# Patient Record
Sex: FEMALE | Race: WHITE | NOT HISPANIC OR LATINO | Employment: FULL TIME | URBAN - METROPOLITAN AREA
[De-identification: names, ages, dates, MRNs, and addresses within clinical notes are randomized per-mention and may not be internally consistent; named-entity substitution may affect disease eponyms.]

---

## 2017-05-25 ENCOUNTER — TELEPHONE (OUTPATIENT)
Dept: FAMILY MEDICINE | Facility: CLINIC | Age: 57
End: 2017-05-25

## 2017-05-25 ENCOUNTER — OFFICE VISIT (OUTPATIENT)
Dept: FAMILY MEDICINE | Facility: CLINIC | Age: 57
End: 2017-05-25
Payer: COMMERCIAL

## 2017-05-25 VITALS
WEIGHT: 145 LBS | OXYGEN SATURATION: 96 % | DIASTOLIC BLOOD PRESSURE: 82 MMHG | HEART RATE: 102 BPM | BODY MASS INDEX: 25.69 KG/M2 | SYSTOLIC BLOOD PRESSURE: 122 MMHG | TEMPERATURE: 98.5 F | HEIGHT: 63 IN

## 2017-05-25 DIAGNOSIS — L72.3 INFECTED SEBACEOUS CYST: Primary | ICD-10-CM

## 2017-05-25 DIAGNOSIS — L08.9 INFECTED SEBACEOUS CYST: Primary | ICD-10-CM

## 2017-05-25 PROCEDURE — 99213 OFFICE O/P EST LOW 20 MIN: CPT | Performed by: PHYSICIAN ASSISTANT

## 2017-05-25 RX ORDER — CEPHALEXIN 500 MG/1
500 CAPSULE ORAL 4 TIMES DAILY
Qty: 40 CAPSULE | Refills: 0 | Status: SHIPPED | OUTPATIENT
Start: 2017-05-25 | End: 2017-06-04

## 2017-05-25 NOTE — PATIENT INSTRUCTIONS
Sebaceous Cyst, Infected (Antibiotic Treatment)  A sebaceous cyst occurs when the opening of an oil gland in the skin becomes blocked. The gland swells with skin oil and dead skin cells. As it gets bigger, it looks like a small, painless lump under the skin. Sometimes the cyst can get infected. When the infection is limited, it can be treated with antibiotics alone. If the infection does not clear up with antibiotic treatment, the cyst will need to be drained by making a small incision using local anesthesia.  A sebaceous cyst is a term that most commonly refers to 2 types of cysts:  epidermoid  (from skin), and  pilar  (from hair follicles). Here is some information about these cysts:    A cyst is a sac filled with material that is often cheesy, fatty, oily, or fibrous material. The material in them can be thick (like cottage cheese) or liquid.    Sebaceous cysts form slowly under the skin, and can be found on most parts of the body. They are most commonly found in hairier areas like the scalp, face, upper back, and genitals.    You can usually move the cyst slightly if you try.    The cysts can be smaller than a pea or as large as a couple of inches.    The cysts are usually not painful, unless they become inflamed or infected.    The area around the cyst may smell bad. If the cyst breaks open, the material inside it often smells bad as well.  Treatment  While these cysts often go away without any treatment, they can get infected. If they drain by themselves, they may return. Resist the temptation to squeeze, pop, stick a needle in it or cut it open. This often leads to an infection and scarring. If the cyst gets severely inflamed or infected, you should seek medical treatment.  When the infection is early, and the size and location are limited, it can sometimes be treated with antibiotics alone. If the infection does not clear up with antibiotics and warm compresses, the cyst will need to be drained by making a  small incision using local anesthesia.  Home care  The following guidelines will help you care for your wound at home:    Again, resist the temptation to squeeze, pop, stick a needle in it, or cut it open; this often leads to a worsening infection and scarring.    Take the antibiotic as directed until it is all used up.    Soak the involved area in hot water or apply hot packs (small towel soaked in hot water) for 20 minutes at a time 3 to 4 times a day.    Apply antibiotic cream or ointment 3 times a day.    You may use acetaminophen or ibuprofen to control pain, unless another medicine was given. If you have chronic liver or kidney disease or ever had a stomach ulcer or GI bleeding, talk with your doctor before using these medicines.  Preventing future infection  Once this infection has healed, observe the following to reduce the risk of future infections:    Keep the area of the cyst clean by bathing or showering daily.    Avoid tight fitting clothing in the area of the cyst.  Follow-up care  Follow up with your doctor as advised by our staff. If a gauze packing was inserted in your wound, it should be removed in 1-2 days. Check your wound every day for the signs listed below.  When to seek medical care  Get prompt medical attention if any of the following occur:    Pus coming from the cyst    Increasing redness around the wound    Increasing local pain or swelling    Fever of 100.4 F (38 C) or higher, or as directed by your health care provider    3297-9484 [employer]. [address] [place] [address], [place] [address]. All rights reserved. This information is not intended as a substitute for professional medical care. Always follow your healthcare professional's instructions.

## 2017-05-25 NOTE — MR AVS SNAPSHOT
After Visit Summary   5/25/2017    Dulce Ma    MRN: [id number]           Patient Information     Date Of Birth          1960        Visit Information        Provider Department      5/25/2017 3:20 PM Tiana Krishna PA-C [facility]        Today's Diagnoses     Infected sebaceous cyst    -  1      Care Instructions      Sebaceous Cyst, Infected (Antibiotic Treatment)  A sebaceous cyst occurs when the opening of an oil gland in the skin becomes blocked. The gland swells with skin oil and dead skin cells. As it gets bigger, it looks like a small, painless lump under the skin. Sometimes the cyst can get infected. When the infection is limited, it can be treated with antibiotics alone. If the infection does not clear up with antibiotic treatment, the cyst will need to be drained by making a small incision using local anesthesia.  A sebaceous cyst is a term that most commonly refers to 2 types of cysts:  epidermoid  (from skin), and  pilar  (from hair follicles). Here is some information about these cysts:    A cyst is a sac filled with material that is often cheesy, fatty, oily, or fibrous material. The material in them can be thick (like cottage cheese) or liquid.    Sebaceous cysts form slowly under the skin, and can be found on most parts of the body. They are most commonly found in hairier areas like the scalp, face, upper back, and genitals.    You can usually move the cyst slightly if you try.    The cysts can be smaller than a pea or as large as a couple of inches.    The cysts are usually not painful, unless they become inflamed or infected.    The area around the cyst may smell bad. If the cyst breaks open, the material inside it often smells bad as well.  Treatment  While these cysts often go away without any treatment, they can get infected. If they drain by themselves, they may return. Resist the temptation to squeeze, pop, stick a needle in it or cut it open.  This often leads to an infection and scarring. If the cyst gets severely inflamed or infected, you should seek medical treatment.  When the infection is early, and the size and location are limited, it can sometimes be treated with antibiotics alone. If the infection does not clear up with antibiotics and warm compresses, the cyst will need to be drained by making a small incision using local anesthesia.  Home care  The following guidelines will help you care for your wound at home:    Again, resist the temptation to squeeze, pop, stick a needle in it, or cut it open; this often leads to a worsening infection and scarring.    Take the antibiotic as directed until it is all used up.    Soak the involved area in hot water or apply hot packs (small towel soaked in hot water) for 20 minutes at a time 3 to 4 times a day.    Apply antibiotic cream or ointment 3 times a day.    You may use acetaminophen or ibuprofen to control pain, unless another medicine was given. If you have chronic liver or kidney disease or ever had a stomach ulcer or GI bleeding, talk with your doctor before using these medicines.  Preventing future infection  Once this infection has healed, observe the following to reduce the risk of future infections:    Keep the area of the cyst clean by bathing or showering daily.    Avoid tight fitting clothing in the area of the cyst.  Follow-up care  Follow up with your doctor as advised by our staff. If a gauze packing was inserted in your wound, it should be removed in 1-2 days. Check your wound every day for the signs listed below.  When to seek medical care  Get prompt medical attention if any of the following occur:    Pus coming from the cyst    Increasing redness around the wound    Increasing local pain or swelling    Fever of 100.4 F (38 C) or higher, or as directed by your health care provider    2597-2036 [employer]. [address] [place] [address], [place] [address]. All rights reserved.  "This information is not intended as a substitute for professional medical care. Always follow your healthcare professional's instructions.                Follow-ups after your visit        Who to contact     If you have questions or need follow up information about today's clinic visit or your schedule please contact [facility] directly at [phone].  Normal or non-critical lab and imaging results will be communicated to you by MyChart, letter or phone within 4 business days after the clinic has received the results. If you do not hear from us within 7 days, please contact the clinic through [employer] or phone. If you have a critical or abnormal lab result, we will notify you by phone as soon as possible.  Submit refill requests through [email or web address] or call your pharmacy and they will forward the refill request to us. Please allow 3 business days for your refill to be completed.          Additional Information About Your Visit        MyChart Information     [email or web address] gives you secure access to your electronic health record. If you see a primary care provider, you can also send messages to your care team and make appointments. If you have questions, please call your primary care clinic.  If you do not have a primary care provider, please call [phone] and they will assist you.        Care EveryWhere ID     This is your Care EveryWhere ID. This could be used by other organizations to access your [place] medical records  [id number]        Your Vitals Were     Pulse Temperature Height Last Period Pulse Oximetry BMI (Body Mass Index)    102 98.5  F (36.9  C) (Tympanic) 5' 3\" (1.6 m) 10/31/2011 96% 25.69 kg/m2       Blood Pressure from Last 3 Encounters:   05/25/17 122/82   07/22/16 128/78   07/20/15 124/74    Weight from Last 3 Encounters:   05/25/17 145 lb (65.8 kg)   07/22/16 144 lb 6 oz (65.5 kg)   07/20/15 143 lb (64.9 kg)              Today, you had the following     No orders found for display    "      Today's Medication Changes          These changes are accurate as of: 5/25/17  3:45 PM.  If you have any questions, ask your nurse or doctor.               Start taking these medicines.        Dose/Directions    cephALEXin 500 MG capsule   Commonly known as:  KEFLEX   Used for:  Infected sebaceous cyst   Started by:  Tiana Krishna PA-C        Dose:  500 mg   Take 1 capsule (500 mg) by mouth 4 times daily for 10 days   Quantity:  40 capsule   Refills:  0            Where to get your medicines      These medications were sent to [facility] - [address] [address]     Phone:  [phone]     cephALEXin 500 MG capsule                Primary Care Provider Office Phone # Fax #    Alida Lamb [phone]       [address] [address]        Thank you!     Thank you for choosing [facility]  for your care. Our goal is always to provide you with excellent care. Hearing back from our patients is one way we can continue to improve our services. Please take a few minutes to complete the written survey that you may receive in the mail after your visit with us. Thank you!             Your Updated Medication List - Protect others around you: Learn how to safely use, store and throw away your medicines at www.disposemymeds.org.          This list is accurate as of: 5/25/17  3:45 PM.  Always use your most recent med list.                   Brand Name Dispense Instructions for use    aspirin 81 MG tablet     100 tablet    Take 1 tablet by mouth daily.       cephALEXin 500 MG capsule    KEFLEX    40 capsule    Take 1 capsule (500 mg) by mouth 4 times daily for 10 days       clobetasol 0.05 % cream    TEMOVATE         furosemide 20 MG tablet    LASIX    20 tablet    Take 0.5 tablets (10 mg) by mouth daily As needed for leg swelling        hydrocortisone 2.5 % cream     60 g    Apply to vulvar tissue and just inside vaginal opening 1-2x/day       latanoprost 0.005 % ophthalmic solution    XALATAN    1 Bottle    At Bedtime. For recent Dx of glaucoma       * lisinopril 5 MG tablet    PRINIVIL/ZESTRIL    90 tablet    Take 1 tablet (5 mg) by mouth daily       * lisinopril 5 MG tablet    PRINIVIL/ZESTRIL    90 tablet    TAKE 1 TABLET DAILY (DUE FOR YEARLY PHYSICAL FOR FUTURE REFILLS)       Multi-vitamin Tabs tablet     100 tablet    Take 1 tablet by mouth daily.       timolol 0.5 % ophthalmic solution    TIMOPTIC    1 Bottle    Place 1 drop into both eyes daily In the a.m. for Dx of glaucoma       * Notice:  This list has 2 medication(s) that are the same as other medications prescribed for you. Read the directions carefully, and ask your doctor or other care provider to review them with you.

## 2017-05-25 NOTE — TELEPHONE ENCOUNTER
Patient is concerned about a bug bite.  She does not remember getting stung.   It is not red, not itchy, not hot.    She noticed it Monday and it has gotten bigger and bigger every day.   On lower back by spine.  Not painful. Very hard, not squishy. She can't tell if it is movable.  Hard for her to view it.   She is outside a lot so she thinks it is a bug bite.  She has a tick earlier this week also.   ? If lump, cyst or bug bite.    She has not tried warm packs.   She is concerned and would like to be seen.      appt today with Tiana at 3:20  Bailey Hazel RN- Triage [employer]

## 2017-05-25 NOTE — PROGRESS NOTES
SUBJECTIVE:                                                    Dulce Ma is a 57 year old female who presents to clinic today for the following health issues:    Qianp  Dulce presents to clinic today with chief complaint of lump on low back. Onset of symptoms became noticeable ~4 days ago. Describes large hard lump on right low back that has been increasing in size since onset. Denies pain, warmth, redness, or drainage. Per patient she thinks lump is from a bug bite. Lump initially began as the size of a pea and has increased to the size of a quarter. Patient reports PMH significant for skin CA so any skin abnormalities always cause her some concern.    Problem list and histories reviewed & adjusted, as indicated.  Additional history: as documented    Patient Active Problem List   Diagnosis     Malignant melanoma of skin of trunk - Dr. Myles Lake - [facility]- rechecks w/ him 1x/year     Encounter for routine gynecological examination - [facility] - Dr. Isabela Perez      Essential hypertension with goal blood pressure less than 140/90     CARDIOVASCULAR SCREENING; LDL GOAL LESS THAN 130     Family history of heart failure- mother, MGM, maternal uncle     Chronic constipation- trying to do daily fiber therapy     Glaucoma- sees Dr. Harvey Brown - [facility] [place], [place]     Dysplastic nevi     Skin cancer, basal cell     Past Surgical History:   Procedure Laterality Date     C NONSPECIFIC PROCEDURE  3/02    malignant melanoma removed from abdomen with negative nodes-Dr. Acosta -surgeon       Social History   Substance Use Topics     Smoking status: Never Smoker     Smokeless tobacco: Never Used     Alcohol use Yes      Comment: occ.     Family History   Problem Relation Age of Onset     Hypertension Mother      HEART DISEASE Mother      bypass     Hypertension Father      Hypertension Paternal Grandmother      Eye Disorder Maternal Grandmother      glacoma     Eye Disorder  Maternal Grandfather      mendez         Current Outpatient Prescriptions   Medication Sig Dispense Refill     cephALEXin (KEFLEX) 500 MG capsule Take 1 capsule (500 mg) by mouth 4 times daily for 10 days 40 capsule 0     lisinopril (PRINIVIL,ZESTRIL) 5 MG tablet Take 1 tablet (5 mg) by mouth daily 90 tablet 3     furosemide (LASIX) 20 MG tablet Take 0.5 tablets (10 mg) by mouth daily As needed for leg swelling 20 tablet 1     clobetasol (TEMOVATE) 0.05 % cream        timolol (TIMOPTIC) 0.5 % ophthalmic solution Place 1 drop into both eyes daily In the a.m. for Dx of glaucoma 1 Bottle      hydrocortisone 2.5 % cream Apply to vulvar tissue and just inside vaginal opening 1-2x/day 60 g 3     multivitamin, therapeutic with minerals (MULTI-VITAMIN) TABS Take 1 tablet by mouth daily. 100 tablet 3     latanoprost (XALATAN) 0.005 % ophthalmic solution At Bedtime. For recent Dx of glaucoma 1 Bottle 12     aspirin 81 MG tablet Take 1 tablet by mouth daily. 100 tablet 3     [DISCONTINUED] lisinopril (PRINIVIL,ZESTRIL) 5 MG tablet TAKE 1 TABLET DAILY (DUE FOR YEARLY PHYSICAL FOR FUTURE REFILLS) 90 tablet 3     Allergies   Allergen Reactions     Amoxicillin      hives     Benzoyl Peroxide      swelling     Ibuprofen      over long duration dev. hives     Penicillins      hives       Reviewed and updated as needed this visit by clinical staff  Tobacco  Allergies  Meds  Problems  Med Hx  Surg Hx  Fam Hx  Soc Hx        Reviewed and updated as needed this visit by Provider  Tobacco  Allergies  Meds  Problems  Med Hx  Surg Hx  Fam Hx  Soc Hx          ROS:  Constitutional, HEENT, cardiovascular, pulmonary, GI, , musculoskeletal, neuro, skin, endocrine and psych systems are negative, except as otherwise noted.    This document serves as a record of the services and decisions personally performed and made by Tiana Krishna PA-C. It was created on her behalf by Nusrat Lisa, a trained medical scribe. The creation of this  "document is based the provider's statements to the medical scribe.  Nusrat Lisa, May 25, 2017 3:43 PM    OBJECTIVE:                                                    /82  Pulse 102  Temp 98.5  F (36.9  C) (Tympanic)  Ht 5' 3\" (1.6 m)  Wt 145 lb (65.8 kg)  LMP 10/31/2011  SpO2 96%  BMI 25.69 kg/m2  Body mass index is 25.69 kg/(m^2).     GENERAL: healthy, alert and no distress  MS: well circumscribes area of tension at base of right low back measuring 2.5 cm in diameter and active drainage with compression - drained a small amount with compression today - foul smelling discharge.  NEURO: Normal strength and tone, mentation intact and speech normal  PSYCH: mentation appears normal, affect normal/bright    Diagnostic Test Results:  none      ASSESSMENT/PLAN:                                                    Dulce was seen today for insect bites.    Diagnoses and all orders for this visit:    Infected sebaceous cyst  Advised patient to do warm compresses BID for at least the next 5 days. Begin taking cephalexin QID for 10 days. If symptoms not improving RTC for I&D   -     cephALEXin (KEFLEX) 500 MG capsule; Take 1 capsule (500 mg) by mouth 4 times daily for 10 days     The information in this document, created by the medical scribe for me, accurately reflects the services I personally performed and the decisions made by me. I have reviewed and approved this document for accuracy prior to leaving the patient care area .  Tiana Krishna PA-C May 25, 2017 3:42 PM    Tiana Krishna PA-C  [facility]  "

## 2017-05-25 NOTE — TELEPHONE ENCOUNTER
Reason for Call:  Other - call back    Detailed comments: Patient calling stating she was bit by a bug a couple of days ago.  It seems to be getting larger and there is a lump on her back.  She would like to talk with the nurse regarding whether she should be seen or not.    Phone Number Patient can be reached at: Cell number on file:    Telephone Information:   Mobile [phone]       Best Time: any    Can we leave a detailed message on this number? YES    Call taken on 5/25/2017 at 8:23 AM by Li Morris

## 2017-05-25 NOTE — NURSING NOTE
"Chief Complaint   Patient presents with     Insect Bites     right lower back bug bite ~4 days        Initial /82  Pulse 102  Temp 98.5  F (36.9  C) (Tympanic)  Ht 5' 3\" (1.6 m)  Wt 145 lb (65.8 kg)  LMP 10/31/2011  SpO2 96%  BMI 25.69 kg/m2 Estimated body mass index is 25.69 kg/(m^2) as calculated from the following:    Height as of this encounter: 5' 3\" (1.6 m).    Weight as of this encounter: 145 lb (65.8 kg).  Medication Reconciliation: complete   Debbie Scherer, LEONILA      "

## 2017-06-03 ENCOUNTER — HEALTH MAINTENANCE LETTER (OUTPATIENT)
Age: 57
End: 2017-06-03

## 2017-08-03 ENCOUNTER — RADIANT APPOINTMENT (OUTPATIENT)
Dept: MAMMOGRAPHY | Facility: CLINIC | Age: 57
End: 2017-08-03
Payer: COMMERCIAL

## 2017-08-03 DIAGNOSIS — Z12.31 VISIT FOR SCREENING MAMMOGRAM: ICD-10-CM

## 2017-08-03 PROCEDURE — G0202 SCR MAMMO BI INCL CAD: HCPCS | Mod: TC

## 2017-09-15 ENCOUNTER — OFFICE VISIT (OUTPATIENT)
Dept: FAMILY MEDICINE | Facility: CLINIC | Age: 57
End: 2017-09-15
Payer: COMMERCIAL

## 2017-09-15 VITALS
OXYGEN SATURATION: 100 % | HEART RATE: 66 BPM | HEIGHT: 63 IN | BODY MASS INDEX: 25.55 KG/M2 | TEMPERATURE: 98.2 F | SYSTOLIC BLOOD PRESSURE: 138 MMHG | DIASTOLIC BLOOD PRESSURE: 90 MMHG | WEIGHT: 144.2 LBS

## 2017-09-15 DIAGNOSIS — I10 ESSENTIAL HYPERTENSION WITH GOAL BLOOD PRESSURE LESS THAN 140/90: ICD-10-CM

## 2017-09-15 DIAGNOSIS — Z00.01 ENCOUNTER FOR ROUTINE ADULT MEDICAL EXAM WITH ABNORMAL FINDINGS: Primary | ICD-10-CM

## 2017-09-15 DIAGNOSIS — Z23 NEED FOR PROPHYLACTIC VACCINATION AND INOCULATION AGAINST INFLUENZA: ICD-10-CM

## 2017-09-15 DIAGNOSIS — Z13.6 CARDIOVASCULAR SCREENING; LDL GOAL LESS THAN 130: ICD-10-CM

## 2017-09-15 DIAGNOSIS — Z12.11 SCREEN FOR COLON CANCER: ICD-10-CM

## 2017-09-15 DIAGNOSIS — R60.0 BILATERAL LEG EDEMA: ICD-10-CM

## 2017-09-15 DIAGNOSIS — Z23 NEED FOR SHINGLES VACCINE: ICD-10-CM

## 2017-09-15 DIAGNOSIS — N90.4 LICHEN SCLEROSUS ET ATROPHICUS OF THE VULVA: ICD-10-CM

## 2017-09-15 PROCEDURE — 99396 PREV VISIT EST AGE 40-64: CPT | Performed by: FAMILY MEDICINE

## 2017-09-15 PROCEDURE — 82043 UR ALBUMIN QUANTITATIVE: CPT | Performed by: FAMILY MEDICINE

## 2017-09-15 RX ORDER — LISINOPRIL 5 MG/1
5 TABLET ORAL DAILY
Qty: 90 TABLET | Refills: 3 | Status: SHIPPED | OUTPATIENT
Start: 2017-09-15 | End: 2018-11-21

## 2017-09-15 RX ORDER — CLOBETASOL PROPIONATE 0.5 MG/G
CREAM TOPICAL
Qty: 45 G | Refills: 1 | Status: SHIPPED | OUTPATIENT
Start: 2017-09-15 | End: 2021-11-22

## 2017-09-15 RX ORDER — FUROSEMIDE 20 MG
10 TABLET ORAL DAILY
Qty: 20 TABLET | Refills: 1 | Status: SHIPPED | OUTPATIENT
Start: 2017-09-15 | End: 2019-01-11

## 2017-09-15 ASSESSMENT — ANXIETY QUESTIONNAIRES
5. BEING SO RESTLESS THAT IT IS HARD TO SIT STILL: NOT AT ALL
GAD7 TOTAL SCORE: 1
2. NOT BEING ABLE TO STOP OR CONTROL WORRYING: NOT AT ALL
7. FEELING AFRAID AS IF SOMETHING AWFUL MIGHT HAPPEN: NOT AT ALL
3. WORRYING TOO MUCH ABOUT DIFFERENT THINGS: NOT AT ALL
1. FEELING NERVOUS, ANXIOUS, OR ON EDGE: NOT AT ALL
IF YOU CHECKED OFF ANY PROBLEMS ON THIS QUESTIONNAIRE, HOW DIFFICULT HAVE THESE PROBLEMS MADE IT FOR YOU TO DO YOUR WORK, TAKE CARE OF THINGS AT HOME, OR GET ALONG WITH OTHER PEOPLE: NOT DIFFICULT AT ALL
6. BECOMING EASILY ANNOYED OR IRRITABLE: NOT AT ALL

## 2017-09-15 ASSESSMENT — PATIENT HEALTH QUESTIONNAIRE - PHQ9
SUM OF ALL RESPONSES TO PHQ QUESTIONS 1-9: 0
5. POOR APPETITE OR OVEREATING: SEVERAL DAYS

## 2017-09-15 NOTE — PROGRESS NOTES
SUBJECTIVE:   CC: Dulce Ma is an 57 year old woman who presents for preventive health visit.     Healthy Habits:    Do you get at least three servings of calcium containing foods daily (dairy, green leafy vegetables, etc.)? yes    Amount of exercise or daily activities, outside of work: horse chores    Problems taking medications regularly No    Medication side effects: No    Have you had an eye exam in the past two years? yes    Do you see a dentist twice per year? No - once per year    Do you have sleep apnea, excessive snoring or daytime drowsiness?no      Hypertension Follow-up:       Outpatient blood pressures are not being checked.    Low Salt Diet: no added salt    BP Readings from Last 5 Encounters:   09/15/17 138/90   05/25/17 122/82   07/22/16 128/78   07/20/15 124/74   07/11/14 120/86       Today's PHQ-2 Score:   PHQ-2 ( 1999 [employer]) 9/15/2017 7/22/2016   Q1: Little interest or pleasure in doing things 0 0   Q2: Feeling down, depressed or hopeless 0 0   PHQ-2 Score 0 0       Abuse: Current or Past(Physical, Sexual or Emotional)- No  Do you feel safe in your environment - Yes      Social History   Substance Use Topics     Smoking status: Never Smoker     Smokeless tobacco: Never Used     Alcohol use Yes      Comment: occ.     The patient does not drink >3 drinks per day nor >7 drinks per week.    Reviewed orders with patient.  Reviewed health maintenance and updated orders accordingly - Yes  BP Readings from Last 3 Encounters:   09/15/17 138/90   05/25/17 122/82   07/22/16 128/78    Wt Readings from Last 3 Encounters:   09/15/17 144 lb 3.2 oz (65.4 kg)   05/25/17 145 lb (65.8 kg)   07/22/16 144 lb 6 oz (65.5 kg)               Patient Active Problem List   Diagnosis     Malignant melanoma of skin of trunk - Dr. Myles Lake - [facility]- rechecks w/ him 1x/year     Encounter for routine gynecological examination - [facility] - Dr. Isabela Perez      Essential hypertension with  goal blood pressure less than 140/90     CARDIOVASCULAR SCREENING; LDL GOAL LESS THAN 130     Family history of heart failure- mother, MGM, maternal uncle     Chronic constipation- trying to do daily fiber therapy     Glaucoma- sees Dr. Harvey Brown - [facility] - [place], [place]     Dysplastic nevi     Skin cancer, basal cell     Lichen sclerosus et atrophicus of the vulva     Past Surgical History:   Procedure Laterality Date     C NONSPECIFIC PROCEDURE  3/02    malignant melanoma removed from abdomen with negative nodes-Dr. Acosta -surgeon       Social History   Substance Use Topics     Smoking status: Never Smoker     Smokeless tobacco: Never Used     Alcohol use Yes      Comment: occ.     Family History   Problem Relation Age of Onset     Hypertension Mother      HEART DISEASE Mother      bypass     Neuropathy Mother      Hypertension Father      Hypertension Paternal Grandmother      Eye Disorder Maternal Grandmother      glacoma     Eye Disorder Maternal Grandfather      glacoma         Current Outpatient Prescriptions   Medication Sig Dispense Refill     lisinopril (PRINIVIL/ZESTRIL) 5 MG tablet Take 1 tablet (5 mg) by mouth daily 90 tablet 3     furosemide (LASIX) 20 MG tablet Take 0.5 tablets (10 mg) by mouth daily As needed for leg swelling 20 tablet 1     zoster vaccine live, PF, (ZOSTAVAX) injection Inject 0.65 mLs Subcutaneous once for 1 dose 0.65 mL 0     clobetasol (TEMOVATE) 0.05 % cream Apply very scant amount to inner labia daily for up to 14 days at a time -ok to use every 2 months or so 45 g 1     timolol (TIMOPTIC) 0.5 % ophthalmic solution Place 1 drop into both eyes daily In the a.m. for Dx of glaucoma 1 Bottle      multivitamin, therapeutic with minerals (MULTI-VITAMIN) TABS Take 1 tablet by mouth daily. 100 tablet 3     latanoprost (XALATAN) 0.005 % ophthalmic solution At Bedtime. For recent Dx of glaucoma 1 Bottle 12     [DISCONTINUED] lisinopril (PRINIVIL,ZESTRIL) 5 MG tablet Take 1  tablet (5 mg) by mouth daily 90 tablet 3     [DISCONTINUED] furosemide (LASIX) 20 MG tablet Take 0.5 tablets (10 mg) by mouth daily As needed for leg swelling 20 tablet 1     Allergies   Allergen Reactions     Amoxicillin      hives     Benzoyl Peroxide      swelling     Ibuprofen      over long duration dev. hives     Penicillins      hives     Recent Labs   Lab Test  07/22/16   0907  07/20/15   0945  05/17/13   1048   10/26/10   1010   LDL  145*  133*  122   < >  116   HDL  61  60  63   < >  64   TRIG  158*  152*  107   < >  125   ALT  16  21  17   --   19   CR  0.73  0.83  0.79   < >  0.86   GFRESTIMATED  83  72  76   < >  70   GFRESTBLACK  >90   GFR Calc    87  >90   < >  85   POTASSIUM  4.6  5.0  4.7   < >  4.4   TSH   --    --   0.96   --   1.30    < > = values in this interval not displayed.      Mammogram: Patient over age 50, mutual decision to screen reflected in health maintenance.    Ma Screening Digital Bilateral    Result Date: 8/4/2017  Narrative: SCREENING MAMMOGRAM, BILATERAL, DIGITAL w/CAD - 8/3/2017 12:56 PM BREAST SYMPTOMS: No current breast complaints. COMPARISON:  07/11/2011, 2/11/2010, 4/27/2007 . BREAST DENSITY: Heterogeneously dense. COMMENTS: No findings of suspicion for malignancy.       History of abnormal Pap smear: NO - age 30- 65 PAP every 3 years recommended    No results found for: PAP      Reviewed and updated as needed this visit by clinical staff  Tobacco  Allergies  Meds  Med Hx  Surg Hx  Fam Hx  Soc Hx        Reviewed and updated as needed this visit by Provider  Fam Hx          Wt Readings from Last 5 Encounters:   09/15/17 144 lb 3.2 oz (65.4 kg)   05/25/17 145 lb (65.8 kg)   07/22/16 144 lb 6 oz (65.5 kg)   07/20/15 143 lb (64.9 kg)   07/11/14 138 lb (62.6 kg)       ROS:  C: NEGATIVE for fever, chills, change in weight  I: NEGATIVE for worrisome rashes, moles or lesions  E: NEGATIVE for vision changes or irritation  ENT: NEGATIVE for ear, mouth and  "throat problems  R: NEGATIVE for significant cough or SOB  B: NEGATIVE for masses, tenderness or discharge  CV: NEGATIVE for chest pain, palpitations or peripheral edema  GI: NEGATIVE for nausea, abdominal pain, heartburn, or change in bowel habits  : NEGATIVE for unusual urinary or vaginal symptoms. No vaginal bleeding.  M: NEGATIVE for significant arthralgias or myalgia  N: NEGATIVE for weakness, dizziness or paresthesias  P: NEGATIVE for changes in mood or affect     OBJECTIVE:   /90 (BP Location: Left arm, Patient Position: Chair, Cuff Size: Adult Large)  Pulse 66  Temp 98.2  F (36.8  C) (Oral)  Ht 5' 2.75\" (1.594 m)  Wt 144 lb 3.2 oz (65.4 kg)  LMP 10/31/2011  SpO2 100%  BMI 25.75 kg/m2  EXAM:  GENERAL APPEARANCE: healthy, alert and no distress  EYES: Eyes grossly normal to inspection, PERRL and conjunctivae and sclerae normal  HENT: ear canals and TM's normal, nose and mouth without ulcers or lesions, oropharynx clear and oral mucous membranes moist  NECK: no adenopathy, no asymmetry, masses, or scars and thyroid normal to palpation  RESP: lungs clear to auscultation - no rales, rhonchi or wheezes  BREAST: normal without masses, tenderness or nipple discharge and no palpable axillary masses or adenopathy  CV: regular rate and rhythm, normal S1 S2, no S3 or S4, no murmur, click or rub, no peripheral edema and peripheral pulses strong  ABDOMEN: soft, nontender, no hepatosplenomegaly, no masses and bowel sounds normal   (female): normal female external genitalia with exception of very mild lichen sclerosis et atrophicus in the inferior inner labia near the introitus , normal urethral meatus, vaginal mucosal atrophy noted, normal cervix and  adnexae, and uterus without masses on bimanual exam, no abnormal discharge  MS: no musculoskeletal defects are noted and gait is age appropriate without ataxia, no pedal edema noted today.   SKIN: no suspicious lesions or rashes  NEURO: Normal strength and " "tone, sensory exam grossly normal, mentation intact and speech normal  PSYCH: mentation appears normal and affect normal/bright    ASSESSMENT/PLAN:       ICD-10-CM    1. Encounter for routine adult medical exam with abnormal findings Z00.01    2. Essential hypertension with goal blood pressure less than 140/90- fairly good control today - encouraged regular walking program  I10 Albumin Random Urine Quantitative with Creat Ratio     Comprehensive metabolic panel     CBC with platelets     lisinopril (PRINIVIL/ZESTRIL) 5 MG tablet   3. Screen for colon cancer Z12.11 GASTROENTEROLOGY ADULT REF PROCEDURE ONLY     Fecal colorectal cancer screen FIT - Future (S+30)   4. Need for prophylactic vaccination and inoculation against influenza Z23    5. CARDIOVASCULAR SCREENING; LDL GOAL LESS THAN 130 Z13.6 TSH with free T4 reflex     Lipid panel reflex to direct LDL   6. Bilateral leg edema R60.0 furosemide (LASIX) 20 MG tablet   7. Need for shingles vaccine Z23 zoster vaccine live, PF, (ZOSTAVAX) injection   8. Lichen sclerosus et atrophicus of the vulva N90.4 clobetasol (TEMOVATE) 0.05 % cream       COUNSELING:   Reviewed preventive health counseling, as reflected in patient instructions    Recheck your blood pressure in our pharmacy in 1 month or sooner if needed.  Have pharmacy send me their note.       reports that she has never smoked. She has never used smokeless tobacco.    Estimated body mass index is 25.75 kg/(m^2) as calculated from the following:    Height as of this encounter: 5' 2.75\" (1.594 m).    Weight as of this encounter: 144 lb 3.2 oz (65.4 kg).   Weight management plan: see next     Start walking for exercise - If walking outside,   - rain or shine - walk a block, then come home, next day walk   2 blocks , then come home ; and then add a block further from home daily - work up to 30-45minutes daily or 3-4x/week - or can work  up to  3-4 miles briskly daily.       If walking on treadmill or at  the mall, " "start with 5 minutes first day, then 6 minutes next day , then 7 minutes next day, then 8 minutes next day, etc.   Gradually work up to the above goals.  No stopping.         Establish an exercise regimen. Activity goal: 45 minutes 5 days a week. New exercise routine: cardiovascular workout on exercise equipment, walking and weightlifting. Diet regimen was discussed and plan is self-directed dieting: reduce calories, reduce portions, reduce processed  carbs, increase fruits/vegetables and avoid sweets and supervised diet program. Avoid artificial sweeteners and \"diet\" drinks and sodas.       Pt getting flu shot through work.   Counseling Resources:  ATP IV Guidelines  Pooled Cohorts Equation Calculator  Breast Cancer Risk Calculator  FRAX Risk Assessment  ICSI Preventive Guidelines  Dietary Guidelines for Americans, 2010  USDA's MyPlate  ASA Prophylaxis  Lung CA Screening    Alida Labm MD  [facility]  "

## 2017-09-15 NOTE — NURSING NOTE
"Chief Complaint   Patient presents with     Physical       Initial /90 (BP Location: Left arm, Patient Position: Chair, Cuff Size: Adult Large)  Pulse 66  Temp 98.2  F (36.8  C) (Oral)  Ht 5' 2.75\" (1.594 m)  Wt 144 lb 3.2 oz (65.4 kg)  LMP 10/31/2011  SpO2 100%  BMI 25.75 kg/m2 Estimated body mass index is 25.75 kg/(m^2) as calculated from the following:    Height as of this encounter: 5' 2.75\" (1.594 m).    Weight as of this encounter: 144 lb 3.2 oz (65.4 kg).  Medication Reconciliation: complete   Lisandra Mercer CMA  "

## 2017-09-15 NOTE — PATIENT INSTRUCTIONS
"Start walking for exercise - If walking outside,   - rain or shine - walk a block, then come home, next day walk   2 blocks , then come home ; and then add a block further from home daily - work up to 30-45minutes daily or 3-4x/week - or can work  up to  3-4 miles briskly daily.       If walking on treadmill or at  the mall, start with 5 minutes first day, then 6 minutes next day , then 7 minutes next day, then 8 minutes next day, etc.   Gradually work up to the above goals.  No stopping.         Establish an exercise regimen. Activity goal: 45 minutes 5 days a week. New exercise routine: cardiovascular workout on exercise equipment, walking and weightlifting. Diet regimen was discussed and plan is self-directed dieting: reduce calories, reduce portions, reduce processed  carbs, increase fruits/vegetables and avoid sweets and supervised diet program. Avoid artificial sweeteners and \"diet\" drinks and sodas.       check your blood pressure in our pharmacy in 1 month or sooner if needed.  Have pharmacy send me their note.     Preventive Health Recommendations  Female Ages 50 - 64    Yearly exam: See your health care provider every year in order to  o Review health changes.   o Discuss preventive care.    o Review your medicines if your doctor has prescribed any.      Get a Pap test every three years (unless you have an abnormal result and your provider advises testing more often).    If you get Pap tests with HPV test, you only need to test every 5 years, unless you have an abnormal result.     You do not need a Pap test if your uterus was removed (hysterectomy) and you have not had cancer.    You should be tested each year for STDs (sexually transmitted diseases) if you're at risk.     Have a mammogram every 1 to 2 years.    Have a colonoscopy at age 50, or have a yearly FIT test (stool test). These exams screen for colon cancer.      Have a cholesterol test every 5 years, or more often if advised.    Have a diabetes " test (fasting glucose) every three years. If you are at risk for diabetes, you should have this test more often.     If you are at risk for osteoporosis (brittle bone disease), think about having a bone density scan (DEXA).    Shots: Get a flu shot each year. Get a tetanus shot every 10 years.    Nutrition:     Eat at least 5 servings of fruits and vegetables each day.    Eat whole-grain bread, whole-wheat pasta and brown rice instead of white grains and rice.    Talk to your provider about Calcium and Vitamin D.     Lifestyle    Exercise at least 150 minutes a week (30 minutes a day, 5 days a week). This will help you control your weight and prevent disease.    Limit alcohol to one drink per day.    No smoking.     Wear sunscreen to prevent skin cancer.     See your dentist every six months for an exam and cleaning.    See your eye doctor every 1 to 2 years.               Thank you for choosing [facility]  for your Health Care. It was a pleasure seeing you at your visit today. Please contact us with any questions or concerns you may have.                   Alida Lamb MD                                  To reach your [facility] care team after hours call:   [phone]    Our clinic hours are:     Monday- 7:30 am - 7:00 pm                             Tuesday through Friday- 7:30 am - 5:00 pm                                        Saturday- 8:00 am - 12:00 pm                  Phone:  [phone]    Our pharmacy hours are:     Monday  8:00 am to 7:00 pm      Tuesday through Friday 8:00am to 6:00pm                        Saturday - 9:00 am to 1:00 pm      Sunday : Closed.              Phone:  [phone]      There is also information available at our web site:  [email or web address]    If your provider ordered any lab tests and you do not receive the results within 10 business days, please call the clinic.    If you need a medication refill please contact your pharmacy.   Please allow 2 business days for your refill to be completed.    Our clinic offers telephone visits and e visits.  Please ask one of your team members to explain more.      Use [facility] (secure email communication and access to your chart) to send your primary care provider a message or make an appointment. Ask someone on your Team how to sign up for [facility] [employer].

## 2017-09-15 NOTE — LETTER
[address] [address]  [phone]        November 20, 2017    Dulce Ma  [id number]  [place] [facility] [address]              Dear Dulce Ma    This is to remind you that your fasting lab work is due.    You may call our office at [phone] to schedule an appointment.    Please disregard this notice if you have already had your labs drawn or made an appointment.        Sincerely,        Alida Lamb M.D.

## 2017-09-15 NOTE — MR AVS SNAPSHOT
"              After Visit Summary   9/15/2017    Dulce Ma    MRN: [id number]           Patient Information     Date Of Birth          1960        Visit Information        Provider Department      9/15/2017 2:45 PM Alida Lamb MD [facility]        Today's Diagnoses     Encounter for routine adult medical exam with abnormal findings    -  1    Essential hypertension with goal blood pressure less than 140/90- fairly good control today - encouraged regular walking program         Screen for colon cancer        Need for prophylactic vaccination and inoculation against influenza        CARDIOVASCULAR SCREENING; LDL GOAL LESS THAN 130        Bilateral leg edema        Need for shingles vaccine        Lichen sclerosus et atrophicus of the vulva          Care Instructions    Start walking for exercise - If walking outside,   - rain or shine - walk a block, then come home, next day walk   2 blocks , then come home ; and then add a block further from home daily - work up to 30-45minutes daily or 3-4x/week - or can work  up to  3-4 miles briskly daily.       If walking on treadmill or at  the mall, start with 5 minutes first day, then 6 minutes next day , then 7 minutes next day, then 8 minutes next day, etc.   Gradually work up to the above goals.  No stopping.         Establish an exercise regimen. Activity goal: 45 minutes 5 days a week. New exercise routine: cardiovascular workout on exercise equipment, walking and weightlifting. Diet regimen was discussed and plan is self-directed dieting: reduce calories, reduce portions, reduce processed  carbs, increase fruits/vegetables and avoid sweets and supervised diet program. Avoid artificial sweeteners and \"diet\" drinks and sodas.       check your blood pressure in our pharmacy in 1 month or sooner if needed.  Have pharmacy send me their note.     Preventive Health Recommendations  Female Ages 50 - 64    Yearly exam: See your health care " provider every year in order to  o Review health changes.   o Discuss preventive care.    o Review your medicines if your doctor has prescribed any.      Get a Pap test every three years (unless you have an abnormal result and your provider advises testing more often).    If you get Pap tests with HPV test, you only need to test every 5 years, unless you have an abnormal result.     You do not need a Pap test if your uterus was removed (hysterectomy) and you have not had cancer.    You should be tested each year for STDs (sexually transmitted diseases) if you're at risk.     Have a mammogram every 1 to 2 years.    Have a colonoscopy at age 50, or have a yearly FIT test (stool test). These exams screen for colon cancer.      Have a cholesterol test every 5 years, or more often if advised.    Have a diabetes test (fasting glucose) every three years. If you are at risk for diabetes, you should have this test more often.     If you are at risk for osteoporosis (brittle bone disease), think about having a bone density scan (DEXA).    Shots: Get a flu shot each year. Get a tetanus shot every 10 years.    Nutrition:     Eat at least 5 servings of fruits and vegetables each day.    Eat whole-grain bread, whole-wheat pasta and brown rice instead of white grains and rice.    Talk to your provider about Calcium and Vitamin D.     Lifestyle    Exercise at least 150 minutes a week (30 minutes a day, 5 days a week). This will help you control your weight and prevent disease.    Limit alcohol to one drink per day.    No smoking.     Wear sunscreen to prevent skin cancer.     See your dentist every six months for an exam and cleaning.    See your eye doctor every 1 to 2 years.               Thank you for choosing [facility]  for your Health Care. It was a pleasure seeing you at your visit today. Please contact us with any questions or concerns you may have.                   Alida Lamb MD                                   To reach your [facility] care team after hours call:   [phone]    Our clinic hours are:     Monday- 7:30 am - 7:00 pm                             Tuesday through Friday- 7:30 am - 5:00 pm                                        Saturday- 8:00 am - 12:00 pm                  Phone:  [phone]    Our pharmacy hours are:     Monday  8:00 am to 7:00 pm      Tuesday through Friday 8:00am to 6:00pm                        Saturday - 9:00 am to 1:00 pm      Sunday : Closed.              Phone:  [phone]      There is also information available at our web site:  [email or web address]    If your provider ordered any lab tests and you do not receive the results within 10 business days, please call the clinic.    If you need a medication refill please contact your pharmacy.  Please allow 2 business days for your refill to be completed.    Our clinic offers telephone visits and e visits.  Please ask one of your team members to explain more.      Use Insightfulinc (secure email communication and access to your chart) to send your primary care provider a message or make an appointment. Ask someone on your Team how to sign up for Insightfulinc.                         Follow-ups after your visit        Additional Services     GASTROENTEROLOGY ADULT REF PROCEDURE ONLY       Last Lab Result: Creatinine (mg/dL)       Date                     Value                 07/22/2016               0.73             ----------  There is no height or weight on file to calculate BMI.     Needed:  No  Language:  English    Patient will be contacted to schedule procedure.     Please be aware that coverage of these services is subject to the terms and limitations of your health insurance plan.  Call member services at your health plan with any benefit or coverage questions.  Any procedures must be performed at a [facility] facility OR coordinated by your clinic's referral office.    Please bring the  following with you to your appointment:    (1) Any X-Rays, CTs or MRIs which have been performed.  Contact the facility where they were done to arrange for  prior to your scheduled appointment.    (2) List of current medications   (3) This referral request   (4) Any documents/labs given to you for this referral                  Future tests that were ordered for you today     Open Future Orders        Priority Expected Expires Ordered    Fecal colorectal cancer screen FIT - Future (S+30) Routine 10/6/2017 10/15/2017 9/15/2017    Comprehensive metabolic panel Routine 9/23/2017 11/15/2017 9/15/2017    CBC with platelets Routine 9/23/2017 11/15/2017 9/15/2017    TSH with free T4 reflex Routine 9/23/2017 11/15/2017 9/15/2017    Lipid panel reflex to direct LDL Routine 9/23/2017 11/15/2017 9/15/2017            Who to contact     If you have questions or need follow up information about today's clinic visit or your schedule please contact [facility] directly at [phone].  Normal or non-critical lab and imaging results will be communicated to you by My Ad Boxhart, letter or phone within 4 business days after the clinic has received the results. If you do not hear from us within 7 days, please contact the clinic through OpenEdt or phone. If you have a critical or abnormal lab result, we will notify you by phone as soon as possible.  Submit refill requests through NEMOPTIC or call your pharmacy and they will forward the refill request to us. Please allow 3 business days for your refill to be completed.          Additional Information About Your Visit        NEMOPTIC Information     NEMOPTIC gives you secure access to your electronic health record. If you see a primary care provider, you can also send messages to your care team and make appointments. If you have questions, please call your primary care clinic.  If you do not have a primary care provider, please call [phone] and they will assist you.    "     Care EveryWhere ID     This is your Care EveryWhere ID. This could be used by other organizations to access your [facility] medical records  [id number]        Your Vitals Were     Pulse Temperature Height Last Period Pulse Oximetry BMI (Body Mass Index)    66 98.2  F (36.8  C) (Oral) 5' 2.75\" (1.594 m) 10/31/2011 100% 25.75 kg/m2       Blood Pressure from Last 3 Encounters:   09/15/17 138/90   05/25/17 122/82   07/22/16 128/78    Weight from Last 3 Encounters:   09/15/17 144 lb 3.2 oz (65.4 kg)   05/25/17 145 lb (65.8 kg)   07/22/16 144 lb 6 oz (65.5 kg)              We Performed the Following     Albumin Random Urine Quantitative with Creat Ratio     GASTROENTEROLOGY ADULT REF PROCEDURE ONLY          Today's Medication Changes          These changes are accurate as of: 9/15/17  4:17 PM.  If you have any questions, ask your nurse or doctor.               Start taking these medicines.        Dose/Directions    zoster vaccine live (PF) injection   Commonly known as:  ZOSTAVAX   Used for:  Need for shingles vaccine   Started by:  Alida Lamb MD        Dose:  1 each   Inject 0.65 mLs Subcutaneous once for 1 dose   Quantity:  0.65 mL   Refills:  0         These medicines have changed or have updated prescriptions.        Dose/Directions    clobetasol 0.05 % cream   Commonly known as:  TEMOVATE   This may have changed:    - when to take this  - reasons to take this  - additional instructions   Used for:  Lichen sclerosus et atrophicus of the vulva   Changed by:  Alida Lamb MD        Apply very scant amount to inner labia daily for up to 14 days at a time -ok to use every 2 months or so   Quantity:  45 g   Refills:  1         Stop taking these medicines if you haven't already. Please contact your care team if you have questions.     aspirin 81 MG tablet   Stopped by:  Alida Lamb MD           hydrocortisone 2.5 % cream   Stopped by:  Alida Lamb MD                Where " to get your medicines      Some of these will need a paper prescription and others can be bought over the counter.  Ask your nurse if you have questions.     Bring a paper prescription for each of these medications     clobetasol 0.05 % cream    furosemide 20 MG tablet    lisinopril 5 MG tablet    zoster vaccine live (PF) injection                Primary Care Provider Office Phone # Fax #    Alida Lamb [phone]       [address] [place] [address]        Equal Access to Services     [facility] : Hadii aad ku hadasho Soomaali, waaxda luqadaha, qaybta kaalmada adeegyada, waxay matthewin haymelinan fabricio chavez . So [facility] [phone].    ATENCIÓN: Si habla español, tiene a mosher disposición servicios gratuitos de asistencia lingüística. [facility] [phone].    We comply with applicable federal civil rights laws and [place] laws. We do not discriminate on the basis of race, color, national origin, age, disability sex, sexual orientation or gender identity.            Thank you!     Thank you for choosing [facility]  for your care. Our goal is always to provide you with excellent care. Hearing back from our patients is one way we can continue to improve our services. Please take a few minutes to complete the written survey that you may receive in the mail after your visit with us. Thank you!             Your Updated Medication List - Protect others around you: Learn how to safely use, store and throw away your medicines at www.disposemymeds.org.          This list is accurate as of: 9/15/17  4:17 PM.  Always use your most recent med list.                   Brand Name Dispense Instructions for use Diagnosis    clobetasol 0.05 % cream    TEMOVATE    45 g    Apply very scant amount to inner labia daily for up to 14 days at a time -ok to use every 2 months or so    Lichen sclerosus et atrophicus of the vulva       furosemide 20 MG tablet    LASIX    20 tablet     Take 0.5 tablets (10 mg) by mouth daily As needed for leg swelling    Bilateral leg edema       latanoprost 0.005 % ophthalmic solution    XALATAN    1 Bottle    At Bedtime. For recent Dx of glaucoma        lisinopril 5 MG tablet    PRINIVIL/ZESTRIL    90 tablet    Take 1 tablet (5 mg) by mouth daily    Essential hypertension with goal blood pressure less than 140/90       Multi-vitamin Tabs tablet     100 tablet    Take 1 tablet by mouth daily.        timolol 0.5 % ophthalmic solution    TIMOPTIC    1 Bottle    Place 1 drop into both eyes daily In the a.m. for Dx of glaucoma        zoster vaccine live (PF) injection    ZOSTAVAX    0.65 mL    Inject 0.65 mLs Subcutaneous once for 1 dose    Need for shingles vaccine

## 2017-09-16 LAB
CREAT UR-MCNC: 138 MG/DL
MICROALBUMIN UR-MCNC: 8 MG/L
MICROALBUMIN/CREAT UR: 5.51 MG/G CR (ref 0–25)

## 2017-09-16 ASSESSMENT — ANXIETY QUESTIONNAIRES: GAD7 TOTAL SCORE: 1

## 2017-10-10 ENCOUNTER — MYC MEDICAL ADVICE (OUTPATIENT)
Dept: FAMILY MEDICINE | Facility: CLINIC | Age: 57
End: 2017-10-10

## 2017-12-26 ENCOUNTER — TELEPHONE (OUTPATIENT)
Dept: LAB | Facility: CLINIC | Age: 57
End: 2017-12-26

## 2018-06-22 ENCOUNTER — OFFICE VISIT (OUTPATIENT)
Dept: FAMILY MEDICINE | Facility: CLINIC | Age: 58
End: 2018-06-22
Payer: COMMERCIAL

## 2018-06-22 VITALS
OXYGEN SATURATION: 98 % | HEIGHT: 63 IN | BODY MASS INDEX: 24.52 KG/M2 | HEART RATE: 74 BPM | SYSTOLIC BLOOD PRESSURE: 110 MMHG | WEIGHT: 138.4 LBS | TEMPERATURE: 98.6 F | DIASTOLIC BLOOD PRESSURE: 78 MMHG

## 2018-06-22 DIAGNOSIS — L50.9 URTICARIA: Primary | ICD-10-CM

## 2018-06-22 DIAGNOSIS — Z12.11 SCREEN FOR COLON CANCER: ICD-10-CM

## 2018-06-22 DIAGNOSIS — C43.59 MALIGNANT MELANOMA OF SKIN OF TRUNK (H): ICD-10-CM

## 2018-06-22 PROCEDURE — 99213 OFFICE O/P EST LOW 20 MIN: CPT | Performed by: FAMILY MEDICINE

## 2018-06-22 RX ORDER — PREDNISONE 20 MG/1
TABLET ORAL
Qty: 14 TABLET | Refills: 0 | Status: SHIPPED | OUTPATIENT
Start: 2018-06-22 | End: 2019-01-11

## 2018-06-22 NOTE — MR AVS SNAPSHOT
After Visit Summary   6/22/2018    Dulce Ma    MRN: [id number]           Patient Information     Date Of Birth          1960        Visit Information        Provider Department      6/22/2018 10:00 AM Feroz Alvarez MD [facility] [place]        Today's Diagnoses     Urticaria    -  1    h/o Malignant melanoma of skin of trunk - Dr. [facility]les [place] [facility]- rechecks w/ him 1x/year          Care Instructions      Understanding Hives (Urticaria)  Urticaria (hives) are red, itchy, and swollen areas on the skin. They are most often an allergic reaction from eating a food or taking a medicine. Sometimes the cause may be unknown. A single hive can vary in size from a half inch to several inches. Hives can appear all over the body. Or they may appear on only one part of the body.  What causes hives?  Hives can be caused by food and beverages such as:    Tree nuts (almonds, walnuts, hazelnuts)    Peanuts    Eggs    Shellfish    Milk  Hives can also be caused by medicines such as:    Antibiotics, especially penicillin and sulfa-based medicines     Anticonvulsant or antiseizure medicines     Chemotherapy medicines   Other causes of hives include:    Infection or virus    Heat    Cold air or cold water    Exercise    Scratching or rubbing your skin, or wearing tight-fitting clothes that rub your skin    Being exposed to sunlight or light from a light bulb, in rare cases    Inhaled-chemicals in the environment from foods and drugs, insects, plants, or other sources  In some cases, hives may occur again and again with no specific cause.  If you have hives    Stay away from the food, drink, medicine, or other thing that may be causing the hives.    Ask your healthcare provider how to control itchy or irritated skin.    Talk with your healthcare provider right away if you think a medicine gave you hives.  Watch for anaphylaxis  If you have hives, watch for symptoms of a severe  reaction that can affect your entire body. This is called anaphylaxis. Symptoms can include swollen areas of the body, wheezing, trouble breathing or swallowing, and a hoarse voice. This reaction may happen right away. Or it may happen in an hour or more. In extreme cases, the airways from mouth to lungs may swell and make breathing difficult. This is a medical emergency. Use epinephrine medicine if you have it, and call 911 or go to the emergency room.     When to call your healthcare provider  Call your healthcare provider if:    Your hives feel uncomfortable    You have never had hives before    Your symptoms don't go away or come back    Your symptoms get worse or new symptoms develop such as:   ? Sneezing, coughing, runny or stuffy nose  ? Itching of the eyes, nose, or roof of the mouth  ? Itching, burning, stinging, or pain  ? Dry, flaky, cracking, or scaly skin  ? Red or purple spots  Call 911  Call 911 right away if you have:    Swelling in your lips, tongue, or throat, called angioedema    Drooling    Trouble breathing, talking, or swallowing    Cool, moist or pale (blue in color) skin    Fast and weak heartbeat    Wheezing or short of breath    Feeling lightheaded or confused    Diarrhea    Severe nausea or vomiting    Seizure    Feeling dizzy or weak, or a sudden drop in blood pressure   Date Last Reviewed: 4/1/2017 2000-2017 [employer]. [address] [place] [address]. All rights reserved. This information is not intended as a substitute for professional medical care. Always follow your healthcare professional's instructions.                Follow-ups after your visit        Follow-up notes from your care team     Return in about 1 week (around 6/29/2018), or if symptoms worsen or fail to improve.      Who to contact     If you have questions or need follow up information about today's clinic visit or your schedule please contact [facility] directly at  "[phone].  Normal or non-critical lab and imaging results will be communicated to you by InfoDifhart, letter or phone within 4 business days after the clinic has received the results. If you do not hear from us within 7 days, please contact the clinic through FANCRUt or phone. If you have a critical or abnormal lab result, we will notify you by phone as soon as possible.  Submit refill requests through Melon or call your pharmacy and they will forward the refill request to us. Please allow 3 business days for your refill to be completed.          Additional Information About Your Visit        InfoDifhart Information     Melon gives you secure access to your electronic health record. If you see a primary care provider, you can also send messages to your care team and make appointments. If you have questions, please call your primary care clinic.  If you do not have a primary care provider, please call [phone] and they will assist you.        Care EveryWhere ID     This is your Care EveryWhere ID. This could be used by other organizations to access your [facility] medical records  [id number]        Your Vitals Were     Pulse Temperature Height Last Period Pulse Oximetry Breastfeeding?    74 98.6  F (37  C) (Oral) 5' 3\" (1.6 m) 10/31/2011 98% No    BMI (Body Mass Index)                   24.52 kg/m2            Blood Pressure from Last 3 Encounters:   06/22/18 110/78   09/15/17 138/90   05/25/17 122/82    Weight from Last 3 Encounters:   06/22/18 138 lb 6.4 oz (62.8 kg)   09/15/17 144 lb 3.2 oz (65.4 kg)   05/25/17 145 lb (65.8 kg)              Today, you had the following     No orders found for display         Today's Medication Changes          These changes are accurate as of 6/22/18 10:57 AM.  If you have any questions, ask your nurse or doctor.               Start taking these medicines.        Dose/Directions    predniSONE 20 MG tablet   Commonly known as:  DELTASONE   Used for:  Urticaria   Started by:  " Feroz Alvarez MD        Take 2 tabs (40 mg) orally daily for 4 days, then Take 1 tab (20 mg) orally daily for 4 days, then Take 1/2 tab (10 mg) orally for 4 days   Quantity:  14 tablet   Refills:  0            Where to get your medicines      These medications were [place] [id number] [place] [address]  [address], [facility] [address]     Phone:  [phone]     predniSONE 20 MG tablet                Primary Care Provider Office Phone # Fax #    Alidatommy Lamb [phone]       [address] [facility] [address]        Equal Access to Services     [facility] : Hadii aad ku hadasho Soomaali, waaxda luqadaha, qaybta kaalmada adeegyada, waxdariela chavez . So [facility] [phone].    ATENCIÓN: Si habla español, tiene a mosher disposición servicios gratuitos de asistencia lingüística. [facility] [phone].    We comply with applicable federal civil rights laws and [place] laws. We do not discriminate on the basis of race, color, national origin, age, disability, sex, sexual orientation, or gender identity.            Thank you!     Thank you for choosing [facility]  for your care. Our goal is always to provide you with excellent care. Hearing back from our patients is one way we can continue to improve our services. Please take a few minutes to complete the written survey that you may receive in the mail after your visit with us. Thank you!             Your Updated Medication List - Protect others around you: Learn how to safely use, store and throw away your medicines at www.disposemymeds.org.          This list is accurate as of 6/22/18 10:57 AM.  Always use your most recent med list.                   Brand Name Dispense Instructions for use Diagnosis    clobetasol 0.05 % cream    TEMOVATE    45 g    Apply very scant amount to inner labia daily for up to 14 days at a time -ok to  use every 2 months or so    Lichen sclerosus et atrophicus of the vulva       furosemide 20 MG tablet    LASIX    20 tablet    Take 0.5 tablets (10 mg) by mouth daily As needed for leg swelling    Bilateral leg edema       latanoprost 0.005 % ophthalmic solution    XALATAN    1 Bottle    At Bedtime. For recent Dx of glaucoma        lisinopril 5 MG tablet    PRINIVIL/ZESTRIL    90 tablet    Take 1 tablet (5 mg) by mouth daily    Essential hypertension with goal blood pressure less than 140/90       Multi-vitamin Tabs tablet     100 tablet    Take 1 tablet by mouth daily.        predniSONE 20 MG tablet    DELTASONE    14 tablet    Take 2 tabs (40 mg) orally daily for 4 days, then Take 1 tab (20 mg) orally daily for 4 days, then Take 1/2 tab (10 mg) orally for 4 days    Urticaria       timolol 0.5 % ophthalmic solution    TIMOPTIC    1 Bottle    Place 1 drop into both eyes daily In the a.m. for Dx of glaucoma

## 2018-06-22 NOTE — PROGRESS NOTES
"  SUBJECTIVE:                                                    Dulce Ma is a 58 year old female who presents to clinic today for the following health issues:    Rash  Onset: 3 weeks    Description:   Location: all over upper body   Character: round, blotchy, red  Itching (Pruritis): YES    Progression of Symptoms:  Improving as of now    Accompanying Signs & Symptoms:  Fever: no   Body aches or joint pain: no   Sore throat symptoms: no   Recent cold symptoms: no   Pt states heat makes the rash worse     History:   Previous similar rash: no     Precipitating factors:   Exposure to similar rash: no   New exposures: None, no new products, no recent swimming in lakes   Recent travel: no     Alleviating factors:  OTC-Cortizone 10, gold bond, children's benadryl      Therapies Tried and outcome: Cortizone 10, gold bond, children's benadryl, showers     She notes getting a rash when taking ibuprofen.       Problem list and histories reviewed & adjusted, as indicated.  Additional history: as documented    ROS:  Constitutional, HEENT, cardiovascular, pulmonary, GI, , musculoskeletal, neuro, skin, endocrine and psych systems are negative, except as otherwise noted.    This document serves as a record of the services and decisions personally performed and made by Feroz Alvarez MD. It was created on his behalf by Khadijah Spicer, a trained medical scribe. The creation of this document is based on the provider's statements to the medical scribe.  Khadijah Spicer 10:42 AM 6/22/2018  OBJECTIVE:                                                    /78  Pulse 74  Temp 98.6  F (37  C) (Oral)  Ht 1.6 m (5' 3\")  Wt 62.8 kg (138 lb 6.4 oz)  LMP 10/31/2011  SpO2 98%  Breastfeeding? No  BMI 24.52 kg/m2 Body mass index is 24.52 kg/(m^2).   GENERAL: healthy, alert, well nourished, well hydrated, no distress  HENT: ear canals- normal; TMs- normal; Nose- normal; Mouth- no ulcers, no lesions  NECK: no tenderness, no " adenopathy, no asymmetry, no masses, no stiffness; thyroid- normal to palpation  RESP: lungs clear to auscultation - no rales, no rhonchi, no wheezes  CV: regular rates and rhythm, normal S1 S2, no S3 or S4 and no murmur, no click or rub -  ABDOMEN: soft, no tenderness, no  hepatosplenomegaly, no masses, normal bowel sounds  MS: extremities- no gross deformities noted, no edema  SKIN: 3-mm to 5-mm pink raised pink papules on arms, chest, abdomen and back, otherwise no suspicious lesions, no rashes    Diagnostic test results:  none      ASSESSMENT/PLAN:         Dulce was seen today for derm problem.    Diagnoses and all orders for this visit:    Urticaria - Begin prednisone taper. If symptoms persist or return, take zyrtec daily.  -     predniSONE (DELTASONE) 20 MG tablet; Take 2 tabs (40 mg) orally daily for 4 days, then Take 1 tab (20 mg) orally daily for 4 days, then Take 1/2 tab (10 mg) orally for 4 days    h/o Malignant melanoma of skin of trunk - Dr. Myles [place] - [facility]- rechecks w/ him 1x/year    Screen for colon cancer - Schedule colonoscopy.   -     GASTROENTEROLOGY ADULT REF PROCEDURE ONLY [facility] [phone]; [facility]        Risks, benefits and alternatives of treatments discussed. Plan agreed on.      Followup: Return in about 1 week (around 6/29/2018), or if symptoms worsen or fail to improve.    See patient instructions.     The information in this document, created by a scribe for me, accurately reflects the services I personally performed and the decisions made by me. I have reviewed and approved this document for accuracy.      Marco Alvarez MD   Pager: [phone]

## 2018-06-22 NOTE — PATIENT INSTRUCTIONS
Understanding Hives (Urticaria)  Urticaria (hives) are red, itchy, and swollen areas on the skin. They are most often an allergic reaction from eating a food or taking a medicine. Sometimes the cause may be unknown. A single hive can vary in size from a half inch to several inches. Hives can appear all over the body. Or they may appear on only one part of the body.  What causes hives?  Hives can be caused by food and beverages such as:    Tree nuts (almonds, walnuts, hazelnuts)    Peanuts    Eggs    Shellfish    Milk  Hives can also be caused by medicines such as:    Antibiotics, especially penicillin and sulfa-based medicines     Anticonvulsant or antiseizure medicines     Chemotherapy medicines   Other causes of hives include:    Infection or virus    Heat    Cold air or cold water    Exercise    Scratching or rubbing your skin, or wearing tight-fitting clothes that rub your skin    Being exposed to sunlight or light from a light bulb, in rare cases    Inhaled-chemicals in the environment from foods and drugs, insects, plants, or other sources  In some cases, hives may occur again and again with no specific cause.  If you have hives    Stay away from the food, drink, medicine, or other thing that may be causing the hives.    Ask your healthcare provider how to control itchy or irritated skin.    Talk with your healthcare provider right away if you think a medicine gave you hives.  Watch for anaphylaxis  If you have hives, watch for symptoms of a severe reaction that can affect your entire body. This is called anaphylaxis. Symptoms can include swollen areas of the body, wheezing, trouble breathing or swallowing, and a hoarse voice. This reaction may happen right away. Or it may happen in an hour or more. In extreme cases, the airways from mouth to lungs may swell and make breathing difficult. This is a medical emergency. Use epinephrine medicine if you have it, and call 911 or go to the emergency room.     When  to call your healthcare provider  Call your healthcare provider if:    Your hives feel uncomfortable    You have never had hives before    Your symptoms don't go away or come back    Your symptoms get worse or new symptoms develop such as:   ? Sneezing, coughing, runny or stuffy nose  ? Itching of the eyes, nose, or roof of the mouth  ? Itching, burning, stinging, or pain  ? Dry, flaky, cracking, or scaly skin  ? Red or purple spots  Call 911  Call 911 right away if you have:    Swelling in your lips, tongue, or throat, called angioedema    Drooling    Trouble breathing, talking, or swallowing    Cool, moist or pale (blue in color) skin    Fast and weak heartbeat    Wheezing or short of breath    Feeling lightheaded or confused    Diarrhea    Severe nausea or vomiting    Seizure    Feeling dizzy or weak, or a sudden drop in blood pressure   Date Last Reviewed: 4/1/2017 2000-2017 [employer]. [address] [place] [address], [place] [address]. All rights reserved. This information is not intended as a substitute for professional medical care. Always follow your healthcare professional's instructions.

## 2018-07-27 ENCOUNTER — HOSPITAL ENCOUNTER (OUTPATIENT)
Facility: CLINIC | Age: 58
Discharge: HOME | End: 2018-07-27
Attending: INTERNAL MEDICINE | Admitting: INTERNAL MEDICINE
Payer: COMMERCIAL

## 2018-07-27 VITALS
RESPIRATION RATE: 16 BRPM | HEIGHT: 63 IN | WEIGHT: 138 LBS | OXYGEN SATURATION: 94 % | DIASTOLIC BLOOD PRESSURE: 59 MMHG | BODY MASS INDEX: 24.45 KG/M2 | SYSTOLIC BLOOD PRESSURE: 117 MMHG

## 2018-07-27 LAB — COLONOSCOPY: NORMAL

## 2018-07-27 PROCEDURE — 45378 DIAGNOSTIC COLONOSCOPY: CPT | Performed by: INTERNAL MEDICINE

## 2018-07-27 PROCEDURE — G0121 COLON CA SCRN NOT HI RSK IND: HCPCS | Performed by: INTERNAL MEDICINE

## 2018-07-27 PROCEDURE — 25000128 H RX IP 250 OP 636: Performed by: INTERNAL MEDICINE

## 2018-07-27 PROCEDURE — G0500 MOD SEDAT ENDO SERVICE >5YRS: HCPCS | Performed by: INTERNAL MEDICINE

## 2018-07-27 RX ORDER — ONDANSETRON 2 MG/ML
4 INJECTION INTRAMUSCULAR; INTRAVENOUS EVERY 6 HOURS PRN
Status: DISCONTINUED | OUTPATIENT
Start: 2018-07-27 | End: 2018-07-27 | Stop reason: HOSPADM

## 2018-07-27 RX ORDER — ONDANSETRON 2 MG/ML
4 INJECTION INTRAMUSCULAR; INTRAVENOUS
Status: DISCONTINUED | OUTPATIENT
Start: 2018-07-27 | End: 2018-07-27 | Stop reason: HOSPADM

## 2018-07-27 RX ORDER — FLUMAZENIL 0.1 MG/ML
0.2 INJECTION, SOLUTION INTRAVENOUS
Status: DISCONTINUED | OUTPATIENT
Start: 2018-07-27 | End: 2018-07-27 | Stop reason: HOSPADM

## 2018-07-27 RX ORDER — FENTANYL CITRATE 50 UG/ML
INJECTION, SOLUTION INTRAMUSCULAR; INTRAVENOUS PRN
Status: DISCONTINUED | OUTPATIENT
Start: 2018-07-27 | End: 2018-07-27 | Stop reason: HOSPADM

## 2018-07-27 RX ORDER — LIDOCAINE 40 MG/G
CREAM TOPICAL
Status: DISCONTINUED | OUTPATIENT
Start: 2018-07-27 | End: 2018-07-27 | Stop reason: HOSPADM

## 2018-07-27 RX ORDER — NALOXONE HYDROCHLORIDE 0.4 MG/ML
.1-.4 INJECTION, SOLUTION INTRAMUSCULAR; INTRAVENOUS; SUBCUTANEOUS
Status: DISCONTINUED | OUTPATIENT
Start: 2018-07-27 | End: 2018-07-27 | Stop reason: HOSPADM

## 2018-07-27 RX ORDER — ONDANSETRON 4 MG/1
4 TABLET, ORALLY DISINTEGRATING ORAL EVERY 6 HOURS PRN
Status: DISCONTINUED | OUTPATIENT
Start: 2018-07-27 | End: 2018-07-27 | Stop reason: HOSPADM

## 2018-07-27 NOTE — IP AVS SNAPSHOT
MRN:[id number]                      After Visit Summary   7/27/2018    Dluce Ma    MRN: [id number]           Thank you!     Thank you for choosing [facility] for your care. Our goal is always to provide you with excellent care. Hearing back from our patients is one way we can continue to improve our services. Please take a few minutes to complete the written survey that you may receive in the mail after you visit. If you would like to speak to someone directly about your visit please contact Patient Relations at [phone]. Thank you!          Patient Information     Date Of Birth          1960        About your hospital stay     You were admitted on:  July 27, 2018 You last received care in the:  [facility]    You were discharged on:  July 27, 2018       Who to Call     For medical emergencies, please call 911.  For non-urgent questions about your medical care, please call your primary care provider or clinic, [phone]  For questions related to your surgery, please call your surgery clinic        Attending Provider     Provider Specialty    Ren Whitehead MD Gastroenterology       Primary Care Provider Office Phone # Fax #    Alida Lamb [phone]      Further instructions from your care team         Understanding Diverticulosis and Diverticulitis     Pouches or diverticula usually occur in the lower part of the colon called the sigmoid.      Diverticulitis occurs when the pouches become inflamed.     The colon (large intestine) is the last part of the digestive tract. It absorbs water from stool and changes it from a liquid to a solid. In certain cases, small pouches called diverticula can form in the colon wall. This condition is called diverticulosis. The pouches can become infected. If this happens, it becomes a more serious problem called diverticulitis. These problems can be painful. But they can be managed.    Managing Your Condition  Diet changes or taking medications are often tried first. These may be enough to bring relief. If the case is bad, surgery may be done. You and your doctor can discuss the plan that is best for you.  If You Have Diverticulosis  Diet changes are often enough to control symptoms. The main changes are adding fiber (roughage) and drinking more water. Fiber absorbs water as it travels through your colon. This helps your stool stay soft and move smoothly. Water helps this process. If needed, you may be told to take over-the-counter stool softeners. To help relieve pain, antispasmodic medications may be prescribed.  If You Have Diverticulitis  Treatment depends on how bad your symptoms are.  For mild symptoms: You may be put on a liquid diet for a short time. You may also be prescribed antibiotics. If these two steps relieve your symptoms, you may then be prescribed a high-fiber diet. If you still have symptoms, your doctor will discuss further treatment options with you.  For severe symptoms: You may need to be admitted to the hospital. There, you can be given IV antibiotics and fluids. Once symptoms are under control, the above treatments may be tried. If these don t control your condition, your doctor may discuss the option of having surgery with you.  [place] to Colon Health  Help keep your colon healthy with a diet that includes plenty of high-fiber fruits, vegetables, and whole grains. Drink plenty of liquids like water and juice. Your doctor may also recommend avoiding seeds and nuts.          [id number] [facility], [address] [place] [address]. All rights reserved. This information is not intended as a substitute for professional medical care. Always follow your healthcare professional's instructions.    Pending Results     No orders found from 7/25/2018 to 7/28/2018.            Admission Information     Date & Time Provider Department Dept. Phone    7/27/2018 [place]  "Ren VILLANUEVA MD [facility] Endoscopy [phone]      Your Vitals Were     Blood Pressure Respirations Height Weight Last Period Pulse Oximetry    109/67 12 1.6 m (5' 3\") 62.6 kg (138 lb) 10/31/2011 95%    BMI (Body Mass Index)                   24.45 kg/m2           MyChart Information     [facility] gives you secure access to your electronic health record. If you see a primary care provider, you can also send messages to your care team and make appointments. If you have questions, please call your primary care clinic.  If you do not have a primary care provider, please call [phone] and they will assist you.        Care EveryWhere ID     This is your Care EveryWhere ID. This could be used by other organizations to access your [place] medical records  [id number]        Equal Access to Services     KENDY BEE : Jenae Miles, dyana mack, erendira hay, yvrose martines. So [facility] [phone].    ATENCIÓN: Si habla español, tiene a mosher disposición servicios gratuitos de asistencia lingüística. Mike al [phone].    We comply with applicable federal civil rights laws and [place] laws. We do not discriminate on the basis of race, color, national origin, age, disability, sex, sexual orientation, or gender identity.               Review of your medicines      CONTINUE these medicines which have NOT CHANGED        Dose / Directions    clobetasol 0.05 % cream   Commonly known as:  TEMOVATE   Used for:  Lichen sclerosus et atrophicus of the vulva        Apply very scant amount to inner labia daily for up to 14 days at a time -ok to use every 2 months or so   Quantity:  45 g   Refills:  1       furosemide 20 MG tablet   Commonly known as:  LASIX   Used for:  Bilateral leg edema        Dose:  10 mg   Take 0.5 tablets (10 mg) by mouth daily As needed for leg swelling   Quantity:  20 tablet   Refills:  1       latanoprost 0.005 % ophthalmic solution "   Commonly known as:  XALATAN        At Bedtime. For recent Dx of glaucoma   Quantity:  1 Bottle   Refills:  12       lisinopril 5 MG tablet   Commonly known as:  PRINIVIL/ZESTRIL   Used for:  Essential hypertension with goal blood pressure less than 140/90        Dose:  5 mg   Take 1 tablet (5 mg) by mouth daily   Quantity:  90 tablet   Refills:  3       Multi-vitamin Tabs tablet        Dose:  1 tablet   Take 1 tablet by mouth daily.   Quantity:  100 tablet   Refills:  3       predniSONE 20 MG tablet   Commonly known as:  DELTASONE   Used for:  Urticaria        Take 2 tabs (40 mg) orally daily for 4 days, then Take 1 tab (20 mg) orally daily for 4 days, then Take 1/2 tab (10 mg) orally for 4 days   Quantity:  14 tablet   Refills:  0       timolol 0.5 % ophthalmic solution   Commonly known as:  TIMOPTIC        Dose:  1 drop   Place 1 drop into both eyes daily In the a.m. for Dx of glaucoma   Quantity:  1 Bottle   Refills:  0                Protect others around you: Learn how to safely use, store and throw away your medicines at www.disposemymeds.org.             Medication List: This is a list of all your medications and when to take them. Check marks below indicate your daily home schedule. Keep this list as a reference.      Medications           Morning Afternoon Evening Bedtime As Needed    clobetasol 0.05 % cream   Commonly known as:  TEMOVATE   Apply very scant amount to inner labia daily for up to 14 days at a time -ok to use every 2 months or so                                furosemide 20 MG tablet   Commonly known as:  LASIX   Take 0.5 tablets (10 mg) by mouth daily As needed for leg swelling                                latanoprost 0.005 % ophthalmic solution   Commonly known as:  XALATAN   At Bedtime. For recent Dx of glaucoma                                lisinopril 5 MG tablet   Commonly known as:  PRINIVIL/ZESTRIL   Take 1 tablet (5 mg) by mouth daily                                Multi-vitamin  Tabs tablet   Take 1 tablet by mouth daily.                                predniSONE 20 MG tablet   Commonly known as:  DELTASONE   Take 2 tabs (40 mg) orally daily for 4 days, then Take 1 tab (20 mg) orally daily for 4 days, then Take 1/2 tab (10 mg) orally for 4 days                                timolol 0.5 % ophthalmic solution   Commonly known as:  TIMOPTIC   Place 1 drop into both eyes daily In the a.m. for Dx of glaucoma

## 2018-07-27 NOTE — LETTER
July 6, 2018      Dulce Ma  [address] [place] [address]        Dear Dulce,         Thank you for choosing [facility]. You are scheduled for the following service.    Date:  07/27/2018 - Friday  Procedure: COLONOSCOPY   Doctor: Ren Whitehead           Arrival Time:  10:30 am  *Check in at Emergency/Endoscopy desk*  Procedure Time: 11:00 am     Location:   [facility]        Endoscopy Department, First Floor (Enter through ER Doors) *        [address] [place], [place] [address]      [phone] or [phone] () to reschedule     Colonoscopy is the most accurate test to detect colon polyps and colon cancer; and the only test where polyps can be removed. During this procedure, a doctor examines the lining of your large intestine and rectum through a flexible tube.     Transportation  Arrange for a ride for the day of your procedure with a responsible adult.  A taxi ride is not an option unless you are accompanied by a responsible adult. If you fail to arrange transportation with a responsible adult, your procedure will be cancelled and rescheduled    MIRALAX -GATORADE  DOUBLE PREP  Purchase the  following supplies at your local pharmacy:  - 2 (two) bisacodyl tablets: each tablet contains 5 mg.  (Dulcolax  laxative NOT Dulcolax  stool softener)   - 2 (two) 8.3 oz bottles of Polyethylene Glycol (PEG) 3350 Powder   (MiraLAX , Smooth LAX , ClearLAX  or equivalent)  - 128 oz Gatorade    Regular Gatorade , Gatorade G2 , Powerade , Powerade Zero  or Pedialyte  is acceptable. Red colored flavors are not allowed; all other colors (yellow, green, orange, purple and blue) are okay. It is also okay to buy four 2.12 oz packets of powdered Gatorade that can be mixed with water to a total volume of 128 oz of liquid.  - 1 (one) 10 oz bottle of Magnesium Citrate (Red colored flavors are not allowed)  It is also okay for you to use a 0.5 oz package of  powdered magnesium citrate (17 g) mixed with 10 oz of water.      PREPARATION FOR COLONOSCOPY    7 days before:    Discontinue fiber supplements and medications containing iron. This includes Metamucil  and Fibercon ; and multivitamins with iron.  3 days before:     Begin a low-fiber diet. A low-fiber diet helps make the cleanout more effective.     Examples of a low-fiber diet include (but are not limited to): white bread, white rice, pasta, crackers, fish, chicken, eggs, ground beef, creamy peanut butter, cooked/steamed/boiled vegetables, canned fruit, bananas, melons, milk, plain yogurt cheese, salad dressing and other condiments.     The following are not allowed on a low-fiber diet: seeds, nuts, popcorn, bran, whole wheat, corn, quinoa, raw fruits and vegetables, berries and dried fruit, beans and lentils.    For additional details on low-fiber diet, please refer to the table on the last page.  2 days before:    Stop eating solid foods in the morning.    Begin a clear liquid diet (liquids you can see through).     Examples of a clear liquid diet include: water, tea, clear broth or bouillon, Gatorade, Pedialyte or Powerade, carbonated and non-carbonated soft drinks (Sprite , 7-Up , ginger ale), strained fruit juices without pulp (apple, white grape, white cranberry), Jell-O  and popsicles.     The following are not allowed on a clear liquid diet: red liquids, alcoholic beverages,  dairy products (milk, creamer and yogurt), protein shakes, creamy broths, juice with pulp and chewing tobacco.    At 4 pm (and no later than 6pm): start drinking the Miralax-Gatorade preparation (8.3 oz of Miralax mixed with 64 oz of Gatorade in a large pitcher). Drink 1 (one) 8 oz glass every 15 minutes thereafter, until the mixture is gone.  1 day before:    Continue the clear liquid diet.    At noon: take 2 (two) bisacodyl tablets.     At 4 pm (and no later than 6pm): start drinking the Miralax-Gatorade preparation (8.3 oz of  Miralax mixed with 64 oz of Gatorade in a large pitcher). Drink 1 (one) 8 oz glass every 15 minutes thereafter, until the mixture is gone.    COLON CLEANSING TIPS: drink adequate amounts of fluids before and after your colon cleansing to prevent dehydration. Stay near a toilet because you will have diarrhea. Even if you are sitting on the toilet, continue to drink the cleansing solution every 15 minutes. If you feel nauseous or vomit, rinse your mouth with water, take a 15 to 30-minute-break and then continue drinking the solution. You will be uncomfortable until the stool has flushed from your colon (in about 2 to 4 hours). You may feel chilled.    Day of your procedure  You may take all of your morning medications including blood pressure medications, blood thinners (if you have not been instructed to stop these by our office), methadone, anti-seizure medications with sips of water 3 hours prior to your procedure or earlier. Do not take insulin or vitamins prior to your procedure. Continue the clear liquid diet.   4 hours prior: Drink 10 oz magnesium citrate. It may be easier to drink it with a straw.     STOP consuming all liquids after that.     Do not take anything by mouth during this time.     Allow extra time to travel to your procedure as you may need to stop and use a restroom along the way.  You are ready for the procedure, if you followed all instructions and your stool is no longer formed, but clear or yellow liquid. If you are unsure whether your colon is clean, please call our office at [phone] before you leave for your appointment.  Bring the following to your procedure:  - Insurance Card/Photo ID.   - List of current medications including over-the-counter medications and supplements.   - Your rescue inhaler if you currently use one to control asthma.     Canceling or rescheduling your appointment:  If you must cancel or reschedule your appointment, please call [phone] as soon as  possible.    COLONOSCOPY PRE-PROCEDURE CHECKLIST  If you have diabetes, ask your regular doctor for diet and medication restrictions.  If you take an anticoagulant or anti-platelet medication (such as Coumadin , Lovenox , Pradaxa , Xarelto , Eliquis , etc.), please call your primary doctor for advice on holding this medication.  If you take aspirin you may continue to do so.  If you are or may be pregnant, please discuss the risks and benefits of this procedure with your doctor.    What happens during a colonoscopy?    Plan to spend up to two hours, starting at registration time, at the endoscopy center the day of your procedure. The colonoscopy takes an average of 15 to 30 minutes. Recovery time is about 30 minutes.    Before the exam:    You will change into a gown.    Your medical history and medication list will be reviewed with you, unless that has been done over the phone prior to the procedure.     A nurse will insert an intravenous (IV) line into your hand or arm.    The doctor will meet with you and will give you a consent form to sign.    During the exam:     Medicine will be given through the IV line to help you relax.     Your heart rate and oxygen levels will be monitored. If your blood pressure is low, you may be given fluids through the IV line.     The doctor will insert a flexible hollow tube, called a colonoscope, into your rectum. The scope will be advanced slowly through the large intestine (colon).    You may have a feeling of fullness or pressure.     If an abnormal tissue or a polyp is found, the doctor may remove it through the endoscope for closer examination, or biopsy. Tissue removal is painless    After the exam:           Any tissue samples removed during the exam will be sent to a lab for evaluation. It may take 5-7 working days for you to be notified of the results.     A nurse will provide you with complete discharge instructions before you leave the endoscopy center. Be sure to ask  the nurse for specific instructions if you take blood thinners such as Aspirin, Coumadin or Plavix.     The doctor will prepare a full report for you and for the physician who referred you for the procedure.     Your doctor will talk with you about the initial results of your exam.      Medication given during the exam will prohibit you from driving for the rest of the day.     Following the exam, you may resume your normal diet. Your first meal should be light, no greasy foods. Avoid alcohol until the next day.     You may resume your regular activities the day after the procedure.     LOW-FIBER DIET  Foods RECOMMENDED Foods to AVOID   Breads, Cereal, Rice and Pasta:   White bread, rolls, biscuits, croissant and mani toast.   Waffles, Lithuanian toast and pancakes.   White rice, noodles, pasta, macaroni and peeled cooked potatoes.   Plain crackers and saltines.   Cooked cereals: farina, cream of rice.   Cold cereals: Puffed Rice , Rice Krispies , Corn Flakes  and Special K    Breads, Cereal, Rice and Pasta:   Breads or rolls with nuts, seeds or fruit.   Whole wheat, pumpernickel, rye breads and cornbread.   Potatoes with skin, brown or wild rice, and kasha (buckwheat).     Vegetables:   Tender cooked and canned vegetables without seeds: carrots, asparagus tips, green or wax beans, pumpkin, spinach, lima beans. Vegetables:   Raw or steamed vegetables.   Vegetables with seeds.   Sauerkraut.   Winter squash, peas, broccoli, Brussel sprouts, cabbage, onions, cauliflower, baked beans, peas and corn.   Fruits:   Strained fruit juice.   Canned fruit, except pineapple.   Ripe bananas and melon. Fruits:   Prunes and prune juice.   Raw fruits.   Dried fruits: figs, dates and raisins.   Milk/Dairy:   Milk: plain or flavored.   Yogurt, custard and ice cream.   Cheese and cottage cheese Milk/Dairy:     Meat and other proteins:   ground, well-cooked tender beef, lamb, ham, veal, pork, fish, poultry and organ meats.   Eggs.   Peanut  butter without nuts. Meat and other proteins:   Tough, fibrous meats with gristle.   Dry beans, peas and lentils.   Peanut butter with nuts.   Tofu.   Fats, Snack, Sweets, Condiments and Beverages:   Margarine, butter, oils, mayonnaise, sour cream and salad dressing, plain gravy.   Sugar, hard candy, clear jelly, honey and syrup.   Spices, cooked herbs, bouillon, broth and soups made with allowed vegetable, ketchup and mustard.   Coffee, tea and carbonated drinks.   Plain cakes, cookies and pretzels.   Gelatin, plain puddings, custard, ice cream, sherbet and popsicles. Fats, Snack, Sweets, Condiments and Beverages:   Nuts, seeds and coconut.   Jam, marmalade and preserves.   Pickles, olives, relish and horseradish.   All desserts containing nuts, seeds, dried fruit and coconut; or made from whole grains or bran.   Candy made with nuts or seeds.   Popcorn.       DIRECTIONS TO THE ENDOSCOPY DEPARTMENT     From the north ([facility])  Take [address] South, exit on [address] [address]. Get into the left hand jasmina, turn left (east), go one-half mile to [address] and turn left. Go north to the first stoplight, take a right on [address] and follow it to the Emergency entrance.  From the south ([facility])  Take 35N to the 35E split and exit on [address] . On [address] , turn left (west) to [address]. Turn right (north) on [address]. Go north to the first stoplight, take a right on [address] and follow it to the Emergency entrance.    From the east via 35E ([facility])  Take [address]E south to [address] [address] exit. Turn right on [address] . Go west to [address]. Turn right (north) on [address]. Go to the first stoplight, take a right and follow on [address] to the Emergency entrance.  From the east via [address] ([facility])  Take [address] to [address]. Turn left (south) on [address] to [address]. Turn left (east)  on [address] and follow it to the Emergency entrance.    From the west via [address] ([place])  Take [address] east to [address]. Turn right (south) on [address] to [address]. Turn left (east) on [address] and follow it to the Emergency entrance.

## 2018-07-27 NOTE — DISCHARGE INSTRUCTIONS
Understanding Diverticulosis and Diverticulitis     Pouches or diverticula usually occur in the lower part of the colon called the sigmoid.      Diverticulitis occurs when the pouches become inflamed.     The colon (large intestine) is the last part of the digestive tract. It absorbs water from stool and changes it from a liquid to a solid. In certain cases, small pouches called diverticula can form in the colon wall. This condition is called diverticulosis. The pouches can become infected. If this happens, it becomes a more serious problem called diverticulitis. These problems can be painful. But they can be managed.   Managing Your Condition  Diet changes or taking medications are often tried first. These may be enough to bring relief. If the case is bad, surgery may be done. You and your doctor can discuss the plan that is best for you.  If You Have Diverticulosis  Diet changes are often enough to control symptoms. The main changes are adding fiber (roughage) and drinking more water. Fiber absorbs water as it travels through your colon. This helps your stool stay soft and move smoothly. Water helps this process. If needed, you may be told to take over-the-counter stool softeners. To help relieve pain, antispasmodic medications may be prescribed.  If You Have Diverticulitis  Treatment depends on how bad your symptoms are.  For mild symptoms: You may be put on a liquid diet for a short time. You may also be prescribed antibiotics. If these two steps relieve your symptoms, you may then be prescribed a high-fiber diet. If you still have symptoms, your doctor will discuss further treatment options with you.  For severe symptoms: You may need to be admitted to the hospital. There, you can be given IV antibiotics and fluids. Once symptoms are under control, the above treatments may be tried. If these don t control your condition, your doctor may discuss the option of having surgery with you.  Olar to Colon  Health  Help keep your colon healthy with a diet that includes plenty of high-fiber fruits, vegetables, and whole grains. Drink plenty of liquids like water and juice. Your doctor may also recommend avoiding seeds and nuts.          [id number] Shirley Winter, [address] [place] [address], [place] [address]. All rights reserved. This information is not intended as a substitute for professional medical care. Always follow your healthcare professional's instructions.

## 2018-07-27 NOTE — H&P
Pre-Endoscopy History and Physical     Dulce Ma MRN# [id number]   YOB: 1960 Age: 58 year old     Date of Procedure: 7/27/2018  Primary care provider: Alida Lamb  Type of Endoscopy: Colonoscopy with possible biopsy, possible polypectomy  Reason for Procedure: screen  Type of Anesthesia Anticipated: Conscious Sedation    HPI:    Dulce is a 58 year old female who will be undergoing the above procedure.      A history and physical has been performed. The patient's medications and allergies have been reviewed. The risks and benefits of the procedure and the sedation options and risks were discussed with the patient.  All questions were answered and informed consent was obtained.      She denies a personal or family history of anesthesia complications or bleeding disorders.     Patient Active Problem List   Diagnosis     h/o Malignant melanoma of skin of trunk - Dr. Myles Lake - [facility]- rechecks w/ him 1x/year     Encounter for routine gynecological examination - [facility] - Dr. Isabela Perez      Essential hypertension with goal blood pressure less than 140/90     CARDIOVASCULAR SCREENING; LDL GOAL LESS THAN 130     Family history of heart failure- mother, MGM, maternal uncle     Chronic constipation- trying to do daily fiber therapy     Glaucoma- sees Dr. Harvey Brown - [facility] - [place], [place]     Dysplastic nevi     Skin cancer, basal cell     Lichen sclerosus et atrophicus of the vulva        Past Medical History:   Diagnosis Date     Dysplastic nevi      Glaucoma     Diagnosed in Spring 2010     Hypertension goal BP (blood pressure) < 140/90 at age 50     very strong family hx      Lumbago     stiffness in low back     Other malignant neoplasm of skin of trunk, except scrotum 2/02    Dr. Myles Lake, melanoma with negative sentinel node biopsy - no chemo-Dr. Selene Gonzales-derm     Perimenopausal      Routine gyne exam     [facility] -   Isabela Perez      Skin cancer, basal cell     multiple - 3 on nose, treated with interferon intralesionally x 1      Unspecified derangement, shoulder region     s/p horse-riding injury- no problem now     Unspecified musculoskeletal disorders and symptoms referable to neck     compressed vertebrae in neck- bothers pt rarely        Past Surgical History:   Procedure Laterality Date     APPENDECTOMY  2008    aprroximately 8-10 years agp     BREAST SURGERY      Breast biopsies     C NONSPECIFIC PROCEDURE  3/02    malignant melanoma removed from abdomen with negative nodes-Dr. Acosta -surgeon     ORTHOPEDIC SURGERY Right 2014    ORIF right ring finger       Social History   Substance Use Topics     Smoking status: Never Smoker     Smokeless tobacco: Never Used     Alcohol use Yes      Comment: occ.       Family History   Problem Relation Age of Onset     Hypertension Mother      HEART DISEASE Mother      bypass     Neuropathy Mother      Hypertension Father      Hypertension Paternal Grandmother      Eye Disorder Maternal Grandmother      glacoma     Eye Disorder Maternal Grandfather      glacoma     Colon Cancer No family hx of        Prior to Admission medications    Medication Sig Start Date End Date Taking? Authorizing Provider   predniSONE (DELTASONE) 20 MG tablet Take 2 tabs (40 mg) orally daily for 4 days, then Take 1 tab (20 mg) orally daily for 4 days, then Take 1/2 tab (10 mg) orally for 4 days 6/22/18  Yes Feroz Alvarez MD   clobetasol (TEMOVATE) 0.05 % cream Apply very scant amount to inner labia daily for up to 14 days at a time -ok to use every 2 months or so 9/15/17   Alida Lamb MD   furosemide (LASIX) 20 MG tablet Take 0.5 tablets (10 mg) by mouth daily As needed for leg swelling 9/15/17   Alida Lamb MD   latanoprost (XALATAN) 0.005 % ophthalmic solution At Bedtime. For recent Dx of glaucoma 10/26/10   Alida Lamb MD   lisinopril (PRINIVIL/ZESTRIL) 5 MG tablet  "Take 1 tablet (5 mg) by mouth daily 9/15/17   Alida Lamb MD   multivitamin, therapeutic with minerals (MULTI-VITAMIN) TABS Take 1 tablet by mouth daily. 5/17/13   Alida Lamb MD   timolol (TIMOPTIC) 0.5 % ophthalmic solution Place 1 drop into both eyes daily In the a.m. for Dx of glaucoma 7/11/14   Alida Lamb MD       Allergies   Allergen Reactions     Amoxicillin      hives     Benzoyl Peroxide      swelling     Ibuprofen      over long duration dev. hives     Penicillins      hives        REVIEW OF SYSTEMS:   5 point ROS negative except as noted above in HPI, including Gen., Resp., CV, GI &  system review.    PHYSICAL EXAM:   [facility] 10/31/2011 Estimated body mass index is 24.52 kg/(m^2) as calculated from the following:    Height as of 6/22/18: 1.6 m (5' 3\").    Weight as of 6/22/18: 62.8 kg (138 lb 6.4 oz).   GENERAL APPEARANCE: alert, and oriented  MENTAL STATUS: alert  AIRWAY EXAM: Mallampatti Class I (visualization of the soft palate, fauces, uvula, anterior and posterior pillars)  RESP: lungs clear to auscultation - no rales, rhonchi or wheezes  CV: regular rates and rhythm  DIAGNOSTICS:    Not indicated    IMPRESSION   ASA Class 1 - Healthy patient, no medical problems    PLAN:   Plan for Colonoscopy with possible biopsy, possible polypectomy. We discussed the risks, benefits and alternatives and the patient wished to proceed.    The above has been forwarded to the consulting provider.      Signed Electronically by: Ren Whitehead  July 27, 2018          "

## 2018-10-06 ENCOUNTER — HEALTH MAINTENANCE LETTER (OUTPATIENT)
Age: 58
End: 2018-10-06

## 2018-10-08 ENCOUNTER — TELEPHONE (OUTPATIENT)
Dept: FAMILY MEDICINE | Facility: CLINIC | Age: 58
End: 2018-10-08

## 2018-10-08 NOTE — TELEPHONE ENCOUNTER
Reason for Call:  Other appointment    Detailed comments: does patient qualify for mammo truck? She has had breast biopsy and benign lumps or does she need order for hospital imaging?    Phone Number Patient can be reached at: Cell number on file:    Telephone Information:   Mobile [phone]       Best Time: any    Can we leave a detailed message on this number? YES    Call taken on 10/8/2018 at 2:36 PM by Blanca Krishna

## 2018-10-12 NOTE — TELEPHONE ENCOUNTER
Left detailed vm. Will keep encounter open incase pt calls back.    Renee Bhandari RN  [facility]

## 2018-10-12 NOTE — TELEPHONE ENCOUNTER
If she wants 3D mammogram, which I'd recommend - needs to do at [facility].   If just wants standard mammo and no current problems - can do mammo truck.     Pt due for annual px. Please assist pt in making appt to be seen for this.

## 2018-10-20 ENCOUNTER — HEALTH MAINTENANCE LETTER (OUTPATIENT)
Age: 58
End: 2018-10-20

## 2018-11-21 DIAGNOSIS — I10 ESSENTIAL HYPERTENSION WITH GOAL BLOOD PRESSURE LESS THAN 140/90: ICD-10-CM

## 2018-11-21 RX ORDER — LISINOPRIL 5 MG/1
5 TABLET ORAL DAILY
Qty: 90 TABLET | Refills: 1 | Status: SHIPPED | OUTPATIENT
Start: 2018-11-21 | End: 2019-01-11

## 2018-11-21 NOTE — TELEPHONE ENCOUNTER
"Requested Prescriptions   Pending Prescriptions Disp Refills     lisinopril (PRINIVIL/ZESTRIL) 5 MG tablet [Pharmacy Med Name: LISINOPRIL TABS 5MG] 90 tablet 3        Last Written Prescription Date:  9.15.17  Last Fill Quantity: 90,  # refills: 3   Last Office Visit: 6/22/2018   Future Office Visit:    Next 5 appointments (look out 90 days)     Jan 11, 2019 10:00 AM CST   PHYSICAL with Alida Lamb MD   [facility]    [address] [place] [id number] [phone]                  Sig: TAKE 1 TABLET DAILY    ACE Inhibitors (Including Combos) Protocol Failed    11/21/2018  9:59 AM       Failed - Normal serum creatinine on file in past 12 months    Recent Labs   Lab Test  07/22/16   0907   CR  0.73            Failed - Normal serum potassium on file in past 12 months    Recent Labs   Lab Test  07/22/16   0907   POTASSIUM  4.6            Passed - Blood pressure under 140/90 in past 12 months    BP Readings from Last 3 Encounters:   07/27/18 117/59   06/22/18 110/78   09/15/17 138/90                Passed - Recent (12 mo) or future (30 days) visit within the authorizing provider's specialty    Patient had office visit in the last 12 months or has a visit in the next 30 days with authorizing provider or within the authorizing provider's specialty.  See \"Patient Info\" tab in inbasket, or \"Choose Columns\" in Meds & Orders section of the refill encounter.             Passed - Patient is age 18 or older       Passed - No active pregnancy on record       Passed - No positive pregnancy test in past 12 months          "

## 2018-11-21 NOTE — TELEPHONE ENCOUNTER
Routing refill request to provider for review/approval because:  Labs not current:  MARGOT Zhou BSN, RN  [facility] [facility]

## 2019-01-11 ENCOUNTER — OFFICE VISIT (OUTPATIENT)
Dept: FAMILY MEDICINE | Facility: CLINIC | Age: 59
End: 2019-01-11
Payer: COMMERCIAL

## 2019-01-11 ENCOUNTER — TELEPHONE (OUTPATIENT)
Dept: FAMILY MEDICINE | Facility: CLINIC | Age: 59
End: 2019-01-11

## 2019-01-11 VITALS
SYSTOLIC BLOOD PRESSURE: 142 MMHG | HEIGHT: 63 IN | HEART RATE: 65 BPM | WEIGHT: 144.2 LBS | TEMPERATURE: 98.9 F | DIASTOLIC BLOOD PRESSURE: 92 MMHG | OXYGEN SATURATION: 100 % | BODY MASS INDEX: 25.55 KG/M2

## 2019-01-11 DIAGNOSIS — T88.59XA: ICD-10-CM

## 2019-01-11 DIAGNOSIS — R60.0 BILATERAL LEG EDEMA: ICD-10-CM

## 2019-01-11 DIAGNOSIS — Z12.11 SCREEN FOR COLON CANCER: ICD-10-CM

## 2019-01-11 DIAGNOSIS — Z12.31 VISIT FOR SCREENING MAMMOGRAM: ICD-10-CM

## 2019-01-11 DIAGNOSIS — Z13.6 CARDIOVASCULAR SCREENING; LDL GOAL LESS THAN 130: ICD-10-CM

## 2019-01-11 DIAGNOSIS — Z00.01 ENCOUNTER FOR ROUTINE ADULT MEDICAL EXAM WITH ABNORMAL FINDINGS: Primary | ICD-10-CM

## 2019-01-11 DIAGNOSIS — Z11.4 SCREENING FOR HIV (HUMAN IMMUNODEFICIENCY VIRUS): ICD-10-CM

## 2019-01-11 DIAGNOSIS — N95.2 ATROPHIC VAGINITIS: ICD-10-CM

## 2019-01-11 DIAGNOSIS — C43.59 MALIGNANT MELANOMA OF SKIN OF TRUNK (H): ICD-10-CM

## 2019-01-11 DIAGNOSIS — Z12.4 SCREENING FOR MALIGNANT NEOPLASM OF CERVIX: ICD-10-CM

## 2019-01-11 DIAGNOSIS — I10 ESSENTIAL HYPERTENSION WITH GOAL BLOOD PRESSURE LESS THAN 140/90: ICD-10-CM

## 2019-01-11 DIAGNOSIS — Z23 NEED FOR SHINGLES VACCINE: ICD-10-CM

## 2019-01-11 LAB
ERYTHROCYTE [DISTWIDTH] IN BLOOD BY AUTOMATED COUNT: 12.6 % (ref 10–15)
HCT VFR BLD AUTO: 39.6 % (ref 35–47)
HGB BLD-MCNC: 12.7 G/DL (ref 11.7–15.7)
MCH RBC QN AUTO: 30.3 PG (ref 26.5–33)
MCHC RBC AUTO-ENTMCNC: 32.1 G/DL (ref 31.5–36.5)
MCV RBC AUTO: 95 FL (ref 78–100)
PLATELET # BLD AUTO: 251 10E9/L (ref 150–450)
RBC # BLD AUTO: 4.19 10E12/L (ref 3.8–5.2)
WBC # BLD AUTO: 6.1 10E9/L (ref 4–11)

## 2019-01-11 PROCEDURE — 84443 ASSAY THYROID STIM HORMONE: CPT | Performed by: FAMILY MEDICINE

## 2019-01-11 PROCEDURE — 80053 COMPREHEN METABOLIC PANEL: CPT | Performed by: FAMILY MEDICINE

## 2019-01-11 PROCEDURE — 85027 COMPLETE CBC AUTOMATED: CPT | Performed by: FAMILY MEDICINE

## 2019-01-11 PROCEDURE — 87624 HPV HI-RISK TYP POOLED RSLT: CPT | Performed by: FAMILY MEDICINE

## 2019-01-11 PROCEDURE — 82043 UR ALBUMIN QUANTITATIVE: CPT | Performed by: FAMILY MEDICINE

## 2019-01-11 PROCEDURE — 99213 OFFICE O/P EST LOW 20 MIN: CPT | Mod: 25 | Performed by: FAMILY MEDICINE

## 2019-01-11 PROCEDURE — G0145 SCR C/V CYTO,THINLAYER,RESCR: HCPCS | Performed by: FAMILY MEDICINE

## 2019-01-11 PROCEDURE — 99396 PREV VISIT EST AGE 40-64: CPT | Mod: 25 | Performed by: FAMILY MEDICINE

## 2019-01-11 PROCEDURE — 36415 COLL VENOUS BLD VENIPUNCTURE: CPT | Performed by: FAMILY MEDICINE

## 2019-01-11 PROCEDURE — 90471 IMMUNIZATION ADMIN: CPT | Performed by: FAMILY MEDICINE

## 2019-01-11 PROCEDURE — 90750 HZV VACC RECOMBINANT IM: CPT | Performed by: FAMILY MEDICINE

## 2019-01-11 PROCEDURE — 80061 LIPID PANEL: CPT | Performed by: FAMILY MEDICINE

## 2019-01-11 RX ORDER — LISINOPRIL 5 MG/1
10 TABLET ORAL DAILY
Qty: 180 TABLET | Refills: 1 | Status: SHIPPED | OUTPATIENT
Start: 2019-01-11 | End: 2019-03-21

## 2019-01-11 RX ORDER — FUROSEMIDE 20 MG
10 TABLET ORAL DAILY
Qty: 20 TABLET | Refills: 1 | Status: SHIPPED | OUTPATIENT
Start: 2019-01-11 | End: 2020-05-28

## 2019-01-11 ASSESSMENT — PATIENT HEALTH QUESTIONNAIRE - PHQ9
5. POOR APPETITE OR OVEREATING: NOT AT ALL
SUM OF ALL RESPONSES TO PHQ QUESTIONS 1-9: 0

## 2019-01-11 ASSESSMENT — ANXIETY QUESTIONNAIRES
1. FEELING NERVOUS, ANXIOUS, OR ON EDGE: NOT AT ALL
2. NOT BEING ABLE TO STOP OR CONTROL WORRYING: NOT AT ALL
6. BECOMING EASILY ANNOYED OR IRRITABLE: NOT AT ALL
3. WORRYING TOO MUCH ABOUT DIFFERENT THINGS: NOT AT ALL
7. FEELING AFRAID AS IF SOMETHING AWFUL MIGHT HAPPEN: NOT AT ALL
GAD7 TOTAL SCORE: 0
5. BEING SO RESTLESS THAT IT IS HARD TO SIT STILL: NOT AT ALL

## 2019-01-11 ASSESSMENT — MIFFLIN-ST. JEOR: SCORE: 1199.25

## 2019-01-11 NOTE — PROGRESS NOTES
SUBJECTIVE:   CC: Dulce Ma is an 58 year old woman who presents for preventive health visit.     Healthy Habits:    Do you get at least three servings of calcium containing foods daily (dairy, green leafy vegetables, etc.)? yes    Amount of exercise or daily activities, outside of work: barn chores and walking    Problems taking medications regularly No    Medication side effects: No    Have you had an eye exam in the past two years? yes    Do you see a dentist twice per year? Once per year    Do you have sleep apnea, excessive snoring or daytime drowsiness?no      Hypertension Follow-up:       Outpatient blood pressures are being checked at home.  Results are within normal limits.    Low Salt Diet: low salt    BP Readings from Last 5 Encounters:   01/11/19 (!) 142/92   07/27/18 117/59   06/22/18 110/78   09/15/17 138/90   05/25/17 122/82     Hasn't been taking furosemide in addition to lisinopril in the last month as she hasn't needed to for leg swelling.       Today's PHQ-2 Score:   PHQ-2 ( 1999 Pf[employer]) 6/22/2018 9/15/2017   Q1: Little interest or pleasure in doing things 0 0   Q2: Feeling down, depressed or hopeless 0 0   PHQ-2 Score 0 0       Abuse: Current or Past(Physical, Sexual or Emotional)- No  Do you feel safe in your environment? Yes      Social History     Tobacco Use     Smoking status: Never Smoker     Smokeless tobacco: Never Used   Substance Use Topics     Alcohol use: Yes     Comment: occ.     If you drink alcohol do you typically have >3 drinks per day or >7 drinks per week? No                     Reviewed orders with patient.  Reviewed health maintenance and updated orders accordingly - Yes  BP Readings from Last 3 Encounters:   01/11/19 166/90   07/27/18 117/59   06/22/18 110/78    Wt Readings from Last 3 Encounters:   01/11/19 65.4 kg (144 lb 3.2 oz)   07/27/18 62.6 kg (137 lb 16 oz)   06/22/18 62.8 kg (138 lb 6.4 oz)                  Patient Active Problem List   Diagnosis     h/o  Malignant melanoma of skin of trunk - Dr. Myles Lake - [facility]- rechecks w/ him 1x/year     [facility] Dr. Isabela Perez      Essential hypertension with goal blood pressure less than 140/90     CARDIOVASCULAR SCREENING; LDL GOAL LESS THAN 130     Family history of heart failure- mother, MGM, maternal uncle     Chronic constipation- trying to do daily fiber therapy     Glaucoma- sees Dr. Harvey Brown - [facility] - [place], [place]     Dysplastic nevi     Skin cancer, basal cell     Lichen sclerosus et atrophicus of the vulva     Past Surgical History:   Procedure Laterality Date     APPENDECTOMY  2008    aprroximately 8-10 years agp     BREAST SURGERY      Breast biopsies     C NONSPECIFIC PROCEDURE  3/02    malignant melanoma removed from abdomen with negative nodes-Dr. Acosta -surgeon     COLONOSCOPY N/A 7/27/2018    Procedure: COLONOSCOPY;  COLONOSCOPY ;  Surgeon: Ren Whitehead MD;  Location:  GI     ORTHOPEDIC SURGERY Right 2014    ORIF right ring finger       Social History     Tobacco Use     Smoking status: Never Smoker     Smokeless tobacco: Never Used   Substance Use Topics     Alcohol use: Yes     Comment: occ.     Family History   Problem Relation Age of Onset     Hypertension Mother      Heart Disease Mother         bypass     Neuropathy Mother      Hypertension Father      Hypertension Paternal Grandmother      Eye Disorder Maternal Grandmother         glacoma     Eye Disorder Maternal Grandfather         glacoma     Colon Cancer No family hx of          Current Outpatient Medications   Medication Sig Dispense Refill     clobetasol (TEMOVATE) 0.05 % cream Apply very scant amount to inner labia daily for up to 14 days at a time -ok to use every 2 months or so 45 g 1     furosemide (LASIX) 20 MG tablet Take 0.5 tablets (10 mg) by mouth daily As needed for leg swelling 20 tablet 1     latanoprost (XALATAN) 0.005 %  ophthalmic solution At Bedtime. For recent Dx of glaucoma 1 Bottle 12     lisinopril (PRINIVIL/ZESTRIL) 5 MG tablet Take 1 tablet (5 mg) by mouth daily 90 tablet 1     multivitamin, therapeutic with minerals (MULTI-VITAMIN) TABS Take 1 tablet by mouth daily. 100 tablet 3     timolol (TIMOPTIC) 0.5 % ophthalmic solution Place 1 drop into both eyes daily In the a.m. for Dx of glaucoma 1 Bottle      Allergies   Allergen Reactions     Amoxicillin      hives     Benzoyl Peroxide      swelling     Ibuprofen      over long duration dev. hives     Penicillins      hives     Recent Labs   Lab Test 07/22/16  0907 07/20/15  0945 05/17/13  1048  10/26/10  1010   * 133* 122   < > 116   HDL 61 60 63   < > 64   TRIG 158* 152* 107   < > 125   ALT 16 21 17  --  19   CR 0.73 0.83 0.79   < > 0.86   GFRESTIMATED 83 72 76   < > 70   GFRESTBLACK >90   GFR Calc   87 >90   < > 85   POTASSIUM 4.6 5.0 4.7   < > 4.4   TSH  --   --  0.96  --  1.30    < > = values in this interval not displayed.      Mammogram Screening: Patient over age 50, mutual decision to screen reflected in health maintenance.    Ma Screening Digital Bilateral    Result Date: 8/4/2017  Narrative: SCREENING MAMMOGRAM, BILATERAL, DIGITAL w/CAD - 8/3/2017 12:56 PM BREAST SYMPTOMS: No current breast complaints. COMPARISON:  07/11/2011, 2/11/2010, 4/27/2007 . BREAST DENSITY: Heterogeneously dense. COMMENTS: No findings of suspicion for malignancy.       History of abnormal Pap smear: NO - age 30- 65 PAP every 3 years recommended     Reviewed and updated as needed this visit by clinical staff  Tobacco  Allergies  Meds  Med Hx  Surg Hx  Fam Hx  Soc Hx        Reviewed and updated as needed this visit by Provider        Past Medical History:   Diagnosis Date     Dysplastic nevi      Glaucoma     Diagnosed in Spring 2010     Hypertension goal BP (blood pressure) < 140/90 at age 50     very strong family hx      Lumbago     stiffness in low back      Other malignant neoplasm of skin of trunk, except scrotum     Dr. Myles Lake, melanoma with negative sentinel node biopsy - no chemo-Dr. Selene Gonzales-derm     Perimenopausal      Routine gyne exam     [facility] ob/[facility] - Dr. Isabela Perez      Skin cancer, basal cell     multiple - 3 on nose, treated with interferon intralesionally x 1      Unspecified derangement, shoulder region     s/p horse-riding injury- no problem now     Unspecified musculoskeletal disorders and symptoms referable to neck     compressed vertebrae in neck- bothers pt rarely      Past Surgical History:   Procedure Laterality Date     APPENDECTOMY      aprroximately 8-10 years agp     BREAST SURGERY      Breast biopsies     C NONSPECIFIC PROCEDURE  3/02    malignant melanoma removed from abdomen with negative nodes-Dr. Acosta -surgeon     COLONOSCOPY N/A 2018    Procedure: COLONOSCOPY;  COLONOSCOPY ;  Surgeon: Ren Whitehead MD;  Location:  GI     ORTHOPEDIC SURGERY Right     ORIF right ring finger     Obstetric History       T0      L0     SAB0   TAB0   Ectopic0   Multiple0   Live Births0             ROS:  CONSTITUTIONAL: NEGATIVE for fever, chills, change in weight  INTEGUMENTARY/SKIN: NEGATIVE for worrisome rashes, moles or lesions  EYES: NEGATIVE for vision changes or irritation  ENT: NEGATIVE for ear, mouth and throat problems  RESP: NEGATIVE for significant cough or SOB  BREAST: NEGATIVE for masses, tenderness or discharge  CV: NEGATIVE for chest pain, palpitations or peripheral edema  GI: NEGATIVE for nausea, abdominal pain, heartburn, or change in bowel habits  : NEGATIVE for unusual urinary or vaginal symptoms. No vaginal bleeding.  MUSCULOSKELETAL: NEGATIVE for significant arthralgias or myalgia  NEURO: NEGATIVE for weakness, dizziness or paresthesias  ENDOCRINE: NEGATIVE for temperature intolerance, skin/hair changes  HEME/ALLERGY/IMMUNE: NEGATIVE for bleeding problems  PSYCHIATRIC: NEGATIVE  "for changes in mood or affect     OBJECTIVE:   BP (!) 142/92   Pulse 65   Temp 98.9  F (37.2  C) (Oral)   Ht 1.594 m (5' 2.75\")   Wt 65.4 kg (144 lb 3.2 oz)   LMP 10/31/2011   SpO2 100%   BMI 25.75 kg/m    EXAM:  GENERAL APPEARANCE: healthy, alert and no distress  EYES: Eyes grossly normal to inspection, PERRL and conjunctivae and sclerae normal  HENT: ear canals and TM's normal, nose and mouth without ulcers or lesions, oropharynx clear and oral mucous membranes moist  NECK: no adenopathy, no asymmetry, masses, or scars and thyroid normal to palpation  RESP: lungs clear to auscultation - no rales, rhonchi or wheezes  BREAST: normal without masses, tenderness or nipple discharge and no palpable axillary masses or adenopathy  CV: regular rate and rhythm, normal S1 S2, no S3 or S4, no murmur, click or rub, no peripheral edema and peripheral pulses strong  ABDOMEN: soft, nontender, no hepatosplenomegaly, no masses and bowel sounds normal   (female): normal female external genitalia, normal urethral meatus, signif. vaginal mucosal atrophy noted, normal cervix, adnexae, and uterus without masses or abnormal discharge  MS: no musculoskeletal defects are noted and gait is age appropriate without ataxia  SKIN: no suspicious lesions or rashes  NEURO: Normal strength and tone, sensory exam grossly normal, mentation intact and speech normal  PSYCH: mentation appears normal and affect normal/bright    See [facility] orders.     ASSESSMENT/PLAN:       ICD-10-CM    1. Encounter for routine adult medical exam with abnormal findings Z00.01    2. Essential hypertension with goal blood pressure less than 140/90 - not well controlled today - will increase lisinopril to 10mg daily  I10 Albumin Random Urine Quantitative with Creat Ratio     JUST IN CASE     lisinopril (PRINIVIL/ZESTRIL) 5 MG tablet     OFFICE/OUTPT VISIT,EST,LEVL IV   3. Atrophic vaginitis N95.2 OFFICE/OUTPT VISIT,EST,LEVL IV   4. Bilateral leg edema R60.0 " "furosemide (LASIX) 20 MG tablet     OFFICE/OUTPT VISIT,EST,LEVL IV   5. Malignant melanoma of skin of trunk (H) C43.59 OFFICE/OUTPT VISIT,EST,LEVL IV   6. CARDIOVASCULAR SCREENING; LDL GOAL LESS THAN 130 Z13.6 Lipid panel reflex to direct LDL Fasting     Comprehensive metabolic panel     CBC with platelets     TSH with free T4 reflex     JUST IN CASE   7. Screen for colon cancer Z12.11 Fecal colorectal cancer screen FIT - Future (S+30)   8. Anesthesia complication, initial encounter T41.45XA    9. Visit for screening mammogram Z12.31 MA Screen Bilateral w/Suraj     CANCELED: MA SCREENING DIGITAL BILAT - Future  (s+30)   10. Screening for malignant neoplasm of cervix Z12.4 Pap imaged thin layer screen with HPV - recommended age 30 - 65 years (select HPV order below)     HPV High Risk Types DNA Cervical   11. Screening for HIV (human immunodeficiency virus) Z11.4    12. Need for shingles vaccine Z23 ZOSTER VACCINE RECOMBINANT ADJUVANTED IM NJX     ADMIN 1st VACCINE   seeing dermatology 1-2x/year re: skin checks for follow up on her     Try to increase your lisinopril to 2-5mg tabs daily and Recheck your blood pressure in our pharmacy in 1 week or sooner if needed.  Have pharmacy send me their note.      Recheck in 2-6 months for nurse only visit for 2nd shingles vaccine. (Shingrix).     Offered pt vaginal estrogen cream to help with atrophic vaginitis . She declined today as she is not sexually active at this time and no plans to be anytime soon. She'll think about it and let us know. No discomfort re: vaginal dryness.     COUNSELING:   Reviewed preventive health counseling, as reflected in patient instructions    BP Readings from Last 1 Encounters:   01/11/19 (!) 142/92     Estimated body mass index is 25.75 kg/m  as calculated from the following:    Height as of this encounter: 1.594 m (5' 2.75\").    Weight as of this encounter: 65.4 kg (144 lb 3.2 oz).      Weight management plan: Discussed healthy diet and exercise " guidelines     reports that  has never smoked. she has never used smokeless tobacco.    Return in about 1 week (around 1/18/2019) for BP Recheck in our pharmacy and then again in 6 months with Dr. ROBERTSON .      Counseling Resources:  ATP IV Guidelines  Pooled Cohorts Equation Calculator  Breast Cancer Risk Calculator  FRAX Risk Assessment  ICSI Preventive Guidelines  Dietary Guidelines for Americans, 2010  USDA's MyPlate  ASA Prophylaxis  Lung CA Screening    Alida Lamb MD  [facility]

## 2019-01-11 NOTE — TELEPHONE ENCOUNTER
Form was signed and completed as much as able by Dr. Lamb.  Patient took original form with her and will complete the lab portion when her labs come in.  Sent copy of form to christo.    Lisandra Mercer CMA

## 2019-01-11 NOTE — PATIENT INSTRUCTIONS
Try to increase your lisinopril to 2-5mg tabs daily and Recheck your blood pressure in our pharmacy in 1 week or sooner if needed.  Have pharmacy send me their note.      Recheck in 2-6 months for nurse only visit for 2nd shingles vaccine. (Shingrix).       Preventive Health Recommendations  Female Ages 50 - 64    Yearly exam: See your health care provider every year in order to  o Review health changes.   o Discuss preventive care.    o Review your medicines if your doctor has prescribed any.      Get a Pap test every three years (unless you have an abnormal result and your provider advises testing more often).    If you get Pap tests with HPV test, you only need to test every 5 years, unless you have an abnormal result.     You do not need a Pap test if your uterus was removed (hysterectomy) and you have not had cancer.    You should be tested each year for STDs (sexually transmitted diseases) if you're at risk.     Have a mammogram every 1 to 2 years.    Have a colonoscopy at age 50, or have a yearly FIT test (stool test). These exams screen for colon cancer.      Have a cholesterol test every 5 years, or more often if advised.    Have a diabetes test (fasting glucose) every three years. If you are at risk for diabetes, you should have this test more often.     If you are at risk for osteoporosis (brittle bone disease), think about having a bone density scan (DEXA).    Shots: Get a flu shot each year. Get a tetanus shot every 10 years.    Nutrition:     Eat at least 5 servings of fruits and vegetables each day.    Eat whole-grain bread, whole-wheat pasta and brown rice instead of white grains and rice.    Get adequate Calcium and Vitamin D.     Lifestyle    Exercise at least 150 minutes a week (30 minutes a day, 5 days a week). This will help you control your weight and prevent disease.    Limit alcohol to one drink per day.    No smoking.     Wear sunscreen to prevent skin cancer.     See your dentist every  six months for an exam and cleaning.    See your eye doctor every 1 to 2 years.      Try to increase your lisinopril to 2-5mg tabs daily and Recheck your blood pressure in our pharmacy in 1 week or sooner if needed.  Have pharmacy send me their note.                 Thank you for choosing [facility]  for your Health Care. It was a pleasure seeing you at your visit today. Please contact us with any questions or concerns you may have.                   Alida Lamb MD                                  To reach your [facility] care team after hours call:   [phone]    Our clinic hours are:     Monday- 7:30 am - 7:00 pm                             Tuesday through Friday- 7:30 am - 5:00 pm                                        Saturday- 8:00 am - 12:00 pm                  Phone:  [phone]    Our pharmacy hours are:     Monday  8:00 am to 7:00 pm      Tuesday through Friday 8:00am to 6:00pm                        Saturday - 9:00 am to 1:00 pm      Sunday : Closed.              Phone:  [phone]      There is also information available at our web site:  [email or web address]    If your provider ordered any lab tests and you do not receive the results within 10 business days, please call the clinic.    If you need a medication refill please contact your pharmacy.  Please allow 2 business days for your refill to be completed.    Our clinic offers telephone visits and e visits.  Please ask one of your team members to explain more.      Use [employer] (secure email communication and access to your chart) to send your primary care provider a message or make an appointment. Ask someone on your Team how to sign up for [employer].

## 2019-01-11 NOTE — NURSING NOTE
Patient was given Shingrix vaccine today.  I asked her if she had checked with her insurance prior to getting Shingrix and she stated she did.  She stated that they said that it was only covered in the clinic.      Lisandra Mercer, LEONILA

## 2019-01-11 NOTE — TELEPHONE ENCOUNTER
Reason for Call:  Form, our goal is to have forms completed with 72 hours, however, some forms may require a visit or additional information.    Type of letter, form or note:  Biometric - Health Screening Form    Who is the form from?: [employer] (if other please explain)    Where did the form come from: Patient or family brought in       What clinic location was the form placed at?: [facility]    Where the form was placed: Given to physician    What number is listed as a contact on the form?: [phone] - Patient       Additional comments: Once completed with labs, needs to be faxed to [phone]. Also mail a copy to patient's [place] address [place] [address] [place] [address].  Send copy to scan.    Call taken on 1/11/2019 at 10:33 AM by Lisandra Mercer

## 2019-01-12 LAB
ALBUMIN SERPL-MCNC: 3.8 G/DL (ref 3.4–5)
ALP SERPL-CCNC: 68 U/L (ref 40–150)
ALT SERPL W P-5'-P-CCNC: 18 U/L (ref 0–50)
ANION GAP SERPL CALCULATED.3IONS-SCNC: 7 MMOL/L (ref 3–14)
AST SERPL W P-5'-P-CCNC: 13 U/L (ref 0–45)
BILIRUB SERPL-MCNC: 0.7 MG/DL (ref 0.2–1.3)
BUN SERPL-MCNC: 16 MG/DL (ref 7–30)
CALCIUM SERPL-MCNC: 8.8 MG/DL (ref 8.5–10.1)
CHLORIDE SERPL-SCNC: 108 MMOL/L (ref 94–109)
CHOLEST SERPL-MCNC: 249 MG/DL
CO2 SERPL-SCNC: 24 MMOL/L (ref 20–32)
CREAT SERPL-MCNC: 0.81 MG/DL (ref 0.52–1.04)
GFR SERPL CREATININE-BSD FRML MDRD: 79 ML/MIN/{1.73_M2}
GLUCOSE SERPL-MCNC: 78 MG/DL (ref 70–99)
HDLC SERPL-MCNC: 63 MG/DL
LDLC SERPL CALC-MCNC: 161 MG/DL
NONHDLC SERPL-MCNC: 186 MG/DL
POTASSIUM SERPL-SCNC: 4.2 MMOL/L (ref 3.4–5.3)
PROT SERPL-MCNC: 7 G/DL (ref 6.8–8.8)
SODIUM SERPL-SCNC: 139 MMOL/L (ref 133–144)
TRIGL SERPL-MCNC: 126 MG/DL
TSH SERPL DL<=0.005 MIU/L-ACNC: 1.46 MU/L (ref 0.4–4)

## 2019-01-12 ASSESSMENT — ANXIETY QUESTIONNAIRES: GAD7 TOTAL SCORE: 0

## 2019-01-13 LAB
CREAT UR-MCNC: 185 MG/DL
MICROALBUMIN UR-MCNC: 15 MG/L
MICROALBUMIN/CREAT UR: 7.95 MG/G CR (ref 0–25)

## 2019-01-16 LAB
COPATH REPORT: NORMAL
PAP: NORMAL

## 2019-01-17 LAB
FINAL DIAGNOSIS: NORMAL
HPV HR 12 DNA CVX QL NAA+PROBE: NEGATIVE
HPV16 DNA SPEC QL NAA+PROBE: NEGATIVE
HPV18 DNA SPEC QL NAA+PROBE: NEGATIVE
SPECIMEN DESCRIPTION: NORMAL
SPECIMEN SOURCE CVX/VAG CYTO: NORMAL

## 2019-02-01 DIAGNOSIS — Z13.6 CARDIOVASCULAR SCREENING; LDL GOAL LESS THAN 130: Primary | ICD-10-CM

## 2019-03-16 ENCOUNTER — ALLIED HEALTH/NURSE VISIT (OUTPATIENT)
Dept: FAMILY MEDICINE | Facility: CLINIC | Age: 59
End: 2019-03-16
Payer: COMMERCIAL

## 2019-03-16 VITALS — DIASTOLIC BLOOD PRESSURE: 80 MMHG | SYSTOLIC BLOOD PRESSURE: 142 MMHG

## 2019-03-16 DIAGNOSIS — I10 ESSENTIAL HYPERTENSION WITH GOAL BLOOD PRESSURE LESS THAN 140/90: Primary | ICD-10-CM

## 2019-03-16 PROCEDURE — 99207 ZZC NO CHARGE NURSE ONLY: CPT | Performed by: FAMILY MEDICINE

## 2019-03-16 NOTE — PROGRESS NOTES
Dulce Ma is enrolled/participating in the retail pharmacy Blood Pressure Goals Achievement Program (BPGAP).  Dulce Ma was evaluated at [facility] on March 16, 2019 at which time her blood pressure was:    BP Readings from Last 3 Encounters:   03/16/19 142/80   01/11/19 (!) 142/92   07/27/18 117/59     Reviewed lifestyle modifications for blood pressure control and reduction: including making healthy food choices, managing weight, getting regular exercise, smoking cessation, reducing alcohol consumption, monitoring blood pressure regularly.     Dulce Ma is not experiencing symptoms.    Follow-Up: BP is not at goal of < 140/90mmHg (patient 18+ years of age with or without diabetes), Recommended follow-up with PCP.  Routing to PCP for further review.    Recommendation to Provider: patient is very close to goal. I suggested she get rechecked by pharmacy in 1 month to see if still elevated. Or, if she has been consistently borderline she may benefit from a dose increase of her lisinopril to see if that brings her to goal.    This note completed by: Tonie Vines, PharmD  [facility]  PH: [phone]

## 2019-03-16 NOTE — Clinical Note
Routing message to PCP for review because BP checked at pharmacy is above goal. Recommended patient to follow-up with PCP.  PCP please close this encounter.

## 2019-03-18 ENCOUNTER — TELEPHONE (OUTPATIENT)
Dept: FAMILY MEDICINE | Facility: CLINIC | Age: 59
End: 2019-03-18

## 2019-03-18 ENCOUNTER — HOSPITAL ENCOUNTER (OUTPATIENT)
Dept: MAMMOGRAPHY | Facility: CLINIC | Age: 59
Discharge: HOME | End: 2019-03-18
Attending: FAMILY MEDICINE | Admitting: FAMILY MEDICINE
Payer: COMMERCIAL

## 2019-03-18 DIAGNOSIS — Z12.31 VISIT FOR SCREENING MAMMOGRAM: ICD-10-CM

## 2019-03-18 DIAGNOSIS — I10 ESSENTIAL HYPERTENSION WITH GOAL BLOOD PRESSURE LESS THAN 140/90: ICD-10-CM

## 2019-03-18 PROCEDURE — 77063 BREAST TOMOSYNTHESIS BI: CPT

## 2019-03-18 NOTE — TELEPHONE ENCOUNTER
Attempt #1.  Tianna Bhandari contacted Dulce on 03/18/19 and left a message. If patient calls back please contact RN team.  eRnee Bhandari RN  [facility]

## 2019-03-18 NOTE — TELEPHONE ENCOUNTER
"From pharmacy bp visit:     \"Dulce Ma is enrolled/participating in the retail pharmacy Blood Pressure Goals Achievement Program (BPGAP).  Dulce Ma was evaluated at [facility] on March 16, 2019 at which time her blood pressure was:         BP Readings from Last 3 Encounters:   03/16/19 142/80   01/11/19 (!) 142/92   07/27/18 117/59      Reviewed lifestyle modifications for blood pressure control and reduction: including making healthy food choices, managing weight, getting regular exercise, smoking cessation, reducing alcohol consumption, monitoring blood pressure regularly.      Dulce Ma is not experiencing symptoms.     Follow-Up: BP is not at goal of < 140/90mmHg (patient 18+ years of age with or without diabetes), Recommended follow-up with PCP.  Routing to PCP for further review.     Recommendation to Provider: patient is very close to goal. I suggested she get rechecked by pharmacy in 1 month to see if still elevated. Or, if she has been consistently borderline she may benefit from a dose increase of her lisinopril to see if that brings her to goal.     This note completed by: Tonie Vines, PharmD  [facility]  PH: [phone] \"    Current Outpatient Medications   Medication Sig Dispense Refill     clobetasol (TEMOVATE) 0.05 % cream Apply very scant amount to inner labia daily for up to 14 days at a time -ok to use every 2 months or so 45 g 1     furosemide (LASIX) 20 MG tablet Take 0.5 tablets (10 mg) by mouth daily As needed for leg swelling 20 tablet 1     latanoprost (XALATAN) 0.005 % ophthalmic solution At Bedtime. For recent Dx of glaucoma 1 Bottle 12     lisinopril (PRINIVIL/ZESTRIL) 5 MG tablet Take 2 tablets (10 mg) by mouth daily 180 tablet 1     multivitamin, therapeutic with minerals (MULTI-VITAMIN) TABS Take 1 tablet by mouth daily. 100 tablet 3     timolol (TIMOPTIC) 0.5 % ophthalmic solution Place 1 drop into both eyes daily In the a.m. for Dx of glaucoma " 1 Bottle      Recommend that pt increase her lisinopril from 10mg daily to 20mg daily. This is the usual increase in dose for pt's bp.    Please inform patient and Adjust med list , too, please.  Ok for 4-5mg tabs until they are done, then 1-20mg daily or 2-10mg daily. Please offer pt the options.   Then, Recheck your blood pressure in our pharmacy in 1 week or sooner if needed.  Have pharmacy send me their note.

## 2019-03-20 NOTE — TELEPHONE ENCOUNTER
Attempt #2  Called patient @ [phone] - Left a non-detailed message to call back and speak with any triage nurse.    Marine Mojica RN  [facility]

## 2019-03-21 RX ORDER — LISINOPRIL 20 MG/1
20 TABLET ORAL DAILY
Qty: 90 TABLET | Refills: 0 | Status: SHIPPED | OUTPATIENT
Start: 2019-03-21 | End: 2019-06-28

## 2019-03-21 NOTE — TELEPHONE ENCOUNTER
Called patient @ # below -     Advised of MD ORION message below - Patient stated an understanding and agreed with plan.  Rx sent to [facility]    Patient to call back and schedule nurse only visit for end of next week.       Marine Mojica RN  [facility]

## 2019-04-22 ENCOUNTER — ALLIED HEALTH/NURSE VISIT (OUTPATIENT)
Dept: NURSING | Facility: CLINIC | Age: 59
End: 2019-04-22
Payer: COMMERCIAL

## 2019-04-22 VITALS
OXYGEN SATURATION: 93 % | HEART RATE: 70 BPM | BODY MASS INDEX: 25.39 KG/M2 | SYSTOLIC BLOOD PRESSURE: 116 MMHG | WEIGHT: 142.2 LBS | DIASTOLIC BLOOD PRESSURE: 70 MMHG

## 2019-04-22 DIAGNOSIS — Z23 ENCOUNTER FOR IMMUNIZATION: ICD-10-CM

## 2019-04-22 DIAGNOSIS — I10 ESSENTIAL HYPERTENSION WITH GOAL BLOOD PRESSURE LESS THAN 140/90: Primary | ICD-10-CM

## 2019-04-22 PROCEDURE — 90750 HZV VACC RECOMBINANT IM: CPT

## 2019-04-22 PROCEDURE — 90471 IMMUNIZATION ADMIN: CPT

## 2019-04-22 NOTE — PROGRESS NOTES
Hypertension Follow-up      Outpatient blood pressures are not being checked.    Low Salt Diet: no added salt      Amount of exercise or physical activity: 2-3 days/week for an average of 30-45 minutes    Problems taking medications regularly: No    Medication side effects: none    Diet: regular (no restrictions)    Denies: CP SOB , headaches, blurred vision, nausea, vomiting        reviewed and advised diet and exercise     Patient stated an understanding and agreed with plan.    Catherine Varma RN, BSN  [facility]

## 2019-04-24 NOTE — PROGRESS NOTES
BP Readings from Last 3 Encounters:   04/22/19 116/70   03/16/19 142/80   01/11/19 (!) 142/92

## 2019-06-10 ENCOUNTER — MYC MEDICAL ADVICE (OUTPATIENT)
Dept: FAMILY MEDICINE | Facility: CLINIC | Age: 59
End: 2019-06-10

## 2019-06-10 NOTE — TELEPHONE ENCOUNTER
Patient needs OV for evaluation/physical exam.   Will likely need imaging/doppler      Tiana Krishna MS, PA-C

## 2019-06-10 NOTE — TELEPHONE ENCOUNTER
Forwarded to JV/provider pool.  Please review patient's Mychart message and advise.  Giselle Luis, RN, BS, PHN

## 2019-06-10 NOTE — TELEPHONE ENCOUNTER
Mychart note sent to patient.    Giselle Luis, BS, RN, N  [facility]) [phone]

## 2019-06-18 ENCOUNTER — APPOINTMENT (OUTPATIENT)
Age: 59
Setting detail: DERMATOLOGY
End: 2019-06-18

## 2019-06-18 VITALS — WEIGHT: 145 LBS | RESPIRATION RATE: 14 BRPM | HEIGHT: 63 IN

## 2019-06-18 DIAGNOSIS — L82.1 OTHER SEBORRHEIC KERATOSIS: ICD-10-CM

## 2019-06-18 DIAGNOSIS — L57.0 ACTINIC KERATOSIS: ICD-10-CM

## 2019-06-18 DIAGNOSIS — L72.0 EPIDERMAL CYST: ICD-10-CM

## 2019-06-18 DIAGNOSIS — L73.8 OTHER SPECIFIED FOLLICULAR DISORDERS: ICD-10-CM

## 2019-06-18 PROCEDURE — 17000 DESTRUCT PREMALG LESION: CPT

## 2019-06-18 PROCEDURE — OTHER COUNSELING: OTHER

## 2019-06-18 PROCEDURE — OTHER EDUCATIONAL RESOURCES PROVIDED: OTHER

## 2019-06-18 PROCEDURE — OTHER LIQUID NITROGEN: OTHER

## 2019-06-18 PROCEDURE — 99214 OFFICE O/P EST MOD 30 MIN: CPT | Mod: 25

## 2019-06-18 ASSESSMENT — LOCATION ZONE DERM
LOCATION ZONE: TRUNK
LOCATION ZONE: FACE
LOCATION ZONE: NECK
LOCATION ZONE: FACE

## 2019-06-18 ASSESSMENT — LOCATION DETAILED DESCRIPTION DERM
LOCATION DETAILED: LEFT INFERIOR ANTERIOR NECK
LOCATION DETAILED: RIGHT LATERAL UPPER BACK
LOCATION DETAILED: RIGHT CENTRAL MALAR CHEEK
LOCATION DETAILED: RIGHT CENTRAL MALAR CHEEK

## 2019-06-18 ASSESSMENT — LOCATION SIMPLE DESCRIPTION DERM
LOCATION SIMPLE: LEFT ANTERIOR NECK
LOCATION SIMPLE: RIGHT UPPER BACK
LOCATION SIMPLE: RIGHT CHEEK
LOCATION SIMPLE: RIGHT CHEEK

## 2019-06-18 ASSESSMENT — TOTAL NUMBER OF LESIONS: # OF LESIONS?: 1

## 2019-06-18 NOTE — PROCEDURE: LIQUID NITROGEN
Number Of Freeze-Thaw Cycles: 2 freeze-thaw cycles
Render Note In Bullet Format When Appropriate: Yes
Consent: The patient's consent was obtained including but not limited to risks of crusting, scabbing, blistering, scarring, darker or lighter pigmentary change, recurrence, incomplete removal and infection.
Post-Care Instructions: I reviewed with the patient in detail post-care instructions. Patient is to wear sunprotection, and avoid picking at any of the treated lesions. Pt may apply Aquaphor or Vaseline to crusted or scabbing areas.
Duration Of Freeze Thaw-Cycle (Seconds): 3
Detail Level: Simple

## 2019-06-28 DIAGNOSIS — I10 ESSENTIAL HYPERTENSION WITH GOAL BLOOD PRESSURE LESS THAN 140/90: ICD-10-CM

## 2019-06-28 RX ORDER — LISINOPRIL 20 MG/1
TABLET ORAL
Qty: 90 TABLET | Refills: 1 | Status: SHIPPED | OUTPATIENT
Start: 2019-06-28 | End: 2019-12-30

## 2019-06-28 NOTE — TELEPHONE ENCOUNTER
Prescription approved per [facility] Refill Protocol.    Giselle Luis, BS, RN, N  [facility]) [phone]

## 2019-06-28 NOTE — TELEPHONE ENCOUNTER
"Requested Prescriptions   Pending Prescriptions Disp Refills     lisinopril (PRINIVIL/ZESTRIL) 20 MG tablet [Pharmacy Med Name: LISINOPRIL TABS 20MG]        Last Written Prescription Date:  3.21.19  Last Fill Quantity: 90 tablet,  # refills: 0   Last office visit: 1/11/2019 with prescribing provider:  Alida Lamb MD             Future Office Visit:       90 tablet 0     Sig: TAKE 1 TABLET DAILY       ACE Inhibitors (Including Combos) Protocol Passed - 6/28/2019 10:20 AM        Passed - Blood pressure under 140/90 in past 12 months     BP Readings from Last 3 Encounters:   04/22/19 116/70   03/16/19 142/80   01/11/19 (!) 142/92                 Passed - Recent (12 mo) or future (30 days) visit within the authorizing provider's specialty     Patient had office visit in the last 12 months or has a visit in the next 30 days with authorizing provider or within the authorizing provider's specialty.  See \"Patient Info\" tab in inbasket, or \"Choose Columns\" in Meds & Orders section of the refill encounter.              Passed - Medication is active on med list        Passed - Patient is age 18 or older        Passed - No active pregnancy on record        Passed - Normal serum creatinine on file in past 12 months     Recent Labs   Lab Test 01/11/19  1035   CR 0.81             Passed - Normal serum potassium on file in past 12 months     Recent Labs   Lab Test 01/11/19  1035   POTASSIUM 4.2             Passed - No positive pregnancy test within past 12 months        "

## 2019-07-25 ENCOUNTER — TELEPHONE (OUTPATIENT)
Dept: FAMILY MEDICINE | Facility: CLINIC | Age: 59
End: 2019-07-25

## 2019-07-26 ENCOUNTER — OFFICE VISIT (OUTPATIENT)
Dept: FAMILY MEDICINE | Facility: CLINIC | Age: 59
End: 2019-07-26
Payer: COMMERCIAL

## 2019-07-26 VITALS
WEIGHT: 142 LBS | TEMPERATURE: 97.8 F | BODY MASS INDEX: 25.16 KG/M2 | OXYGEN SATURATION: 97 % | DIASTOLIC BLOOD PRESSURE: 82 MMHG | HEIGHT: 63 IN | SYSTOLIC BLOOD PRESSURE: 122 MMHG | HEART RATE: 71 BPM

## 2019-07-26 DIAGNOSIS — L72.3 SEBACEOUS CYST: Primary | ICD-10-CM

## 2019-07-26 PROCEDURE — 99213 OFFICE O/P EST LOW 20 MIN: CPT | Performed by: NURSE PRACTITIONER

## 2019-07-26 RX ORDER — CEPHALEXIN 500 MG/1
500 CAPSULE ORAL 2 TIMES DAILY
Qty: 14 CAPSULE | Refills: 0 | Status: SHIPPED | OUTPATIENT
Start: 2019-07-26 | End: 2019-08-02

## 2019-07-26 ASSESSMENT — MIFFLIN-ST. JEOR: SCORE: 1184.27

## 2019-07-26 NOTE — PROGRESS NOTES
Subjective     Dulce Ma is a 59 year old female who presents to clinic today for the following health issues:    HPI     Lump-  Has had it for a while- lump on lower back- got bit by a horse about a year ago and its in the same area where she has the lump now- not sure if its related, its irritating - her jeans rub on it from where its located, can press on it and it doesn't hurt. Had a hematoma in that area after horse bite.  Increase in size and down decrease back down.      Has kept it covered by a band aid.        Patient Active Problem List   Diagnosis     h/o Malignant melanoma of skin of trunk - Dr. Myles Lake - [facility]- rechecks w/ him 1x/year     Encounter for routine gynecological examination - [facility] - Dr. Isabela Perez      Essential hypertension with goal blood pressure less than 140/90     CARDIOVASCULAR SCREENING; LDL GOAL LESS THAN 130     Family history of heart failure- mother, MGM, maternal uncle     Chronic constipation- trying to do daily fiber therapy     Glaucoma- sees Dr. Harvey Brown - [facility] - [place], [place]     Dysplastic nevi     Skin cancer, basal cell     Lichen sclerosus et atrophicus of the vulva     Anesthesia complication, initial encounter-was completely out with Midazolam 2 mg IV, Fentanyl 100 micrograms IV that was given at time of colonoscopy 7/2018      Past Surgical History:   Procedure Laterality Date     APPENDECTOMY  2008    aprroximately 8-10 years agp     BREAST SURGERY      Breast biopsies     C NONSPECIFIC PROCEDURE  3/02    malignant melanoma removed from abdomen with negative nodes-Dr. Acosta -surgeon     COLONOSCOPY N/A 7/27/2018    Procedure: COLONOSCOPY;  COLONOSCOPY ;  Surgeon: Ren Whitehead MD;  Location:  GI     ORTHOPEDIC SURGERY Right 2014    ORIF right ring finger       Social History     Tobacco Use     Smoking status: Never Smoker     Smokeless tobacco: Never Used   Substance Use Topics     Alcohol use: Yes  "    Comment: occ.     Family History   Problem Relation Age of Onset     Hypertension Mother      Heart Disease Mother         bypass     Neuropathy Mother      Hypertension Father      Hypertension Paternal Grandmother      Eye Disorder Maternal Grandmother         glacoma     Eye Disorder Maternal Grandfather         glacoma     Colon Cancer No family hx of          Current Outpatient Medications   Medication Sig Dispense Refill     cephALEXin (KEFLEX) 500 MG capsule Take 1 capsule (500 mg) by mouth 2 times daily for 7 days 14 capsule 0     furosemide (LASIX) 20 MG tablet Take 0.5 tablets (10 mg) by mouth daily As needed for leg swelling 20 tablet 1     latanoprost (XALATAN) 0.005 % ophthalmic solution At Bedtime. For recent Dx of glaucoma 1 Bottle 12     lisinopril (PRINIVIL/ZESTRIL) 20 MG tablet TAKE 1 TABLET DAILY 90 tablet 1     multivitamin, therapeutic with minerals (MULTI-VITAMIN) TABS Take 1 tablet by mouth daily. 100 tablet 3     timolol (TIMOPTIC) 0.5 % ophthalmic solution Place 1 drop into both eyes daily In the a.m. for Dx of glaucoma 1 Bottle      clobetasol (TEMOVATE) 0.05 % cream Apply very scant amount to inner labia daily for up to 14 days at a time -ok to use every 2 months or so (Patient not taking: Reported on 7/26/2019) 45 g 1     Allergies   Allergen Reactions     Amoxicillin      hives     Benzoyl Peroxide      swelling     Ibuprofen      over long duration dev. hives     Penicillins      hives       Reviewed and updated as needed this visit by Provider         Review of Systems   ROS COMP: Constitutional, HEENT, cardiovascular, pulmonary, gi and gu systems are negative, except as otherwise noted.      Objective    /82 (BP Location: Right arm, Patient Position: Sitting, Cuff Size: Adult Regular)   Pulse 71   Temp 97.8  F (36.6  C) (Oral)   Ht 1.594 m (5' 2.75\")   Wt 64.4 kg (142 lb)   LMP 10/31/2011   SpO2 97%   BMI 25.36 kg/m    Body mass index is 25.36 kg/m .  Physical Exam "   GENERAL: healthy, alert and no distress  SKIN:  Right lumbar region with erythematous and indurated cyst like lesion, tender to palpation, no drainage or center point  PSYCH: mentation appears normal, affect normal/bright      Assessment & Plan     Dulce was seen today for mass.    Diagnoses and all orders for this visit:    Sebaceous cyst - infected  -     cephALEXin (KEFLEX) 500 MG capsule; Take 1 capsule (500 mg) by mouth 2 times daily for 7 days  -     Hot pack/warm compress 3 times daily for 10 minutes.   -     Follow-up with no improvement or worsening of symptoms.       Return in about 2 weeks (around 8/9/2019) for dermatology follow-up.    [facility]EMIA [facility]

## 2019-07-26 NOTE — PATIENT INSTRUCTIONS
Patient Education     Epidermoid Cyst (Sebaceous Cyst), Infected (Antibiotic Treatment)  You have an epidermoid cyst. This is a small, painless lump under your skin. An epidermoid cyst (often called a sebaceous cyst, epidermal cyst, or epidermal inclusion cyst) is a term most often used for 2 similar types of cysts:    Epidermoid cysts. These cysts form slowly under the skin. They can be found on most parts of the body. But they are most often found on areas with more hair such as the scalp, face, upper back, and genitals.    Pilar cysts. These are similar to epidermoid cysts. But they start from a different part of the hair follicle. They are more likely to be on the scalp.  Here are some general facts about these cysts:    A cyst is a sac filled with material that is often cheesy, fatty, oily, or stringy. The material inside can be thick. Or it can be a liquid.    You can usually move the cyst slightly if you try.    The cysts can be smaller than a pea or as large as a few inches.    The cysts are usually not painful, unless they become inflamed or infected.    The area around the cyst may smell bad. If the cyst breaks open, the material inside it often smells bad as well.  Your cyst became infected and your healthcare provider wanted to treat it with antibiotics. If the antibiotics don t clear up the infection, the cyst will need to be drained by making a small cut (incision). Local anesthesia will be used to numb the area.  Home care    Resist the temptation to squeeze or pop the cyst, stick a needle in it, or cut it open. This often leads to a worsening infection and scarring.    Take the antibiotic as directed until it is all used up.    Soak the affected area in hot water or apply a hot pack (a thin, clean towel soaked in hot water) for 20 minutes at a time. Do this 3 to 4 times a day.    Apply antibiotic cream or ointment 3 times a day.    You may use over-the-counter pain medicine to control pain, unless  another medicine was given. If you have chronic liver or kidney disease or ever had a stomach ulcer or GI bleeding, talk with your healthcare provider before using these medicines.  Prevention  Once this infection has healed, reduce the risk of future infections by:    Keeping the cyst area clean by bathing or showering daily    Avoiding tight-fitting clothing in the cyst area  Follow-up care  Follow up with your healthcare provider, or as advised. If a gauze packing was put in your wound, it should be removed in a few days as advised by your healthcare provider. Check your wound every day for the signs listed below.  When to seek medical advice  Call your healthcare provider right away if any of these occur:    Pus coming from the cyst    Increasing redness around the wound    Increasing local pain or swelling    Fever of 100.4 F (38 C) or higher, or as directed by your provider  Date Last Reviewed: 10/5/2016    5461-9787 [employer]. [address] [place] [address], [place] [address]. All rights reserved. This information is not intended as a substitute for professional medical care. Always follow your healthcare professional's instructions.

## 2020-03-15 ENCOUNTER — HEALTH MAINTENANCE LETTER (OUTPATIENT)
Age: 60
End: 2020-03-15

## 2020-03-31 DIAGNOSIS — I10 ESSENTIAL HYPERTENSION WITH GOAL BLOOD PRESSURE LESS THAN 140/90: ICD-10-CM

## 2020-03-31 RX ORDER — LISINOPRIL 20 MG/1
TABLET ORAL
Qty: 90 TABLET | Refills: 0 | Status: SHIPPED | OUTPATIENT
Start: 2020-03-31 | End: 2020-05-28

## 2020-03-31 NOTE — TELEPHONE ENCOUNTER
"Requested Prescriptions   Pending Prescriptions Disp Refills     lisinopril (ZESTRIL) 20 MG tablet [Pharmacy Med Name: LISINOPRIL TABS 20MG] 90 tablet 3     Sig: TAKE 1 TABLET DAILY       Last Written Prescription Date:  12/30/2019  Last Fill Quantity: 90,  # refills: 0   Last office visit: 7/26/2019 with prescribing provider:     Future Office Visit:   Next 5 appointments (look out 90 days)    May 28, 2020  8:00 AM CDT  PHYSICAL with Alida Lamb MD  [facility] [address] [place] [id number] [phone]             ACE Inhibitors (Including Combos) Protocol Failed - 3/31/2020  8:36 AM        Failed - Recent (12 mo) or future (30 days) visit within the authorizing provider's specialty     Patient has had an office visit with the authorizing provider or a provider within the authorizing providers department within the previous 12 mos or has a future within next 30 days. See \"Patient Info\" tab in inbasket, or \"Choose Columns\" in Meds & Orders section of the refill encounter.              Failed - Normal serum creatinine on file in past 12 months     Recent Labs   Lab Test 01/11/19  1035   CR 0.81       Ok to refill medication if creatinine is low          Failed - Normal serum potassium on file in past 12 months     Recent Labs   Lab Test 01/11/19  1035   POTASSIUM 4.2             Passed - Blood pressure under 140/90 in past 12 months     BP Readings from Last 3 Encounters:   07/26/19 122/82   04/22/19 116/70   03/16/19 142/80                 Passed - Medication is active on med list        Passed - Patient is age 18 or older        Passed - No active pregnancy on record        Passed - No positive pregnancy test within past 12 months             "

## 2020-05-27 NOTE — PATIENT INSTRUCTIONS
Preventive Health Recommendations  Female Ages 50 - 64    Yearly exam: See your health care provider every year in order to  o Review health changes.   o Discuss preventive care.    o Review your medicines if your doctor has prescribed any.      Get a Pap test every three years (unless you have an abnormal result and your provider advises testing more often).    If you get Pap tests with HPV test, you only need to test every 5 years, unless you have an abnormal result.     You do not need a Pap test if your uterus was removed (hysterectomy) and you have not had cancer.    You should be tested each year for STDs (sexually transmitted diseases) if you're at risk.     Have a mammogram every 1 to 2 years.    Have a colonoscopy at age 50, or have a yearly FIT test (stool test). These exams screen for colon cancer.      Have a cholesterol test every 5 years, or more often if advised.    Have a diabetes test (fasting glucose) every three years. If you are at risk for diabetes, you should have this test more often.     If you are at risk for osteoporosis (brittle bone disease), think about having a bone density scan (DEXA).    Shots: Get a flu shot each year. Get a tetanus shot every 10 years.    Nutrition:     Eat at least 5 servings of fruits and vegetables each day.    Eat whole-grain bread, whole-wheat pasta and brown rice instead of white grains and rice.    Get adequate Calcium and Vitamin D.     Lifestyle    Exercise at least 150 minutes a week (30 minutes a day, 5 days a week). This will help you control your weight and prevent disease.    Limit alcohol to one drink per day.    No smoking.     Wear sunscreen to prevent skin cancer.     See your dentist every six months for an exam and cleaning.    See your eye doctor every 1 to 2 years.    Preventive Health Recommendations  Female Ages 50 - 64    Yearly exam: See your health care provider every year in order to  o Review health changes.   o Discuss preventive  care.    o Review your medicines if your doctor has prescribed any.      Get a Pap test every three years (unless you have an abnormal result and your provider advises testing more often).    If you get Pap tests with HPV test, you only need to test every 5 years, unless you have an abnormal result.     You do not need a Pap test if your uterus was removed (hysterectomy) and you have not had cancer.    You should be tested each year for STDs (sexually transmitted diseases) if you're at risk.     Have a mammogram every 1 to 2 years.    Have a colonoscopy at age 50, or have a yearly FIT test (stool test). These exams screen for colon cancer.      Have a cholesterol test every 5 years, or more often if advised.    Have a diabetes test (fasting glucose) every three years. If you are at risk for diabetes, you should have this test more often.     If you are at risk for osteoporosis (brittle bone disease), think about having a bone density scan (DEXA).    Shots: Get a flu shot each year. Get a tetanus shot every 10 years.    Nutrition:     Eat at least 5 servings of fruits and vegetables each day.    Eat whole-grain bread, whole-wheat pasta and brown rice instead of white grains and rice.    Get adequate Calcium and Vitamin D.     Lifestyle    Exercise at least 150 minutes a week (30 minutes a day, 5 days a week). This will help you control your weight and prevent disease.    Limit alcohol to one drink per day.    No smoking.     Wear sunscreen to prevent skin cancer.     See your dentist every six months for an exam and cleaning.  See your eye doctor every 1 to 2 years.    Thank you so much or choosing [facility]  for your Health Care. It was a pleasure seeing you at your visit today! Please contact us with any questions or concerns you may have.                   Alida Lamb MD                              To reach your [facility] care team after hours  call:   [phone]    PLEASE NOTE OUR HOURS HAVE CHANGED secondary to COVID-19 coronavirus pandemic, as we are trying to minimize patient exposure to the virus,  which is now widespread in the state.  These hours may change with very little notice.  We apologize for any inconvenience.       Our current clinic hours are:    Monday- Friday  9:00am - 4:00pm                              Saturday and Sunday : Closed to in person visits      We have telephone and virtual visit times available between 7:40am - 6pm on Mondays.                                                      And 7:40am - 5pm Tuesdays through Fridays.                  Phone:  [phone]    Our pharmacy hours:   Monday  9:00 am to 6:00 pm      Tuesday through Friday 9:00am to 5:00pm                        Saturday - 9:00 am to 12 noon       Sunday : Closed.              Phone:  [phone]      There is also information available at our web site:  [email or web address]    If your provider ordered any lab tests and you do not receive the results within 10 business days, please call the clinic.    If you need a medication refill please contact your pharmacy.  Please allow 2 business days for your refill to be completed.    Our clinic offers telephone visits and e visits.  Please ask one of your team members to explain more.      Use Commun[employer] (secure email communication and access to your chart) to send your primary care provider a message or make an appointment. Ask someone on your Team how to sign up for [employer].

## 2020-05-27 NOTE — PROGRESS NOTES
"   SUBJECTIVE:   CC: Dulce Ma is an 60 year old woman who presents for preventive health visit.     HPI  {Add if <65 person on Medicare  - Required Questions (Optional):709650}  {Outside tests to abstract? :089304}    {additional problems to add (Optional):323907}    Today's PHQ-2 Score:   PHQ-2 ( 1999 Pfizer) 1/11/2019   Q1: Little interest or pleasure in doing things 0   Q2: Feeling down, depressed or hopeless 0   PHQ-2 Score 0       Abuse: Current or Past(Physical, Sexual or Emotional)- { :743348}  Do you feel safe in your environment? { :114676}        Social History     Tobacco Use     Smoking status: Never Smoker     Smokeless tobacco: Never Used   Substance Use Topics     Alcohol use: Yes     Comment: occ.     {Rooming Staff- Complete this question if Prescreen response is not shown below for today's visit. If you drink alcohol do you typically have >3 drinks per day or >7 drinks per week? (Optional):908410}    Alcohol Use 9/15/2017   Prescreen: >3 drinks/day or >7 drinks/week? The patient does not drink >3 drinks per day nor >7 drinks per week.   {add AUDIT responses (Optional) (A score of 7 for adult men is an indication of hazardous drinking; a score of 8 or more is an indication of an alcohol use disorder.  A score of 7 or more for adult women is an indication of hazardous drinking or an alchohol use disorder):296518}    Reviewed orders with patient.  Reviewed health maintenance and updated orders accordingly - { :181414::\"Yes\"}  {Chronicprobdata (optional):369333}    {Mammo Decision Support (Optional):986813}    Pertinent mammograms are reviewed under the imaging tab.  History of abnormal Pap smear: { :193835}  PAP / HPV Latest Ref Rng & Units 1/11/2019   PAP - NIL   HPV 16 DNA NEG:Negative Negative   HPV 18 DNA NEG:Negative Negative   OTHER HR HPV NEG:Negative Negative     Reviewed and updated as needed this visit by clinical staff         Reviewed and updated as needed this visit by Provider      " "  {HISTORY OPTIONS (Optional):840136}    Review of Systems  {FEMALE ROS (Optional):954933}     OBJECTIVE:   LMP 10/31/2011   Physical Exam  {Exam Choices (Optional):479399}    {Diagnostic Test Results (Optional):439197::\"Diagnostic Test Results:\",\"Labs reviewed in Epic\"}    ASSESSMENT/PLAN:   {Diag Picklist:004887}    COUNSELING:  {FEMALE COUNSELING MESSAGES:682984::\"Reviewed preventive health counseling, as reflected in patient instructions\"}    Estimated body mass index is 25.36 kg/m  as calculated from the following:    Height as of 7/26/19: 1.594 m (5' 2.75\").    Weight as of 7/26/19: 64.4 kg (142 lb).    {Weight Management Plan (ACO) Complete if BMI is abnormal-  Ages 18-64  BMI >24.9.  Age 65+ with BMI <23 or >30 (Optional):145374}     reports that she has never smoked. She has never used smokeless tobacco.  {Tobacco Cessation -- Complete if patient is a smoker (Optional):193556}    Counseling Resources:  ATP IV Guidelines  Pooled Cohorts Equation Calculator  Breast Cancer Risk Calculator  FRAX Risk Assessment  ICSI Preventive Guidelines  Dietary Guidelines for Americans, 2010  USDA's MyPlate  ASA Prophylaxis  Lung CA Screening    Alida Lamb MD  [facility]   SUBJECTIVE:   CC: Dulce Ma is an 60 year old woman who presents for preventive health visit.     Healthy Habits:    Do you get at least three servings of calcium containing foods daily (dairy, green leafy vegetables, etc.)? { :940714::\"yes\"}    Amount of exercise or daily activities, outside of work: { :099339}    Problems taking medications regularly { :113152::\"No\"}    Medication side effects: { :114101::\"No\"}    Have you had an eye exam in the past two years? { :394221}    Do you see a dentist twice per year? { :666942}    Do you have sleep apnea, excessive snoring or daytime drowsiness?{ :942529}  {Outside tests to abstract? :251829}    {additional problems to add (Optional):567152}    Today's PHQ-2 Score:   PHQ-2 " "( 1999 Pfizer) 1/11/2019 6/22/2018   Q1: Little interest or pleasure in doing things 0 0   Q2: Feeling down, depressed or hopeless 0 0   PHQ-2 Score 0 0     {PHQ-2 LOOK IN ASSESSMENTS (Optional) :468597}  Abuse: Current or Past(Physical, Sexual or Emotional)- {YES/NO/NA:814724}  Do you feel safe in your environment? {YES/NO/NA:595849}        Social History     Tobacco Use     Smoking status: Never Smoker     Smokeless tobacco: Never Used   Substance Use Topics     Alcohol use: Yes     Comment: occ.     If you drink alcohol do you typically have >3 drinks per day or >7 drinks per week? {ETOH :258206}                     Reviewed orders with patient.  Reviewed health maintenance and updated orders accordingly - {Yes/No:033003::\"Yes\"}  {Chronicprobdata (Optional):467904}    {Mammo Decision Support (Optional):229061}    Pertinent mammograms are reviewed under the imaging tab.  History of abnormal Pap smear: {PAP HX:308955}  PAP / HPV Latest Ref Rng & Units 1/11/2019   PAP - NIL   HPV 16 DNA NEG:Negative Negative   HPV 18 DNA NEG:Negative Negative   OTHER HR HPV NEG:Negative Negative     Reviewed and updated as needed this visit by clinical staff         Reviewed and updated as needed this visit by Provider        {HISTORY OPTIONS (Optional):392224}    ROS:  { :143782}    OBJECTIVE:   LMP 10/31/2011   EXAM:  {Exam Choices:372255}    {Diagnostic Test Results (Optional):301793::\"Diagnostic Test Results:\",\"Labs reviewed in Epic\"}    ASSESSMENT/PLAN:   {Diag Picklist:488982}    COUNSELING:   {FEMALE COUNSELING MESSAGES:182424::\"Reviewed preventive health counseling, as reflected in patient instructions\"}    Estimated body mass index is 25.36 kg/m  as calculated from the following:    Height as of 7/26/19: 1.594 m (5' 2.75\").    Weight as of 7/26/19: 64.4 kg (142 lb).    {Weight Management Plan (ACO) Complete if BMI is abnormal-  Ages 18-64  BMI >24.9.  Age 65+ with BMI <23 or >30 (Optional):156855}     reports that she has " never smoked. She has never used smokeless tobacco.  {Tobacco Cessation -- Complete if patient is a smoker (Optional):377566}    Counseling Resources:  ATP IV Guidelines  Pooled Cohorts Equation Calculator  Breast Cancer Risk Calculator  FRAX Risk Assessment  ICSI Preventive Guidelines  Dietary Guidelines for Americans, 2010  USDA's MyPlate  ASA Prophylaxis  Lung CA Screening    Alida Lamb MD  [facility]

## 2020-05-28 ENCOUNTER — OFFICE VISIT (OUTPATIENT)
Dept: FAMILY MEDICINE | Facility: CLINIC | Age: 60
End: 2020-05-28
Payer: COMMERCIAL

## 2020-05-28 VITALS
BODY MASS INDEX: 25.52 KG/M2 | WEIGHT: 144 LBS | SYSTOLIC BLOOD PRESSURE: 110 MMHG | DIASTOLIC BLOOD PRESSURE: 80 MMHG | HEIGHT: 63 IN | OXYGEN SATURATION: 98 % | TEMPERATURE: 98.6 F | HEART RATE: 78 BPM

## 2020-05-28 DIAGNOSIS — C43.59 MALIGNANT MELANOMA OF SKIN OF TRUNK (H): ICD-10-CM

## 2020-05-28 DIAGNOSIS — R60.0 BILATERAL LEG EDEMA: ICD-10-CM

## 2020-05-28 DIAGNOSIS — Z12.11 SCREEN FOR COLON CANCER: ICD-10-CM

## 2020-05-28 DIAGNOSIS — I10 ESSENTIAL HYPERTENSION WITH GOAL BLOOD PRESSURE LESS THAN 140/90: ICD-10-CM

## 2020-05-28 DIAGNOSIS — E55.9 VITAMIN D DEFICIENCY: ICD-10-CM

## 2020-05-28 DIAGNOSIS — Z00.01 ENCOUNTER FOR ROUTINE ADULT MEDICAL EXAM WITH ABNORMAL FINDINGS: Primary | ICD-10-CM

## 2020-05-28 DIAGNOSIS — N90.4 LICHEN SCLEROSUS ET ATROPHICUS OF THE VULVA: ICD-10-CM

## 2020-05-28 DIAGNOSIS — Z12.31 VISIT FOR SCREENING MAMMOGRAM: ICD-10-CM

## 2020-05-28 DIAGNOSIS — Z13.6 CARDIOVASCULAR SCREENING; LDL GOAL LESS THAN 130: ICD-10-CM

## 2020-05-28 LAB
BASOPHILS # BLD AUTO: 0 10E9/L (ref 0–0.2)
BASOPHILS NFR BLD AUTO: 0.3 %
DIFFERENTIAL METHOD BLD: NORMAL
EOSINOPHIL # BLD AUTO: 0.2 10E9/L (ref 0–0.7)
EOSINOPHIL NFR BLD AUTO: 2.7 %
ERYTHROCYTE [DISTWIDTH] IN BLOOD BY AUTOMATED COUNT: 13.2 % (ref 10–15)
HCT VFR BLD AUTO: 40.1 % (ref 35–47)
HGB BLD-MCNC: 12.8 G/DL (ref 11.7–15.7)
LYMPHOCYTES # BLD AUTO: 2.1 10E9/L (ref 0.8–5.3)
LYMPHOCYTES NFR BLD AUTO: 30.3 %
MCH RBC QN AUTO: 29.8 PG (ref 26.5–33)
MCHC RBC AUTO-ENTMCNC: 31.9 G/DL (ref 31.5–36.5)
MCV RBC AUTO: 93 FL (ref 78–100)
MONOCYTES # BLD AUTO: 0.4 10E9/L (ref 0–1.3)
MONOCYTES NFR BLD AUTO: 5.5 %
NEUTROPHILS # BLD AUTO: 4.2 10E9/L (ref 1.6–8.3)
NEUTROPHILS NFR BLD AUTO: 61.2 %
PLATELET # BLD AUTO: 275 10E9/L (ref 150–450)
RBC # BLD AUTO: 4.3 10E12/L (ref 3.8–5.2)
WBC # BLD AUTO: 6.9 10E9/L (ref 4–11)

## 2020-05-28 PROCEDURE — 80050 GENERAL HEALTH PANEL: CPT | Performed by: FAMILY MEDICINE

## 2020-05-28 PROCEDURE — 99396 PREV VISIT EST AGE 40-64: CPT | Mod: 25 | Performed by: FAMILY MEDICINE

## 2020-05-28 PROCEDURE — 90714 TD VACC NO PRESV 7 YRS+ IM: CPT | Performed by: FAMILY MEDICINE

## 2020-05-28 PROCEDURE — 82306 VITAMIN D 25 HYDROXY: CPT | Performed by: FAMILY MEDICINE

## 2020-05-28 PROCEDURE — 90471 IMMUNIZATION ADMIN: CPT | Performed by: FAMILY MEDICINE

## 2020-05-28 PROCEDURE — 82043 UR ALBUMIN QUANTITATIVE: CPT | Performed by: FAMILY MEDICINE

## 2020-05-28 PROCEDURE — 80061 LIPID PANEL: CPT | Performed by: FAMILY MEDICINE

## 2020-05-28 PROCEDURE — 36415 COLL VENOUS BLD VENIPUNCTURE: CPT | Performed by: FAMILY MEDICINE

## 2020-05-28 RX ORDER — FUROSEMIDE 20 MG
10 TABLET ORAL DAILY
Qty: 20 TABLET | Refills: 1 | Status: SHIPPED | OUTPATIENT
Start: 2020-05-28 | End: 2021-11-22

## 2020-05-28 RX ORDER — LISINOPRIL 20 MG/1
20 TABLET ORAL DAILY
Qty: 90 TABLET | Refills: 3 | Status: SHIPPED | OUTPATIENT
Start: 2020-05-28 | End: 2021-07-07

## 2020-05-28 ASSESSMENT — MIFFLIN-ST. JEOR: SCORE: 1192.31

## 2020-05-28 NOTE — PROGRESS NOTES
SUBJECTIVE:   CC: Dulce Ma is an 60 year old woman who presents for preventive health visit.     Healthy Habits:    Getting at least 3 servings of Calcium per day:  NO    Bi-annual eye exam:  Yes    Dental care twice a year:  NO    Sleep apnea or symptoms of sleep apnea:  None    Diet:  Regular (no restrictions)    Frequency of exercise:  None [place] and [place]    Duration of exercise:  N/A    Taking medications regularly:  Yes    Barriers to taking medications:  Not applicable    Medication side effects:  None    PHQ-2 Total Score:    Additional concerns today:  No    Last pap 1/11/2019 with me.      Hypertension Follow-up:       Do you check your blood pressure regularly outside of the clinic? Yes- not often - has a wrist cuff at home - 120's/80 or so.       Are you following a low salt diet? Yes    Are your blood pressures ever more than 140 on the top number (systolic) OR more   than 90 on the bottom number (diastolic), for example 140/90? Yes      Today's PHQ-2 Score:   PHQ-2 ( 1999 Pfizer) 5/28/2020   Q1: Little interest or pleasure in doing things 0   Q2: Feeling down, depressed or hopeless 0   PHQ-2 Score 0       Abuse: Current or Past(Physical, Sexual or Emotional)- No  Do you feel safe in your environment? Yes        Social History     Tobacco Use     Smoking status: Never Smoker     Smokeless tobacco: Never Used   Substance Use Topics     Alcohol use: Yes     Comment: occ.       Alcohol Use 9/15/2017   Prescreen: >3 drinks/day or >7 drinks/week? The patient does not drink >3 drinks per day nor >7 drinks per week.       Reviewed orders with patient.  Reviewed health maintenance and updated orders accordingly - Yes  Lab work is in process  Labs reviewed in EPIC  BP Readings from Last 3 Encounters:   05/28/20 110/80   07/26/19 122/82   04/22/19 116/70    Wt Readings from Last 3 Encounters:   05/28/20 65.3 kg (144 lb)   07/26/19 64.4 kg (142 lb)   04/22/19 64.5 kg (142 lb 3.2 oz)                   Patient Active Problem List   Diagnosis     h/o Malignant melanoma of skin of trunk - Dr. Myles Lake - [facility]- rechecks w/ him 1x/year     Essential hypertension with goal blood pressure less than 140/90     CARDIOVASCULAR SCREENING; LDL GOAL LESS THAN 130     Family history of heart failure- mother, MGM, maternal uncle     Chronic constipation- trying to do daily fiber therapy     Glaucoma- sees Dr. Harvey Brown - [facility] [place], [place]     Dysplastic nevi     Skin cancer, basal cell     Lichen sclerosus et atrophicus of the vulva     Anesthesia complication, initial encounter-was completely out with Midazolam 2 mg IV, Fentanyl 100 micrograms IV that was given at time of colonoscopy 7/2018      Past Surgical History:   Procedure Laterality Date     APPENDECTOMY  2008    aprroximately 8-10 years agp     BREAST SURGERY      Breast biopsies     C NONSPECIFIC PROCEDURE  3/02    malignant melanoma removed from abdomen with negative nodes-Dr. Acosta -surgeon     COLONOSCOPY N/A 7/27/2018    Procedure: COLONOSCOPY;  COLONOSCOPY ;  Surgeon: Ren Whitehead MD;  Location:  GI     ORTHOPEDIC SURGERY Right 2014    ORIF right ring finger       Social History     Tobacco Use     Smoking status: Never Smoker     Smokeless tobacco: Never Used   Substance Use Topics     Alcohol use: Yes     Comment: occ.     Family History   Problem Relation Age of Onset     Hypertension Mother      Heart Disease Mother         bypass     Neuropathy Mother      Hypertension Father      Hypertension Paternal Grandmother      Eye Disorder Maternal Grandmother         [place]     Eye Disorder Maternal Grandfather         gl[place]     Colon Cancer No family hx of          Current Outpatient Medications   Medication Sig Dispense Refill     furosemide (LASIX) 20 MG tablet Take 0.5 tablets (10 mg) by mouth daily As needed for leg swelling 20 tablet 1     latanoprost (XALATAN) 0.005 % ophthalmic solution At  Bedtime. For recent Dx of glaucoma 1 Bottle 12     lisinopril (ZESTRIL) 20 MG tablet TAKE 1 TABLET DAILY 90 tablet 0     multivitamin, therapeutic with minerals (MULTI-VITAMIN) TABS Take 1 tablet by mouth daily. 100 tablet 3     timolol (TIMOPTIC) 0.5 % ophthalmic solution Place 1 drop into both eyes daily In the a.m. for Dx of glaucoma 1 Bottle      clobetasol (TEMOVATE) 0.05 % cream Apply very scant amount to inner labia daily for up to 14 days at a time -ok to use every 2 months or so (Patient not taking: Reported on 2019) 45 g 1     Allergies   Allergen Reactions     Amoxicillin      hives     Benzoyl Peroxide      swelling     Ibuprofen      over long duration dev. hives     Penicillins      hives     Recent Labs   Lab Test 19  1035 16  0907 07/20/15  0945 13  1048   * 145* 133* 122   HDL 63 61 60 63   TRIG 126 158* 152* 107   ALT 18 16 21 17   CR 0.81 0.73 0.83 0.79   GFRESTIMATED 79 83 72 76   GFRESTBLACK >90 >90   GFR Calc   87 >90   POTASSIUM 4.2 4.6 5.0 4.7   TSH 1.46  --   --  0.96        Mammogram Screening: Patient over age 50, mutual decision to screen reflected in health maintenance.    Pertinent mammograms are reviewed under the imaging tab.  History of abnormal Pap smear: NO - age 30- 65 PAP every 3 years recommended  PAP / HPV Latest Ref Rng & Units 2019   PAP - NIL   HPV 16 DNA NEG:Negative Negative   HPV 18 DNA NEG:Negative Negative   OTHER HR HPV NEG:Negative Negative     Reviewed and updated as needed this visit by clinical staff  Tobacco  Allergies  Meds  Problems  Med Hx  Surg Hx  Fam Hx         Reviewed and updated as needed this visit by Provider        OB History    Para Term  AB Living   0 0 0 0 0 0   SAB TAB Ectopic Multiple Live Births   0 0 0 0 0       Review of Systems  CONSTITUTIONAL: NEGATIVE for fever, chills, change in weight  INTEGUMENTARY/SKIN: NEGATIVE for worrisome rashes, moles or lesions  EYES: NEGATIVE  "for vision changes or irritation  ENT: NEGATIVE for ear, mouth and throat problems  RESP: NEGATIVE for significant cough or SOB  BREAST: NEGATIVE for masses, tenderness or discharge  CV: NEGATIVE for chest pain, palpitations or peripheral edema  GI: NEGATIVE for nausea, abdominal pain, heartburn, or change in bowel habits  : NEGATIVE for unusual urinary or vaginal symptoms. No vaginal bleeding.  MUSCULOSKELETAL: NEGATIVE for significant arthralgias or myalgia  NEURO: NEGATIVE for weakness, dizziness or paresthesias  ENDOCRINE: NEGATIVE for temperature intolerance, skin/hair changes  HEME/ALLERGY/IMMUNE: NEGATIVE for bleeding problems  PSYCHIATRIC: NEGATIVE for changes in mood or affect      OBJECTIVE:   /80   Pulse 78   Temp 98.6  F (37  C)   Ht 1.6 m (5' 3\")   Wt 65.3 kg (144 lb)   LMP 10/31/2011   SpO2 98%   BMI 25.51 kg/m    Physical Exam  GENERAL APPEARANCE: healthy, alert and no distress  EYES: Eyes grossly normal to inspection, PERRL and conjunctivae and sclerae normal  HENT: ear canals and TM's normal, nose and mouth without ulcers or lesions, oropharynx clear and oral mucous membranes moist  NECK: no adenopathy, no asymmetry, masses, or scars and thyroid normal to palpation  RESP: lungs clear to auscultation - no rales, rhonchi or wheezes  BREAST: normal without masses, tenderness or nipple discharge and no palpable axillary masses or adenopathy  CV: regular rate and rhythm, normal S1 S2, no S3 or S4, no murmur, click or rub, no peripheral edema and peripheral pulses strong  ABDOMEN: soft, nontender, no hepatosplenomegaly, no masses and bowel sounds normal   (female): normal female external genitalia, normal urethral meatus, vaginal mucosal atrophy noted, normal cervix, adnexae, and uterus without masses or abnormal discharge  MS: no musculoskeletal defects are noted and gait is age appropriate without ataxia  SKIN: no suspicious lesions or rashes  NEURO: Normal strength and tone, sensory " "exam grossly normal, mentation intact and speech normal  PSYCH: mentation appears normal and affect normal/bright    Diagnostic Test Results:  Labs [facility] in [facility]  See [facility] orders.     ASSESSMENT/PLAN:       ICD-10-CM    1. Encounter for routine adult medical exam with abnormal findings  Z00.01    2. Essential hypertension with goal blood pressure less than 140/90  I10 Albumin Random Urine Quantitative with Creat Ratio     CBC with platelets differential     Comprehensive metabolic panel     TSH with free T4 reflex   3. Visit for screening mammogram  Z12.31 MA Screen Bilateral w/Suraj   4. CARDIOVASCULAR SCREENING; LDL GOAL LESS THAN 130  Z13.6 Lipid panel reflex to direct LDL Fasting   5. Lichen sclerosus et atrophicus of the vulva  N90.4    6. h/o Malignant melanoma of skin of trunk - Dr. Myles Lake - [facility]- rechecks w/ him 1x/year  C43.59    7. Screen for colon cancer  Z12.11 Fecal colorectal cancer screen FIT   8. Vitamin D deficiency  E55.9 25 Hydroxyvitamin D2 and D3     Following up with Dermatology for her full body skin exam in the next few months.       COUNSELING:  Reviewed preventive health counseling, as reflected in patient instructions    Estimated body mass index is 25.51 kg/m  as calculated from the following:    Height as of this encounter: 1.6 m (5' 3\").    Weight as of this encounter: 65.3 kg (144 lb).    Weight management plan: Discussed healthy diet and exercise guidelines     reports that she has never smoked. She has never used smokeless tobacco.      Counseling Resources:  ATP IV Guidelines  Pooled Cohorts Equation Calculator  Breast Cancer Risk Calculator  FRAX Risk Assessment  ICSI Preventive Guidelines  Dietary Guidelines for Americans, 2010  USDA's MyPlate  ASA Prophylaxis  Lung CA Screening    Alida Lamb MD  [facility]    "

## 2020-05-29 LAB
ALBUMIN SERPL-MCNC: 4.2 G/DL (ref 3.4–5)
ALP SERPL-CCNC: 72 U/L (ref 40–150)
ALT SERPL W P-5'-P-CCNC: 15 U/L (ref 0–50)
ANION GAP SERPL CALCULATED.3IONS-SCNC: 5 MMOL/L (ref 3–14)
AST SERPL W P-5'-P-CCNC: 13 U/L (ref 0–45)
BILIRUB SERPL-MCNC: 0.7 MG/DL (ref 0.2–1.3)
BUN SERPL-MCNC: 23 MG/DL (ref 7–30)
CALCIUM SERPL-MCNC: 8.7 MG/DL (ref 8.5–10.1)
CHLORIDE SERPL-SCNC: 109 MMOL/L (ref 94–109)
CHOLEST SERPL-MCNC: 235 MG/DL
CO2 SERPL-SCNC: 24 MMOL/L (ref 20–32)
CREAT SERPL-MCNC: 0.78 MG/DL (ref 0.52–1.04)
CREAT UR-MCNC: 308 MG/DL
GFR SERPL CREATININE-BSD FRML MDRD: 82 ML/MIN/{1.73_M2}
GLUCOSE SERPL-MCNC: 90 MG/DL (ref 70–99)
HDLC SERPL-MCNC: 60 MG/DL
LDLC SERPL CALC-MCNC: 147 MG/DL
MICROALBUMIN UR-MCNC: 27 MG/L
MICROALBUMIN/CREAT UR: 8.9 MG/G CR (ref 0–25)
NONHDLC SERPL-MCNC: 175 MG/DL
POTASSIUM SERPL-SCNC: 4.8 MMOL/L (ref 3.4–5.3)
PROT SERPL-MCNC: 7.9 G/DL (ref 6.8–8.8)
SODIUM SERPL-SCNC: 138 MMOL/L (ref 133–144)
TRIGL SERPL-MCNC: 140 MG/DL
TSH SERPL DL<=0.005 MIU/L-ACNC: 1.31 MU/L (ref 0.4–4)

## 2020-06-05 LAB
DEPRECATED CALCIDIOL+CALCIFEROL SERPL-MC: <53 UG/L (ref 20–75)
VITAMIN D2 SERPL-MCNC: <5 UG/L
VITAMIN D3 SERPL-MCNC: 48 UG/L

## 2020-06-08 ENCOUNTER — MYC MEDICAL ADVICE (OUTPATIENT)
Dept: FAMILY MEDICINE | Facility: CLINIC | Age: 60
End: 2020-06-08

## 2020-06-08 DIAGNOSIS — Z13.6 CARDIOVASCULAR SCREENING; LDL GOAL LESS THAN 130: Primary | ICD-10-CM

## 2020-06-08 NOTE — TELEPHONE ENCOUNTER
Please see my chart message below     Please print out form and fill out     Thank you     Catherine Varma RN, BSN  [place] [facility]

## 2020-06-08 NOTE — TELEPHONE ENCOUNTER
Form printed out and placed in Dr Lamb in basket for when she returns.   Once provider portion completed   will need to call patient and have her pick it up as it also requires her signature so we cannot fax it directly.       Patient had a physical on 5/28/2020.       Isabela Torrez

## 2020-06-09 NOTE — TELEPHONE ENCOUNTER
Sounds like patient is aware and expects to wait for Dr. LYLES to return? Let me know if needs before then.        Tonei Aguilar, FNP-BC

## 2020-06-25 NOTE — TELEPHONE ENCOUNTER
Located forms. completed/ signed - at [facility] coordinator's file box.  They still need to be signed by patient and do her own waist circumference.  We don't do that here.

## 2020-09-02 ENCOUNTER — NURSE TRIAGE (OUTPATIENT)
Dept: FAMILY MEDICINE | Facility: CLINIC | Age: 60
End: 2020-09-02

## 2020-09-02 NOTE — TELEPHONE ENCOUNTER
General Call:     Who is calling:  PT   Reason for Call:  Pt has had diarrhea for about 1 week - not sure what to do about it     What are your questions or concerns:    Date of last appointment with provider:     Okay to leave a detailed message:Yes at Cell number on file:    Telephone Information:   Mobile [phone]

## 2020-09-03 NOTE — TELEPHONE ENCOUNTER
Called patient @ # below -     Patient noted she has been dealing with stress anxiety - recently lost her father, other personal stuff going on. Patient also noted that she ate a cob of corn ~1 week ago (shortly before the diarrhea started) that was probably too old. First bout of diarrhea had corn in it  Patient noted she normally doesn't get diarrhea to this extent with anxiety/stress    Diarrhea  Onset: 1 Week    Description:   Consistency of stool: runny, soft  Blood in stool: no   Number of loose stools in past 24 hours: 4, last night was the first night patient didn't have to get up R/T diarrhea    Progression of Symptoms:  same and intermittent    Accompanying Signs & Symptoms:  Fever: no   Nausea or vomiting; no   Abdominal pain: no   Episodes of constipation: no   Weight loss: no     History:   Ill contacts: no   Recent use of antibiotics: YES- over 1 month ago R/T tooth infection/removal   Recent travels: no          Recent medication-new or changes(Rx or OTC): no     Precipitating factors:   Nothing    Alleviating factors:   See below    Therapies Tried and outcome:  Imodium AD; Outcome: did help x 1.5 days, BRAT diet.     DENIES: CP, SOB, Difficulty Breathing, Dizziness/Lightheadedness, Numbness/Tingling, HA, Vision/Hearing Changes, N/V, Palpitations    Patient does urinate with every BM, pushing Gatorade/water    Additional Information    Negative: SEVERE abdominal pain (e.g., excruciating) and present > 1 hour    Negative: SEVERE abdominal pain and age > 60    Negative: Bloody, black, or tarry bowel movements    Negative: SEVERE diarrhea (e.g., 7 or more times / day more than normal) and age > 60 years    Negative: Constant abdominal pain lasting > 2 hours    Negative: Drinking very little and has signs of dehydration (e.g., no urine > 12 hours, very dry mouth, very lightheaded)    Negative: Patient sounds very sick or weak to the triager    Negative: SEVERE diarrhea (e.g., 7 or more times / day more  than normal) and present > 24 hours (1 day)    Negative: MODERATE diarrhea (e.g., 4-6 times / day more than normal) and present > 48 hours (2 days)    Negative: MODERATE diarrhea (e.g., 4-6 times / day more than normal) and age > 70 years    Negative: Abdominal pain  (Exception: pain clears completely with each passage of diarrhea stool)    Negative: Fever > 101 F (38.3 C)    Negative: Blood in the stool    Negative: Mucus or pus in stool has been present > 2 days and diarrhea is more than mild    Negative: Weak immune system (e.g., HIV positive, cancer chemo, splenectomy, organ transplant, chronic steroids)    Negative: Travel to a foreign country in past month    Negative: Recent antibiotic therapy (i.e., within last 2 months) and diarrhea present > 3 days since antibiotic was stopped    Negative: Recent hospitalization and diarrhea present > 3 days    Negative: Tube feedings (e.g., nasogastric, g-tube, j-tube)    MILD diarrhea (e.g., 1-3 or more stools than normal in past 24 hours) diarrhea without known cause and present > 7 days    Protocols used: DIARRHEA-A-OH    Patient does NOT want to come into clinic, requesting VV.         Advised patient that if new or worsening symptoms appear (reviewed new & worsening symptoms) to call the clinic or be seen in the the ER  Patient stated an understanding and agreed with plan.    Marine Mojica RN  [facility]

## 2020-09-04 ENCOUNTER — VIRTUAL VISIT (OUTPATIENT)
Dept: FAMILY MEDICINE | Facility: CLINIC | Age: 60
End: 2020-09-04
Payer: COMMERCIAL

## 2020-09-04 DIAGNOSIS — R19.7 DIARRHEA, UNSPECIFIED TYPE: Primary | ICD-10-CM

## 2020-09-04 PROCEDURE — 99213 OFFICE O/P EST LOW 20 MIN: CPT | Mod: 95 | Performed by: NURSE PRACTITIONER

## 2020-09-04 RX ORDER — BIMATOPROST 0.1 MG/ML
SOLUTION/ DROPS OPHTHALMIC
COMMUNITY
Start: 2020-07-25 | End: 2023-05-25

## 2020-09-04 RX ORDER — DORZOLAMIDE HYDROCHLORIDE AND TIMOLOL MALEATE 20; 5 MG/ML; MG/ML
SOLUTION/ DROPS OPHTHALMIC
COMMUNITY
Start: 2020-06-09

## 2020-09-04 NOTE — PROGRESS NOTES
Dulce Ma is a 60 year old female who is being evaluated via a billable telephone visit.          What phone number would you like to be contacted at? [phone]    How would you like to obtain your AVS? Diegot    Cee     Dulce Ma is a 60 year old female who presents via phone visit today for the following health issues:    HPI   Triage Note:  Diarrhea  Onset: 1 Week    Description:   Consistency of stool: runny, soft  Blood in stool: no   Number of loose stools in past 24 hours: 4, last night was the first night patient didn't have to get up R/T diarrhea    Progression of Symptoms:  same and intermittent    Accompanying Signs & Symptoms:  Fever: no   Nausea or vomiting; no   Abdominal pain: no   Episodes of constipation: no   Weight loss: no     History:   Ill contacts: no   Recent use of antibiotics: YES- over 1 month ago R/T tooth infection/removal   Recent travels: no          Recent medication-new or changes(Rx or OTC): no     Precipitating factors:   Nothing    Alleviating factors:   See below     Therapies Tried and outcome:  Imodium AD; Outcome: did help x 1.5 days, BRAT diet.      DENIES: CP, SOB, Difficulty Breathing, Dizziness/Lightheadedness, Numbness/Tingling, HA, Vision/Hearing Changes, N/V, Palpitations     Patient does urinate with every BM, pushing Gatorade/water               Review of Systems   Constitutional, HEENT, cardiovascular, pulmonary, GI, , musculoskeletal, neuro, skin, endocrine and psych systems are negative, except as otherwise noted.       Objective          Vitals:  No vitals were obtained today due to virtual visit.    healthy, alert and no distress  PSYCH: Alert and oriented times 3; coherent speech, normal   rate and volume, able to articulate logical thoughts, able   to abstract reason, no tangential thoughts, no hallucinations   or delusions  Her affect is normal  RESP: No cough, no audible wheezing, able to talk in full sentences  Remainder of exam unable  "to be completed due to telephone visits    No results found for any visits on 09/04/20.        Assessment/Plan:    Assessment & Plan     Diarrhea, unspecified type  Stool studies to start. Follow results and further plan based on findings.   - Clostridium difficile Toxin B PCR  - Enteric Bacteria and Virus Panel by MALVIN Stool  - Ova and Parasite Exam Routine             No follow-ups on file.    Tonie Emery, [facility]    Phone call duration:  10 minutes      The patient has been notified of following:     \"This telephone visit will be conducted via a call between you and your physician/provider. We have found that certain health care needs can be provided without the need for a physical exam.  This service lets us provide the care you need with a short phone conversation.  If a prescription is necessary we can send it directly to your pharmacy.  If lab work is needed we can place an order for that and you can then stop by our lab to have the test done at a later time.    Telephone visits are billed at different rates depending on your insurance coverage. During this emergency period, for some insurers they may be billed the same as an in-person visit.  Please reach out to your insurance provider with any questions.    If during the course of the call the physician/provider feels a telephone visit is not appropriate, you will not be charged for this service.\"    Patient has given verbal consent for Telephone visit?  Yes          "

## 2020-09-24 ENCOUNTER — ALLIED HEALTH/NURSE VISIT (OUTPATIENT)
Dept: FAMILY MEDICINE | Facility: CLINIC | Age: 60
End: 2020-09-24
Payer: COMMERCIAL

## 2020-09-24 DIAGNOSIS — Z23 NEED FOR PROPHYLACTIC VACCINATION AND INOCULATION AGAINST INFLUENZA: Primary | ICD-10-CM

## 2020-09-24 PROCEDURE — 90682 RIV4 VACC RECOMBINANT DNA IM: CPT

## 2020-09-24 PROCEDURE — 90471 IMMUNIZATION ADMIN: CPT

## 2020-09-24 PROCEDURE — 99207 ZZC NO CHARGE NURSE ONLY: CPT

## 2021-01-14 ENCOUNTER — HEALTH MAINTENANCE LETTER (OUTPATIENT)
Age: 61
End: 2021-01-14

## 2021-05-21 NOTE — TELEPHONE ENCOUNTER
Labs on 9/15/2017 have  and a letter was sent on 2017. Patient has not followed up. Routing to team to address.    [facility]  
These were PX labs need labs done     Attempt # 1 - encounter closed    Called # [phone]     Left a non detailed VM     Catherine Varma RN, BSN  [facility]             
Declines

## 2021-07-03 ENCOUNTER — HEALTH MAINTENANCE LETTER (OUTPATIENT)
Age: 61
End: 2021-07-03

## 2021-07-06 DIAGNOSIS — I10 ESSENTIAL HYPERTENSION WITH GOAL BLOOD PRESSURE LESS THAN 140/90: ICD-10-CM

## 2021-07-06 NOTE — TELEPHONE ENCOUNTER
Routing refill request to provider for review/approval because:  Labs not current:  Bp, cr, k  Gui Hurtado RN, BSN

## 2021-07-07 RX ORDER — LISINOPRIL 20 MG/1
TABLET ORAL
Qty: 30 TABLET | Refills: 0 | Status: SHIPPED | OUTPATIENT
Start: 2021-07-07 | End: 2021-08-17

## 2021-07-07 NOTE — TELEPHONE ENCOUNTER
Chart reviewed in absence of PCP, Dr. Lamb, who is out of office. Should have run out of medication as last prescribed 5/2020. Temp script granted in light of COVID19 pandemic, but please call and assist in scheduling follow-up as overdue for routine physical/BP check/labs.  Electronically Signed By: Alona Mcdonald PA-C     none

## 2021-08-03 ENCOUNTER — ANCILLARY PROCEDURE (OUTPATIENT)
Dept: MAMMOGRAPHY | Facility: CLINIC | Age: 61
End: 2021-08-03
Attending: FAMILY MEDICINE
Payer: COMMERCIAL

## 2021-08-03 PROCEDURE — 77067 SCR MAMMO BI INCL CAD: CPT | Mod: TC | Performed by: RADIOLOGY

## 2021-08-03 PROCEDURE — 77063 BREAST TOMOSYNTHESIS BI: CPT | Mod: TC | Performed by: RADIOLOGY

## 2021-10-22 ENCOUNTER — APPOINTMENT (OUTPATIENT)
Dept: URBAN - METROPOLITAN AREA CLINIC 253 | Age: 61
Setting detail: DERMATOLOGY
End: 2021-10-22

## 2021-10-22 VITALS — RESPIRATION RATE: 15 BRPM | WEIGHT: 140 LBS | HEIGHT: 63 IN

## 2021-10-22 DIAGNOSIS — L91.8 OTHER HYPERTROPHIC DISORDERS OF THE SKIN: ICD-10-CM

## 2021-10-22 DIAGNOSIS — Z71.89 OTHER SPECIFIED COUNSELING: ICD-10-CM

## 2021-10-22 DIAGNOSIS — D18.0 HEMANGIOMA: ICD-10-CM

## 2021-10-22 DIAGNOSIS — D22 MELANOCYTIC NEVI: ICD-10-CM

## 2021-10-22 DIAGNOSIS — L82.1 OTHER SEBORRHEIC KERATOSIS: ICD-10-CM

## 2021-10-22 DIAGNOSIS — L82.0 INFLAMED SEBORRHEIC KERATOSIS: ICD-10-CM

## 2021-10-22 DIAGNOSIS — L81.4 OTHER MELANIN HYPERPIGMENTATION: ICD-10-CM

## 2021-10-22 PROBLEM — D22.72 MELANOCYTIC NEVI OF LEFT LOWER LIMB, INCLUDING HIP: Status: ACTIVE | Noted: 2021-10-22

## 2021-10-22 PROBLEM — D18.01 HEMANGIOMA OF SKIN AND SUBCUTANEOUS TISSUE: Status: ACTIVE | Noted: 2021-10-22

## 2021-10-22 PROBLEM — D22.5 MELANOCYTIC NEVI OF TRUNK: Status: ACTIVE | Noted: 2021-10-22

## 2021-10-22 PROCEDURE — OTHER MIPS QUALITY: OTHER

## 2021-10-22 PROCEDURE — OTHER ADDITIONAL NOTES: OTHER

## 2021-10-22 PROCEDURE — 99213 OFFICE O/P EST LOW 20 MIN: CPT | Mod: 25

## 2021-10-22 PROCEDURE — OTHER LIQUID NITROGEN: OTHER

## 2021-10-22 PROCEDURE — 17110 DESTRUCT B9 LESION 1-14: CPT

## 2021-10-22 PROCEDURE — OTHER COUNSELING: OTHER

## 2021-10-22 ASSESSMENT — LOCATION ZONE DERM
LOCATION ZONE: AXILLAE
LOCATION ZONE: TRUNK
LOCATION ZONE: FEET

## 2021-10-22 ASSESSMENT — LOCATION SIMPLE DESCRIPTION DERM
LOCATION SIMPLE: LEFT FOOT
LOCATION SIMPLE: ABDOMEN
LOCATION SIMPLE: LEFT BREAST
LOCATION SIMPLE: RIGHT UPPER BACK
LOCATION SIMPLE: RIGHT AXILLARY VAULT
LOCATION SIMPLE: UPPER BACK

## 2021-10-22 ASSESSMENT — LOCATION DETAILED DESCRIPTION DERM
LOCATION DETAILED: RIGHT INFERIOR LATERAL UPPER BACK
LOCATION DETAILED: LEFT LATERAL BREAST 5-6:00 REGION
LOCATION DETAILED: RIGHT AXILLARY VAULT
LOCATION DETAILED: RIGHT INFERIOR UPPER BACK
LOCATION DETAILED: LEFT MEDIAL DORSAL FOOT
LOCATION DETAILED: RIGHT MID-UPPER BACK
LOCATION DETAILED: RIGHT LATERAL UPPER BACK
LOCATION DETAILED: LEFT RIB CAGE
LOCATION DETAILED: INFERIOR THORACIC SPINE

## 2021-10-22 NOTE — PROCEDURE: ADDITIONAL NOTES
Detail Level: Simple
Render Risk Assessment In Note?: no
Additional Notes: Come 15 minutes early for topical numbing.
Additional Notes: A clinical assistant was present for the exam. Care instructions of treated sites were explained to the patient in detail. Told patient to call with any concerns or questions. The patient verbalized understanding and agreement of the education provided and the treatment plan. Encouraged patient to schedule a follow up appointment right after visit. At the end of the visit, all questions had been answered and the patient was satisfied with the visit.

## 2021-10-22 NOTE — HPI: FULL BODY SKIN EXAMINATION
What Type Of Note Output Would You Prefer (Optional)?: Standard Output
What Is The Reason For Today's Visit?: Full Body Skin Examination
What Is The Reason For Today's Visit? (Being Monitored For X): the development of new lesions
Additional History: FBE. Concern on abdomen today.

## 2021-10-22 NOTE — PROCEDURE: LIQUID NITROGEN
Show Topical Anesthesia Variable?: Yes
Medical Necessity Clause: This procedure was medically necessary because the lesions that were treated were:
Render Post-Care Instructions In Note?: no
Medical Necessity Information: It is in your best interest to select a reason for this procedure from the list below. All of these items fulfill various CMS LCD requirements except the new and changing color options.
Post-Care Instructions: I reviewed with the patient in detail post-care instructions. Patient is to wear sunprotection, and avoid picking at any of the treated lesions. Pt may apply Vaseline to crusted or scabbing areas.
Consent: The patient's consent was obtained including but not limited to risks of crusting, scabbing, blistering, scarring, darker or lighter pigmentary change, recurrence, incomplete removal and infection.
Detail Level: Detailed

## 2021-10-23 ENCOUNTER — HEALTH MAINTENANCE LETTER (OUTPATIENT)
Age: 61
End: 2021-10-23

## 2021-11-22 ENCOUNTER — MYC MEDICAL ADVICE (OUTPATIENT)
Dept: FAMILY MEDICINE | Facility: CLINIC | Age: 61
End: 2021-11-22

## 2021-11-22 ENCOUNTER — OFFICE VISIT (OUTPATIENT)
Dept: FAMILY MEDICINE | Facility: CLINIC | Age: 61
End: 2021-11-22
Payer: COMMERCIAL

## 2021-11-22 VITALS
HEART RATE: 78 BPM | DIASTOLIC BLOOD PRESSURE: 71 MMHG | WEIGHT: 135 LBS | SYSTOLIC BLOOD PRESSURE: 110 MMHG | BODY MASS INDEX: 23.92 KG/M2 | OXYGEN SATURATION: 97 % | TEMPERATURE: 97.5 F | HEIGHT: 63 IN

## 2021-11-22 DIAGNOSIS — Z00.00 ROUTINE GENERAL MEDICAL EXAMINATION AT A HEALTH CARE FACILITY: Primary | ICD-10-CM

## 2021-11-22 DIAGNOSIS — Z13.6 CARDIOVASCULAR SCREENING; LDL GOAL LESS THAN 130: ICD-10-CM

## 2021-11-22 DIAGNOSIS — Z12.4 SCREENING FOR MALIGNANT NEOPLASM OF CERVIX: ICD-10-CM

## 2021-11-22 DIAGNOSIS — R60.0 BILATERAL LEG EDEMA: ICD-10-CM

## 2021-11-22 DIAGNOSIS — N90.4 LICHEN SCLEROSUS ET ATROPHICUS OF THE VULVA: ICD-10-CM

## 2021-11-22 DIAGNOSIS — I10 ESSENTIAL HYPERTENSION WITH GOAL BLOOD PRESSURE LESS THAN 140/90: ICD-10-CM

## 2021-11-22 DIAGNOSIS — C43.59 MALIGNANT MELANOMA OF SKIN OF TRUNK (H): ICD-10-CM

## 2021-11-22 DIAGNOSIS — Z23 HIGH PRIORITY FOR 2019-NCOV VACCINE: ICD-10-CM

## 2021-11-22 LAB
ALBUMIN SERPL-MCNC: 3.9 G/DL (ref 3.4–5)
ALP SERPL-CCNC: 66 U/L (ref 40–150)
ALT SERPL W P-5'-P-CCNC: 18 U/L (ref 0–50)
ANION GAP SERPL CALCULATED.3IONS-SCNC: 4 MMOL/L (ref 3–14)
AST SERPL W P-5'-P-CCNC: 12 U/L (ref 0–45)
BILIRUB SERPL-MCNC: 0.8 MG/DL (ref 0.2–1.3)
BUN SERPL-MCNC: 19 MG/DL (ref 7–30)
CALCIUM SERPL-MCNC: 8.9 MG/DL (ref 8.5–10.1)
CHLORIDE BLD-SCNC: 106 MMOL/L (ref 94–109)
CHOLEST SERPL-MCNC: 259 MG/DL
CO2 SERPL-SCNC: 27 MMOL/L (ref 20–32)
CREAT SERPL-MCNC: 0.85 MG/DL (ref 0.52–1.04)
CREAT UR-MCNC: 245 MG/DL
ERYTHROCYTE [DISTWIDTH] IN BLOOD BY AUTOMATED COUNT: 12.8 % (ref 10–15)
FASTING STATUS PATIENT QL REPORTED: YES
GFR SERPL CREATININE-BSD FRML MDRD: 74 ML/MIN/1.73M2
GLUCOSE BLD-MCNC: 83 MG/DL (ref 70–99)
HCT VFR BLD AUTO: 38.1 % (ref 35–47)
HDLC SERPL-MCNC: 69 MG/DL
HGB BLD-MCNC: 12.6 G/DL (ref 11.7–15.7)
LDLC SERPL CALC-MCNC: 169 MG/DL
MCH RBC QN AUTO: 30.7 PG (ref 26.5–33)
MCHC RBC AUTO-ENTMCNC: 33.1 G/DL (ref 31.5–36.5)
MCV RBC AUTO: 93 FL (ref 78–100)
MICROALBUMIN UR-MCNC: 23 MG/L
MICROALBUMIN/CREAT UR: 9.39 MG/G CR (ref 0–25)
NONHDLC SERPL-MCNC: 190 MG/DL
PLATELET # BLD AUTO: 271 10E3/UL (ref 150–450)
POTASSIUM BLD-SCNC: 4 MMOL/L (ref 3.4–5.3)
PROT SERPL-MCNC: 7.8 G/DL (ref 6.8–8.8)
RBC # BLD AUTO: 4.1 10E6/UL (ref 3.8–5.2)
SODIUM SERPL-SCNC: 137 MMOL/L (ref 133–144)
TRIGL SERPL-MCNC: 104 MG/DL
WBC # BLD AUTO: 5.4 10E3/UL (ref 4–11)

## 2021-11-22 PROCEDURE — 85027 COMPLETE CBC AUTOMATED: CPT | Performed by: FAMILY MEDICINE

## 2021-11-22 PROCEDURE — 87624 HPV HI-RISK TYP POOLED RSLT: CPT | Performed by: FAMILY MEDICINE

## 2021-11-22 PROCEDURE — 80061 LIPID PANEL: CPT | Performed by: FAMILY MEDICINE

## 2021-11-22 PROCEDURE — 36415 COLL VENOUS BLD VENIPUNCTURE: CPT | Performed by: FAMILY MEDICINE

## 2021-11-22 PROCEDURE — 91306 COVID-19,PF,MODERNA (18+ YRS BOOSTER .25ML): CPT | Performed by: FAMILY MEDICINE

## 2021-11-22 PROCEDURE — 99396 PREV VISIT EST AGE 40-64: CPT | Performed by: FAMILY MEDICINE

## 2021-11-22 PROCEDURE — 82043 UR ALBUMIN QUANTITATIVE: CPT | Performed by: FAMILY MEDICINE

## 2021-11-22 PROCEDURE — 80053 COMPREHEN METABOLIC PANEL: CPT | Performed by: FAMILY MEDICINE

## 2021-11-22 PROCEDURE — 99213 OFFICE O/P EST LOW 20 MIN: CPT | Mod: 25 | Performed by: FAMILY MEDICINE

## 2021-11-22 PROCEDURE — 0064A COVID-19,PF,MODERNA (18+ YRS BOOSTER .25ML): CPT | Performed by: FAMILY MEDICINE

## 2021-11-22 PROCEDURE — G0145 SCR C/V CYTO,THINLAYER,RESCR: HCPCS | Performed by: FAMILY MEDICINE

## 2021-11-22 RX ORDER — CLOBETASOL PROPIONATE 0.5 MG/G
CREAM TOPICAL
Qty: 30 G | Refills: 1 | Status: SHIPPED | OUTPATIENT
Start: 2021-11-22

## 2021-11-22 RX ORDER — FUROSEMIDE 20 MG
10 TABLET ORAL DAILY
Qty: 20 TABLET | Refills: 1 | Status: SHIPPED | OUTPATIENT
Start: 2021-11-22 | End: 2023-05-25

## 2021-11-22 RX ORDER — LISINOPRIL 20 MG/1
20 TABLET ORAL DAILY
Qty: 90 TABLET | Refills: 3 | Status: SHIPPED | OUTPATIENT
Start: 2021-11-22 | End: 2022-12-15

## 2021-11-22 ASSESSMENT — ENCOUNTER SYMPTOMS
EYE PAIN: 0
CONSTIPATION: 0
WEAKNESS: 0
ABDOMINAL PAIN: 0
BREAST MASS: 0
HEMATURIA: 0
CHILLS: 0
JOINT SWELLING: 0
MYALGIAS: 1
SORE THROAT: 0
NERVOUS/ANXIOUS: 0
COUGH: 0
FEVER: 0
SHORTNESS OF BREATH: 0
ARTHRALGIAS: 1
PALPITATIONS: 0
PARESTHESIAS: 0
HEARTBURN: 0
DYSURIA: 0
HEADACHES: 0
DIZZINESS: 0
HEMATOCHEZIA: 0
NAUSEA: 0
FREQUENCY: 0
DIARRHEA: 0

## 2021-11-22 ASSESSMENT — MIFFLIN-ST. JEOR: SCORE: 1146.49

## 2021-11-22 NOTE — PROGRESS NOTES
SUBJECTIVE:   CC: Dulce Ma is an 61 year old woman who presents for preventive health visit and the following other medical problems:      1. Routine general medical examination at a health care facility    2. Essential hypertension with goal blood pressure less than 140/90 - well controlled today - will keep lisinopril to 20mg daily -recheck fasting labs today     3. CARDIOVASCULAR SCREENING; LDL GOAL LESS THAN 130    4. Bilateral leg edema- not signif today - uses furosemide rarely prn - checks pretty much daily     5. High priority for 2019-nCoV vaccine    6. Screening for malignant neoplasm of cervix- use Pediatric speculum secondary to vaginal atrophy and narrow introitus     7. Malignant melanoma of skin of trunk (H)- following with dermatology yearly for full body skin exams     8. Lichen sclerosus et atrophicus of the vulva- very mild- not on any treatment currently - will have clobetasol available for patient at her pharmacy           Patient has been advised of split billing requirements and indicates understanding: Yes  Healthy Habits:     Getting at least 3 servings of Calcium per day:  Yes    Bi-annual eye exam:  Yes    Dental care twice a year:  Yes    Sleep apnea or symptoms of sleep apnea:  None    Diet:  Regular (no restrictions)    Frequency of exercise:  2-3 days/week    Duration of exercise:  15-30 minutes    Taking medications regularly:  Yes    Medication side effects:  None    PHQ-2 Total Score: 0    Additional concerns today:  No    Immunization History   Administered Date(s) Administered     COVID-19,PF,Moderna 04/24/2021, 05/22/2021     COVID-19,PF,Moderna Booster 11/22/2021     Influenza (IIV3) PF 10/23/2010, 10/04/2017, 11/21/2021     Influenza Quad, Recombinant, pf(RIV4) (Flublok) 09/24/2020     Influenza Vaccine IM > 6 months Valent IIV4 (Alfuria,Fluzone) 10/01/2014, 10/03/2014, 10/01/2018     TD (ADULT, 7+) 08/09/2001, 05/28/2020     TDAP Vaccine (Adacel) 10/23/2010     Zoster  vaccine recombinant adjuvanted (SHINGRIX) 01/11/2019, 04/22/2019          Hypertension Follow-up:       Do you check your blood pressure regularly outside of the clinic? Occas     Are you following a low salt diet? Yes    Are your blood pressures ever more than 140 on the top number (systolic) OR more   than 90 on the bottom number (diastolic), for example 140/90? No     Creatinine   Date Value Ref Range Status   05/28/2020 0.78 0.52 - 1.04 mg/dL Final           Today's PHQ-2 Score:   PHQ-2 ( 1999 Pfizer) 11/22/2021   Q1: Little interest or pleasure in doing things 0   Q2: Feeling down, depressed or hopeless 0   PHQ-2 Score 0   PHQ-2 Total Score (12-17 Years)- Positive if 3 or more points; Administer PHQ-A if positive -   Q1: Little interest or pleasure in doing things Not at all   Q2: Feeling down, depressed or hopeless Not at all   PHQ-2 Score 0       Abuse: Current or Past (Physical, Sexual or Emotional) - No  Do you feel safe in your environment? Yes    Have you ever done Advance Care Planning? (For example, a Health Directive, POLST, or a discussion with a medical provider or your loved ones about your wishes): No, advance care planning information given to patient to review.  Advanced care planning was discussed at today's visit.    Social History     Tobacco Use     Smoking status: Never Smoker     Smokeless tobacco: Never Used   Substance Use Topics     Alcohol use: Yes     Comment: occ.     If you drink alcohol do you typically have >3 drinks per day or >7 drinks per week? No    Alcohol Use 11/22/2021   Prescreen: >3 drinks/day or >7 drinks/week? No   Prescreen: >3 drinks/day or >7 drinks/week? -       Reviewed orders with patient.  Reviewed health maintenance and updated orders accordingly - Yes  Lab work is in process  Labs reviewed in EPIC  BP Readings from Last 3 Encounters:   11/22/21 110/71   05/28/20 110/80   07/26/19 122/82    Wt Readings from Last 3 Encounters:   11/22/21 61.2 kg (135 lb)    05/28/20 65.3 kg (144 lb)   07/26/19 64.4 kg (142 lb)                  Patient Active Problem List   Diagnosis     h/o Malignant melanoma of skin of trunk - Dr. Myles Lake - [facility]- rechecks w/ him 1x/year     Essential hypertension with goal blood pressure less than 140/90     CARDIOVASCULAR SCREENING; LDL GOAL LESS THAN 130     Family history of heart failure- mother, MGM, maternal uncle     Chronic constipation- trying to do daily fiber therapy     Glaucoma- sees Dr. Harvey Brown - [facility] [place], [place]     Dysplastic nevi     Skin cancer, basal cell     Lichen sclerosus et atrophicus of the vulva     Anesthesia complication, initial encounter-was completely out with Midazolam 2 mg IV, Fentanyl 100 micrograms IV that was given at time of colonoscopy 7/2018      Past Surgical History:   Procedure Laterality Date     APPENDECTOMY  2008    aprroximately 8-10 years agp     BREAST SURGERY      Breast biopsies     COLONOSCOPY N/A 7/27/2018    Procedure: COLONOSCOPY;  COLONOSCOPY ;  Surgeon: Ren Whitehead MD;  Location:  GI     ORTHOPEDIC SURGERY Right 2014    ORIF right ring finger     ZZC NONSPECIFIC PROCEDURE  3/02    malignant melanoma removed from abdomen with negative nodes-Dr. Acosta -surgeon       Social History     Tobacco Use     Smoking status: Never Smoker     Smokeless tobacco: Never Used   Substance Use Topics     Alcohol use: Yes     Comment: occ.     Family History   Problem Relation Age of Onset     Hypertension Mother      Heart Disease Mother         bypass     Neuropathy Mother      Hypertension Father      Hypertension Paternal Grandmother      Eye Disorder Maternal Grandmother         glacoma     Eye Disorder Maternal Grandfather         glacoma     Colon Cancer No family hx of          Current Outpatient Medications   Medication Sig Dispense Refill     cholecalciferol (VITAMIN D3) 25 mcg (1000 units) capsule        clobetasol (TEMOVATE) 0.05 % cream Apply  very scant amount to inner labia daily for up to 14 days at a time -ok to use every 2 months or so 45 g 1     dorzolamide-timolol (COSOPT) 2-0.5 % ophthalmic solution INSTILL 1 DROP IN BOTH EYES TWO TIMES A DAY       furosemide (LASIX) 20 MG tablet Take 0.5 tablets (10 mg) by mouth daily As needed for leg swelling 20 tablet 1     lisinopril (ZESTRIL) 20 MG tablet Take 1 tablet (20 mg) by mouth daily 90 tablet 3     LUMIGAN 0.01 % SOLN ophthalmic solution 1 DROP IN BOTH EYES AT BEDTIME       Allergies   Allergen Reactions     Amoxicillin      hives     Benzoyl Peroxide      swelling     Ibuprofen      over long duration dev. hives     Penicillins      hives     Recent Labs   Lab Test 05/28/20  1023 01/11/19  1035 07/22/16  0907   * 161* 145*   HDL 60 63 61   TRIG 140 126 158*   ALT 15 18 16   CR 0.78 0.81 0.73   GFRESTIMATED 82 79 83   GFRESTBLACK >90 >90 >90  African American GFR Calc     POTASSIUM 4.8 4.2 4.6   TSH 1.31 1.46  --         Breast Cancer Screening:    Breast CA Risk Assessment (FHS-7) 11/22/2021   Do you have a family history of breast, colon, or ovarian cancer? No / Unknown       click delete button to remove this line now  Mammogram Screening: Recommended annual mammography  Pertinent mammograms are reviewed under the imaging tab.    History of abnormal Pap smear: NO - age 30- 65 PAP every 3 years recommended  PAP / HPV Latest Ref Rng & Units 1/11/2019   PAP (Historical) - NIL   HPV16 NEG:Negative Negative   HPV18 NEG:Negative Negative   HRHPV NEG:Negative Negative     Reviewed and updated as needed this visit by clinical staff                Reviewed and updated as needed this visit by Provider               Past Medical History:   Diagnosis Date     Anesthesia complication, initial encounter     was completely out with Midazolam 2 mg IV, Fentanyl 100 micrograms IV that was given at time of colonoscopy 7/2018      Dysplastic nevi      Glaucoma     Diagnosed in Spring 2010     Hypertension  goal BP (blood pressure) < 140/90 at age 50     very strong family hx      Lumbago     stiffness in low back     Other malignant neoplasm of skin of trunk, except scrotum     Dr. Myles Lake, melanoma with negative sentinel node biopsy - no chemo-Dr. Selene Gonzales-derm     Perimenopausal      Routine gyne exam     [facility] ob[facility] - Dr. Isabela Perez      Skin cancer, basal cell     multiple - 3 on nose, treated with interferon intralesionally x 1      Unspecified derangement, shoulder region     s/p horse-riding injury- no problem now     Unspecified musculoskeletal disorders and symptoms referable to neck     compressed vertebrae in neck- bothers pt rarely      Past Surgical History:   Procedure Laterality Date     APPENDECTOMY      aprroximately 8-10 years agp     BREAST SURGERY      Breast biopsies     COLONOSCOPY N/A 2018    Procedure: COLONOSCOPY;  COLONOSCOPY ;  Surgeon: Ren Whitehead MD;  Location:  GI     ORTHOPEDIC SURGERY Right     ORIF right ring finger     ZZC NONSPECIFIC PROCEDURE  3/02    malignant melanoma removed from abdomen with negative nodes-Dr. Acosta -surgeon     OB History    Para Term  AB Living   0 0 0 0 0 0   SAB IAB Ectopic Multiple Live Births   0 0 0 0 0       Review of Systems   Constitutional: Negative for chills and fever.   HENT: Negative for congestion, ear pain, hearing loss and sore throat.    Eyes: Negative for pain and visual disturbance.   Respiratory: Negative for cough and shortness of breath.    Cardiovascular: Negative for chest pain, palpitations and peripheral edema.   Gastrointestinal: Negative for abdominal pain, constipation, diarrhea, heartburn, hematochezia and nausea.   Breasts:  Negative for tenderness, breast mass and discharge.   Genitourinary: Negative for dysuria, frequency, genital sores, hematuria, pelvic pain, urgency, vaginal bleeding and vaginal discharge.   Musculoskeletal: Positive for arthralgias and myalgias.  "Negative for joint swelling.   Skin: Negative for rash.   Neurological: Negative for dizziness, weakness, headaches and paresthesias.   Psychiatric/Behavioral: Negative for mood changes. The patient is not nervous/anxious.      OBJECTIVE:   /71 (BP Location: Right arm, Patient Position: Chair, Cuff Size: Adult Large)   Pulse 78   Temp 97.5  F (36.4  C) (Tympanic)   Ht 1.6 m (5' 3\")   Wt 61.2 kg (135 lb)   LMP 10/31/2011   SpO2 97%   Breastfeeding No   BMI 23.91 kg/m    Physical Exam  GENERAL APPEARANCE: healthy, alert and no distress  EYES: Eyes grossly normal to inspection, PERRL and conjunctivae and sclerae normal  HENT: ear canals and TM's normal, nose and mouth without ulcers or lesions, oropharynx clear and oral mucous membranes moist  NECK: no adenopathy, no asymmetry, masses, or scars and thyroid normal to palpation  RESP: lungs clear to auscultation - no rales, rhonchi or wheezes  BREAST: normal without masses, tenderness or nipple discharge and no palpable axillary masses or adenopathy  CV: regular rate and rhythm, normal S1 S2, no S3 or S4, no murmur, click or rub, no peripheral edema and peripheral pulses strong  ABDOMEN: soft, nontender, no hepatosplenomegaly, no masses and bowel sounds normal   (female): normal female external genitalia except for some Lichen sclerosus et atrophicus of the vulva- very mild-, normal urethral meatus,signif. Introitus narrowing though and  vaginal mucosal atrophy noted;  normal , but mildly atrophic cervix, adnexae, and uterus without masses or abnormal discharge.   MS: no musculoskeletal defects are noted and gait is age appropriate without ataxia  SKIN: no suspicious lesions or rashes  NEURO: Normal strength and tone, sensory exam grossly normal, mentation intact and speech normal  PSYCH: mentation appears normal and affect normal/bright    Diagnostic Test Results:  Labs reviewed in Epic    ASSESSMENT/PLAN:       ICD-10-CM    1. Routine general medical " "examination at a health care facility  Z00.00    2. Essential hypertension with goal blood pressure less than 140/90 - well controlled today - will keep lisinopril to 20mg daily -recheck fasting labs today   I10 COMPREHENSIVE METABOLIC PANEL     Albumin Random Urine Quantitative with Creat Ratio     lisinopril (ZESTRIL) 20 MG tablet     COMPREHENSIVE METABOLIC PANEL     Albumin Random Urine Quantitative with Creat Ratio     OFFICE/OUTPT VISIT,EST,LEVL III   3. CARDIOVASCULAR SCREENING; LDL GOAL LESS THAN 130  Z13.6 CBC with Platelets       CBC with Platelets     4. Bilateral leg edema- not signif today - uses furosemide rarely prn - checks pretty much daily   R60.0 furosemide (LASIX) 20 MG tablet     OFFICE/OUTPT VISIT,EST,LEVL III   5. High priority for 2019-nCoV vaccine  Z23 COVID-19,PF,MODERNA (18+ Yrs BOOSTER .25mL)     VACCINE ADMINISTRATION, INITIAL     CANCELED: COVID-19,PF,MODERNA (18+ Yrs BOOSTER .25mL)   6. Screening for malignant neoplasm of cervix- use Pediatric speculum secondary to vaginal atrophy and narrow introitus   Z12.4 Pap Screen with HPV - recommended age 30 - 65 years   7. Malignant melanoma of skin of trunk (H)- following with dermatology yearly for full body skin exams   C43.59    8. Lichen sclerosus et atrophicus of the vulva- very mild- not on any treatment currently - will have clobetasol available for patient at her pharmacy   N90.4 clobetasol (TEMOVATE) 0.05 % external cream       Patient has been advised of split billing requirements and indicates understanding: Yes  COUNSELING:  Reviewed preventive health counseling, as reflected in patient instructions    Estimated body mass index is 25.51 kg/m  as calculated from the following:    Height as of 5/28/20: 1.6 m (5' 3\").    Weight as of 5/28/20: 65.3 kg (144 lb).    Seeing her Dermatologist at [facility] once yearly now. Recently saw them.     She reports that she has never smoked. She has never used smokeless tobacco.      Counseling " Resources:  ATP IV Guidelines  Pooled Cohorts Equation Calculator  Breast Cancer Risk Calculator  BRCA-Related Cancer Risk Assessment: FHS-7 Tool  FRAX Risk Assessment  ICSI Preventive Guidelines  Dietary Guidelines for Americans, 2010  USDA's MyPlate  ASA Prophylaxis  Lung CA Screening          Alida Lamb MD  [facility]

## 2021-11-22 NOTE — PATIENT INSTRUCTIONS
[facility]  [address] [place] [address]  Office: [phone]   Fax:    [phone]       For possible COVID-19 novel coronavirus vaccine mild reactions not related to allergies:   I would not take the Ibuprofen prior to your shot, but rather afterward.  No prescription steroids within 2 weeks of the shots as they can suppress your immune system, but Ibuprofen is not a significant immunosuppressant at the 400mg dosage.   Ok to take tylenol 2-325 or 2-500mg tabs prior to vaccine.  Ok to also take claritin (a non-sedating anti-histamine) 10 mg daily for 2 days and 2 otc Ibuprofen every 4-6 hours while awake for the next 36 hours and I had no problems.  There has been some controversy about whether to take Ibuprofen 400mg by mouth every 4-5 hours or naproxen 220mg by mouth twice daily  ,but nothing concrete from the CDC or WHO to recommend against taking those medications.  If you do take them, take them with food so they don't irritate your stomach.  You can do the above regimen for 2-3 after your first,  Second, or third  shots to decrease the possibility of achiness, fever, chills after your COVID-19 novel coronavirus vaccines.      Hope that helps!   Sincerely,  Alida Lamb MD           Preventive Health Recommendations  Female Ages 50 - 64    Yearly exam: See your health care provider every year in order to  o Review health changes.   o Discuss preventive care.    o Review your medicines if your doctor has prescribed any.      Get a Pap test every three years (unless you have an abnormal result and your provider advises testing more often).    If you get Pap tests with HPV test, you only need to test every 5 years, unless you have an abnormal result.     You do not need a Pap test if your uterus was removed (hysterectomy) and you have not had cancer.    You should be tested each year for STDs (sexually transmitted diseases) if you're at risk.     Have a  mammogram every 1 to 2 years.    Have a colonoscopy at age 50, or have a yearly FIT test (stool test). These exams screen for colon cancer.      Have a cholesterol test every 5 years, or more often if advised.    Have a diabetes test (fasting glucose) every three years. If you are at risk for diabetes, you should have this test more often.     If you are at risk for osteoporosis (brittle bone disease), think about having a bone density scan (DEXA).    Shots: Get a flu shot each year. Get a tetanus shot every 10 years.    Nutrition:     Eat at least 5 servings of fruits and vegetables each day.    Eat whole-grain bread, whole-wheat pasta and brown rice instead of white grains and rice.    Get adequate Calcium and Vitamin D.     Lifestyle    Exercise at least 150 minutes a week (30 minutes a day, 5 days a week). This will help you control your weight and prevent disease.    Limit alcohol to one drink per day.    No smoking.     Wear sunscreen to prevent skin cancer.     See your dentist every six months for an exam and cleaning.    See your eye doctor every 1 to 2 years.    Thank you so much or choosing [facility]  for your Health Care. It was a pleasure seeing you at your visit today! Please contact us with any questions or concerns you may have.                   Alida Lamb MD                              To reach your [facility] care team after hours call:   [phone]    PLEASE NOTE OUR HOURS HAVE CHANGED secondary to COVID-19 coronavirus pandemic, as we are trying to minimize patient exposure to the virus,  which is now widespread in the [facility].  These hours may change with very little notice.  We apologize for any inconvenience.       Our current clinic hours are:          Monday- Thursday   7:00am - 6:00pm  in person.      Friday  7:00am- 5:00pm                       Saturday and Sunday : Closed to in person and virtual visits        We have  telephone and virtual visit times available between    7:00am - 6pm on Monday-Friday as well.                                                Phone:  [phone]      Our pharmacy hours: Monday through Friday 9:00am to 5:00pm                        Saturday - 9:00 am to 12 noon       Sunday : Closed.              Phone:  [phone]              ###  Please note: at this time we are not accepting any walk-in visits. ###      There is also information available at our web site:  [email or web address]    If your provider ordered any lab tests and you do not receive the results within 10 business days, please call the clinic.    If you need a medication refill please contact your pharmacy.  Please allow 2 business days for your refill to be completed.    Our clinic offers telephone visits and e visits.  Please ask one of your team members to explain more.      Use [employer] (secure email communication and access to your chart) to send your primary care provider a message or make an appointment. Ask someone on your Team how to sign up for [employer].

## 2021-11-29 LAB
BKR LAB AP GYN ADEQUACY: NORMAL
BKR LAB AP GYN INTERPRETATION: NORMAL
BKR LAB AP HPV REFLEX: NORMAL
BKR LAB AP LMP: NORMAL
BKR LAB AP PREVIOUS ABNORMAL: NORMAL
PATH REPORT.COMMENTS IMP SPEC: NORMAL
PATH REPORT.COMMENTS IMP SPEC: NORMAL
PATH REPORT.RELEVANT HX SPEC: NORMAL

## 2021-11-30 LAB
HUMAN PAPILLOMA VIRUS 16 DNA: NEGATIVE
HUMAN PAPILLOMA VIRUS 18 DNA: NEGATIVE
HUMAN PAPILLOMA VIRUS FINAL DIAGNOSIS: NORMAL
HUMAN PAPILLOMA VIRUS OTHER HR: NEGATIVE

## 2021-12-08 ENCOUNTER — MYC MEDICAL ADVICE (OUTPATIENT)
Dept: FAMILY MEDICINE | Facility: CLINIC | Age: 61
End: 2021-12-08
Payer: COMMERCIAL

## 2022-01-04 NOTE — TELEPHONE ENCOUNTER
Team: please see if this has been completed and inform pt of status      ANUSHA CollinsA, MS, PA-C

## 2022-01-04 NOTE — TELEPHONE ENCOUNTER
This was done 2+ weeks ago  - prior to deadline -  and sent to scan. To Tiana Krishna PA-C ok to close.

## 2022-01-05 NOTE — TELEPHONE ENCOUNTER
Please call pt to see if this was done and picked up , but not scanned in. I'm almost positive I signed this prior to deadline.

## 2022-01-05 NOTE — TELEPHONE ENCOUNTER
Form is not in the signed forms, not scanned into  Patient chart, I do not see the form signed by Roby ( or by you) so do we need to print it off and have you sign it?       Isabela Torrez

## 2022-01-06 NOTE — TELEPHONE ENCOUNTER
Patient confirmed that she did not take the form with has Dr. Lamb still needed to sign the form on her end. I apologized on our behalf as we must have misplaced the form . At this point the form was due 12/15/21 and it wouldn't matter if we were to send it in now. She understands that these things happened and appreciated the follow up.     Guido Herrera

## 2022-01-06 NOTE — TELEPHONE ENCOUNTER
Called and left message for patient to call us to see if she picked up the form       Isabela Torrez

## 2022-01-10 ENCOUNTER — MYC MEDICAL ADVICE (OUTPATIENT)
Dept: FAMILY MEDICINE | Facility: CLINIC | Age: 62
End: 2022-01-10

## 2022-01-18 DIAGNOSIS — E78.5 HYPERLIPIDEMIA LDL GOAL <130: Primary | ICD-10-CM

## 2022-08-16 ENCOUNTER — ANCILLARY PROCEDURE (OUTPATIENT)
Dept: MAMMOGRAPHY | Facility: CLINIC | Age: 62
End: 2022-08-16
Attending: FAMILY MEDICINE
Payer: COMMERCIAL

## 2022-08-16 DIAGNOSIS — Z12.31 VISIT FOR SCREENING MAMMOGRAM: ICD-10-CM

## 2022-08-16 PROCEDURE — 77067 SCR MAMMO BI INCL CAD: CPT | Mod: TC | Performed by: RADIOLOGY

## 2022-08-16 PROCEDURE — 77063 BREAST TOMOSYNTHESIS BI: CPT | Mod: TC | Performed by: RADIOLOGY

## 2022-08-22 ENCOUNTER — MYC MEDICAL ADVICE (OUTPATIENT)
Dept: FAMILY MEDICINE | Facility: CLINIC | Age: 62
End: 2022-08-22

## 2022-08-22 NOTE — RESULT ENCOUNTER NOTE
Your mammogram was normal.     Thank you so much for choosing [facility].  Please contact us with any questions that you may have.   We appreciate the opportunity to serve you now and look forward to supporting your healthcare needs for a long time to come!    Most Sincerely,     Alida Lamb MD

## 2022-10-10 ENCOUNTER — HEALTH MAINTENANCE LETTER (OUTPATIENT)
Age: 62
End: 2022-10-10

## 2022-10-17 ENCOUNTER — MYC MEDICAL ADVICE (OUTPATIENT)
Dept: FAMILY MEDICINE | Facility: CLINIC | Age: 62
End: 2022-10-17

## 2022-10-17 ENCOUNTER — TELEPHONE (OUTPATIENT)
Dept: FAMILY MEDICINE | Facility: CLINIC | Age: 62
End: 2022-10-17

## 2022-10-17 ENCOUNTER — VIRTUAL VISIT (OUTPATIENT)
Dept: FAMILY MEDICINE | Facility: CLINIC | Age: 62
End: 2022-10-17
Payer: COMMERCIAL

## 2022-10-17 DIAGNOSIS — U07.1 INFECTION DUE TO 2019 NOVEL CORONAVIRUS: Primary | ICD-10-CM

## 2022-10-17 PROCEDURE — 99214 OFFICE O/P EST MOD 30 MIN: CPT | Mod: 95 | Performed by: INTERNAL MEDICINE

## 2022-10-17 NOTE — TELEPHONE ENCOUNTER
Pt calling in with concerns regarding positive COVID test 10/17/22, pt already scheduled for appt for today. Pt lives with mom, concerned about giving COVID to mom. Relayed recommendations from CDC website, sent [employer] message with recommendations per request. No further questions.     Franci RAMIRES RN

## 2022-10-17 NOTE — PROGRESS NOTES
Dulce is a 62 year old who is being evaluated via a billable telephone visit.      What phone number would you like to be contacted at? [phone]  How would you like to obtain your AVS? MyChart    Assessment & Plan     Infection due to 2019 novel coronavirus  Started having COVID symptoms on 14th of this month  Having some body aches  Fever with chills and congestion  She is interested in exploring options with Paxlovid  Discussed the side effects of the medicine including the rare skin reaction  Also discussed about nausea/diarrhea/dizziness  Discussed about bitter taste  Discussed about rebound COVID  Discussed about medication interactions  Discussed about quarantine measures  - nirmatrelvir and ritonavir (PAXLOVID) therapy pack; Take 3 tablets by mouth 2 times daily for 5 days (Take 2 Nirmatrelvir tablets and 1 Ritonavir tablet twice daily for 5 days)      30 minutes spent on the date of the encounter doing chart review, history and exam, documentation and further activities per the note           Return in about 4 weeks (around 11/14/2022), or if symptoms worsen or fail to improve.    Will Dewitt MD  [facility]   Dluce is a 62 year old, presenting for the following health issues:  Covid Concern      HPI       COVID-19 Symptom Review  How many days ago did these symptoms start? Friday 10/14/22    Are any of the following symptoms significant for you?    New or worsening difficulty breathing? No    Worsening cough? Yes with nasal congestion    Fever or chills? yes    Headache: No    Sore throat: No    Chest pain: No    Diarrhea: No    Body aches? YES    What treatments has patient tried? Acetaminophen   Does patient live in a nursing home, group home, or shelter? No  Does patient have a way to get food/medications during quarantined? Yes, I have a friend or family member who can help me.                  Review of Systems   Constitutional, HEENT, cardiovascular,  pulmonary, gi and gu systems are negative, except as otherwise noted.      Objective           Vitals:  No vitals were obtained today due to virtual visit.    Physical Exam   healthy, alert and no distress  PSYCH: Alert and oriented times 3; coherent speech, normal   rate and volume, able to articulate logical thoughts, able   to abstract reason, no tangential thoughts, no hallucinations   or delusions  Her affect is normal  RESP: No cough, no audible wheezing, able to talk in full sentences  Remainder of exam unable to be completed due to telephone visits                Phone call duration: 10 minutes

## 2022-12-13 DIAGNOSIS — I10 ESSENTIAL HYPERTENSION WITH GOAL BLOOD PRESSURE LESS THAN 140/90: ICD-10-CM

## 2022-12-15 RX ORDER — LISINOPRIL 20 MG/1
TABLET ORAL
Qty: 90 TABLET | Refills: 0 | Status: SHIPPED | OUTPATIENT
Start: 2022-12-15 | End: 2023-04-05

## 2022-12-15 NOTE — TELEPHONE ENCOUNTER
Medication is being filled for 1 time refill only due to:  Patient needs to be seen because it has been more than one year since last visit.    Routing to MA/TC pool. The Pt is due for a visit with PCP   Gui DOW RN, BSN

## 2022-12-16 ENCOUNTER — MYC MEDICAL ADVICE (OUTPATIENT)
Dept: FAMILY MEDICINE | Facility: CLINIC | Age: 62
End: 2022-12-16

## 2023-03-25 ENCOUNTER — HEALTH MAINTENANCE LETTER (OUTPATIENT)
Age: 63
End: 2023-03-25

## 2023-04-01 DIAGNOSIS — I10 ESSENTIAL HYPERTENSION WITH GOAL BLOOD PRESSURE LESS THAN 140/90: ICD-10-CM

## 2023-04-05 RX ORDER — LISINOPRIL 20 MG/1
TABLET ORAL
Qty: 60 TABLET | Refills: 0 | Status: SHIPPED | OUTPATIENT
Start: 2023-04-05 | End: 2023-05-25

## 2023-04-05 NOTE — TELEPHONE ENCOUNTER
Routing refill request to provider for review/approval because:  Drug does not pass the [facility] refill protocol     LOV 5 months ago for covid   BP Readings from Last 3 Encounters:   11/22/21 110/71   05/28/20 110/80   07/26/19 122/82       Future Appointments 4/5/2023 - 10/2/2023      Date Visit Type Length Department Provider     5/25/2023  2:40 PM PREVENTATIVE ADULT 40 min [facility] Alida Lamb MD    Location Instructions:     [facility] is located at [address] [place] [address], along [address] Free parking is available; access the lot by turning north from [address] onto [address], then west onto [address].                   Veronica GONG RN   [facility] Triage

## 2023-05-25 ENCOUNTER — ANCILLARY PROCEDURE (OUTPATIENT)
Dept: GENERAL RADIOLOGY | Facility: CLINIC | Age: 63
End: 2023-05-25
Attending: FAMILY MEDICINE
Payer: COMMERCIAL

## 2023-05-25 ENCOUNTER — OFFICE VISIT (OUTPATIENT)
Dept: FAMILY MEDICINE | Facility: CLINIC | Age: 63
End: 2023-05-25
Payer: COMMERCIAL

## 2023-05-25 VITALS
TEMPERATURE: 97.7 F | RESPIRATION RATE: 16 BRPM | WEIGHT: 138 LBS | SYSTOLIC BLOOD PRESSURE: 102 MMHG | HEART RATE: 85 BPM | BODY MASS INDEX: 24.45 KG/M2 | DIASTOLIC BLOOD PRESSURE: 70 MMHG | HEIGHT: 63 IN

## 2023-05-25 DIAGNOSIS — C43.59 MALIGNANT MELANOMA OF SKIN OF TRUNK (H): ICD-10-CM

## 2023-05-25 DIAGNOSIS — Z00.01 ENCOUNTER FOR ROUTINE ADULT HEALTH EXAMINATION WITH ABNORMAL FINDINGS: Primary | ICD-10-CM

## 2023-05-25 DIAGNOSIS — N17.9 AKI (ACUTE KIDNEY INJURY) (H): ICD-10-CM

## 2023-05-25 DIAGNOSIS — Z12.31 VISIT FOR SCREENING MAMMOGRAM: ICD-10-CM

## 2023-05-25 DIAGNOSIS — I10 ESSENTIAL HYPERTENSION WITH GOAL BLOOD PRESSURE LESS THAN 140/90: ICD-10-CM

## 2023-05-25 DIAGNOSIS — R60.0 BILATERAL LEG EDEMA: ICD-10-CM

## 2023-05-25 DIAGNOSIS — D23.9 DYSPLASTIC NEVI: ICD-10-CM

## 2023-05-25 DIAGNOSIS — M75.101 ROTATOR CUFF SYNDROME, RIGHT: ICD-10-CM

## 2023-05-25 DIAGNOSIS — C44.91 SKIN CANCER, BASAL CELL: ICD-10-CM

## 2023-05-25 DIAGNOSIS — M25.511 CHRONIC RIGHT SHOULDER PAIN: ICD-10-CM

## 2023-05-25 DIAGNOSIS — M75.41 IMPINGEMENT SYNDROME, SHOULDER, RIGHT: ICD-10-CM

## 2023-05-25 DIAGNOSIS — E55.9 VITAMIN D DEFICIENCY: ICD-10-CM

## 2023-05-25 DIAGNOSIS — U07.1 INFECTION DUE TO 2019 NOVEL CORONAVIRUS: ICD-10-CM

## 2023-05-25 DIAGNOSIS — H40.10X4 OPEN-ANGLE GLAUCOMA OF BOTH EYES, INDETERMINATE STAGE, UNSPECIFIED OPEN-ANGLE GLAUCOMA TYPE: ICD-10-CM

## 2023-05-25 DIAGNOSIS — D64.9 ANEMIA, UNSPECIFIED TYPE: ICD-10-CM

## 2023-05-25 DIAGNOSIS — G89.29 CHRONIC RIGHT SHOULDER PAIN: ICD-10-CM

## 2023-05-25 DIAGNOSIS — E78.5 HYPERLIPIDEMIA LDL GOAL <130: ICD-10-CM

## 2023-05-25 PROCEDURE — 73030 X-RAY EXAM OF SHOULDER: CPT | Mod: TC | Performed by: RADIOLOGY

## 2023-05-25 PROCEDURE — 99396 PREV VISIT EST AGE 40-64: CPT | Performed by: FAMILY MEDICINE

## 2023-05-25 PROCEDURE — 99214 OFFICE O/P EST MOD 30 MIN: CPT | Mod: 25 | Performed by: FAMILY MEDICINE

## 2023-05-25 RX ORDER — LISINOPRIL 20 MG/1
20 TABLET ORAL DAILY
Qty: 90 TABLET | Refills: 3 | Status: SHIPPED | OUTPATIENT
Start: 2023-05-25 | End: 2024-06-18

## 2023-05-25 RX ORDER — FUROSEMIDE 20 MG
10 TABLET ORAL DAILY
Qty: 20 TABLET | Refills: 1 | Status: SHIPPED | OUTPATIENT
Start: 2023-05-25

## 2023-05-25 ASSESSMENT — ENCOUNTER SYMPTOMS
DIZZINESS: 0
SORE THROAT: 0
FREQUENCY: 0
WEAKNESS: 0
JOINT SWELLING: 0
EYE PAIN: 0
NERVOUS/ANXIOUS: 0
HEMATOCHEZIA: 0
ARTHRALGIAS: 1
ABDOMINAL PAIN: 0
HEADACHES: 0
DIARRHEA: 0
MYALGIAS: 0
NAUSEA: 0
CONSTIPATION: 1
COUGH: 0
CHILLS: 0
DYSURIA: 0
HEMATURIA: 0
HEARTBURN: 0
FEVER: 0
SHORTNESS OF BREATH: 0
PARESTHESIAS: 0
BREAST MASS: 0
PALPITATIONS: 0

## 2023-05-25 NOTE — LETTER
Understanding Rotator Cuff Tendonitis    Tendons are tough tissues that connect muscles to bone. A group of 4 muscles and their tendons form a  cuff  around the head of the upper arm bone. This is called the rotator cuff. It connects the upper arm to the shoulder blade. It gives the shoulder joint stability and strength.  If tendons are injured or strained, they may become irritated and swollen (inflamed). This is called tendonitis. Rotator cuff tendonitis may cause shoulder pain and loss of function.  What causes rotator cuff tendonitis?  Tendonitis results when the rotator cuff tendons are injured or overworked. The most common cause of injury is repetitive overhead activities. These can be work-related activities such as reaching, pushing, or lifting. Or they can be sports-related activities such as throwing, swimming, or lifting weights.  Symptoms of rotator cuff tendonitis  Pain on the side of the upper arm is the most common symptom. Pain may get worse with overhead movements or when you raise the arm above shoulder level. It may also hurt to lie on the shoulder at night.  Treatment for rotator cuff tendonitis  Treatment may include the following:  Active rest. This lets the rotator cuff heal. Active rest means using your arm and shoulder, but avoiding activities that cause pain, such as reaching overhead or sleeping on the shoulder.  Cold packs. Putting ice packs on the shoulder helps reduce swelling and relieve pain.  Pain medicines. Prescription or over-the-counter pain medicines can help relieve pain and swelling.  Arm and shoulder exercises. These help keep the shoulder joint mobile as it heals. They also help improve the strength of muscles around the joint.  Possible complications  It might be tempting to stop using your shoulder completely to avoid pain. But doing so may lead to a condition called  frozen shoulder.  To help prevent this, following instructions you are given for active rest and  for doing exercises to help your shoulder heal.  When to call your healthcare provider  Call your healthcare provider right away if you have any of these:  Fever of 100.4 F (38 C) or higher, or as directed  Symptoms that don t get better, or get worse  New symptoms   Date Last Reviewed: 3/10/2016    4011-6963 [employer]. [address] [place] [address], [place] [id number]. All rights reserved. This information is not intended as a substitute for professional medical care. Always follow your healthcare professional's instructions.           Patient Education     Exercises for Shoulder Flexibility: Internal Rotation    This stretch can help restore shoulder flexibility and relieve pain over time. When stretching, be sure to breathe deeply. Follow any special instructions from your healthcare provider or physical therapist.  While seated, move the arm on the side you want to stretch toward the middle of your back. The palm of your hand should face out.  Cup your other hand under the hand that s behind your back. Gently push your cupped hand upward until you feel the stretch in the shoulder. Try to hold the stretch for 5 seconds.  Work up to doing 3 sets of this stretch, 3 times a day. Work up to holding the stretch for 30 to 60 seconds.  Note: Keep your back straight. It s OK if your hand can t reach the middle of your back. Instead, start the stretch with your hand as close as you can get it to the middle of your back.  Frozen shoulder  Frozen shoulder is another name for adhesive capsulitis. This causes restricted movement in the shoulder. If you have frozen shoulder, this stretch may cause discomfort, especially when you first get started. A few months may pass before you achieve the results you want. But once your shoulder heals, it rarely becomes frozen again. So stick to your stretching program. If you have any questions, be sure to ask your healthcare provider.   Date Last Reviewed: 12/1/2017 2000-2018  [email or web address]. [address] [place] [address]. All rights reserved. This information is not intended as a substitute for professional medical care. Always follow your healthcare professional's instructions.           Patient Education     Exercises for Shoulder Flexibility: Adduction (Reaching Across)    This stretch can help restore shoulder flexibility and relieve pain over time. When stretching, be sure to breathe deeply. And follow any special instructions from your doctor or physical therapist:  Put the hand from the side you want to stretch on your opposite shoulder. Your elbow should point away from your body. Try to raise your elbow as close to shoulder height as you can.  With your other hand, push the raised elbow toward the opposite shoulder. Avoid turning your head. Stop when you feel the stretch. Try to hold the stretch for 5 seconds.  Work up to doing 3 sets of this stretch, 3 times a day. Work up to holding the stretch for 30 to 60 seconds.  Note: Be sure to push your elbow across your chest, not up toward your chin. Over time, try to push your elbow farther across your chest to enhance the stretch.  Frozen shoulder is another name for adhesive capsulitis, which causes restricted movement in the shoulder. If you have frozen shoulder, this stretch may cause discomfort, especially when you first get started. A few months may pass before you achieve the results you want. Once your shoulder heals, it rarely becomes frozen again. So stick to your stretching program. If you have any questions, be sure to ask your doctor.  Date Last Reviewed: 3/1/2018    9526-9760 [email or web address]. [address] [place] [address]. All rights reserved. This information is not intended as a substitute for professional medical care. Always follow your healthcare professional's instructions.           Patient Education     Exercises for Shoulder Flexibility: Back Scratch    Improving  your flexibility can reduce pain. Stretching exercises also can help increase your range of pain-free motion. Breathe normally when you exercise. Try to use smooth, fluid movements. Never force a stretch.  Note: Follow any special instructions you are given. If you feel pain, stop the exercise. If the pain continues after stopping, call your healthcare provider.  Stand straight, placing the back of your hand on the side you want to stretch flat against your lower back.  Throw one end of a towel over your shoulder. Grab it behind your back with your other hand.  Pull down gently on the towel with your front arm. Let your back arm slide up as high as is comfortable. You ll feel a stretch in your shoulder. Hold the stretch for a few seconds.  Repeat 3 to 5 times. Build up to holding each stretch for 30 to 60 seconds.  For your safety, check with your healthcare provider before starting an exercise program.  Date Last Reviewed: 3/1/2018    5656-4954 [employer]. [address] [place] [address]. All rights reserved. This information is not intended as a substitute for professional medical care. Always follow your healthcare professional's instructions.           Patient Education     Exercises for Shoulder Flexibility: Wall Walk    Improving your flexibility can reduce pain. Stretching exercises also can help increase your range of pain-free motion. Breathe normally when you exercise. Use smooth, fluid movements.  Note: Follow any special instructions you are given. If you feel pain, stop the exercise. If the pain continues after stopping, call your healthcare provider:  Stand with your shoulder about 2 feet from the wall.  Raise your arm to shoulder level and gently  walk  your fingers up the wall as high as you can.  Hold for a few seconds. Then walk your fingers back down.  Repeat 3 times. Move closer to the wall as you repeat.  Build up to holding each stretch for 30 seconds.  Caution: Do this  "stretch only if your healthcare provider recommends it. Don t do it when you are first injured.  Date Last Reviewed: 3/1/2018    4416-6173 [employer]. [address] [place] [address]. All rights reserved. This information is not intended as a substitute for professional medical care. Always follow your healthcare professional's instructions.           Patient Education     Shoulder Abduction (Flexibility)    Stand up straight. Or lie on your back on the floor with legs straight. Hold a broomstick horizontally. Cup the top of the broomstick with your right hand. Hold the broomstick just above the broom with your left hand, palm facing down.  Push the broomstick to the right with your left hand, raising your right arm up. Raise it only as high as feels comfortable.  Hold for a few seconds. Return to the starting position.  Repeat 3 to 5 times, or as instructed. Build up to holding each stretch for 10 to 30 seconds.     Tip: If you don t have a broom, you can also do this exercise with a cane, mop, or yardstick.      Date Last Reviewed: 3/10/2016    7859-3997 [employer]. [address] [place] [address]. All rights reserved. This information is not intended as a substitute for professional medical care. Always follow your healthcare professional's instructions.           Patient Education     \"Reaching Up\" for Shoulder Flexibility    This stretch can help restore shoulder flexibility and relieve pain over time. When stretching, be sure to breathe deeply. And follow any special instructions from your healthcare provider or therapist.  Raise the hand on the side you want to stretch as high as you can. Then grasp a stable surface, such as a bookcase or a doorframe, with the same hand.  Keeping your arm straight, lower your body by bending your knees. Stop when you feel the stretch in the shoulder. Try to hold the stretch for 5 seconds.  Work up to doing 3 sets of this " stretch, 3 times a day. Work up to holding the stretch for 30 to 60 seconds.  Note: Your back should remain straight. To enhance the stretch over time, try to bend your knees lower. Or, raise your arm higher at the start of the stretch.  Frozen shoulder is another name for adhesive capsulitis. This causes restricted movement in the shoulder. If you have frozen shoulder, this stretch may cause mild pain, especially when you first get started. A few months may pass before you achieve the results you want. But once your shoulder heals, it rarely becomes frozen again. So stick to your stretching program. If you have any questions, ask your healthcare provider.  Date Last Reviewed: 2/1/2018 2000-2018 [employer]. [address] [place] [address], [place] [id number]. All rights reserved. This information is not intended as a substitute for professional medical care. Always follow your healthcare professional's instructions.           Patient Education     Exercises for Shoulder Flexibility: Pendulum Exercise      Improving your flexibility can reduce pain. Stretching exercises also can help increase your range of pain-free motion. Breathe normally when you exercise. And try to use smooth, fluid movements.  Follow any special instructions you are given. If you feel pain, stop the exercise. If the pain continues after stopping, call your healthcare provider.  Here are the steps for the pendulum exercise:   Lean over with your good arm supported on a table or chair.  Relax the arm on the painful side, letting it hang straight down.  Slowly begin to swing the relaxed arm. Move it in a small [place], gradually making it bigger if you can. Then reverse the direction. Next, move it backward and forward. Finally, move it side to side.     Note: Spend about 5 minutes doing the exercise, 3 times a day. Change direction after 1 minute of motion.   Date Last Reviewed: 12/1/2017 2000-2018 [employer] [address]  [place] [address]. All rights reserved. This information is not intended as a substitute for professional medical care. Always follow your healthcare professional's instructions.           Patient Education     Shoulder Flexion (Flexibility)    Sit in a chair sideways next to a table. Lay your right arm on the table, pointed forward.  Slowly lean forward.  Slide your arm forward on the table. Feel the stretch in your right shoulder.  Hold for 5 seconds. Slowly sit back up.  Repeat 5 times.  Repeat this exercise 3 times a day, or as instructed.  Date Last Reviewed: 3/10/2016    1460-7912 [employer]. [address] [place] [address]. All rights reserved. This information is not intended as a substitute for professional medical care. Always follow your healthcare professional's instructions.           Patient Education     Exercises for Shoulder Flexibility: Broom Stretch    Improving your flexibility can reduce pain. Stretching exercises also can help increase your range of pain-free motion. Breathe normally when you exercise. Try to use smooth, fluid movements. Never force a stretch.  Stand up or lie on the floor. Place the palm of your hand over the end of a broomstick or cane. Grasp the stick farther down with the other hand, palm facing down.  Push the end of the stick up on the side of your injured shoulder until you feel a stretch.  Hold for a few seconds. Return to the starting position.  Repeat 3 to 5 times. Build up to holding each stretch for 10 to 30 seconds.  Note: Follow any special instructions you are given. If you feel pain, stop the exercise. If the pain continues after stopping, call your healthcare provider.  Date Last Reviewed: 2/1/2018 2000-2018 [employer]. [address] [place] [address]. All rights reserved. This information is not intended as a substitute for professional medical care. Always follow your healthcare  professional's instructions.

## 2023-05-25 NOTE — PATIENT INSTRUCTIONS
Preventive Health Recommendations  Female Ages 50 - 64    Yearly exam: See your health care provider every year in order to  Review health changes.   Discuss preventive care.    Review your medicines if your doctor has prescribed any.    Get a Pap test every three years (unless you have an abnormal result and your provider advises testing more often).  If you get Pap tests with HPV test, you only need to test every 5 years, unless you have an abnormal result.   You do not need a Pap test if your uterus was removed (hysterectomy) and you have not had cancer.  You should be tested each year for STDs (sexually transmitted diseases) if you're at risk.   Have a mammogram every 1 to 2 years.  Have a colonoscopy at age 50, or have a yearly FIT test (stool test). These exams screen for colon cancer.    Have a cholesterol test every 5 years, or more often if advised.  Have a diabetes test (fasting glucose) every three years. If you are at risk for diabetes, you should have this test more often.   If you are at risk for osteoporosis (brittle bone disease), think about having a bone density scan (DEXA).    Shots: Get a flu shot each year. Get a tetanus shot every 10 years.    Nutrition:   Eat at least 5 servings of fruits and vegetables each day.  Eat whole-grain bread, whole-wheat pasta and brown rice instead of white grains and rice.  Get adequate Calcium and Vitamin D.     Lifestyle  Exercise at least 150 minutes a week (30 minutes a day, 5 days a week). This will help you control your weight and prevent disease.  Limit alcohol to one drink per day.  No smoking.   Wear sunscreen to prevent skin cancer.   See your dentist every six months for an exam and cleaning.  See your eye doctor every 1 to 2 years.      Thank you so much or choosing [facility]  for your Health Care. It was a pleasure seeing you at your visit today! Please contact us with any questions or concerns you may have.                "    Alida Lamb MD                              To reach your [facility] care team after hours call:   [phone] press #2 \"to speak with your care team\".  This will get you to our clinic instead of routing to central [facility]  scheduling.     PLEASE NOTE OUR HOURS HAVE CHANGED secondary to COVID-19 coronavirus pandemic, as we are trying to minimize patient exposure to the virus,  which is now widespread in the state.  These hours may change with very little notice.  We apologize for any inconvenience.       Our current clinic hours are:          Monday- Thursday   7:00am - 6:00pm  in person.      Friday  7:00am- 5:00pm                       Saturday and Sunday : Closed to in person and virtual visits        We have telephone and virtual visit times available between    7:00am - 6pm on Monday-Friday as well.                                                Phone:  [phone]      Our pharmacy hours: Monday through Friday 8:00am to 5:00pm                        Saturday - 9:00 am to 12 noon       Sunday : Closed.              Phone:  [phone]              ###  Please note: at this time we are not accepting any walk-in visits. ###      There is also information available at our web site:  [email or web address]    If your provider ordered any lab tests and you do not receive the results within 10 business days, please call the clinic.    If you need a medication refill please contact your pharmacy.  Please allow 3 business days for your refill to be completed.    Our clinic offers telephone visits and e visits.  Please ask one of your team members to explain more.      Use [employer] (secure email communication and access to your chart) to send your primary care provider a message or make an appointment. Ask someone on your Team how to sign up for [employer].             "
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
## 2023-06-01 ENCOUNTER — MYC MEDICAL ADVICE (OUTPATIENT)
Dept: FAMILY MEDICINE | Facility: CLINIC | Age: 63
End: 2023-06-01
Payer: COMMERCIAL

## 2023-06-13 ENCOUNTER — LAB (OUTPATIENT)
Dept: LAB | Facility: CLINIC | Age: 63
End: 2023-06-13
Payer: COMMERCIAL

## 2023-06-13 ENCOUNTER — TELEPHONE (OUTPATIENT)
Dept: FAMILY MEDICINE | Facility: CLINIC | Age: 63
End: 2023-06-13

## 2023-06-13 DIAGNOSIS — E55.9 VITAMIN D DEFICIENCY: ICD-10-CM

## 2023-06-13 DIAGNOSIS — E78.5 HYPERLIPIDEMIA LDL GOAL <130: ICD-10-CM

## 2023-06-13 DIAGNOSIS — I10 ESSENTIAL HYPERTENSION WITH GOAL BLOOD PRESSURE LESS THAN 140/90: ICD-10-CM

## 2023-06-13 LAB
ALBUMIN SERPL BCG-MCNC: 4.1 G/DL (ref 3.5–5.2)
ALP SERPL-CCNC: 61 U/L (ref 35–104)
ALT SERPL W P-5'-P-CCNC: 9 U/L (ref 0–50)
ANION GAP SERPL CALCULATED.3IONS-SCNC: 11 MMOL/L (ref 7–15)
AST SERPL W P-5'-P-CCNC: 17 U/L (ref 0–45)
BILIRUB SERPL-MCNC: 0.5 MG/DL
BUN SERPL-MCNC: 14.1 MG/DL (ref 8–23)
CALCIUM SERPL-MCNC: 9.3 MG/DL (ref 8.8–10.2)
CHLORIDE SERPL-SCNC: 105 MMOL/L (ref 98–107)
CHOLEST SERPL-MCNC: 234 MG/DL
CREAT SERPL-MCNC: 1.06 MG/DL (ref 0.51–0.95)
CREAT UR-MCNC: 156 MG/DL
DEPRECATED HCO3 PLAS-SCNC: 25 MMOL/L (ref 22–29)
ERYTHROCYTE [DISTWIDTH] IN BLOOD BY AUTOMATED COUNT: 13 % (ref 10–15)
GFR SERPL CREATININE-BSD FRML MDRD: 59 ML/MIN/1.73M2
GLUCOSE SERPL-MCNC: 95 MG/DL (ref 70–99)
HCT VFR BLD AUTO: 35.5 % (ref 35–47)
HDLC SERPL-MCNC: 52 MG/DL
HGB BLD-MCNC: 11.5 G/DL (ref 11.7–15.7)
LDLC SERPL CALC-MCNC: 160 MG/DL
MCH RBC QN AUTO: 30.3 PG (ref 26.5–33)
MCHC RBC AUTO-ENTMCNC: 32.4 G/DL (ref 31.5–36.5)
MCV RBC AUTO: 94 FL (ref 78–100)
MICROALBUMIN UR-MCNC: <12 MG/L
MICROALBUMIN/CREAT UR: NORMAL MG/G{CREAT}
NONHDLC SERPL-MCNC: 182 MG/DL
PLATELET # BLD AUTO: 244 10E3/UL (ref 150–450)
POTASSIUM SERPL-SCNC: 4.2 MMOL/L (ref 3.4–5.3)
PROT SERPL-MCNC: 7.2 G/DL (ref 6.4–8.3)
RBC # BLD AUTO: 3.79 10E6/UL (ref 3.8–5.2)
SODIUM SERPL-SCNC: 141 MMOL/L (ref 136–145)
TRIGL SERPL-MCNC: 108 MG/DL
TSH SERPL DL<=0.005 MIU/L-ACNC: 2.17 UIU/ML (ref 0.3–4.2)
WBC # BLD AUTO: 5.8 10E3/UL (ref 4–11)

## 2023-06-13 PROCEDURE — 80053 COMPREHEN METABOLIC PANEL: CPT

## 2023-06-13 PROCEDURE — 82306 VITAMIN D 25 HYDROXY: CPT

## 2023-06-13 PROCEDURE — 82043 UR ALBUMIN QUANTITATIVE: CPT

## 2023-06-13 PROCEDURE — 36415 COLL VENOUS BLD VENIPUNCTURE: CPT

## 2023-06-13 PROCEDURE — 85027 COMPLETE CBC AUTOMATED: CPT

## 2023-06-13 PROCEDURE — 80061 LIPID PANEL: CPT

## 2023-06-13 PROCEDURE — 84443 ASSAY THYROID STIM HORMONE: CPT

## 2023-06-13 PROCEDURE — 82570 ASSAY OF URINE CREATININE: CPT

## 2023-06-13 NOTE — TELEPHONE ENCOUNTER
Forms/Letter Request    Type of form/letter: Biometric    Have you been seen for this request: N/A    Do we have the form/letter: Yes: FD    Who is the form from? Biometric (if other please explain)    Where did/will the form come from? Patient or family brought in       When is form/letter needed by: before 12/15/23    How would you like the form/letter returned: Fax : [phone]    Patient Notified form requests are processed in 3-5 business days:Yes    Could we send this information to you in [employer] or would you prefer to receive a phone call?:   Patient would like to be contacted via [employer]

## 2023-06-15 NOTE — TELEPHONE ENCOUNTER
Form with ROZINA SARMIETNO for patient to call us back with her waist circumference and to let us know if she wants the signed form mailed to her or if she want to come in and  the form, or if she wants us to fax after she has filled out an ANSELMO    Jazz S   [place]

## 2023-06-16 ENCOUNTER — MYC MEDICAL ADVICE (OUTPATIENT)
Dept: FAMILY MEDICINE | Facility: CLINIC | Age: 63
End: 2023-06-16
Payer: COMMERCIAL

## 2023-06-19 LAB
DEPRECATED CALCIDIOL+CALCIFEROL SERPL-MC: <73 UG/L (ref 20–75)
VITAMIN D2 SERPL-MCNC: <5 UG/L
VITAMIN D3 SERPL-MCNC: 68 UG/L

## 2023-06-19 NOTE — RESULT ENCOUNTER NOTE
Dulce  I have reviewed your recent test results:    -Hemoglobin is decreased indicating anemia.  Anemia can cause fatigue and, occasionally, light-headedness.  ADVISE: getting additional studies to determine the exact cause of your anemia.  If you are experiencing any blood loss symptoms (dark stools, bright red blood in stool, coffee-ground vomiting, abnormal vaginal bleeding) please send a [employer] message or call our triage nurses to give an update as soon as possible.  If not experiencing any of the symptoms please make a lab appointment within the next 2 weeks to recheck this level.  -White blood cell and platelet counts are normal.  -LDL(bad) cholesterol level is elevated which can increase your heart disease risk.  A diet high in fat and simple carbohydrates, genetics and being overweight can contribute to this. ADVISE: exercising 150 minutes of aerobic exercise per week (30 minutes for 5 days per week or 50 minutes for 3 days per week are options) and eating a low saturated fat/low carbohydrate diet are helpful to improve this.  -Liver and gallbladder tests (ALT,AST, Alk phos,bilirubin) are normal.  -Kidney function (GFR) is mildly decreased.  ADVISE: This could be from your furosemide.-Plan to recheck this in 2 weeks with the hemoglobin mentioned above.  -Sodium is normal.  -Potassium is normal.  -Calcium is normal.  -Glucose (diabetic screening test) is normal.  -TSH (thyroid stimulating hormone) level is normal which indicates normal thyroid function.  -Vitamin D level is normal and getting 1000 IU daily in your diet or supplements is recommended.   -Microalbumin (urine protein) test is normal.  ADVISE: rechecking this annually.    For additional lab test information, www."[employer].com is an excellent reference.     If you have any questions please do not hesitate to contact our office via phone ([phone]) or [employer].    Healthy regards,     Tiana Krishna MBA, MS, PA-C (covering for Dr. Lamb)  M  [facility]    The 10-year ASCVD risk score (Maggie PRABHAKAR, et al., 2019) is: 4.4%    Values used to calculate the score:      Age: 63 years      Sex: Female      Is Non- : No      Diabetic: No      Tobacco smoker: No      Systolic Blood Pressure: 102 mmHg      Is BP treated: Yes      HDL Cholesterol: 52 mg/dL      Total Cholesterol: 234 mg/dL

## 2023-06-19 NOTE — TELEPHONE ENCOUNTER
Form filled out, just needs signature.     Sent patient a mychart asking how she wants to receive the completed form.     CARLOS BOTELLO RN on 6/19/2023 at 4:56 PM   [facility]

## 2023-06-20 ENCOUNTER — MYC MEDICAL ADVICE (OUTPATIENT)
Dept: FAMILY MEDICINE | Facility: CLINIC | Age: 63
End: 2023-06-20
Payer: COMMERCIAL

## 2023-06-20 NOTE — TELEPHONE ENCOUNTER
Form completed and placed in [facility] inbox      Tiana Krishna MBA, MS, PAAFRICA  [facility]

## 2023-06-26 NOTE — TELEPHONE ENCOUNTER
LVM for Dulce to let us know if she would like us to mail or would she like to  at clinic her signed biometric form.    Jazz S   [place]

## 2023-06-30 NOTE — TELEPHONE ENCOUNTER
Pt did not fill out ANSELMO so she came in to pick this up and will give to her employer herself.    We will work on getting ROIs moving forward.

## 2023-07-17 ENCOUNTER — PATIENT OUTREACH (OUTPATIENT)
Dept: CARE COORDINATION | Facility: CLINIC | Age: 63
End: 2023-07-17
Payer: COMMERCIAL

## 2023-08-14 ENCOUNTER — PATIENT OUTREACH (OUTPATIENT)
Dept: CARE COORDINATION | Facility: CLINIC | Age: 63
End: 2023-08-14
Payer: COMMERCIAL

## 2023-10-29 ENCOUNTER — HEALTH MAINTENANCE LETTER (OUTPATIENT)
Age: 63
End: 2023-10-29

## 2024-06-15 DIAGNOSIS — I10 ESSENTIAL HYPERTENSION WITH GOAL BLOOD PRESSURE LESS THAN 140/90: ICD-10-CM

## 2024-06-18 RX ORDER — LISINOPRIL 20 MG/1
20 TABLET ORAL DAILY
Qty: 90 TABLET | Refills: 0 | Status: SHIPPED | OUTPATIENT
Start: 2024-06-18 | End: 2024-09-16

## 2024-06-18 NOTE — TELEPHONE ENCOUNTER
Pt overdue for recheck on nonfasting labs.   They are in as future orders. Deadline extended.     Please inform pt and assist pt with the below:   We've entered future orders for this and you can do this as a lab only appointment at our clinic.  Please call [phone] and press #2 to speak with your care team to schedule this or you can schedule it on KS12 at your convenience.  You can also have this done at any [facility] without appointment during regular outpatient lab hours.   [facility] outpatient lab hours 7am-7pm Monday-Friday, and 9am -noon Saturdays and Sundays .  They close promptly at 7pm weekdays and 12 noon on the weekends.

## 2024-06-27 NOTE — TELEPHONE ENCOUNTER
Left message to call back. When patient calls back please advise of PCPs message below and help schedule non-fasting lab only.

## 2024-07-11 NOTE — TELEPHONE ENCOUNTER
Called patient. Left voicemail to schedule non fasting lab appointment. Stefanie Tay, [facility]

## 2024-08-04 ENCOUNTER — HEALTH MAINTENANCE LETTER (OUTPATIENT)
Age: 64
End: 2024-08-04

## 2024-09-04 ENCOUNTER — ANCILLARY PROCEDURE (OUTPATIENT)
Dept: MAMMOGRAPHY | Facility: CLINIC | Age: 64
End: 2024-09-04
Attending: FAMILY MEDICINE
Payer: COMMERCIAL

## 2024-09-04 DIAGNOSIS — Z12.31 VISIT FOR SCREENING MAMMOGRAM: ICD-10-CM

## 2024-09-04 PROCEDURE — 77063 BREAST TOMOSYNTHESIS BI: CPT | Mod: TC | Performed by: RADIOLOGY

## 2024-09-04 PROCEDURE — 77067 SCR MAMMO BI INCL CAD: CPT | Mod: TC | Performed by: RADIOLOGY

## 2024-09-10 DIAGNOSIS — I10 ESSENTIAL HYPERTENSION WITH GOAL BLOOD PRESSURE LESS THAN 140/90: ICD-10-CM

## 2024-09-16 RX ORDER — LISINOPRIL 20 MG/1
20 TABLET ORAL DAILY
Qty: 90 TABLET | Refills: 0 | Status: SHIPPED | OUTPATIENT
Start: 2024-09-16

## 2024-10-17 SDOH — HEALTH STABILITY: PHYSICAL HEALTH: ON AVERAGE, HOW MANY DAYS PER WEEK DO YOU ENGAGE IN MODERATE TO STRENUOUS EXERCISE (LIKE A BRISK WALK)?: 4 DAYS

## 2024-10-17 SDOH — HEALTH STABILITY: PHYSICAL HEALTH: ON AVERAGE, HOW MANY MINUTES DO YOU ENGAGE IN EXERCISE AT THIS LEVEL?: 30 MIN

## 2024-10-17 ASSESSMENT — SOCIAL DETERMINANTS OF HEALTH (SDOH): HOW OFTEN DO YOU GET TOGETHER WITH FRIENDS OR RELATIVES?: ONCE A WEEK

## 2024-10-18 ENCOUNTER — OFFICE VISIT (OUTPATIENT)
Dept: FAMILY MEDICINE | Facility: CLINIC | Age: 64
End: 2024-10-18
Payer: COMMERCIAL

## 2024-10-18 VITALS
RESPIRATION RATE: 18 BRPM | TEMPERATURE: 98.4 F | HEIGHT: 63 IN | OXYGEN SATURATION: 98 % | BODY MASS INDEX: 22.66 KG/M2 | WEIGHT: 127.9 LBS | DIASTOLIC BLOOD PRESSURE: 68 MMHG | SYSTOLIC BLOOD PRESSURE: 102 MMHG | HEART RATE: 89 BPM

## 2024-10-18 DIAGNOSIS — Z00.00 ROUTINE GENERAL MEDICAL EXAMINATION AT A HEALTH CARE FACILITY: Primary | ICD-10-CM

## 2024-10-18 DIAGNOSIS — C43.59 MALIGNANT MELANOMA OF SKIN OF TRUNK (H): ICD-10-CM

## 2024-10-18 DIAGNOSIS — Z28.20 VACCINE REFUSED BY PATIENT: ICD-10-CM

## 2024-10-18 DIAGNOSIS — E55.9 VITAMIN D DEFICIENCY: ICD-10-CM

## 2024-10-18 DIAGNOSIS — E78.5 HYPERLIPIDEMIA LDL GOAL <130: ICD-10-CM

## 2024-10-18 DIAGNOSIS — D64.9 ANEMIA, UNSPECIFIED TYPE: ICD-10-CM

## 2024-10-18 DIAGNOSIS — R60.0 BILATERAL LEG EDEMA: ICD-10-CM

## 2024-10-18 DIAGNOSIS — N90.4 LICHEN SCLEROSUS ET ATROPHICUS OF THE VULVA: ICD-10-CM

## 2024-10-18 DIAGNOSIS — I49.9 IRREGULAR HEARTBEAT: ICD-10-CM

## 2024-10-18 DIAGNOSIS — Z12.11 SCREEN FOR COLON CANCER: ICD-10-CM

## 2024-10-18 DIAGNOSIS — Z78.0 ASYMPTOMATIC POSTMENOPAUSAL STATUS: ICD-10-CM

## 2024-10-18 DIAGNOSIS — I10 ESSENTIAL HYPERTENSION WITH GOAL BLOOD PRESSURE LESS THAN 140/90: ICD-10-CM

## 2024-10-18 DIAGNOSIS — R94.4 DECREASED GFR: ICD-10-CM

## 2024-10-18 DIAGNOSIS — R94.31 NONSPECIFIC ABNORMAL ELECTROCARDIOGRAM (ECG) (EKG): ICD-10-CM

## 2024-10-18 DIAGNOSIS — N39.46 MIXED INCONTINENCE URGE AND STRESS (MALE)(FEMALE): ICD-10-CM

## 2024-10-18 DIAGNOSIS — N39.0 URINARY TRACT INFECTION WITHOUT HEMATURIA, SITE UNSPECIFIED: ICD-10-CM

## 2024-10-18 DIAGNOSIS — Z12.4 CERVICAL CANCER SCREENING: ICD-10-CM

## 2024-10-18 DIAGNOSIS — I47.10 SVT (SUPRAVENTRICULAR TACHYCARDIA) (H): ICD-10-CM

## 2024-10-18 LAB
ALBUMIN SERPL BCG-MCNC: 4.2 G/DL (ref 3.5–5.2)
ALBUMIN UR-MCNC: ABNORMAL MG/DL
ALP SERPL-CCNC: 61 U/L (ref 40–150)
ALT SERPL W P-5'-P-CCNC: 8 U/L (ref 0–50)
ANION GAP SERPL CALCULATED.3IONS-SCNC: 10 MMOL/L (ref 7–15)
APPEARANCE UR: ABNORMAL
AST SERPL W P-5'-P-CCNC: 17 U/L (ref 0–45)
BACTERIA #/AREA URNS HPF: ABNORMAL /HPF
BILIRUB SERPL-MCNC: 0.6 MG/DL
BILIRUB UR QL STRIP: NEGATIVE
BUN SERPL-MCNC: 33.3 MG/DL (ref 8–23)
CALCIUM SERPL-MCNC: 10.3 MG/DL (ref 8.8–10.4)
CHLORIDE SERPL-SCNC: 104 MMOL/L (ref 98–107)
CHOLEST SERPL-MCNC: 253 MG/DL
COLOR UR AUTO: YELLOW
CREAT SERPL-MCNC: 2.46 MG/DL (ref 0.51–0.95)
CREAT UR-MCNC: 64.9 MG/DL
EGFRCR SERPLBLD CKD-EPI 2021: 21 ML/MIN/1.73M2
ERYTHROCYTE [DISTWIDTH] IN BLOOD BY AUTOMATED COUNT: 12.6 % (ref 10–15)
FASTING STATUS PATIENT QL REPORTED: NO
FASTING STATUS PATIENT QL REPORTED: NO
GLUCOSE SERPL-MCNC: 91 MG/DL (ref 70–99)
GLUCOSE UR STRIP-MCNC: NEGATIVE MG/DL
HCO3 SERPL-SCNC: 25 MMOL/L (ref 22–29)
HCT VFR BLD AUTO: 31.8 % (ref 35–47)
HDLC SERPL-MCNC: 48 MG/DL
HGB BLD-MCNC: 10.2 G/DL (ref 11.7–15.7)
HGB UR QL STRIP: NEGATIVE
KETONES UR STRIP-MCNC: NEGATIVE MG/DL
LDLC SERPL CALC-MCNC: 175 MG/DL
LEUKOCYTE ESTERASE UR QL STRIP: ABNORMAL
MCH RBC QN AUTO: 30.7 PG (ref 26.5–33)
MCHC RBC AUTO-ENTMCNC: 32.1 G/DL (ref 31.5–36.5)
MCV RBC AUTO: 96 FL (ref 78–100)
MICROALBUMIN UR-MCNC: <12 MG/L
MICROALBUMIN/CREAT UR: NORMAL MG/G{CREAT}
NITRATE UR QL: NEGATIVE
NONHDLC SERPL-MCNC: 205 MG/DL
PH UR STRIP: 5.5 [PH] (ref 5–7)
PLATELET # BLD AUTO: 284 10E3/UL (ref 150–450)
POTASSIUM SERPL-SCNC: 5.1 MMOL/L (ref 3.4–5.3)
PROT SERPL-MCNC: 7.8 G/DL (ref 6.4–8.3)
RBC # BLD AUTO: 3.32 10E6/UL (ref 3.8–5.2)
RBC #/AREA URNS AUTO: ABNORMAL /HPF
SODIUM SERPL-SCNC: 139 MMOL/L (ref 135–145)
SP GR UR STRIP: 1.01 (ref 1–1.03)
SQUAMOUS #/AREA URNS AUTO: ABNORMAL /LPF
TRIGL SERPL-MCNC: 148 MG/DL
TSH SERPL DL<=0.005 MIU/L-ACNC: 2.16 UIU/ML (ref 0.3–4.2)
UROBILINOGEN UR STRIP-ACNC: 0.2 E.U./DL
WBC # BLD AUTO: 7.5 10E3/UL (ref 4–11)
WBC #/AREA URNS AUTO: ABNORMAL /HPF
WBC CLUMPS #/AREA URNS HPF: PRESENT /HPF

## 2024-10-18 PROCEDURE — 36415 COLL VENOUS BLD VENIPUNCTURE: CPT | Performed by: FAMILY MEDICINE

## 2024-10-18 PROCEDURE — 99396 PREV VISIT EST AGE 40-64: CPT | Performed by: FAMILY MEDICINE

## 2024-10-18 PROCEDURE — 82570 ASSAY OF URINE CREATININE: CPT | Performed by: FAMILY MEDICINE

## 2024-10-18 PROCEDURE — 87186 SC STD MICRODIL/AGAR DIL: CPT | Performed by: FAMILY MEDICINE

## 2024-10-18 PROCEDURE — 82728 ASSAY OF FERRITIN: CPT | Performed by: FAMILY MEDICINE

## 2024-10-18 PROCEDURE — 81001 URINALYSIS AUTO W/SCOPE: CPT | Performed by: FAMILY MEDICINE

## 2024-10-18 PROCEDURE — 83540 ASSAY OF IRON: CPT | Performed by: FAMILY MEDICINE

## 2024-10-18 PROCEDURE — 83550 IRON BINDING TEST: CPT | Performed by: FAMILY MEDICINE

## 2024-10-18 PROCEDURE — 99214 OFFICE O/P EST MOD 30 MIN: CPT | Mod: 25 | Performed by: FAMILY MEDICINE

## 2024-10-18 PROCEDURE — 84443 ASSAY THYROID STIM HORMONE: CPT | Performed by: FAMILY MEDICINE

## 2024-10-18 PROCEDURE — 82306 VITAMIN D 25 HYDROXY: CPT | Performed by: FAMILY MEDICINE

## 2024-10-18 PROCEDURE — 82043 UR ALBUMIN QUANTITATIVE: CPT | Performed by: FAMILY MEDICINE

## 2024-10-18 PROCEDURE — 80053 COMPREHEN METABOLIC PANEL: CPT | Performed by: FAMILY MEDICINE

## 2024-10-18 PROCEDURE — 80061 LIPID PANEL: CPT | Performed by: FAMILY MEDICINE

## 2024-10-18 PROCEDURE — 87086 URINE CULTURE/COLONY COUNT: CPT | Performed by: FAMILY MEDICINE

## 2024-10-18 PROCEDURE — 93000 ELECTROCARDIOGRAM COMPLETE: CPT | Performed by: FAMILY MEDICINE

## 2024-10-18 PROCEDURE — 85027 COMPLETE CBC AUTOMATED: CPT | Performed by: FAMILY MEDICINE

## 2024-10-18 RX ORDER — LISINOPRIL 20 MG/1
20 TABLET ORAL DAILY
Qty: 90 TABLET | Refills: 3 | Status: SHIPPED | OUTPATIENT
Start: 2024-10-18

## 2024-10-18 RX ORDER — FUROSEMIDE 20 MG/1
10 TABLET ORAL DAILY
Qty: 20 TABLET | Refills: 1 | Status: SHIPPED | OUTPATIENT
Start: 2024-10-18

## 2024-10-18 RX ORDER — CLOBETASOL PROPIONATE 0.5 MG/G
CREAM TOPICAL
Qty: 15 G | Refills: 3 | Status: SHIPPED | OUTPATIENT
Start: 2024-10-18
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
## 2024-10-18 NOTE — PATIENT INSTRUCTIONS
"Patient Education         For bladder urgency and bladder irritation which can contribute to daytime and/or night-time urgency, accidents or incontinence for children and adults :   Avoid or better yet eliminate citrus juices (orange juice, grapefruit juice, lemonade or limeade), citric acid in things as preservatives,  and /or or vinegars ( acidic substances - even those in salad dressing), all caffeine, even decaf coffee, and teas; alcohol, and artificial sweeteners, red and blue food dyes, sports drinks, spicy foods, chocolate, tomato products, excessive dairy, and carbonated beverages ( even sparkling castaneda).     Even 1 or a partial serving of any of the above can irritate/negatively affect the bladder for up to  3 days.     If you eliminate all the above and are still having problems, please contact our office.   There are medications available to help with this.     Consider pelvic floor PT , if the above and pelvic floor exercises aren't helping your urinary incontinence.     Please call your dermatologist to schedule annual skin exam to follow up on your previous skin cancer.     Please schedule your flu and covid-19 shots as soon as possible - You can do your vaccinations/immunizations  in any [facility] pharmacy:     To make a pharmacy only appointment online, please go to [email or web address] and select \"Click here to schedule a Vaccination or Blood Pressure Check at available [facility] Pharmacies \" at the bottom of the first page.  You can then select a location, then date and time bubbles that best fits your schedule.            Preventive Care Advice   This is general advice given by our system to help you stay healthy. However, your care team may have specific advice just for you. Please talk to your care team about your preventive care needs.  Nutrition  Eat 5 or more servings of fruits and vegetables each day.  Try wheat bread, brown rice and whole grain pasta (instead of white bread, rice, and " pasta).  Get enough calcium and vitamin D. Check the label on foods and aim for 100% of the RDA (recommended daily allowance).  Lifestyle  Exercise at least 150 minutes each week  (30 minutes a day, 5 days a week).  Do muscle strengthening activities 2 days a week. These help control your weight and prevent disease.  No smoking.  Wear sunscreen to prevent skin cancer.  Have a dental exam and cleaning every 6 months.  Yearly exams  See your health care team every year to talk about:  Any changes in your health.  Any medicines your care team has prescribed.  Preventive care, family planning, and ways to prevent chronic diseases.  Shots (vaccines)   HPV shots (up to age 26), if you've never had them before.  Hepatitis B shots (up to age 59), if you've never had them before.  COVID-19 shot: Get this shot when it's due.  Flu shot: Get a flu shot every year.  Tetanus shot: Get a tetanus shot every 10 years.  Pneumococcal, hepatitis A, and RSV shots: Ask your care team if you need these based on your risk.  Shingles shot (for age 50 and up)  General health tests  Diabetes screening:  Starting at age 35, Get screened for diabetes at least every 3 years.  If you are younger than age 35, ask your care team if you should be screened for diabetes.  Cholesterol test: At age 39, start having a cholesterol test every 5 years, or more often if advised.  Bone density scan (DEXA): At age 50, ask your care team if you should have this scan for osteoporosis (brittle bones).  Hepatitis C: Get tested at least once in your life.  STIs (sexually transmitted infections)  Before age 24: Ask your care team if you should be screened for STIs.  After age 24: Get screened for STIs if you're at risk. You are at risk for STIs (including HIV) if:  You are sexually active with more than one person.  You don't use condoms every time.  You or a partner was diagnosed with a sexually transmitted infection.  If you are at risk for HIV, ask about PrEP  medicine to prevent HIV.  Get tested for HIV at least once in your life, whether you are at risk for HIV or not.  Cancer screening tests  Cervical cancer screening: If you have a cervix, begin getting regular cervical cancer screening tests starting at age 21.  Breast cancer scan (mammogram): If you've ever had breasts, begin having regular mammograms starting at age 40. This is a scan to check for breast cancer.  Colon cancer screening: It is important to start screening for colon cancer at age 45.  Have a colonoscopy test every 10 years (or more often if you're at risk) Or, ask your provider about stool tests like a FIT test every year or Cologuard test every 3 years.  To learn more about your testing options, visit:   .  For help making a decision, visit:   https://bit.ly/qy65321.  Prostate cancer screening test: If you have a prostate, ask your care team if a prostate cancer screening test (PSA) at age 55 is right for you.  Lung cancer screening: If you are a current or former smoker ages 50 to 80, ask your care team if ongoing lung cancer screenings are right for you.  For informational purposes only. Not to replace the advice of your health care provider. Copyright   2023 [facility]. All rights reserved. Clinically reviewed by the [facility] Transitions Program. [employer] 519554 - REV 01/24.  Eating Healthy Foods: Care Instructions  With every meal, you can make healthy food choices. Try to eat a variety of fruits, vegetables, whole grains, lean proteins, and low-fat dairy products. This can help you get the right balance of nutrients, including vitamins and minerals. Small changes add up over time. You can start by adding one healthy food to your meals each day.    Try to make half your plate fruits and vegetables, one-fourth whole grains, and one-fourth lean proteins. Try including dairy with your meals.   Eat more fruits and vegetables. Try to have them with most meals and snacks.  "  Foods for healthy eating        Fruits   These can be fresh, frozen, canned, or dried.  Try to choose whole fruit rather than fruit juice.  Eat a variety of colors.        Vegetables   These can be fresh, frozen, canned, or dried.  Beans, peas, and lentils count too.        Whole grains   Choose whole-grain breads, cereals, and noodles.  Try brown rice.        Lean proteins   These can include lean meat, poultry, fish, and eggs.  You can also have tofu, beans, peas, lentils, nuts, and seeds.        Dairy   Try milk, yogurt, and cheese.  Choose low-fat or fat-free when you can.  If you need to, use lactose-free milk or fortified plant-based milk products, such as soy milk.        Water   Drink water when you're thirsty.  Limit sugar-sweetened drinks, including soda, fruit drinks, and sports drinks.  Where can you learn more?  Go to https://www.[employer].net/patiented  Enter T756 in the search box to learn more about \"Eating Healthy Foods: Care Instructions.\"  Current as of: September 20, 2023  Content Version: 14.2 2024 [facility] [employer].   Care instructions adapted under license by your healthcare professional. If you have questions about a medical condition or this instruction, always ask your healthcare professional. Healthwise, Incorporated disclaims any warranty or liability for your use of this information.    Substance Use Disorder: Care Instructions  Overview     You can improve your life and health by stopping your use of alcohol or drugs. When you don't drink or use drugs, you may feel and sleep better. You may get along better with your family, friends, and coworkers. There are medicines and programs that can help with substance use disorder.  How can you care for yourself at home?  Here are some ways to help you stay sober and prevent relapse.  If you have been given medicine to help keep you sober or reduce your cravings, be sure to take it exactly as prescribed.  Talk to your doctor about " programs that can help you stop using drugs or drinking alcohol.  Do not keep alcohol or drugs in your home.  Plan ahead. Think about what you'll say if other people ask you to drink or use drugs. Try not to spend time with people who drink or use drugs.  Use the time and money spent on drinking or drugs to do something that's important to you.  Preventing a relapse  Have a plan to deal with relapse. Learn to recognize changes in your thinking that lead you to drink or use drugs. Get help before you start to drink or use drugs again.  Try to stay away from situations, friends, or places that may lead you to drink or use drugs.  If you feel the need to drink alcohol or use drugs again, seek help right away. Call a trusted friend or family member. Some people get support from organizations such as Narcotics Anonymous or [employer] or from treatment facilities.  If you relapse, get help as soon as you can. Some people make a plan with another person that outlines what they want that person to do for them if they relapse. The plan usually includes how to handle the relapse and who to notify in case of relapse.  Don't give up. Remember that a relapse doesn't mean that you have failed. Use the experience to learn the triggers that lead you to drink or use drugs. Then quit again. Recovery is a lifelong process. Many people have several relapses before they are able to quit for good.  Follow-up care is a key part of your treatment and safety. Be sure to make and go to all appointments, and call your doctor if you are having problems. It's also a good idea to know your test results and keep a list of the medicines you take.  When should you call for help?   Call 911  anytime you think you may need emergency care. For example, call if you or someone else:    Has overdosed or has withdrawal signs. Be sure to tell the emergency workers that you are or someone else is using or trying to quit using drugs. Overdose or  "withdrawal signs may include:  Losing consciousness.  Seizure.  Seeing or hearing things that aren't there (hallucinations).     Is thinking or talking about suicide or harming others.   Where to get help 24 hours a day, 7 days a week   If you or someone you know talks about suicide, self-harm, a mental health crisis, a substance use crisis, or any other kind of emotional distress, get help right away. You can:    Call the Suicide and Crisis Lifeline at 988.     Call 2-914-787-FEKK (1-726.420.1264).     Text HOME to 524044 to access the Crisis Text Line.   Consider saving these numbers in your phone.  Go to [employer] for more information or to chat online.  Call your doctor now or seek immediate medical care if:    You are having withdrawal symptoms. These may include nausea or vomiting, sweating, shakiness, and anxiety.   Watch closely for changes in your health, and be sure to contact your doctor if:    You have a relapse.     You need more help or support to stop.   Where can you learn more?  Go to https://www.[employer].net/patiented  Enter H573 in the search box to learn more about \"Substance Use Disorder: Care Instructions.\"  Current as of: November 15, 2023  Content Version: 14.2 2024 [facility].   Care instructions adapted under license by your healthcare professional. If you have questions about a medical condition or this instruction, always ask your healthcare professional. Healthwise, Incorporated disclaims any warranty or liability for your use of this information.    Thank you so much or choosing [facility]  for your Health Care. It was a pleasure seeing you at your visit today! Please contact us with any questions or concerns you may have.                   Alida Lamb MD                              To reach your [facility] care team after hours call:   [phone] press #2 \"to speak with your care team\".  This " will get you to our clinic instead of routing to [facility]  scheduling.     PLEASE NOTE OUR HOURS HAVE CHANGED secondary to COVID-19 coronavirus pandemic, as we are trying to minimize patient exposure to the virus,  which is now widespread in the state.  These hours may change with very little notice.  We apologize for any inconvenience.       Our current clinic hours are:          Monday- Thursday   7:00am - 6:00pm  in person.      Friday  7:00am- 5:00pm                       Saturday and Sunday : Closed to in person and virtual visits        We have telephone and virtual visit times available between    7:00am - 6pm on Monday-Friday as well.                                                Phone:  [phone]      Our pharmacy hours: Monday through Friday 8:00am to 5:00pm                        Saturday - 9:00 am to 12 noon       Sunday : Closed.              Phone:  [phone]              ###  Please note: at this time we are not accepting any walk-in visits. ###      There is also information available at our web site:  [email or web address]    If your provider ordered any lab tests and you do not receive the results within 10 business days, please call the clinic.    If you need a medication refill please contact your pharmacy.  Please allow 3 business days for your refill to be completed.    Our clinic offers telephone visits and e visits.  Please ask one of your team members to explain more.      Use [employer] (secure email communication and access to your chart) to send your primary care provider a message or make an appointment. Ask someone on your Team how to sign up for [employer].

## 2024-10-20 LAB — BACTERIA UR CULT: ABNORMAL

## 2024-10-21 ENCOUNTER — ORDERS ONLY (AUTO-RELEASED) (OUTPATIENT)
Dept: FAMILY MEDICINE | Facility: CLINIC | Age: 64
End: 2024-10-21
Payer: COMMERCIAL

## 2024-10-21 ENCOUNTER — PATIENT OUTREACH (OUTPATIENT)
Dept: CARE COORDINATION | Facility: CLINIC | Age: 64
End: 2024-10-21
Payer: COMMERCIAL

## 2024-10-21 DIAGNOSIS — Z12.11 SCREEN FOR COLON CANCER: ICD-10-CM

## 2024-10-21 DIAGNOSIS — D64.9 ANEMIA, UNSPECIFIED TYPE: ICD-10-CM

## 2024-10-21 LAB
FERRITIN SERPL-MCNC: 167 NG/ML (ref 11–328)
IRON BINDING CAPACITY (ROCHE): 280 UG/DL (ref 240–430)
IRON SATN MFR SERPL: 22 % (ref 15–46)
IRON SERPL-MCNC: 62 UG/DL (ref 37–145)

## 2024-10-21 RX ORDER — CEFDINIR 300 MG/1
300 CAPSULE ORAL 2 TIMES DAILY
Qty: 14 CAPSULE | Refills: 0 | Status: SHIPPED | OUTPATIENT
Start: 2024-10-21 | End: 2024-10-28

## 2024-10-23 LAB
DEPRECATED CALCIDIOL+CALCIFEROL SERPL-MC: <83 UG/L (ref 20–75)
VITAMIN D2 SERPL-MCNC: <5 UG/L
VITAMIN D3 SERPL-MCNC: 78 UG/L

## 2024-10-25 ENCOUNTER — ORDERS ONLY (AUTO-RELEASED) (OUTPATIENT)
Dept: FAMILY MEDICINE | Facility: CLINIC | Age: 64
End: 2024-10-25
Payer: COMMERCIAL

## 2024-10-25 DIAGNOSIS — I49.9 IRREGULAR HEARTBEAT: ICD-10-CM

## 2024-10-30 ENCOUNTER — HOSPITAL ENCOUNTER (OUTPATIENT)
Dept: CARDIOLOGY | Facility: CLINIC | Age: 64
Discharge: HOME | End: 2024-10-30
Attending: FAMILY MEDICINE | Admitting: FAMILY MEDICINE
Payer: COMMERCIAL

## 2024-10-30 DIAGNOSIS — R94.31 NONSPECIFIC ABNORMAL ELECTROCARDIOGRAM (ECG) (EKG): ICD-10-CM

## 2024-10-30 DIAGNOSIS — I49.9 IRREGULAR HEARTBEAT: ICD-10-CM

## 2024-10-30 PROCEDURE — 93325 DOPPLER ECHO COLOR FLOW MAPG: CPT | Mod: 26 | Performed by: INTERNAL MEDICINE

## 2024-10-30 PROCEDURE — 93018 CV STRESS TEST I&R ONLY: CPT | Performed by: INTERNAL MEDICINE

## 2024-10-30 PROCEDURE — 93325 DOPPLER ECHO COLOR FLOW MAPG: CPT | Mod: TC

## 2024-10-30 PROCEDURE — 93350 STRESS TTE ONLY: CPT | Mod: 26 | Performed by: INTERNAL MEDICINE

## 2024-10-30 PROCEDURE — 93016 CV STRESS TEST SUPVJ ONLY: CPT | Performed by: INTERNAL MEDICINE

## 2024-10-30 PROCEDURE — 93321 DOPPLER ECHO F-UP/LMTD STD: CPT | Mod: 26 | Performed by: INTERNAL MEDICINE

## 2024-10-31 ENCOUNTER — MYC MEDICAL ADVICE (OUTPATIENT)
Dept: FAMILY MEDICINE | Facility: CLINIC | Age: 64
End: 2024-10-31
Payer: COMMERCIAL

## 2024-11-12 LAB — NONINV COLON CA DNA+OCC BLD SCRN STL QL: NEGATIVE

## 2024-11-15 ENCOUNTER — TELEPHONE (OUTPATIENT)
Dept: FAMILY MEDICINE | Facility: CLINIC | Age: 64
End: 2024-11-15
Payer: COMMERCIAL

## 2024-11-15 NOTE — TELEPHONE ENCOUNTER
General Call    Contacts       Contact Date/Time Type Contact Phone/Fax    11/15/2024 12:03 PM CST Phone (Incoming) Dulce Ma (Self)           Reason for Call:  stress test and need to see a cardiologist, and appt is scheduled for 12/13.  Is there anything that can be done to get a sooner appt or recommend another cardiologist.     Also wondering if anything should be done like taking an aspirin daily?    Can call pt to discuss        Could we send this information to you in [facility] or would you prefer to receive a phone call?:   Patient would prefer a phone call   Okay to leave a detailed message?: Yes at Cell number on file:    Telephone Information:   Mobile [phone]

## 2024-11-21 PROBLEM — I47.10 PAROXYSMAL SUPRAVENTRICULAR TACHYCARDIA (H): Status: ACTIVE | Noted: 2024-11-21

## 2024-11-27 ENCOUNTER — OFFICE VISIT (OUTPATIENT)
Dept: CARDIOLOGY | Facility: CLINIC | Age: 64
End: 2024-11-27
Attending: FAMILY MEDICINE
Payer: COMMERCIAL

## 2024-11-27 VITALS
DIASTOLIC BLOOD PRESSURE: 64 MMHG | WEIGHT: 123 LBS | SYSTOLIC BLOOD PRESSURE: 118 MMHG | BODY MASS INDEX: 21.79 KG/M2 | HEIGHT: 63 IN | HEART RATE: 78 BPM

## 2024-11-27 DIAGNOSIS — E78.5 DYSLIPIDEMIA: ICD-10-CM

## 2024-11-27 DIAGNOSIS — I10 BENIGN ESSENTIAL HYPERTENSION: Primary | ICD-10-CM

## 2024-11-27 DIAGNOSIS — I47.10 SVT (SUPRAVENTRICULAR TACHYCARDIA) (H): ICD-10-CM

## 2024-11-27 DIAGNOSIS — R94.31 NONSPECIFIC ABNORMAL ELECTROCARDIOGRAM (ECG) (EKG): ICD-10-CM

## 2024-11-27 DIAGNOSIS — I49.9 IRREGULAR HEARTBEAT: ICD-10-CM

## 2024-11-27 RX ORDER — ATORVASTATIN CALCIUM 40 MG/1
40 TABLET, FILM COATED ORAL DAILY
Qty: 90 TABLET | Refills: 11 | Status: SHIPPED | OUTPATIENT
Start: 2024-11-27

## 2024-11-27 NOTE — PROGRESS NOTES
HISTORY OF PRESENT ILLNESS:  Dulce Ma a 64 year old female with     [employer] [place]  very active by no regular exercise program   tries to be active  exertions palpitations no syncope energy level seems decreased gets up at  had been on 20 lisinopril . Decreased to 10 lisinopril and toprol  had been very high in past.     Orders this Visit:  No orders of the defined types were placed in this encounter.    No orders of the defined types were placed in this encounter.    There are no discontinued medications.    Encounter Diagnoses   Name Primary?    Irregular heartbeat - sometimes symptomatic - dizzy and/or mildly SOB - lasts a minute or so- goes away with deep breathing     Nonspecific abnormal electrocardiogram (ECG) (EKG)     SVT (supraventricular tachycardia) (H)        CURRENT MEDICATIONS:  Current Outpatient Medications   Medication Sig Dispense Refill    cholecalciferol (VITAMIN D3) 25 mcg (1000 units) capsule Take 2 capsules (50 mcg) by mouth daily      clobetasol propionate (TEMOVATE) 0.05 % external cream Apply very scant amount to inner labia daily for up to 14 days at a time -ok to use every 2 months or so 15 g 3    dorzolamide-timolol (COSOPT) 2-0.5 % ophthalmic solution INSTILL 1 DROP IN BOTH EYES TWO TIMES A DAY      furosemide (LASIX) 20 MG tablet Take 0.5 tablets (10 mg) by mouth daily. As needed for leg swelling 20 tablet 1    lisinopril (ZESTRIL) 20 MG tablet Take 0.5 tablets (10 mg) by mouth daily. 90 tablet 3    metoprolol succinate ER (TOPROL XL) 25 MG 24 hr tablet Take 1 tablet (25 mg) by mouth daily. 90 tablet 1    Netarsudil-Latanoprost 0.02-0.005 % SOLN Apply 1 drop to eye At Bedtime (brand name Rocklatan)         ALLERGIES     Allergies   Allergen Reactions    Amoxicillin      hives    Benzoyl Peroxide      swelling    Ibuprofen      over long duration dev. hives    Penicillins      hives       PAST MEDICAL, SURGICAL, FAMILY, SOCIAL  "HISTORY:  History was reviewed and updated as needed, see medical record.        Review of Systems:  A 12-point review of systems was completed, see medical record for detailed review of systems information.    Physical Exam:  Vitals: /64   Pulse 78   Ht 1.6 m (5' 3\")   Wt 55.8 kg (123 lb)   LMP 10/31/2011   BMI 21.79 kg/m      Constitutional: No apparent distress    HEENT: pupils equal round reactive to light, thyroid normal size, JVP is normal    Chest: Clear to percussion and auscultation    Cardiac: Normal S1, normal S2 no S3, no murmur or click    Abdomen: Scaphoid.  Liver percusses to 6 cm spleen is not palpable aorta is not tender or enlarged    Extremitiies: no edema.    Neurological:  Cranial nerves II through XII are intact, strength equal and symmetrical, displays normal insight and judgment        ASSESSMENT: Dulce Ma is a 64 year old female with          We reviewed the benefits of a regular exercise program, a Mediterranean-style weight loss diet, and contacting us for any changes in between now and the time of her next visit.     RECOMMENDATIONS:   1) Await results of monitoring to exclude paroxysmal atrial fibrillation  2)  can titrate beta blockers to control symptoms  3)  Atorvastatin 40 mg daily, can titrate up to 80 mg daily, follow up lipid profile and ALT with primary care MD  4) Mediterranean style diet  5) regular exercise program        Recent Lab Results:  LIPID RESULTS:  Lab Results   Component Value Date    CHOL 253 (H) 10/18/2024    CHOL 235 (H) 05/28/2020    HDL 48 (L) 10/18/2024    HDL 60 05/28/2020     (H) 10/18/2024     (H) 05/28/2020    TRIG 148 10/18/2024    TRIG 140 05/28/2020    CHOLHDLRATIO 3.7 07/20/2015       LIVER ENZYME RESULTS:  Lab Results   Component Value Date    AST 17 10/18/2024    AST 13 05/28/2020    ALT 8 10/18/2024    ALT 15 05/28/2020       CBC RESULTS:  Lab Results   Component Value Date    WBC 7.5 10/18/2024    WBC 6.9 05/28/2020 " "   RBC 3.32 (L) 10/18/2024    RBC 4.30 05/28/2020    HGB 10.2 (L) 10/18/2024    HGB 12.8 05/28/2020    HCT 31.8 (L) 10/18/2024    HCT 40.1 05/28/2020    MCV 96 10/18/2024    MCV 93 05/28/2020    MCH 30.7 10/18/2024    MCH 29.8 05/28/2020    MCHC 32.1 10/18/2024    MCHC 31.9 05/28/2020    RDW 12.6 10/18/2024    RDW 13.2 05/28/2020     10/18/2024     05/28/2020       BMP RESULTS:  Lab Results   Component Value Date     10/18/2024     05/28/2020    POTASSIUM 5.1 10/18/2024    POTASSIUM 4.0 11/22/2021    POTASSIUM 4.8 05/28/2020    CHLORIDE 104 10/18/2024    CHLORIDE 106 11/22/2021    CHLORIDE 109 05/28/2020    CO2 25 10/18/2024    CO2 27 11/22/2021    CO2 24 05/28/2020    ANIONGAP 10 10/18/2024    ANIONGAP 4 11/22/2021    ANIONGAP 5 05/28/2020    GLC 91 10/18/2024    GLC 83 11/22/2021    GLC 90 05/28/2020    BUN 33.3 (H) 10/18/2024    BUN 19 11/22/2021    BUN 23 05/28/2020    CR 2.46 (H) 10/18/2024    CR 0.78 05/28/2020    GFRESTIMATED 21 (L) 10/18/2024    GFRESTIMATED 82 05/28/2020    GFRESTBLACK >90 05/28/2020    KELTON 10.3 10/18/2024    KELTON 8.7 05/28/2020        A1C RESULTS:  No results found for: \"A1C\"    INR RESULTS:  No results found for: \"INR\"    We greatly appreciate the opportunity to be involved in the care of your patient, Dulce Ma.    Sincerely,  Moe Alan MD        Alida Lamb [place]  [address] [place] [id number]                                                                     "

## 2024-11-27 NOTE — PROGRESS NOTES
HISTORY OF PRESENT ILLNESS:  Dulce aM a 64 year old female with essential hypertension, dyslipidemia, right rotator cuff syndrome, history of malignant melanoma, glaucoma was seen today at the request of her primary care physician for symptomatic palpitations and SVT noted on a recent stress echocardiogram    The patient receives very excellent attentive care by her primary care physician Dr. Alida Lamb.  Recent general physical examination, primary care physician noted ST-T wave changes on the resting electrocardiogram.  Patient has been prone to exercise associated palpitations mild dyspnea that improved with rest and deep breaths.  She does not describe syncope, exertional chest discomfort, or sustained tachycardia.     In order to better evaluate her symptoms, ambulatory monitoring with a Zio patch monitor was ordered (pending at this time) stress echocardiogram was performed.  This was terminated because of irregular narrow complex episodes of nonsustained SVT.  Resting echo showed normal left ventricular systolic performance without wall motion abnormality and borderline left atrial enlargement.  The interpreting physician could not exclude atrial fibrillation, but the patient's narrow complex rhythms not sustained.  I have personally reviewed the patient's echo stress test which does not show any definite evidence of structural abnormality, usual wall thickness, or inducible ischemia her exercise tolerance was limited.  Dr. Lamb cut the patient's lisinopril from 20 to 10 mg daily and added metoprolol XL 25 mg daily.  The patient really has not tried to perform any exercise since the change in her medical therapy but has tolerated them well.  I note that her LDL cholesterol was 175 there is been no recommendations as yet for pharmacologic intervention.    The patient does not have a regular exercise program but performs a lot of exercise [place] she cares for horses and does a lot of  farm chores without symptoms.    PAST MEDICAL HISTORY  1) Malignant melanoma sp excision. Followed by   2) essential hypertension  3) right rotator cuff syndrome  4) Dyslipidemia     Orders this Visit:  No orders of the defined types were placed in this encounter.    No orders of the defined types were placed in this encounter.    There are no discontinued medications.    Encounter Diagnoses   Name Primary?    Irregular heartbeat - sometimes symptomatic - dizzy and/or mildly SOB - lasts a minute or so- goes away with deep breathing     Nonspecific abnormal electrocardiogram (ECG) (EKG)     SVT (supraventricular tachycardia) (H)        CURRENT MEDICATIONS:  Current Outpatient Medications   Medication Sig Dispense Refill    cholecalciferol (VITAMIN D3) 25 mcg (1000 units) capsule Take 2 capsules (50 mcg) by mouth daily      clobetasol propionate (TEMOVATE) 0.05 % external cream Apply very scant amount to inner labia daily for up to 14 days at a time -ok to use every 2 months or so 15 g 3    dorzolamide-timolol (COSOPT) 2-0.5 % ophthalmic solution INSTILL 1 DROP IN BOTH EYES TWO TIMES A DAY      furosemide (LASIX) 20 MG tablet Take 0.5 tablets (10 mg) by mouth daily. As needed for leg swelling 20 tablet 1    lisinopril (ZESTRIL) 20 MG tablet Take 0.5 tablets (10 mg) by mouth daily. 90 tablet 3    metoprolol succinate ER (TOPROL XL) 25 MG 24 hr tablet Take 1 tablet (25 mg) by mouth daily. 90 tablet 1    Netarsudil-Latanoprost 0.02-0.005 % SOLN Apply 1 drop to eye At Bedtime (brand name Rocklatan)         ALLERGIES     Allergies   Allergen Reactions    Amoxicillin      hives    Benzoyl Peroxide      swelling    Ibuprofen      over long duration dev. hives    Penicillins      hives       PAST MEDICAL, SURGICAL, FAMILY, SOCIAL HISTORY:  History was reviewed and updated as needed, see medical record.    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     No tobacco or alcohol abuse        Review of Systems:  A 12-point review of systems was completed, see medical record for detailed review of systems information.    Physical Exam:  Vitals: /64   Pulse 78   Ht 1.6 m (5' 3\")   Wt 55.8 kg (123 lb)   LMP 10/31/2011   BMI 21.79 kg/m      Constitutional: No apparent distress    HEENT: pupils equal round reactive to light, thyroid normal size, JVP is normal    Chest: Clear to percussion and auscultation    Cardiac: Normal S1, normal S2 no S3, no murmur or click    Abdomen: Scaphoid.  Liver percusses to 6 cm spleen is not palpable aorta is not tender or enlarged    Extremitiies: no edema.    Neurological:  Cranial nerves II through XII are intact, strength equal and symmetrical, displays normal insight and judgment    ASSESSMENT: I suspect that the patient's resting ECG changes are on the basis of longstanding hypertension, which is now well-controlled.  Her echo was not suggestive of hypertrophic cardiomyopathy.  She is free of angina or sustained arrhythmia.  I would like to await her ambulatory monitoring to determine whether she in fact has atrial fibrillation might warrant consideration of anticoagulation.  If we see no evidence of atrial fibrillation, beta-blocker therapy for control of symptoms related to SVT/PACs would be my first choice.  Her beta-blocker can be increased as necessary to control symptoms.    Her current LDL cholesterol level warrants both lifestyle i and pharmacologic intervention.  I discussed the benefits and risks of statin therapy and gave her a prescription for atorvastatin 40 mg daily.  Follow up lipid profile and liver function studies can be performed with her excellent primary care doctor in 2 to 3 months.  We discussed the features and benefits of a Mediterranean-style diet and a regular exercise program.    I have advised her to increase her activity " to determine whether low-dose beta-blockade satisfactorily controls her symptoms.   If  Metoprolol xl at the current dose does not control her symptoms, dose can be increased as needed up to 200 mg daily.    Her blood pressure appears optimally controlled at this time.    RECOMMENDATIONS:   1) Agree with metoprolol XL, can be titrated upward to as high as 200 mg daily if needed as long as blood pressure remains greater than 100 mmHg systolic and heart rate greater than 50 for symptom control of palpitations.   2) Await results of ambulatory monitor to assess for paroxymal atrial fibrillation. If atrial fibrillation identified, we will plan follow up to discuss anticoagulation as her CHADSVaSC would be 3.  3) Mediterranean style diet  4) Advance exercise as tolerated ( see 1) above)  5) atorvastatin 40 mg daily follow up with primary care MD for lipid profile and ALT  6) If ambulatory monitor shows concerning arrhythmia and /or patient /Dr. Lamb has questions we will be happy to arrange follow up        Recent Lab Results:    ECG NSR ST T changes in multiple leads could be a consequence of long standing hypertension    Stress echo personally reviewed. Normal wall motion, wall thickness and systolic performance on rest study Limited exercise tolerance due to SVT/PACs.but no ischemic changes seen. Strain was not suggestive of HCM.   LIPID RESULTS:  Lab Results   Component Value Date    CHOL 253 (H) 10/18/2024    CHOL 235 (H) 05/28/2020    HDL 48 (L) 10/18/2024    HDL 60 05/28/2020     (H) 10/18/2024     (H) 05/28/2020    TRIG 148 10/18/2024    TRIG 140 05/28/2020    CHOLHDLRATIO 3.7 07/20/2015       LIVER ENZYME RESULTS:  Lab Results   Component Value Date    AST 17 10/18/2024    AST 13 05/28/2020    ALT 8 10/18/2024    ALT 15 05/28/2020       CBC RESULTS:  Lab Results   Component Value Date    WBC 7.5 10/18/2024    WBC 6.9 05/28/2020    RBC 3.32 (L) 10/18/2024    RBC 4.30 05/28/2020    HGB 10.2  "(L) 10/18/2024    HGB 12.8 05/28/2020    HCT 31.8 (L) 10/18/2024    HCT 40.1 05/28/2020    MCV 96 10/18/2024    MCV 93 05/28/2020    MCH 30.7 10/18/2024    MCH 29.8 05/28/2020    MCHC 32.1 10/18/2024    MCHC 31.9 05/28/2020    RDW 12.6 10/18/2024    RDW 13.2 05/28/2020     10/18/2024     05/28/2020       BMP RESULTS:  Lab Results   Component Value Date     10/18/2024     05/28/2020    POTASSIUM 5.1 10/18/2024    POTASSIUM 4.0 11/22/2021    POTASSIUM 4.8 05/28/2020    CHLORIDE 104 10/18/2024    CHLORIDE 106 11/22/2021    CHLORIDE 109 05/28/2020    CO2 25 10/18/2024    CO2 27 11/22/2021    CO2 24 05/28/2020    ANIONGAP 10 10/18/2024    ANIONGAP 4 11/22/2021    ANIONGAP 5 05/28/2020    GLC 91 10/18/2024    GLC 83 11/22/2021    GLC 90 05/28/2020    BUN 33.3 (H) 10/18/2024    BUN 19 11/22/2021    BUN 23 05/28/2020    CR 2.46 (H) 10/18/2024    CR 0.78 05/28/2020    GFRESTIMATED 21 (L) 10/18/2024    GFRESTIMATED 82 05/28/2020    GFRESTBLACK >90 05/28/2020    KELTON 10.3 10/18/2024    KELTON 8.7 05/28/2020        A1C RESULTS:  No results found for: \"A1C\"    INR RESULTS:  No results found for: \"INR\"    We greatly appreciate the opportunity to be involved in the care of your patient, Dulce Ma.    Sincerely,  Moe Alan MD      CC  Alida Lamb MD  [address] [place] [address]                                                                     "

## 2024-11-27 NOTE — LETTER
11/27/2024    Alida Lamb MD  [id number] [facility] [address]    RE: Dulce Ma       Dear Colleague,     I had the pleasure of seeing Dulce Ma in the [facility].  HISTORY OF PRESENT ILLNESS:  Dulce Ma a 64 year old female with essential hypertension, dyslipidemia, right rotator cuff syndrome, history of malignant melanoma, glaucoma was seen today at the request of her primary care physician for symptomatic palpitations and SVT noted on a recent stress echocardiogram    The patient receives very excellent attentive care by her primary care physician Dr. Alida Lamb.  Recent general physical examination, primary care physician noted ST-T wave changes on the resting electrocardiogram.  Patient has been prone to exercise associated palpitations mild dyspnea that improved with rest and deep breaths.  She does not describe syncope, exertional chest discomfort, or sustained tachycardia.     In order to better evaluate her symptoms, ambulatory monitoring with a Zio patch monitor was ordered (pending at this time) stress echocardiogram was performed.  This was terminated because of irregular narrow complex episodes of nonsustained SVT.  Resting echo showed normal left ventricular systolic performance without wall motion abnormality and borderline left atrial enlargement.  The interpreting physician could not exclude atrial fibrillation, but the patient's narrow complex rhythms not sustained.  I have personally reviewed the patient's echo stress test which does not show any definite evidence of structural abnormality, usual wall thickness, or inducible ischemia her exercise tolerance was limited.  Dr. Lamb cut the patient's lisinopril from 20 to 10 mg daily and added metoprolol XL 25 mg daily.  The patient really has not tried to perform any exercise since the change in her medical therapy but has tolerated them well.  I note that her LDL cholesterol  was 175 there is been no recommendations as yet for pharmacologic intervention.    The patient does not have a regular exercise program but performs a lot of exercise at her farm she cares for horses and does a lot of farm chores without symptoms.    PAST MEDICAL HISTORY  1) Malignant melanoma sp excision. Followed by   2) essential hypertension  3) right rotator cuff syndrome  4) Dyslipidemia     Orders this Visit:  No orders of the defined types were placed in this encounter.    No orders of the defined types were placed in this encounter.    There are no discontinued medications.    Encounter Diagnoses   Name Primary?     Irregular heartbeat - sometimes symptomatic - dizzy and/or mildly SOB - lasts a minute or so- goes away with deep breathing      Nonspecific abnormal electrocardiogram (ECG) (EKG)      SVT (supraventricular tachycardia) (H)        CURRENT MEDICATIONS:  Current Outpatient Medications   Medication Sig Dispense Refill     cholecalciferol (VITAMIN D3) 25 mcg (1000 units) capsule Take 2 capsules (50 mcg) by mouth daily       clobetasol propionate (TEMOVATE) 0.05 % external cream Apply very scant amount to inner labia daily for up to 14 days at a time -ok to use every 2 months or so 15 g 3     dorzolamide-timolol (COSOPT) 2-0.5 % ophthalmic solution INSTILL 1 DROP IN BOTH EYES TWO TIMES A DAY       furosemide (LASIX) 20 MG tablet Take 0.5 tablets (10 mg) by mouth daily. As needed for leg swelling 20 tablet 1     lisinopril (ZESTRIL) 20 MG tablet Take 0.5 tablets (10 mg) by mouth daily. 90 tablet 3     metoprolol succinate ER (TOPROL XL) 25 MG 24 hr tablet Take 1 tablet (25 mg) by mouth daily. 90 tablet 1     Netarsudil-Latanoprost 0.02-0.005 % SOLN Apply 1 drop to eye At Bedtime (brand name Rocklatan)         ALLERGIES     Allergies   Allergen Reactions     Amoxicillin      hives     Benzoyl Peroxide      swelling     Ibuprofen      over long duration dev. hives     Penicillins       "hives       PAST MEDICAL, SURGICAL, FAMILY, SOCIAL HISTORY:  History was reviewed and updated as needed, see medical record.    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     No tobacco or alcohol abuse        Review of Systems:  A 12-point review of systems was completed, see medical record for detailed review of systems information.    Physical Exam:  Vitals: /64   Pulse 78   Ht 1.6 m (5' 3\")   Wt 55.8 kg (123 lb)   LMP 10/31/2011   BMI 21.79 kg/m      Constitutional: No apparent distress    HEENT: pupils equal round reactive to light, thyroid normal size, JVP is normal    Chest: Clear to percussion and auscultation    Cardiac: Normal S1, normal S2 no S3, no murmur or click    Abdomen: Scaphoid.  Liver percusses to 6 cm spleen is not palpable aorta is not tender or enlarged    Extremitiies: no edema.    Neurological:  Cranial nerves II through XII are intact, strength equal and symmetrical, displays normal insight and judgment    ASSESSMENT: I suspect that the patient's resting ECG changes are on the basis of longstanding hypertension, which is now well-controlled.  Her echo was not suggestive of hypertrophic cardiomyopathy.  She is free of angina or sustained arrhythmia.  I would like to await her ambulatory monitoring to determine whether she in fact has atrial fibrillation might warrant consideration of anticoagulation.  If we see no evidence of atrial fibrillation, beta-blocker therapy for control of symptoms related to SVT/PACs would be my first choice.  Her beta-blocker can be increased as necessary to control symptoms.    Her current LDL cholesterol level warrants both lifestyle i and pharmacologic intervention.  I discussed the benefits and risks of statin therapy and gave her a prescription for atorvastatin 40 mg daily.  Follow up lipid profile and liver " function studies can be performed with her excellent primary care doctor in 2 to 3 months.  We discussed the features and benefits of a Mediterranean-style diet and a regular exercise program.    I have advised her to increase her activity to determine whether low-dose beta-blockade satisfactorily controls her symptoms.   If  Metoprolol xl at the current dose does not control her symptoms, dose can be increased as needed up to 200 mg daily.    Her blood pressure appears optimally controlled at this time.    RECOMMENDATIONS:   1) Agree with metoprolol XL, can be titrated upward to as high as 200 mg daily if needed as long as blood pressure remains greater than 100 mmHg systolic and heart rate greater than 50 for symptom control of palpitations.   2) Await results of ambulatory monitor to assess for paroxymal atrial fibrillation. If atrial fibrillation identified, we will plan follow up to discuss anticoagulation as her CHADSVaSC would be 3.  3) Mediterranean style diet  4) Advance exercise as tolerated ( see 1) above)  5) atorvastatin 40 mg daily follow up with primary care MD for lipid profile and ALT  6) If ambulatory monitor shows concerning arrhythmia and /or patient /Dr. Lamb has questions we will be happy to arrange follow up        Recent Lab Results:    ECG NSR ST T changes in multiple leads could be a consequence of long standing hypertension    Stress echo personally reviewed. Normal wall motion, wall thickness and systolic performance on rest study Limited exercise tolerance due to SVT/PACs.but no ischemic changes seen. Strain was not suggestive of HCM.   LIPID RESULTS:  Lab Results   Component Value Date    CHOL 253 (H) 10/18/2024    CHOL 235 (H) 05/28/2020    HDL 48 (L) 10/18/2024    HDL 60 05/28/2020     (H) 10/18/2024     (H) 05/28/2020    TRIG 148 10/18/2024    TRIG 140 05/28/2020    CHOLHDLRATIO 3.7 07/20/2015       LIVER ENZYME RESULTS:  Lab Results   Component Value Date    AST  "17 10/18/2024    AST 13 05/28/2020    ALT 8 10/18/2024    ALT 15 05/28/2020       CBC RESULTS:  Lab Results   Component Value Date    WBC 7.5 10/18/2024    WBC 6.9 05/28/2020    RBC 3.32 (L) 10/18/2024    RBC 4.30 05/28/2020    HGB 10.2 (L) 10/18/2024    HGB 12.8 05/28/2020    HCT 31.8 (L) 10/18/2024    HCT 40.1 05/28/2020    MCV 96 10/18/2024    MCV 93 05/28/2020    MCH 30.7 10/18/2024    MCH 29.8 05/28/2020    MCHC 32.1 10/18/2024    MCHC 31.9 05/28/2020    RDW 12.6 10/18/2024    RDW 13.2 05/28/2020     10/18/2024     05/28/2020       BMP RESULTS:  Lab Results   Component Value Date     10/18/2024     05/28/2020    POTASSIUM 5.1 10/18/2024    POTASSIUM 4.0 11/22/2021    POTASSIUM 4.8 05/28/2020    CHLORIDE 104 10/18/2024    CHLORIDE 106 11/22/2021    CHLORIDE 109 05/28/2020    CO2 25 10/18/2024    CO2 27 11/22/2021    CO2 24 05/28/2020    ANIONGAP 10 10/18/2024    ANIONGAP 4 11/22/2021    ANIONGAP 5 05/28/2020    GLC 91 10/18/2024    GLC 83 11/22/2021    GLC 90 05/28/2020    BUN 33.3 (H) 10/18/2024    BUN 19 11/22/2021    BUN 23 05/28/2020    CR 2.46 (H) 10/18/2024    CR 0.78 05/28/2020    GFRESTIMATED 21 (L) 10/18/2024    GFRESTIMATED 82 05/28/2020    GFRESTBLACK >90 05/28/2020    KELTON 10.3 10/18/2024    KELTON 8.7 05/28/2020        A1C RESULTS:  No results found for: \"A1C\"    INR RESULTS:  No results found for: \"INR\"    We greatly appreciate the opportunity to be involved in the care of your patient, Dulce Ma.    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 Decreased to 10 lisinopril and toprol  had been very high in past.     Orders this Visit:  No orders of the defined types were placed in this encounter.    No orders of the defined types were placed in this encounter.    There are no discontinued medications.    Encounter Diagnoses   Name Primary?     Irregular heartbeat - sometimes symptomatic - dizzy and/or mildly SOB - lasts a minute or so- goes away with deep breathing      Nonspecific abnormal electrocardiogram (ECG) (EKG)      SVT (supraventricular tachycardia) (H)        CURRENT MEDICATIONS:  Current Outpatient Medications   Medication Sig Dispense Refill     cholecalciferol (VITAMIN D3) 25 mcg (1000 units) capsule Take 2 capsules (50 mcg) by mouth daily       clobetasol propionate (TEMOVATE) 0.05 % external cream Apply very scant amount to inner labia daily for up to 14 days at a time -ok to use every 2 months or so 15 g 3     dorzolamide-timolol (COSOPT) 2-0.5 % ophthalmic solution INSTILL 1 DROP IN BOTH EYES TWO TIMES A DAY       furosemide (LASIX) 20 MG tablet Take 0.5 tablets (10 mg) by mouth daily. As needed for leg swelling 20 tablet 1     lisinopril (ZESTRIL) 20 MG tablet Take 0.5 tablets (10 mg) by mouth daily. 90 tablet 3     metoprolol succinate ER (TOPROL XL) 25 MG 24 hr tablet Take 1 tablet (25 mg) by mouth daily. 90 tablet 1     Netarsudil-Latanoprost 0.02-0.005 % SOLN Apply 1 drop to eye At Bedtime (brand name Rocklatan)         ALLERGIES     Allergies   Allergen Reactions     Amoxicillin      hives     Benzoyl Peroxide      swelling     Ibuprofen      over long duration dev. hives     Penicillins      hives       PAST MEDICAL, SURGICAL, FAMILY, SOCIAL HISTORY:  History was reviewed and updated as needed, see medical record.        Review of Systems:  A 12-point review of systems was completed, see medical record for detailed review of systems information.    Physical Exam:  Vitals: /64   Pulse 78   Ht 1.6 m (5' 3\")   Wt 55.8 kg (123 " lb)   LMP 10/31/2011   BMI 21.79 kg/m      Constitutional: No apparent distress    HEENT: pupils equal round reactive to light, thyroid normal size, JVP is normal    Chest: Clear to percussion and auscultation    Cardiac: Normal S1, normal S2 no S3, no murmur or click    Abdomen: Scaphoid.  Liver percusses to 6 cm spleen is not palpable aorta is not tender or enlarged    Extremitiies: no edema.    Neurological:  Cranial nerves II through XII are intact, strength equal and symmetrical, displays normal insight and judgment        ASSESSMENT: Dulce Ma is a 64 year old female with          We reviewed the benefits of a regular exercise program, a Mediterranean-style weight loss diet, and contacting us for any changes in between now and the time of her next visit.     RECOMMENDATIONS:   1) Await results of monitoring to exclude paroxysmal atrial fibrillation  2)  can titrate beta blockers to control symptoms  3)  Atorvastatin 40 mg daily, can titrate up to 80 mg daily, follow up lipid profile and ALT with primary care MD  4) Mediterranean style diet  5) regular exercise program        Recent Lab Results:  LIPID RESULTS:  Lab Results   Component Value Date    CHOL 253 (H) 10/18/2024    CHOL 235 (H) 05/28/2020    HDL 48 (L) 10/18/2024    HDL 60 05/28/2020     (H) 10/18/2024     (H) 05/28/2020    TRIG 148 10/18/2024    TRIG 140 05/28/2020    CHOLHDLRATIO 3.7 07/20/2015       LIVER ENZYME RESULTS:  Lab Results   Component Value Date    AST 17 10/18/2024    AST 13 05/28/2020    ALT 8 10/18/2024    ALT 15 05/28/2020       CBC RESULTS:  Lab Results   Component Value Date    WBC 7.5 10/18/2024    WBC 6.9 05/28/2020    RBC 3.32 (L) 10/18/2024    RBC 4.30 05/28/2020    HGB 10.2 (L) 10/18/2024    HGB 12.8 05/28/2020    HCT 31.8 (L) 10/18/2024    HCT 40.1 05/28/2020    MCV 96 10/18/2024    MCV 93 05/28/2020    MCH 30.7 10/18/2024    MCH 29.8 05/28/2020    MCHC 32.1 10/18/2024    MCHC 31.9 05/28/2020    RDW 12.6  "10/18/2024    RDW 13.2 05/28/2020     10/18/2024     05/28/2020       BMP RESULTS:  Lab Results   Component Value Date     10/18/2024     05/28/2020    POTASSIUM 5.1 10/18/2024    POTASSIUM 4.0 11/22/2021    POTASSIUM 4.8 05/28/2020    CHLORIDE 104 10/18/2024    CHLORIDE 106 11/22/2021    CHLORIDE 109 05/28/2020    CO2 25 10/18/2024    CO2 27 11/22/2021    CO2 24 05/28/2020    ANIONGAP 10 10/18/2024    ANIONGAP 4 11/22/2021    ANIONGAP 5 05/28/2020    GLC 91 10/18/2024    GLC 83 11/22/2021    GLC 90 05/28/2020    BUN 33.3 (H) 10/18/2024    BUN 19 11/22/2021    BUN 23 05/28/2020    CR 2.46 (H) 10/18/2024    CR 0.78 05/28/2020    GFRESTIMATED 21 (L) 10/18/2024    GFRESTIMATED 82 05/28/2020    GFRESTBLACK >90 05/28/2020    KELTON 10.3 10/18/2024    KELTON 8.7 05/28/2020        A1C RESULTS:  No results found for: \"A1C\"    INR RESULTS:  No results found for: \"INR\"    We greatly appreciate the opportunity to be involved in the care of your patient, Dulce Ma.    Sincerely,  Moe Alan MD      CC  Alida Lamb MD  [address] [place], [place] [id number]                                                                       Thank you for allowing me to participate in the care of your patient.      Sincerely,     Moe Alan MD     [facility]  cc:   Alida Lamb MD  [address] [place] [id number]      "

## 2024-12-17 ENCOUNTER — APPOINTMENT (OUTPATIENT)
Dept: URBAN - METROPOLITAN AREA CLINIC 253 | Age: 64
Setting detail: DERMATOLOGY
End: 2024-12-18

## 2024-12-17 VITALS — WEIGHT: 128 LBS | HEIGHT: 63 IN | RESPIRATION RATE: 14 BRPM

## 2024-12-17 DIAGNOSIS — Z71.89 OTHER SPECIFIED COUNSELING: ICD-10-CM

## 2024-12-17 DIAGNOSIS — L82.1 OTHER SEBORRHEIC KERATOSIS: ICD-10-CM

## 2024-12-17 DIAGNOSIS — L57.8 OTHER SKIN CHANGES DUE TO CHRONIC EXPOSURE TO NONIONIZING RADIATION: ICD-10-CM

## 2024-12-17 DIAGNOSIS — Z85.820 PERSONAL HISTORY OF MALIGNANT MELANOMA OF SKIN: ICD-10-CM

## 2024-12-17 DIAGNOSIS — D18.0 HEMANGIOMA: ICD-10-CM

## 2024-12-17 DIAGNOSIS — Z85.828 PERSONAL HISTORY OF OTHER MALIGNANT NEOPLASM OF SKIN: ICD-10-CM

## 2024-12-17 DIAGNOSIS — D22 MELANOCYTIC NEVI: ICD-10-CM

## 2024-12-17 DIAGNOSIS — L70.8 OTHER ACNE: ICD-10-CM

## 2024-12-17 PROBLEM — D22.71 MELANOCYTIC NEVI OF RIGHT LOWER LIMB, INCLUDING HIP: Status: ACTIVE | Noted: 2024-12-17

## 2024-12-17 PROBLEM — D22.61 MELANOCYTIC NEVI OF RIGHT UPPER LIMB, INCLUDING SHOULDER: Status: ACTIVE | Noted: 2024-12-17

## 2024-12-17 PROBLEM — D22.5 MELANOCYTIC NEVI OF TRUNK: Status: ACTIVE | Noted: 2024-12-17

## 2024-12-17 PROBLEM — D18.01 HEMANGIOMA OF SKIN AND SUBCUTANEOUS TISSUE: Status: ACTIVE | Noted: 2024-12-17

## 2024-12-17 PROBLEM — D22.72 MELANOCYTIC NEVI OF LEFT LOWER LIMB, INCLUDING HIP: Status: ACTIVE | Noted: 2024-12-17

## 2024-12-17 PROBLEM — D22.62 MELANOCYTIC NEVI OF LEFT UPPER LIMB, INCLUDING SHOULDER: Status: ACTIVE | Noted: 2024-12-17

## 2024-12-17 PROCEDURE — OTHER COUNSELING: OTHER

## 2024-12-17 PROCEDURE — OTHER SUNSCREEN RECOMMENDATIONS: OTHER

## 2024-12-17 PROCEDURE — OTHER PATIENT SPECIFIC COUNSELING: OTHER

## 2024-12-17 PROCEDURE — 99213 OFFICE O/P EST LOW 20 MIN: CPT

## 2024-12-17 PROCEDURE — OTHER MIPS QUALITY: OTHER

## 2024-12-17 PROCEDURE — OTHER ADDITIONAL NOTES: OTHER

## 2024-12-17 ASSESSMENT — LOCATION SIMPLE DESCRIPTION DERM
LOCATION SIMPLE: LEFT UPPER BACK
LOCATION SIMPLE: LEFT LOWER BACK
LOCATION SIMPLE: LEFT UPPER ARM
LOCATION SIMPLE: LEFT FOREARM
LOCATION SIMPLE: LEFT CHEEK
LOCATION SIMPLE: RIGHT THIGH
LOCATION SIMPLE: CHEST
LOCATION SIMPLE: RIGHT FOREARM
LOCATION SIMPLE: LEFT THIGH
LOCATION SIMPLE: UPPER BACK
LOCATION SIMPLE: ABDOMEN

## 2024-12-17 ASSESSMENT — LOCATION DETAILED DESCRIPTION DERM
LOCATION DETAILED: RIGHT PROXIMAL DORSAL FOREARM
LOCATION DETAILED: LEFT INFERIOR LATERAL MIDBACK
LOCATION DETAILED: LEFT INFERIOR CENTRAL MALAR CHEEK
LOCATION DETAILED: RIGHT VENTRAL DISTAL FOREARM
LOCATION DETAILED: LEFT DISTAL DORSAL FOREARM
LOCATION DETAILED: EPIGASTRIC SKIN
LOCATION DETAILED: RIGHT ANTERIOR DISTAL THIGH
LOCATION DETAILED: LEFT VENTRAL PROXIMAL FOREARM
LOCATION DETAILED: LEFT ANTERIOR PROXIMAL THIGH
LOCATION DETAILED: MIDDLE STERNUM
LOCATION DETAILED: LEFT ANTECUBITAL SKIN
LOCATION DETAILED: INFERIOR THORACIC SPINE
LOCATION DETAILED: LEFT MEDIAL UPPER BACK
LOCATION DETAILED: RIGHT ANTERIOR PROXIMAL THIGH
LOCATION DETAILED: LEFT VENTRAL DISTAL FOREARM
LOCATION DETAILED: RIGHT VENTRAL PROXIMAL FOREARM
LOCATION DETAILED: LEFT ANTERIOR DISTAL THIGH
LOCATION DETAILED: LEFT INFERIOR UPPER BACK

## 2024-12-17 ASSESSMENT — LOCATION ZONE DERM
LOCATION ZONE: FACE
LOCATION ZONE: TRUNK
LOCATION ZONE: LEG
LOCATION ZONE: ARM

## 2024-12-17 NOTE — HPI: FULL BODY SKIN EXAMINATION
What Type Of Note Output Would You Prefer (Optional)?: Standard Output
What Is The Reason For Today's Visit?: Full Body Skin Examination with No Concerns
What Is The Reason For Today's Visit? (Being Monitored For X): concerning skin lesions on an annual basis
Additional History: Patient reports use of sun protection. She has a history with tanning beds in her younger years. She does not report any blistering sunburns.

## 2024-12-17 NOTE — PROCEDURE: ADDITIONAL NOTES
Additional Notes: Patient had lymph nodes taken from each side of her armpits. Results came back negative.
Detail Level: Simple
Render Risk Assessment In Note?: no

## 2025-01-02 ENCOUNTER — NURSE TRIAGE (OUTPATIENT)
Dept: FAMILY MEDICINE | Facility: CLINIC | Age: 65
End: 2025-01-02
Payer: COMMERCIAL

## 2025-01-02 NOTE — TELEPHONE ENCOUNTER
FYI - Status Update    Who is Calling: patient    Update: Patient states she is having dry mouth, fatigue and upset stomach and thinks it's from taking the Metoprolol. Please call.    Does caller want a call/response back: Yes     Could we send this information to you in [place] or would you prefer to receive a phone call?:   Patient would prefer a phone call   Okay to leave a detailed message?: Yes at Cell number on file:    Telephone Information:   Mobile [phone]

## 2025-01-02 NOTE — TELEPHONE ENCOUNTER
Patient was transferred to a triage nurse.    S-(situation): medication issue(Metoprolol)    B-(background): started taking Metoprolol over a month ago.  Was told the side effects should improve with taking the medication.  Has been taking medication everyday and symptoms are not improving and thinks they are worsening.  Stated she stopped the lisinopril due to her blood pressure going too low as well.      A-(assessment): takes metoprolol at HS and wakes up nauseated and is ill all day long.  Having symptoms of dry mouth, stomach upset, vomiting and fatigue since starting metoprolol.  Hast tried eating crackers, yogurt, probiotics and stated whatever she eats causes indigestion, stomach upset.  Having difficulty sleeping and stated she is still having the an erratic heartbeat and wonders if the medication is even working.  Saw Cardiology on 11/27/24 and was advised to apply the heart monitor prescribed by primary care provider in October(had not gotten around to it yet and just applied it recently).  Denied chest pain, difficulty breathing, sweating or any other symptoms.  No concerns for dehydration as patient is drinking fluids and voiding about every 4 hours.    R-(recommendations): please advise on if patient should continue taking the medication.  Any other recommendations.      Reason for Disposition   Caller has URGENT medicine question about med that PCP or specialist prescribed and triager unable to answer question    Additional Information   Negative: Intentional drug overdose and suicidal thoughts or ideas   Negative: Drug overdose and triager unable to answer question   Negative: Caller requesting a renewal or refill of a medicine patient is currently taking   Negative: Caller requesting information unrelated to medicine   Negative: Caller requesting information about COVID-19 Vaccine   Negative: Caller requesting information about Emergency Contraception   Negative: Caller requesting information about  "Combined Birth Control Pills   Negative: Caller requesting information about Progestin Birth Control Pills   Negative: Caller requesting information about post-op pain or medicines   Negative: Caller requesting a prescription antibiotic (such as penicillin) for Strep throat and has a positive culture result   Negative: Caller requesting a prescription anti-viral med (such as Tamiflu) and has influenza (flu) symptoms   Negative: Immunization reaction suspected   Negative: Rash while taking a medicine or within 3 days of stopping it   Negative: Asthma and having symptoms of asthma (cough, wheezing, etc.)   Negative: Symptom of illness (e.g., headache, abdominal pain, earache, vomiting) that are more than mild   Negative: Breastfeeding questions about mother's medicines and diet   Negative: MORE THAN A DOUBLE DOSE of a prescription or over-the-counter (OTC) drug   Negative: DOUBLE DOSE (an extra dose or lesser amount) of prescription drug and any symptoms (e.g., dizziness, nausea, pain, sleepiness)   Negative: DOUBLE DOSE (an extra dose or lesser amount) of over-the-counter (OTC) drug and any symptoms (e.g., dizziness, nausea, pain, sleepiness)   Negative: Took another person's prescription drug   Negative: DOUBLE DOSE (an extra dose or lesser amount) of prescription drug and NO symptoms  (Exception: A double dose of antibiotics.)   Negative: Diabetes drug error or overdose (e.g., took wrong type of insulin or took extra dose)   Negative: Caller has medication question about med NOT prescribed by PCP and triager unable to answer question (e.g., compatibility with other med, storage)    Answer Assessment - Initial Assessment Questions  1. NAME of MEDICINE: \"What medicine(s) are you calling about?\"      Metoprolol  2. QUESTION: \"What is your question?\" (e.g., double dose of medicine, side effect)      Side effects  3. PRESCRIBER: \"Who prescribed the medicine?\" Reason: if prescribed by specialist, call should be referred " "to that group.      Primary care provider  4. SYMPTOMS: \"Do you have any symptoms?\" If Yes, ask: \"What symptoms are you having?\"  \"How bad are the symptoms (e.g., mild, moderate, severe)      Dry mouth, nausea, upset stomach, fatigue  5. PREGNANCY:  \"Is there any chance that you are pregnant?\" \"When was your last menstrual period?\"      no    Protocols used: Medication Question Call-A-OH    "

## 2025-01-02 NOTE — TELEPHONE ENCOUNTER
Can pt try to take 1/2 of a 25mg metoprolol succinate tablet daily to see if this helps.   otherwise needs to call Cardiology to check on alternative rx as we await completion of ZioPatch.

## 2025-01-02 NOTE — TELEPHONE ENCOUNTER
Advised Pt of below. She will try half tab and will reach out to Cardiology if not improving. Advised Pt to call with any new or worsening symptoms.

## 2025-01-03 ENCOUNTER — MYC MEDICAL ADVICE (OUTPATIENT)
Dept: CARDIOLOGY | Facility: CLINIC | Age: 65
End: 2025-01-03
Payer: COMMERCIAL

## 2025-01-03 NOTE — TELEPHONE ENCOUNTER
See mychart message regarding sxs. Pt states she is not on Lisinopril d/t low BPs.     Pt did not started Atorvastatin 40 mg recommended on 11/27/24 yet d/t sxs she's having.     PCP recommended cutting Metoprolol in half to see if that helps. Routing to .     Awa TUCKER  [facility]

## 2025-01-06 NOTE — TELEPHONE ENCOUNTER
"Per  below regarding [employer] message received reviewing possible side effects to Metoprolol,    \"I would suggest she allow her primary care MD to manage unless her monitor shows atrial fibrillation. If she has SVT it is not crucial to treat unless the symptoms are disabling. One option might be to switch her to atenolol, but I would prefer to have her primary prescribe her drugs. If she were not to tolerate atenolol and find palpitations are disabling I would suggest EP consultation. I hope she is able to tolerate a statin, but if she does not, please again advise she speak with her primary about trials of alternate agents. If her primary is unable to manage these questions she could be seen back in our office....Thanks. \".     Sent message to pt via [employer]. Pt to continue follow up with PCP. We will await monitor results.     Awa BIRD [facility]    "

## 2025-01-07 ENCOUNTER — TELEPHONE (OUTPATIENT)
Dept: FAMILY MEDICINE | Facility: CLINIC | Age: 65
End: 2025-01-07
Payer: COMMERCIAL

## 2025-01-07 NOTE — TELEPHONE ENCOUNTER
"See also 1/2/25 Triage call, this is an update.   S-(situation): Patient calls back after decreasing Metoprolol to 12.5 mg daily 7 days ago.  States she feels slight improvement but still experiencing a \"weird taste\" in mouth, nausea, fatigue and a lot of belching that she still believes are related to Metoprolol.  Symptoms seem to improve slightly after eating but the bad taste in mouth remains. Cardiology told her that symptoms are likely not related to Metoprolol and to follow up with primary care provider. Can RNs use same day slot with primary care provider tomorrow 1/8?    B-(background): Patient reports she had a \"sensitive stomach\" as a child with difficulty digesting some foods such as peanut butter.  Patient did not elaborate but states she no longer has those issues.  She has tried Tums intermittently with no noticeable improvement.    A-(assessment): Patient denies fever, black or bloody stools.  No further vomiting since decreasing dose of Metoprolol.  She does report 1 loose stool per day with formed stools in between.  Denies increase in flatus or fever. Does believe she has lost a few pounds.     R-(recommendations): Try following a bland diet. Follow up with primary care provider.  Patient asking for any labs or tests that may determine what is causing her symptoms.  YOEL Smyth R.N.            "

## 2025-01-08 ENCOUNTER — OFFICE VISIT (OUTPATIENT)
Dept: FAMILY MEDICINE | Facility: CLINIC | Age: 65
End: 2025-01-08
Payer: COMMERCIAL

## 2025-01-08 ENCOUNTER — OFFICE VISIT (OUTPATIENT)
Dept: PEDIATRICS | Facility: CLINIC | Age: 65
End: 2025-01-08
Payer: COMMERCIAL

## 2025-01-08 VITALS
DIASTOLIC BLOOD PRESSURE: 52 MMHG | RESPIRATION RATE: 20 BRPM | HEART RATE: 78 BPM | HEIGHT: 63 IN | TEMPERATURE: 97.7 F | BODY MASS INDEX: 21.09 KG/M2 | OXYGEN SATURATION: 94 % | WEIGHT: 119 LBS | SYSTOLIC BLOOD PRESSURE: 133 MMHG

## 2025-01-08 VITALS
DIASTOLIC BLOOD PRESSURE: 80 MMHG | TEMPERATURE: 97.9 F | WEIGHT: 119.2 LBS | HEART RATE: 89 BPM | HEIGHT: 63 IN | OXYGEN SATURATION: 100 % | SYSTOLIC BLOOD PRESSURE: 140 MMHG | BODY MASS INDEX: 21.12 KG/M2 | RESPIRATION RATE: 20 BRPM

## 2025-01-08 DIAGNOSIS — R63.4 WEIGHT LOSS: ICD-10-CM

## 2025-01-08 DIAGNOSIS — K21.9 GASTROESOPHAGEAL REFLUX DISEASE, UNSPECIFIED WHETHER ESOPHAGITIS PRESENT: ICD-10-CM

## 2025-01-08 DIAGNOSIS — T88.59XA: ICD-10-CM

## 2025-01-08 DIAGNOSIS — N19 RENAL FAILURE, UNSPECIFIED CHRONICITY: Primary | ICD-10-CM

## 2025-01-08 DIAGNOSIS — I47.10 PAROXYSMAL SUPRAVENTRICULAR TACHYCARDIA: ICD-10-CM

## 2025-01-08 DIAGNOSIS — R94.4 DECREASED GFR: ICD-10-CM

## 2025-01-08 DIAGNOSIS — R10.13 EPIGASTRIC PAIN: Primary | ICD-10-CM

## 2025-01-08 DIAGNOSIS — R79.89 ELEVATED SERUM CREATININE: ICD-10-CM

## 2025-01-08 DIAGNOSIS — R43.2 ALTERED TASTE: ICD-10-CM

## 2025-01-08 DIAGNOSIS — R60.0 BILATERAL LEG EDEMA: ICD-10-CM

## 2025-01-08 DIAGNOSIS — D64.9 ANEMIA, UNSPECIFIED TYPE: ICD-10-CM

## 2025-01-08 DIAGNOSIS — I10 ESSENTIAL HYPERTENSION WITH GOAL BLOOD PRESSURE LESS THAN 140/90: ICD-10-CM

## 2025-01-08 DIAGNOSIS — R63.0 DECREASED APPETITE: ICD-10-CM

## 2025-01-08 DIAGNOSIS — R23.1 PALE SKIN: ICD-10-CM

## 2025-01-08 DIAGNOSIS — C43.59 MALIGNANT MELANOMA OF SKIN OF TRUNK (H): ICD-10-CM

## 2025-01-08 DIAGNOSIS — R10.13 EPIGASTRIC PAIN: ICD-10-CM

## 2025-01-08 LAB
ALBUMIN SERPL BCG-MCNC: 4.2 G/DL (ref 3.5–5.2)
ALP SERPL-CCNC: 48 U/L (ref 40–150)
ALT SERPL W P-5'-P-CCNC: 10 U/L (ref 0–50)
AMYLASE SERPL-CCNC: 96 U/L (ref 28–100)
ANION GAP SERPL CALCULATED.3IONS-SCNC: 13 MMOL/L (ref 7–15)
AST SERPL W P-5'-P-CCNC: 21 U/L (ref 0–45)
BASOPHILS # BLD AUTO: 0 10E3/UL (ref 0–0.2)
BASOPHILS NFR BLD AUTO: 0 %
BILIRUB SERPL-MCNC: 0.5 MG/DL
BUN SERPL-MCNC: 60.1 MG/DL (ref 8–23)
CALCIUM SERPL-MCNC: 10.9 MG/DL (ref 8.8–10.4)
CHLORIDE SERPL-SCNC: 102 MMOL/L (ref 98–107)
CREAT SERPL-MCNC: 5.86 MG/DL (ref 0.51–0.95)
EGFRCR SERPLBLD CKD-EPI 2021: 8 ML/MIN/1.73M2
EOSINOPHIL # BLD AUTO: 0.2 10E3/UL (ref 0–0.7)
EOSINOPHIL NFR BLD AUTO: 2 %
ERYTHROCYTE [DISTWIDTH] IN BLOOD BY AUTOMATED COUNT: 12.8 % (ref 10–15)
GGT SERPL-CCNC: 15 U/L (ref 5–36)
GLUCOSE SERPL-MCNC: 101 MG/DL (ref 70–99)
HCO3 SERPL-SCNC: 20 MMOL/L (ref 22–29)
HCT VFR BLD AUTO: 27.6 % (ref 35–47)
HGB BLD-MCNC: 9.2 G/DL (ref 11.7–15.7)
IMM GRANULOCYTES # BLD: 0 10E3/UL
IMM GRANULOCYTES NFR BLD: 0 %
LIPASE SERPL-CCNC: 42 U/L (ref 13–60)
LYMPHOCYTES # BLD AUTO: 2.2 10E3/UL (ref 0.8–5.3)
LYMPHOCYTES NFR BLD AUTO: 26 %
MCH RBC QN AUTO: 31.4 PG (ref 26.5–33)
MCHC RBC AUTO-ENTMCNC: 33.3 G/DL (ref 31.5–36.5)
MCV RBC AUTO: 94 FL (ref 78–100)
MONOCYTES # BLD AUTO: 0.4 10E3/UL (ref 0–1.3)
MONOCYTES NFR BLD AUTO: 5 %
NEUTROPHILS # BLD AUTO: 5.7 10E3/UL (ref 1.6–8.3)
NEUTROPHILS NFR BLD AUTO: 67 %
PLATELET # BLD AUTO: 266 10E3/UL (ref 150–450)
POTASSIUM SERPL-SCNC: 5.2 MMOL/L (ref 3.4–5.3)
PROT SERPL-MCNC: 7.6 G/DL (ref 6.4–8.3)
RBC # BLD AUTO: 2.93 10E6/UL (ref 3.8–5.2)
SODIUM SERPL-SCNC: 135 MMOL/L (ref 135–145)
WBC # BLD AUTO: 8.5 10E3/UL (ref 4–11)

## 2025-01-08 PROCEDURE — 85025 COMPLETE CBC W/AUTO DIFF WBC: CPT | Performed by: FAMILY MEDICINE

## 2025-01-08 PROCEDURE — 82977 ASSAY OF GGT: CPT | Performed by: FAMILY MEDICINE

## 2025-01-08 PROCEDURE — 99207 REFERRAL TO ACUTE AND DIAGNOSTIC SERVICES: CPT | Performed by: FAMILY MEDICINE

## 2025-01-08 PROCEDURE — 80053 COMPREHEN METABOLIC PANEL: CPT | Performed by: FAMILY MEDICINE

## 2025-01-08 PROCEDURE — 82150 ASSAY OF AMYLASE: CPT | Performed by: FAMILY MEDICINE

## 2025-01-08 PROCEDURE — 83690 ASSAY OF LIPASE: CPT | Performed by: FAMILY MEDICINE

## 2025-01-08 PROCEDURE — 36415 COLL VENOUS BLD VENIPUNCTURE: CPT | Performed by: FAMILY MEDICINE

## 2025-01-08 RX ORDER — METOPROLOL SUCCINATE 25 MG/1
12.5 TABLET, EXTENDED RELEASE ORAL DAILY
Status: ON HOLD | COMMUNITY
Start: 2025-01-08

## 2025-01-08 ASSESSMENT — ENCOUNTER SYMPTOMS: ABDOMINAL PAIN: 1

## 2025-01-08 ASSESSMENT — PAIN SCALES - GENERAL: PAINLEVEL_OUTOF10: NO PAIN (0)

## 2025-01-08 NOTE — PATIENT INSTRUCTIONS
" [facility]  [address] [place] [address]  Office: [phone]   Fax:    [phone]       Acute Diagnostic Services  - They can take you at 3pm today.     [address]  [facility] [address]         Thank you so much or choosing [facility]  for your Health Care. It was a pleasure seeing you at your visit today! Please contact us with any questions or concerns you may have.                   Alida Lamb MD                              To reach your [facility] care team after hours call:   [phone] press #2 \"to speak with your care team\".  This will get you to our clinic instead of routing to central [facility]  scheduling.     PLEASE NOTE OUR HOURS HAVE CHANGED secondary to COVID-19 coronavirus pandemic, as we are trying to minimize patient exposure to the virus,  which is now widespread in the [facility].  These hours may change with very little notice.  We apologize for any inconvenience.       Our current clinic hours are:          Monday- Thursday   7:00am - 6:00pm  in person.      Friday  7:00am- 5:00pm                       Saturday and Sunday : Closed to in person and virtual visits        We have telephone and virtual visit times available between    7:00am - 6pm on Monday-Friday as well.                                                Phone:  [phone]      Our pharmacy hours: Monday through Friday 8:00am to 5:00pm                        Saturday - 9:00 am to 12 noon       Sunday : Closed.              Phone:  [phone]              ###  Please note: at this time we are not accepting any walk-in visits. ###      There is also information available at our web site:  [email or web address]    If your provider ordered any lab tests and you do not receive the results within 10 business days, please call the clinic.    If you need a medication refill please contact your pharmacy.  Please " allow 3 business days for your refill to be completed.    Our clinic offers telephone visits and e visits.  Please ask one of your team members to explain more.      Use MyChart (secure email communication and access to your chart) to send your primary care provider a message or make an appointment. Ask someone on your Team how to sign up for [employer].

## 2025-01-08 NOTE — PROGRESS NOTES
Assessment/Plan:    Unfortunately, the patient's renal function has significantly decreased since October. 1 year ago creatinine was 1.06, 2.5 months ago it was 2.46, and today is 5.86. GFR is 8 today. BUN is 60.1. She also has hypercalcemia (10.9) and decreased bicarb (20). Anemia has worsened, Hgb now 9.2.   Unclear etiology for these lab abnormalities as well as the patient's symptoms at this time, as she will require more work-up and a nephrology consult.  Due to significantly decreased renal function over a relatively short period of time and other concerning symptoms with associated anemia, I have advised the patient go to the ER at [facility]. Discussed she may need dialysis and the level of reduction in her kidney function is significant enough to pose a serious threat to her health, including risk of death. Recommended we take her directly to the ER, but she declines. She states she has livestock at home and an elderly mother she cares for. I again advised she make arrangements for these things and proceed to the ER as soon as she is able. She agrees to do so.     In the meantime, advised she discontinue her lisinopril, avoid NSAIDs, and stay well hydrated.    See patient instructions below.    At the end of the encounter, I discussed results, diagnosis, medications. Discussed red flags for immediate return to clinic/ER, as well as indications for follow up if no improvement. Patient understood and agreed to plan. Patient was stable for discharge.    30 minutes was spent preparing for the visit and reviewing the patient's chart; getting a history and performing an exam; counseling and providing education to the patient, family, or caregiver; ordering medicines and/or tests; communicating with other healthcare professionals; documenting information in the medical record; interpreting results and sharing that information with the patient, family, or caregiver; and care coordination.        "ICD-10-CM    1. Renal failure, unspecified chronicity  N19       2. Epigastric pain  R10.13 Referral to Acute and Diagnostic Services (Day of diagnostic / First order acute)      3. Weight loss  R63.4 Referral to Acute and Diagnostic Services (Day of diagnostic / First order acute)      4. Decreased appetite  R63.0 Referral to Acute and Diagnostic Services (Day of diagnostic / First order acute)      5. Gastroesophageal reflux disease, unspecified whether esophagitis present  K21.9 Referral to Acute and Diagnostic Services (Day of diagnostic / First order acute)      6. Altered taste- metallic at first, now more sour , \"food just doesn't taste right\"  \"all tastes old\"  R43.2 Referral to Acute and Diagnostic Services (Day of diagnostic / First order acute)      7. Anemia, unspecified type  D64.9             No follow-ups on file.    BERNADINE Mota, SILVIO  [employer]  -----------------------------------------------------------------------------------------------------------------------------------------------------    HPI:  Dulce Ma is a 64 year old female who presents for evaluation of  the following:    Patient reports nausea, dry mouth, sour taste like acid in her mouth(started with metallic taste in mouth for about 1 week). Also notes epigastric pain and \"uneasiness\" in her stomach. This morning she was coughing & gagged & vomited a small amount of clear mucus. These symptoms started about 2 months ago. She has been taking antacids at least nightly otherwise she cannot sleep secondary to heartburn/sour taste in mouth & stomach upset. Antacids have not helped with her nausea, however.    She has lost 8+ lbs of unintentional weight loss over the last 2 months. Notes significant decrease in appetite, can only eat really bland foods secondary to taste buds being \"really off\".    She has no previous history of GERD, gastritis, or PUD in the past. She denies melena, hematochezia, " "hematemesis.    She has also been experiencing some palpitations & irregular heartbeat. She has a Ziopatch on right now and is being evaluated by cardiology for this.    In addition, patient had abnormal renal function labs in Oct 2024 during her routine physical. She was advised to increase water intake & recheck this in 1 month. She never returned for repeat labs.   She also had anemia in October (Hgb 10.2).    She was seen in primary care today & referred to the [facility] for further evaluation. Labs were drawn in the primary care clinic before she came in.    Past Medical History:   Diagnosis Date    Anesthesia complication, initial encounter     was completely out with Midazolam 2 mg IV, Fentanyl 100 micrograms IV that was given at time of colonoscopy 7/2018     Dysplastic nevi     Glaucoma     Diagnosed in Spring 2010    Hypertension goal BP (blood pressure) < 140/90 at age 50     very strong family hx     Lumbago     stiffness in low back    Other malignant neoplasm of skin of trunk, except scrotum 2/02    Dr. Myles Lake, melanoma with negative sentinel node biopsy - no chemo-Dr. Selene Gonzales-derm    Perimenopausal     Routine gyne exam     [facility] ob/gyn - Dr. Isabela Perez     Skin cancer, basal cell     multiple - 3 on nose, treated with interferon intralesionally x 1     Unspecified derangement, shoulder region     s/p horse-riding injury- no problem now    Unspecified musculoskeletal disorders and symptoms referable to neck     compressed vertebrae in neck- bothers pt rarely       Vitals:    01/08/25 1434   BP: 133/52   BP Location: Left arm   Patient Position: Sitting   Cuff Size: Adult Regular   Pulse: 78   Resp: 20   Temp: 97.7  F (36.5  C)   TempSrc: Oral   SpO2: 94%   Weight: 54 kg (119 lb)   Height: 1.6 m (5' 3\")       Physical Exam  Vitals and nursing note reviewed.   Pulmonary:      Effort: Pulmonary effort is normal.   Neurological:      Mental Status: She is alert. "         Labs/Imaging:  Results for orders placed or performed in visit on 01/08/25 (from the past 24 hours)   CBC with platelets and differential    Narrative    The following orders were created for panel order CBC with platelets and differential.  Procedure                               Abnormality         Status                     ---------                               -----------         ------                     CBC with platelets and d...[[id number]]  Abnormal            Final result                 Please view results for these tests on the individual orders.   CBC with platelets and differential   Result Value Ref Range    WBC Count 8.5 4.0 - 11.0 10e3/uL    RBC Count 2.93 (L) 3.80 - 5.20 10e6/uL    Hemoglobin 9.2 (L) 11.7 - 15.7 g/dL    Hematocrit 27.6 (L) 35.0 - 47.0 %    MCV 94 78 - 100 fL    MCH 31.4 26.5 - 33.0 pg    MCHC 33.3 31.5 - 36.5 g/dL    RDW 12.8 10.0 - 15.0 %    Platelet Count 266 150 - 450 10e3/uL    % Neutrophils 67 %    % Lymphocytes 26 %    % Monocytes 5 %    % Eosinophils 2 %    % Basophils 0 %    % Immature Granulocytes 0 %    Absolute Neutrophils 5.7 1.6 - 8.3 10e3/uL    Absolute Lymphocytes 2.2 0.8 - 5.3 10e3/uL    Absolute Monocytes 0.4 0.0 - 1.3 10e3/uL    Absolute Eosinophils 0.2 0.0 - 0.7 10e3/uL    Absolute Basophils 0.0 0.0 - 0.2 10e3/uL    Absolute Immature Granulocytes 0.0 <=0.4 10e3/uL   Comprehensive metabolic panel (BMP + Alb, Alk Phos, ALT, AST, Total. Bili, TP)   Result Value Ref Range    Sodium 135 135 - 145 mmol/L    Potassium 5.2 3.4 - 5.3 mmol/L    Carbon Dioxide (CO2) 20 (L) 22 - 29 mmol/L    Anion Gap 13 7 - 15 mmol/L    Urea Nitrogen 60.1 (H) 8.0 - 23.0 mg/dL    Creatinine 5.86 (H) 0.51 - 0.95 mg/dL    GFR Estimate 8 (L) >60 mL/min/1.73m2    Calcium 10.9 (H) 8.8 - 10.4 mg/dL    Chloride 102 98 - 107 mmol/L    Glucose 101 (H) 70 - 99 mg/dL    Alkaline Phosphatase 48 40 - 150 U/L    AST 21 0 - 45 U/L    ALT 10 0 - 50 U/L    Protein Total 7.6 6.4 - 8.3 g/dL     Albumin 4.2 3.5 - 5.2 g/dL    Bilirubin Total 0.5 <=1.2 mg/dL   Amylase   Result Value Ref Range    Amylase 96 28 - 100 U/L   Lipase   Result Value Ref Range    Lipase 42 13 - 60 U/L     No results found for this or any previous visit (from the past 24 hours).        Patient Instructions   Go to the ER at [facility] for further evaluation and treatment.
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
## 2025-01-08 NOTE — Clinical Note
I advised Dulce go to the ER due to renal failure. She stated she needed to get some things in order first, but would go later. It may help if you also give her a call. Thank you!

## 2025-01-08 NOTE — TELEPHONE ENCOUNTER
Attempt # 1    Called # [phone]     Left a non detailed VM to call back at [phone] and ask for any available Triage Nurse.    Chapin Hay RN  [facility]

## 2025-01-08 NOTE — TELEPHONE ENCOUNTER
Yes ok for same day/provider approval slot tomorrow  at 11:00am with 10:40am arrival time . I put pt in that slot due to no other availability for me tomorrow.     Please inform patient.

## 2025-01-08 NOTE — PROGRESS NOTES
"Acute and Diagnostic Services Clinic Visit    {PROVIDER CHARTING PREFERENCE:263840}    Subjective   Dulce is a 64 year old, presenting for the following health issues:  Abdominal Pain  {(!) Visit Details have not yet been documented.  Please enter Visit Details and then use this list to pull in documentation. (Optional):[id number]}  Abdominal Pain          Abdominal/Flank Pain  Onset/Duration: possaly Nov 2024 when she started metoprolol   Description:   Character: sour taste/metal  Location: epigastric region  Radiation: None  Intensity: no pain just feeling nauseous  Progression of Symptoms:  same  Accompanying Signs & Symptoms:  Fever/chills: no   Gas/Bloating: YES- gas  Nausea: YES  Vomitting: YES  Diarrhea: YES  Constipation:no   Dysuria: no            Hematuria: no            Frequency: no            Incontinence of urine: no   History:            Last bowel movement: today  Trauma: no   Previous similar pain: no    Previous tests done: none           Previous Abdominal surgery: YES- appendix- years ago  Precipitating factors:   Does the pain change with:     Food: YES     Bowel Movement: no     Urination: no              Other factors: no   Therapies tried and outcome:  None    When food last eaten: last night 1/7/25      {ROS Picklists (Optional):515638}      Objective    /52 (BP Location: Left arm, Patient Position: Sitting, Cuff Size: Adult Regular)   Pulse 78   Temp 97.7  F (36.5  C) (Oral)   Resp 20   Ht 1.6 m (5' 3\")   Wt 54 kg (119 lb)   LMP 10/31/2011   SpO2 94%   BMI 21.08 kg/m    Body mass index is 21.08 kg/m .  Physical Exam   {Exam List (Optional):537642}    {Diagnostic Test Results (Optional):137828}        Signed Electronically by: Esther Neal PA-C  {Email feedback regarding this note to [email or web address]   :[id number]}  "

## 2025-01-09 ENCOUNTER — HOSPITAL ENCOUNTER (INPATIENT)
Facility: CLINIC | Age: 65
End: 2025-01-09
Attending: STUDENT IN AN ORGANIZED HEALTH CARE EDUCATION/TRAINING PROGRAM | Admitting: INTERNAL MEDICINE
Payer: COMMERCIAL

## 2025-01-09 ENCOUNTER — APPOINTMENT (OUTPATIENT)
Dept: ULTRASOUND IMAGING | Facility: CLINIC | Age: 65
End: 2025-01-09
Attending: INTERNAL MEDICINE
Payer: COMMERCIAL

## 2025-01-09 VITALS
OXYGEN SATURATION: 91 % | BODY MASS INDEX: 20.68 KG/M2 | TEMPERATURE: 98.2 F | HEART RATE: 84 BPM | SYSTOLIC BLOOD PRESSURE: 145 MMHG | DIASTOLIC BLOOD PRESSURE: 70 MMHG | HEIGHT: 63 IN | WEIGHT: 116.7 LBS | RESPIRATION RATE: 17 BRPM

## 2025-01-09 DIAGNOSIS — N17.9 ACUTE RENAL FAILURE SUPERIMPOSED ON CHRONIC KIDNEY DISEASE, UNSPECIFIED ACUTE RENAL FAILURE TYPE, UNSPECIFIED CKD STAGE: ICD-10-CM

## 2025-01-09 DIAGNOSIS — N18.9 ACUTE RENAL FAILURE SUPERIMPOSED ON CHRONIC KIDNEY DISEASE, UNSPECIFIED ACUTE RENAL FAILURE TYPE, UNSPECIFIED CKD STAGE: ICD-10-CM

## 2025-01-09 DIAGNOSIS — N39.0 ACUTE UTI: ICD-10-CM

## 2025-01-09 DIAGNOSIS — R11.0 NAUSEA: Primary | ICD-10-CM

## 2025-01-09 LAB
ALBUMIN MFR UR ELPH: 52 MG/DL
ALBUMIN SERPL BCG-MCNC: 4.3 G/DL (ref 3.5–5.2)
ALBUMIN SERPL BCG-MCNC: 4.3 G/DL (ref 3.5–5.2)
ALBUMIN UR-MCNC: 30 MG/DL
ALP SERPL-CCNC: 47 U/L (ref 40–150)
ALT SERPL W P-5'-P-CCNC: 9 U/L (ref 0–50)
ANION GAP SERPL CALCULATED.3IONS-SCNC: 15 MMOL/L (ref 7–15)
ANION GAP SERPL CALCULATED.3IONS-SCNC: 16 MMOL/L (ref 7–15)
APPEARANCE UR: CLEAR
AST SERPL W P-5'-P-CCNC: 21 U/L (ref 0–45)
ATRIAL RATE - MUSE: 85 BPM
BACTERIA #/AREA URNS HPF: ABNORMAL /HPF
BASOPHILS # BLD AUTO: 0 10E3/UL (ref 0–0.2)
BASOPHILS NFR BLD AUTO: 1 %
BILIRUB SERPL-MCNC: 0.6 MG/DL
BILIRUB UR QL STRIP: NEGATIVE
BUN SERPL-MCNC: 65.7 MG/DL (ref 8–23)
BUN SERPL-MCNC: 66.4 MG/DL (ref 8–23)
CALCIUM SERPL-MCNC: 10.6 MG/DL (ref 8.8–10.4)
CALCIUM SERPL-MCNC: 10.7 MG/DL (ref 8.8–10.4)
CHLORIDE SERPL-SCNC: 104 MMOL/L (ref 98–107)
CHLORIDE SERPL-SCNC: 104 MMOL/L (ref 98–107)
COLOR UR AUTO: ABNORMAL
CREAT SERPL-MCNC: 6.6 MG/DL (ref 0.51–0.95)
CREAT SERPL-MCNC: 6.63 MG/DL (ref 0.51–0.95)
CREAT UR-MCNC: 102.2 MG/DL
DIASTOLIC BLOOD PRESSURE - MUSE: NORMAL MMHG
EGFRCR SERPLBLD CKD-EPI 2021: 6 ML/MIN/1.73M2
EGFRCR SERPLBLD CKD-EPI 2021: 7 ML/MIN/1.73M2
EOSINOPHIL # BLD AUTO: 0.1 10E3/UL (ref 0–0.7)
EOSINOPHIL NFR BLD AUTO: 1 %
ERYTHROCYTE [DISTWIDTH] IN BLOOD BY AUTOMATED COUNT: 12.8 % (ref 10–15)
GLUCOSE SERPL-MCNC: 100 MG/DL (ref 70–99)
GLUCOSE SERPL-MCNC: 101 MG/DL (ref 70–99)
GLUCOSE UR STRIP-MCNC: NEGATIVE MG/DL
HBV SURFACE AB SERPL IA-ACNC: <3.5 M[IU]/ML
HBV SURFACE AB SERPL IA-ACNC: NONREACTIVE M[IU]/ML
HBV SURFACE AG SERPL QL IA: NONREACTIVE
HCO3 SERPL-SCNC: 17 MMOL/L (ref 22–29)
HCO3 SERPL-SCNC: 19 MMOL/L (ref 22–29)
HCT VFR BLD AUTO: 27.5 % (ref 35–47)
HCV AB SERPL QL IA: NONREACTIVE
HGB BLD-MCNC: 9.1 G/DL (ref 11.7–15.7)
HGB UR QL STRIP: ABNORMAL
HOLD SPECIMEN: NORMAL
HOLD SPECIMEN: NORMAL
IMM GRANULOCYTES # BLD: 0 10E3/UL
IMM GRANULOCYTES NFR BLD: 0 %
INTERPRETATION ECG - MUSE: NORMAL
KETONES UR STRIP-MCNC: NEGATIVE MG/DL
LEUKOCYTE ESTERASE UR QL STRIP: ABNORMAL
LYMPHOCYTES # BLD AUTO: 1.7 10E3/UL (ref 0.8–5.3)
LYMPHOCYTES NFR BLD AUTO: 21 %
MCH RBC QN AUTO: 30.6 PG (ref 26.5–33)
MCHC RBC AUTO-ENTMCNC: 33.1 G/DL (ref 31.5–36.5)
MCV RBC AUTO: 93 FL (ref 78–100)
MONOCYTES # BLD AUTO: 0.3 10E3/UL (ref 0–1.3)
MONOCYTES NFR BLD AUTO: 4 %
MUCOUS THREADS #/AREA URNS LPF: PRESENT /LPF
NEUTROPHILS # BLD AUTO: 5.8 10E3/UL (ref 1.6–8.3)
NEUTROPHILS NFR BLD AUTO: 73 %
NITRATE UR QL: NEGATIVE
NRBC # BLD AUTO: 0 10E3/UL
NRBC BLD AUTO-RTO: 0 /100
P AXIS - MUSE: 79 DEGREES
PH UR STRIP: 5.5 [PH] (ref 5–7)
PHOSPHATE SERPL-MCNC: 4.5 MG/DL (ref 2.5–4.5)
PLATELET # BLD AUTO: 264 10E3/UL (ref 150–450)
POTASSIUM SERPL-SCNC: 5 MMOL/L (ref 3.4–5.3)
POTASSIUM SERPL-SCNC: 5 MMOL/L (ref 3.4–5.3)
PR INTERVAL - MUSE: 154 MS
PROT SERPL-MCNC: 7.6 G/DL (ref 6.4–8.3)
PROT/CREAT 24H UR: 0.51 MG/MG CR (ref 0–0.2)
QRS DURATION - MUSE: 82 MS
QT - MUSE: 396 MS
QTC - MUSE: 471 MS
R AXIS - MUSE: 65 DEGREES
RBC # BLD AUTO: 2.97 10E6/UL (ref 3.8–5.2)
RBC URINE: 3 /HPF
SODIUM SERPL-SCNC: 137 MMOL/L (ref 135–145)
SODIUM SERPL-SCNC: 138 MMOL/L (ref 135–145)
SP GR UR STRIP: 1.01 (ref 1–1.03)
SQUAMOUS EPITHELIAL: <1 /HPF
SYSTOLIC BLOOD PRESSURE - MUSE: NORMAL MMHG
T AXIS - MUSE: 270 DEGREES
TOTAL PROTEIN SERUM FOR ELP: 6.5 G/DL (ref 6.4–8.3)
UROBILINOGEN UR STRIP-MCNC: NORMAL MG/DL
VENTRICULAR RATE- MUSE: 85 BPM
WBC # BLD AUTO: 8 10E3/UL (ref 4–11)
WBC URINE: 23 /HPF

## 2025-01-09 PROCEDURE — 84166 PROTEIN E-PHORESIS/URINE/CSF: CPT | Performed by: STUDENT IN AN ORGANIZED HEALTH CARE EDUCATION/TRAINING PROGRAM

## 2025-01-09 PROCEDURE — 84450 TRANSFERASE (AST) (SGOT): CPT | Performed by: STUDENT IN AN ORGANIZED HEALTH CARE EDUCATION/TRAINING PROGRAM

## 2025-01-09 PROCEDURE — 258N000003 HC RX IP 258 OP 636: Performed by: STUDENT IN AN ORGANIZED HEALTH CARE EDUCATION/TRAINING PROGRAM

## 2025-01-09 PROCEDURE — 258N000003 HC RX IP 258 OP 636: Performed by: INTERNAL MEDICINE

## 2025-01-09 PROCEDURE — 76770 US EXAM ABDO BACK WALL COMP: CPT

## 2025-01-09 PROCEDURE — 258N000003 HC RX IP 258 OP 636: Performed by: PHYSICIAN ASSISTANT

## 2025-01-09 PROCEDURE — 86036 ANCA SCREEN EACH ANTIBODY: CPT | Performed by: INTERNAL MEDICINE

## 2025-01-09 PROCEDURE — 250N000009 HC RX 250: Performed by: INTERNAL MEDICINE

## 2025-01-09 PROCEDURE — 86803 HEPATITIS C AB TEST: CPT | Performed by: INTERNAL MEDICINE

## 2025-01-09 PROCEDURE — 86160 COMPLEMENT ANTIGEN: CPT | Performed by: INTERNAL MEDICINE

## 2025-01-09 PROCEDURE — 86706 HEP B SURFACE ANTIBODY: CPT | Performed by: INTERNAL MEDICINE

## 2025-01-09 PROCEDURE — 84155 ASSAY OF PROTEIN SERUM: CPT | Performed by: INTERNAL MEDICINE

## 2025-01-09 PROCEDURE — 84100 ASSAY OF PHOSPHORUS: CPT | Performed by: INTERNAL MEDICINE

## 2025-01-09 PROCEDURE — 84156 ASSAY OF PROTEIN URINE: CPT | Performed by: INTERNAL MEDICINE

## 2025-01-09 PROCEDURE — 36415 COLL VENOUS BLD VENIPUNCTURE: CPT | Performed by: INTERNAL MEDICINE

## 2025-01-09 PROCEDURE — 84165 PROTEIN E-PHORESIS SERUM: CPT | Mod: 26 | Performed by: STUDENT IN AN ORGANIZED HEALTH CARE EDUCATION/TRAINING PROGRAM

## 2025-01-09 PROCEDURE — 86038 ANTINUCLEAR ANTIBODIES: CPT | Performed by: INTERNAL MEDICINE

## 2025-01-09 PROCEDURE — 250N000013 HC RX MED GY IP 250 OP 250 PS 637: Performed by: STUDENT IN AN ORGANIZED HEALTH CARE EDUCATION/TRAINING PROGRAM

## 2025-01-09 PROCEDURE — 85004 AUTOMATED DIFF WBC COUNT: CPT | Performed by: STUDENT IN AN ORGANIZED HEALTH CARE EDUCATION/TRAINING PROGRAM

## 2025-01-09 PROCEDURE — 99285 EMERGENCY DEPT VISIT HI MDM: CPT | Mod: 25

## 2025-01-09 PROCEDURE — 99223 1ST HOSP IP/OBS HIGH 75: CPT | Performed by: PHYSICIAN ASSISTANT

## 2025-01-09 PROCEDURE — 120N000001 HC R&B MED SURG/OB

## 2025-01-09 PROCEDURE — 86225 DNA ANTIBODY NATIVE: CPT | Performed by: INTERNAL MEDICINE

## 2025-01-09 PROCEDURE — 84166 PROTEIN E-PHORESIS/URINE/CSF: CPT | Mod: 26 | Performed by: STUDENT IN AN ORGANIZED HEALTH CARE EDUCATION/TRAINING PROGRAM

## 2025-01-09 PROCEDURE — 86255 FLUORESCENT ANTIBODY SCREEN: CPT | Performed by: INTERNAL MEDICINE

## 2025-01-09 PROCEDURE — 84155 ASSAY OF PROTEIN SERUM: CPT | Performed by: STUDENT IN AN ORGANIZED HEALTH CARE EDUCATION/TRAINING PROGRAM

## 2025-01-09 PROCEDURE — 84165 PROTEIN E-PHORESIS SERUM: CPT | Mod: TC | Performed by: STUDENT IN AN ORGANIZED HEALTH CARE EDUCATION/TRAINING PROGRAM

## 2025-01-09 PROCEDURE — 87340 HEPATITIS B SURFACE AG IA: CPT | Performed by: INTERNAL MEDICINE

## 2025-01-09 PROCEDURE — 87186 SC STD MICRODIL/AGAR DIL: CPT | Performed by: STUDENT IN AN ORGANIZED HEALTH CARE EDUCATION/TRAINING PROGRAM

## 2025-01-09 PROCEDURE — 87086 URINE CULTURE/COLONY COUNT: CPT | Performed by: STUDENT IN AN ORGANIZED HEALTH CARE EDUCATION/TRAINING PROGRAM

## 2025-01-09 PROCEDURE — 81001 URINALYSIS AUTO W/SCOPE: CPT | Performed by: STUDENT IN AN ORGANIZED HEALTH CARE EDUCATION/TRAINING PROGRAM

## 2025-01-09 PROCEDURE — 93005 ELECTROCARDIOGRAM TRACING: CPT

## 2025-01-09 PROCEDURE — 36415 COLL VENOUS BLD VENIPUNCTURE: CPT | Performed by: STUDENT IN AN ORGANIZED HEALTH CARE EDUCATION/TRAINING PROGRAM

## 2025-01-09 RX ORDER — SODIUM CHLORIDE 9 MG/ML
INJECTION, SOLUTION INTRAVENOUS CONTINUOUS
Status: DISCONTINUED | OUTPATIENT
Start: 2025-01-09 | End: 2025-01-15 | Stop reason: HOSPADM

## 2025-01-09 RX ORDER — NETARSUDIL AND LATANOPROST OPHTHALMIC SOLUTION, 0.02%/0.005% .2; .05 MG/ML; MG/ML
1 SOLUTION/ DROPS OPHTHALMIC; TOPICAL AT BEDTIME
COMMUNITY
Start: 2022-03-14

## 2025-01-09 RX ORDER — LIDOCAINE 40 MG/G
CREAM TOPICAL
Status: DISCONTINUED | OUTPATIENT
Start: 2025-01-09 | End: 2025-01-15 | Stop reason: HOSPADM

## 2025-01-09 RX ORDER — ATORVASTATIN CALCIUM 40 MG/1
40 TABLET, FILM COATED ORAL DAILY
COMMUNITY

## 2025-01-09 RX ORDER — AMOXICILLIN 250 MG
2 CAPSULE ORAL 2 TIMES DAILY PRN
Status: DISCONTINUED | OUTPATIENT
Start: 2025-01-09 | End: 2025-01-15 | Stop reason: HOSPADM

## 2025-01-09 RX ORDER — CEPHALEXIN 500 MG/1
500 CAPSULE ORAL ONCE
Status: COMPLETED | OUTPATIENT
Start: 2025-01-09 | End: 2025-01-09

## 2025-01-09 RX ORDER — DORZOLAMIDE HYDROCHLORIDE AND TIMOLOL MALEATE 20; 5 MG/ML; MG/ML
1 SOLUTION/ DROPS OPHTHALMIC 2 TIMES DAILY
COMMUNITY

## 2025-01-09 RX ORDER — ACETAMINOPHEN 325 MG/1
650 TABLET ORAL EVERY 4 HOURS PRN
Status: DISCONTINUED | OUTPATIENT
Start: 2025-01-09 | End: 2025-01-15 | Stop reason: HOSPADM

## 2025-01-09 RX ORDER — ONDANSETRON 4 MG/1
4 TABLET, ORALLY DISINTEGRATING ORAL EVERY 6 HOURS PRN
Status: DISCONTINUED | OUTPATIENT
Start: 2025-01-09 | End: 2025-01-15 | Stop reason: HOSPADM

## 2025-01-09 RX ORDER — AMOXICILLIN 250 MG
1 CAPSULE ORAL 2 TIMES DAILY PRN
Status: DISCONTINUED | OUTPATIENT
Start: 2025-01-09 | End: 2025-01-15 | Stop reason: HOSPADM

## 2025-01-09 RX ORDER — ONDANSETRON 2 MG/ML
4 INJECTION INTRAMUSCULAR; INTRAVENOUS EVERY 6 HOURS PRN
Status: DISCONTINUED | OUTPATIENT
Start: 2025-01-09 | End: 2025-01-15 | Stop reason: HOSPADM

## 2025-01-09 RX ORDER — ACETAMINOPHEN 650 MG/1
650 SUPPOSITORY RECTAL EVERY 4 HOURS PRN
Status: DISCONTINUED | OUTPATIENT
Start: 2025-01-09 | End: 2025-01-15 | Stop reason: HOSPADM

## 2025-01-09 RX ADMIN — SODIUM CHLORIDE 1000 ML: 9 INJECTION, SOLUTION INTRAVENOUS at 11:13

## 2025-01-09 RX ADMIN — SODIUM BICARBONATE: 84 INJECTION, SOLUTION INTRAVENOUS at 17:18

## 2025-01-09 RX ADMIN — SODIUM CHLORIDE: 9 INJECTION, SOLUTION INTRAVENOUS at 15:53

## 2025-01-09 RX ADMIN — CEPHALEXIN 500 MG: 500 CAPSULE ORAL at 15:53

## 2025-01-09 ASSESSMENT — ACTIVITIES OF DAILY LIVING (ADL)
ADLS_ACUITY_SCORE: 43
ADLS_ACUITY_SCORE: 26
ADLS_ACUITY_SCORE: 26
ADLS_ACUITY_SCORE: 43
ADLS_ACUITY_SCORE: 41
ADLS_ACUITY_SCORE: 43
ADLS_ACUITY_SCORE: 43
ADLS_ACUITY_SCORE: 41
ADLS_ACUITY_SCORE: 43
ADLS_ACUITY_SCORE: 41
ADLS_ACUITY_SCORE: 43

## 2025-01-09 ASSESSMENT — COLUMBIA-SUICIDE SEVERITY RATING SCALE - C-SSRS
6. HAVE YOU EVER DONE ANYTHING, STARTED TO DO ANYTHING, OR PREPARED TO DO ANYTHING TO END YOUR LIFE?: NO
2. HAVE YOU ACTUALLY HAD ANY THOUGHTS OF KILLING YOURSELF IN THE PAST MONTH?: NO
1. IN THE PAST MONTH, HAVE YOU WISHED YOU WERE DEAD OR WISHED YOU COULD GO TO SLEEP AND NOT WAKE UP?: NO

## 2025-01-09 NOTE — ED PROVIDER NOTES
Emergency Department Note      History of Present Illness     Chief Complaint   Abnormal Labs      HPI   Dulce Ma is a 64 year old female with a past medical history significant for HTN, HLD, and skin cancer who presents to the emergency department for evaluation of abnormal labs. She was seen by her PCP yesterday and had labs done at this time, which showed abnormalities in her renal function. She is experiencing mild nausea and abdominal discomfort at bedside which she describes as a pressure and fullness in her abdomen. She has been been able to produce urine and does endorse mild urinary frequency and urgency as she gets up several times throughout the night in order to urinate. She is currently taking lisinopril and notes that her dosage was recently decreased. She was recently started on metoprolol as well as a new medication to treat her hypercholesterolemia, although she has not started this yet. She does not typically take ibuprofen or anti inflammatory medications such as NSAIDS on a regular basis. She does feel as though she developed some cold like symptoms after starting metoprolol. She has been tolerating foods and fluids without difficulty and denies any fever, chest pain, or shortness of breath. She does endorse some alcohol use on occasion, but denies regular or excessive intake of alcohol or smoking history. She does have a Zio Patch on as she was recently found to have a heart arrhythmia and they have been monitoring this. She is not anticoagulated, and denies personal history significant for diabetes, with no family history of renal failure.     Independent Historian   None    Review of External Notes   I reviewed the internal medicine note from yesterday, 1/8/25, for which the patient was seen for acute renal failure.  Past Medical History   Medical History and Problem List   Dysplastic nevi  Glaucoma  Hypertension  Lumbago  Perimenopausal  Skin cancer, basal cell  Chronic  constipation  Lichen sclerosus et atrophicus of the vulva  Hyperlipidemia  Vitamin D deficiency  Infection due to 2019 coronavirus  Rotator cuff syndrome  Impingement syndrome  Irregular heartbeat  Paroxysmal supraventricular tachycardia    Medications   lisinopril  metoprolol succinate  Timolol  Oxycodone acetaminophen    Surgical History   Appendectomy  Breast biopsy  Colonoscopy  ORIF right ring finger  Malignant melanoma procedure- excision  Skin biopsy  Physical Exam     Patient Vitals for the past 24 hrs:   BP Temp Temp src Pulse Resp SpO2 Weight   01/09/25 1337 127/58 97.7  F (36.5  C) Oral -- 16 97 % --   01/09/25 1333 131/62 -- -- -- -- 99 % --   01/09/25 1023 (!) 162/62 96.9  F (36.1  C) Temporal 75 18 97 % 53.9 kg (118 lb 13.3 oz)     Physical Exam  GENERAL: Patient well-appearing  HEAD: Atraumatic.  NECK: No rigidity  CV: RRR, no murmurs, rubs or gallops  PULM: CTAB with good aeration; no retractions, rales, rhonchi, or wheezing  ABD: Soft, nontender, nondistended, no guarding  DERM: No rash. Skin warm and dry  EXTREMITY: Moving all extremities without difficulty. No calf tenderness or peripheral edema  VASCULAR: Symmetric pulses bilaterally      Diagnostics     Lab Results   Labs Ordered and Resulted from Time of ED Arrival to Time of ED Departure   COMPREHENSIVE METABOLIC PANEL - Abnormal       Result Value    Sodium 138      Potassium 5.0      Carbon Dioxide (CO2) 19 (*)     Anion Gap 15      Urea Nitrogen 65.7 (*)     Creatinine 6.60 (*)     GFR Estimate 7 (*)     Calcium 10.7 (*)     Chloride 104      Glucose 100 (*)     Alkaline Phosphatase 47      AST 21      ALT 9      Protein Total 7.6      Albumin 4.3      Bilirubin Total 0.6     ROUTINE UA WITH MICROSCOPIC REFLEX TO CULTURE - Abnormal    Color Urine Straw      Appearance Urine Clear      Glucose Urine Negative      Bilirubin Urine Negative      Ketones Urine Negative      Specific Gravity Urine 1.011      Blood Urine Trace (*)     pH Urine 5.5       Protein Albumin Urine 30 (*)     Urobilinogen Urine Normal      Nitrite Urine Negative      Leukocyte Esterase Urine Small (*)     Bacteria Urine Few (*)     Mucus Urine Present (*)     RBC Urine 3 (*)     WBC Urine 23 (*)     Squamous Epithelials Urine <1     CBC WITH PLATELETS AND DIFFERENTIAL - Abnormal    WBC Count 8.0      RBC Count 2.97 (*)     Hemoglobin 9.1 (*)     Hematocrit 27.5 (*)     MCV 93      MCH 30.6      MCHC 33.1      RDW 12.8      Platelet Count 264      % Neutrophils 73      % Lymphocytes 21      % Monocytes 4      % Eosinophils 1      % Basophils 1      % Immature Granulocytes 0      NRBCs per 100 WBC 0      Absolute Neutrophils 5.8      Absolute Lymphocytes 1.7      Absolute Monocytes 0.3      Absolute Eosinophils 0.1      Absolute Basophils 0.0      Absolute Immature Granulocytes 0.0      Absolute NRBCs 0.0     EXTRA PURPLE TOP ON ICE    Hold Specimen JIC     URINE CULTURE       Imaging   US Renal Complete w Arterial Duplex    (Results Pending)       EKG   ECG results from 01/09/25   EKG 12-lead, tracing only     Value    Systolic Blood Pressure     Diastolic Blood Pressure     Ventricular Rate 85    Atrial Rate 85    AZ Interval 154    QRS Duration 82        QTc 471    P Axis 79    R AXIS 65    T Axis 270    Interpretation ECG      Sinus rhythm with Premature atrial complexes  Marked ST abnormality, possible inferior subendocardial injury  Abnormal ECG  When compared with ECG of 30-Oct-2024 10:03, (unconfirmed)  Premature atrial complexes are now Present  T wave inversion more evident in Lateral leads  Unconfirmed report - interpretation of this ECG is computer generated - see medical record for final interpretation  Confirmed by - EMERGENCY ROOM, PHYSICIAN ([id number]),  MARIAH SOLANO ([id number]) on 1/9/2025 10:53:52 AM         Independent Interpretation   None    ED Course      Medications Administered   Medications   Netarsudil-Latanoprost 0.02-0.005 % SOLN 1 drop - PATIENT'S OWN  SUPPLY (has no administration in time range)   metoprolol succinate ER (TOPROL-XL) 24 hr half-tab 12.5 mg (has no administration in time range)   lidocaine 1 % 0.1-1 mL (has no administration in time range)   lidocaine (LMX4) cream (has no administration in time range)   sodium chloride (PF) 0.9% PF flush 3 mL (3 mLs Intracatheter Not Given 1/9/25 1556)   sodium chloride (PF) 0.9% PF flush 3 mL (has no administration in time range)   senna-docusate (SENOKOT-S/PERICOLACE) 8.6-50 MG per tablet 1 tablet (has no administration in time range)     Or   senna-docusate (SENOKOT-S/PERICOLACE) 8.6-50 MG per tablet 2 tablet (has no administration in time range)   sodium chloride 0.9 % infusion ( Intravenous $New Bag 1/9/25 1553)   acetaminophen (TYLENOL) tablet 650 mg (has no administration in time range)     Or   acetaminophen (TYLENOL) Suppository 650 mg (has no administration in time range)   ondansetron (ZOFRAN ODT) ODT tab 4 mg (has no administration in time range)     Or   ondansetron (ZOFRAN) injection 4 mg (has no administration in time range)   sodium chloride 0.9% BOLUS 1,000 mL (1,000 mLs Intravenous $New Bag 1/9/25 1113)   cephALEXin (KEFLEX) capsule 500 mg (500 mg Oral $Given 1/9/25 1553)       Procedures   Procedures     Discussion of Management   Admitting Hospitalist, Dr. Francisco Correa    ED Course   ED Course as of 01/09/25 1614   Thu Jan 09, 2025   1052 I obtained history and examined the patient as noted above.     1312 I spoke with Dr. Francisco Correa from [facility], who accepts the patient for admission.       Additional Documentation  None    Medical Decision Making / Diagnosis     CMS Diagnoses: None    MIPS       None    MDM   Dulce Ma is a 64 year old female sent in for labs showing new onset renal failure.  Patient has mild nausea and stomach irritation but otherwise is grossly asymptomatic.  Her abdominal assessment is benign without any focal tenderness.  I do not think she requires emergent  CT scan of the abdomen.  Labs showing baseline anemia.  CMP with renal failure.  Potassium is 5.  EKG-she has evidence of subendocardial ischemia with T wave inversions and ST depressions in multiple leads, however, it looks grossly unchanged from prior.  Urine later returned does have 23 white blood cells.  Not actively complaining of urinary symptoms.  However she had a urinalysis done in October that had fewer white blood cells and grew out E. coli.  I would not want a urinary tract infection to further exacerbate her renal failure.  Therefore we will manage with Keflex.  She had a penicillin allergy, but PENFAST rule negative, therefore reasonable to try Keflex.  It was given.  Discussed with hospitalist who will consult nephrology for further evaluation workup.  Do not think she requires emergent dialysis.  Patient still makes urine.    Disposition   The patient was admitted to the hospital.     Diagnosis     ICD-10-CM    1. Acute renal failure superimposed on chronic kidney disease, unspecified acute renal failure type, unspecified CKD stage  N17.9     N18.9       2. Acute UTI  N39.0            Discharge Medications   New Prescriptions    No medications on file     Scribe Disclosure:  I, Iris Richey, am serving as a scribe at 12:37 PM on 1/9/2025 to document services personally performed by Vlad Ang MD based on my observations and the provider's statements to me.        Vlad Ang MD  01/09/25 [id number]

## 2025-01-09 NOTE — PHARMACY-ADMISSION MEDICATION HISTORY
Pharmacist Admission Medication History    Admission medication history is complete. The information provided in this note is only as accurate as the sources available at the time of the update.    Information Source(s): Patient via in-person    Pertinent Information: Patient has both eye drops with her in her purse today; HAS NOT started new Rx for atorvastatin    Changes made to PTA medication list:  Added: Rx eye drops  Deleted: lisinopril (stopped yesterday at clinic visit)  Changed: None    Allergies reviewed with patient and updates made in EHR: yes  Medication History Completed By: Glynn Diaz RPH 1/9/2025 1:56 PM    PTA Med List   Medication Sig Note Last Dose/Taking    atorvastatin (LIPITOR) 40 MG tablet Take 40 mg by mouth daily.  Taking    clobetasol propionate (TEMOVATE) 0.05 % external cream Apply very scant amount to inner labia daily for up to 14 days at a time -ok to use every 2 months or so  More than a month    dorzolamide-timolol (COSOPT) 2-0.5 % ophthalmic solution Place 1 drop into both eyes 2 times daily.  1/9/2025 Morning    metoprolol succinate ER (TOPROL XL) 25 MG 24 hr tablet Take 0.5 tablets (12.5 mg) by mouth daily. 1/9/2025: QHS 1/8/2025 Bedtime    Netarsudil-Latanoprost (ROCKLATAN) 0.02-0.005 % SOLN Place 1 drop into both eyes at bedtime.  Taking

## 2025-01-09 NOTE — ED NOTES
[facility]  ED Nurse Handoff Report    ED Chief complaint: Abnormal Labs  . ED Diagnosis:   Final diagnoses:   Acute renal failure superimposed on chronic kidney disease, unspecified acute renal failure type, unspecified CKD stage       Allergies:   Allergies   Allergen Reactions    Amoxicillin      hives    Benzoyl Peroxide      swelling    Ibuprofen      over long duration dev. hives    Penicillins      hives       Code Status: Full Code    Activity level - Baseline/Home:  independent.  Activity Level - Current:   independent.   Lift room needed: No.   Bariatric: No   Needed: No   Isolation: No.   Infection: Not Applicable.     Respiratory status: Room air    Vital Signs (within 30 minutes):   Vitals:    01/09/25 1023   BP: (!) 162/62   Pulse: 75   Resp: 18   Temp: 96.9  F (36.1  C)   TempSrc: Temporal   SpO2: 97%   Weight: 53.9 kg (118 lb 13.3 oz)       Cardiac Rhythm:  ,      Pain level:    Patient confused: No.   Patient Falls Risk: bed/chair alarm on.   Elimination Status: Has voided     Patient Report - Initial Complaint: Abnormal labs.     Focused Assessment: HPI   Dulce Ma is a 64 year old female with a past medical history significant for HTN, HLD, and skin cancer who presents to the emergency department for evaluation of abnormal labs. She was seen by her PCP yesterday and had labs done at this time, which showed abnormalities in her renal function. She is experiencing mild nausea and abdominal discomfort at bedside which she describes as a pressure and fullness in her abdomen. She has been been able to produce urine and does endorse mild urinary frequency and urgency as she gets up several times throughout the night in order to urinate. She is currently taking lisinopril and notes that her dosage was recently decreased. She was recently started on metoprolol as well as a new medication to treat her hypercholesterolemia, although she has not started this yet. She  does not typically take ibuprofen or anti inflammatory medications such as NSAIDS on a regular basis. She does feel as though she developed some cold like symptoms after starting metoprolol. She has been tolerating foods and fluids without difficulty and denies any fever, chest pain, or shortness of breath. She does endorse some alcohol use on occasion, but denies regular or excessive intake of alcohol or smoking history. She does have a Zio Patch on as she was recently found to have a heart arrhythmia and they have been monitoring this. She is not anticoagulated, and denies personal history significant for diabetes, with no family history of renal failure.      Abnormal Results:   Labs Ordered and Resulted from Time of ED Arrival to Time of ED Departure   COMPREHENSIVE METABOLIC PANEL - Abnormal       Result Value    Sodium 138      Potassium 5.0      Carbon Dioxide (CO2) 19 (*)     Anion Gap 15      Urea Nitrogen 65.7 (*)     Creatinine 6.60 (*)     GFR Estimate 7 (*)     Calcium 10.7 (*)     Chloride 104      Glucose 100 (*)     Alkaline Phosphatase 47      AST 21      ALT 9      Protein Total 7.6      Albumin 4.3      Bilirubin Total 0.6     CBC WITH PLATELETS AND DIFFERENTIAL - Abnormal    WBC Count 8.0      RBC Count 2.97 (*)     Hemoglobin 9.1 (*)     Hematocrit 27.5 (*)     MCV 93      MCH 30.6      MCHC 33.1      RDW 12.8      Platelet Count 264      % Neutrophils 73      % Lymphocytes 21      % Monocytes 4      % Eosinophils 1      % Basophils 1      % Immature Granulocytes 0      NRBCs per 100 WBC 0      Absolute Neutrophils 5.8      Absolute Lymphocytes 1.7      Absolute Monocytes 0.3      Absolute Eosinophils 0.1      Absolute Basophils 0.0      Absolute Immature Granulocytes 0.0      Absolute NRBCs 0.0     EXTRA PURPLE TOP ON ICE    Hold Specimen JIC     ROUTINE UA WITH MICROSCOPIC REFLEX TO CULTURE        No orders to display       Treatments provided: See MAR  Family Comments: NA  OBS brochure/video  discussed/provided to patient:  N/A  ED Medications:   Medications   sodium chloride 0.9% BOLUS 1,000 mL (1,000 mLs Intravenous $New Bag 1/9/25 1113)       Drips infusing:  No  For the majority of the shift this patient was Green.   Interventions performed were NA.    Sepsis treatment initiated: No    Cares/treatment/interventions/medications to be completed following ED care:     ED Nurse Name: Nicol Tabor RN  1:26 PM

## 2025-01-09 NOTE — PROGRESS NOTES
I reviewed the case with Dr. Correa.    64 y.o who with hypertension and h/o skin cancer, who was sent in for severe acute kidney injury of unclear etiology.   Scr was quite high at 2.46 mg/dl.   She had FUV with PCP, Dr. Neal, on 1/8  Scr was noted to be 5.86 mg/dl.  She was referred to the hospital.     Plan:  # Renal ultrasound. Insert michele if there is obstruction.  # strict I/O  # Change NS infusion to D5W + 150 meq bicarb at 125 ml/hr  # Check UPCR, SPEP/UPEP, complements, MONICA, ds DNA, ANCA, anti-GBM hep B/C  # Agree with holding ACEi  # Hold metoprolol due to bradycaria  # Low K diet  # Dose all medications to eGFR of <10 ml/min    Full consult to follow tomorrow.

## 2025-01-09 NOTE — CARE PLAN
[facility]    ED Boarding Nurse Handoff Addendum Report:    Date/time: 1/9/2025, 2:04 PM    Activity Level: standby    Fall Risk: No    Active Infusions: None    Current Meds Due: None    Current care needs: None    Oxygen requirements (liters/min and/or FiO2): None    Respiratory status: Room air    Vital signs (within last 30 minutes):    Vitals:    01/09/25 1023 01/09/25 1333 01/09/25 1337   BP: (!) 162/62 131/62 127/58   BP Location:   Right arm   Pulse: 75     Resp: 18  16   Temp: 96.9  F (36.1  C)  97.7  F (36.5  C)   TempSrc: Temporal  Oral   SpO2: 97% 99% 97%   Weight: 53.9 kg (118 lb 13.3 oz)       /58 (BP Location: Right arm)   Pulse 75   Temp 97.7  F (36.5  C) (Oral)   Resp 16   Wt 53.9 kg (118 lb 13.3 oz)   LMP 10/31/2011   SpO2 97%   BMI 21.05 kg/m        Focused assessment within last 30 minutes:    Pt A&O X4, VSS on RA. Presents to ED after clinic visit showed abnormal labs. (Creatinine 6.6, GFR 7), will be admitted for nephrology consult. IND w/ mobility. 1L fluid bolus in ED. Denies pain. LS clear. D5W with sodium bicarbonate running at 125 mL/hr. Keflex given. Trace edema noted to BLE. UA collected. Scheduled for renal US, to be NPO until ultrasound is done.     ED Boarding Nurse name: Elle Tejada RN    RECEIVING UNIT ED HANDOFF REVIEW    Above ED Nurse Handoff Report was reviewed: Yes  Reviewed by: Sukhjinder Calderon RN on January 9, 2025 at 5:36 PM   I Timo called the ED to inform them the note was read: Yes, nurse was paged.

## 2025-01-09 NOTE — ED TRIAGE NOTES
Patient sent in from her PCP after finding out her kidneys are failing. Told to come here for further testing. Was previously having weight loss, stomach issues,  and cardiac complaints. Currently wearing a zio patch.  ABCs intact. VSS.

## 2025-01-09 NOTE — H&P
[facility]    Hospitalist History and Physical    Name: Dulce Ma    MRN: [id number]  YOB: 1960    Age: 64 year old  Date of Admission:  1/9/2025  Date of Service (when I saw the patient): 01/09/25    Assessment & Plan   Dulce Ma is a 64 year old female with PMH significant for hypertension, hyperlipidemia, and skin cancer who presents to the ED on 1/9/2025 for evaluation of worsening kidney function.    ED workup reveals: initially hypertensive otherwise VSS, Hgb of 9.1, CO2 of 19, BUN of 65.7, creatinine of 6.60, calcium of 10.7, glucose of 100, UA shows trace blood, 30 of protein, small LE, few bacteria, 3 RBC, and 23 WBC, urine culture in process, and EKG shows rate of 85 bpm and sinus rhythm with PACs, marked ST abnormality, possible inferior subendocardial injury.    #Acute renal failure  Noted to have slightly increased kidney function in 06/2023 that has become progressively worse over the last 1.5 years.  Creatinine noted to be elevated at 2.46 in 10/2024 and patient was was to follow-up was not seen until 1/8 where her creatinine was noted to be 5.86 with elevated BUN of 60.1.  Patient has symptoms of uremia with noting metallic taste and now sour taste with decreased appetite, nausea, intermittent vomiting with coughing, epigastric discomfort, fatigue, and weakness.  Patient states she is more anxious and overwhelmed with keeping track of the timeline of her recent medical history. Does not acutely appear encephalopathic on exam. She has not had any imaging to further assess cause for her acute worsening renal function. No recent NSAID use or changes in medications other thank stopping her Lisinopril 2 days ago. Labs in ED reveal BUN of 65.7, creatinine of 6.6, calcium of 10.7, but thankfully potassium is within normal limits.  UA obtained in ED, refer to below  -received 1 liter NS in ED, continue IVF hydration with NS at 100 ml/hour  -monitor I&Os  -obtain  renal ultrasound   -continue to hold PTA Lisinopril   -nephrology consult   -follow BMP daily   -avoid nephrotoxins     #Abnormal UA, possible UTI  Patient reports recent nocturia without dysuria.  Afebrile and no leukocytosis.UA shows trace blood, 30 of protein, small LE, few bacteria, 3 RBC, and 23 WBC. Given dose of PO Keflex for possible UTI. Not necessarily convinced of UTI or that this is contributing to ARF.   -follow urine culture  -hold off on further antibiotics, consider resuming if onset of leukocytosis or fever    #Anemia  Send approximately 1 year ago with worsening kidney function.  Hemoglobin currently stable at 9.1.  Current evidence of bleeding.  -continue to monitor CBC    #Symptomatic palpitations, SVT  EKG done by PCP noted to have ST T wave changes.  Underwent stress echocardiogram that was reviewed by cardiology did not show any evidence of structural abnormality, usual wall thickness, or inducible ischemia even with limited exercise tolerance.  Currently has Zio patch in place assess for atrial fibrillation.  -continue current zio patch (placed on 12/30, to take off on 1/12)   -continue PTA Metoprolol     #HTN  Previously on Lisinopril but stopped this for a period of time when she was started on metoprolol.  Again stopped lisinopril 2 days ago due to elevated kidney function.  -continue PTA Metoprolol with parameters     #HLD  Not currently on medical management     Clinically Significant Risk Factors Present on Admission            # Hypercalcemia: Highest Ca = 10.9 mg/dL in last 2 days, will monitor as appropriate       # Acute Kidney Injury, unspecified: based on a >150% or 0.3 mg/dL increase in last creatinine compared to past 90 day average, will monitor renal function  # Hypertension: Noted on problem list      # Anemia: based on hgb <11                  DVT Prophylaxis: Pneumatic Compression Devices  Code Status: Full Code, discussed with patient   Disposition: Expected stay >2  midnights, will admit to inpatient     Primary Care Physician   Alida Lamb    Chief Complaint   Worsening kidney function     History of discussion with ED provider, Dr. Ang, chart review, and interview with patient.    History of Present Illness   Dulce Ma is a 64 year old female who presents due to recommendation by primary care provider and provider at [facility] center about worsening kidney function.  Patient was noted to have slightly elevated kidney function in 06/2023 that has become progressively worse over the last year and a half.  In October the patient's kidney function increased to creatinine of 2.46 and she is post to have close follow-up which she did not do.  Upon returning to clinic her creatinine was checked and to be elevated at 5.86.  Patient has noted a dry mouth of recent with a week of metallic taste then followed by a sour taste.  She notes decreased appetite due to things not tasting as they used to.  As well as she has had ongoing issues with nausea and intermittent vomiting with coughing.  She has lost approximately 8 pounds since October due to lack of intake.  She states 4 days ago she had a couple nights of diarrhea since resolved.  She has had increased fatigue and weakness of recent.  She states that recently she stopped her lisinopril with her increased kidney function.  Prior to that the patient did take a period of time off of her lisinopril when she was started on metoprolol due to lower blood pressures.  Denies any chest pain, shortness of breath, melena, or hematochezia.  She states she has been able to urinate and notes actually waking up a couple of times due to nocturia.  Denies dysuria, or urgency.  Denies increased NSAID use and only has using naproxen or aspirin intermittently.  She does have a prescription for Lasix due to history of LE swelling but does not take this regularly.  She states that she has recently had mild lightheadedness and  dizziness. She is currently wearing a Zio patch that she placed on 12/30 due to recent arrhythmia and suppose to keep in place until 1/12.  She reports infrequent alcohol use and does not smoke.  No personal or family history of kidney issues.    Past Medical History    Past Medical History:   Diagnosis Date    Anesthesia complication, initial encounter     was completely out with Midazolam 2 mg IV, Fentanyl 100 micrograms IV that was given at time of colonoscopy 7/2018     Dysplastic nevi     Glaucoma     Diagnosed in Spring 2010    Hypertension goal BP (blood pressure) < 140/90 at age 50     very strong family hx     Lumbago     stiffness in low back    Other malignant neoplasm of skin of trunk, except scrotum 2/02    Dr. Myles Lake, melanoma with negative sentinel node biopsy - no chemo-Dr. Selene Gonzales-derm    Perimenopausal     Routine gyne exam     [facility] ob/gyn - Dr. Isabela Perez     Skin cancer, basal cell     multiple - 3 on nose, treated with interferon intralesionally x 1     Unspecified derangement, shoulder region     s/p horse-riding injury- no problem now    Unspecified musculoskeletal disorders and symptoms referable to neck     compressed vertebrae in neck- bothers pt rarely     Past Surgical History   Past Surgical History:   Procedure Laterality Date    APPENDECTOMY  2008    aprroximately 8-10 years agp    BREAST SURGERY      Breast biopsies    COLONOSCOPY N/A 7/27/2018    Procedure: COLONOSCOPY;  COLONOSCOPY ;  Surgeon: Ren Whitehead MD;  Location:  GI    ORTHOPEDIC SURGERY Right 2014    ORIF right ring finger    ZZC NONSPECIFIC PROCEDURE  3/02    malignant melanoma removed from abdomen with negative nodes-Dr. Acosta -surgeon     Prior to Admission Medications   Prior to Admission Medications   Prescriptions Last Dose Informant Patient Reported? Taking?   Netarsudil-Latanoprost (ROCKLATAN) 0.02-0.005 % SOLN   Yes Yes   Sig: Place 1 drop into both eyes at bedtime.   atorvastatin  (LIPITOR) 40 MG tablet   Yes Yes   Sig: Take 40 mg by mouth daily.   clobetasol propionate (TEMOVATE) 0.05 % external cream More than a month  No Yes   Sig: Apply very scant amount to inner labia daily for up to 14 days at a time -ok to use every 2 months or so   dorzolamide-timolol (COSOPT) 2-0.5 % ophthalmic solution 1/9/2025 Morning  Yes Yes   Sig: Place 1 drop into both eyes 2 times daily.   metoprolol succinate ER (TOPROL XL) 25 MG 24 hr tablet 1/8/2025 Bedtime  Yes Yes   Sig: Take 0.5 tablets (12.5 mg) by mouth daily.      Facility-Administered Medications: None     Allergies   Allergies   Allergen Reactions    Benzoyl Peroxide      swelling    Ibuprofen      over long duration dev. hives    Amoxicillin Rash     Face; in early adulthood    Penicillins Rash     Face; in early adulthood       Social History   Social History     Tobacco Use    Smoking status: Never    Smokeless tobacco: Never   Substance Use Topics    Alcohol use: Yes     Comment: occ.     Social History     Social History Narrative    Anabel Zhong is pt's mom.  - she had pt when she was 19 yrs old.      Family History   Family history reviewed with patient and is noncontributory.    Review of Systems   A Comprehensive greater than 10 system review of systems was carried out.  Pertinent positives and negatives are noted above.  Otherwise negative for contributory information.    Physical Exam   Temp: 97.7  F (36.5  C) Temp src: Oral BP: 127/58 Pulse: 75   Resp: 16 SpO2: 97 % O2 Device: None (Room air)    Vital Signs with Ranges  Temp:  [96.9  F (36.1  C)-97.7  F (36.5  C)] 97.7  F (36.5  C)  Pulse:  [75-78] 75  Resp:  [16-20] 16  BP: (127-162)/(52-62) 127/58  SpO2:  [94 %-99 %] 97 %  118 lbs 13.25 oz    GEN:  Alert, oriented x 3, appears comfortable sitting up on gurney, no overt distress  HEENT:  Normocephalic/atraumatic, no scleral icterus, no nasal discharge, mouth moist.  CV:  Regular rate and rhythm, no murmur or JVD.  S1 + S2 noted, no  S3 or S4.  LUNGS:  Clear to auscultation bilaterally without rales/rhonchi/wheezing/retractions.  Symmetric chest rise on inhalation noted.  ABD:  Active bowel sounds, soft, non-tender/non-distended.  No rebound/guarding/rigidity.  EXT:  No LE edema.  No cyanosis.  No acute joint synovitis noted.  SKIN:  Dry to touch, no exanthems noted in the visualized areas.  NEURO:  Symmetric muscle strength, sensation to touch grossly intact.  Coordination symmetric on general exam.  No new focal deficits appreciated.    Data   Data reviewed today:  I personally reviewed EKG showing rate of 85 bpm and sinus rhythm with PACs, marked ST abnormality, possible inferior subendocardial injury..    Results for orders placed or performed during the hospital encounter of 01/09/25   Comprehensive metabolic panel     Status: Abnormal   Result Value Ref Range    Sodium 138 135 - 145 mmol/L    Potassium 5.0 3.4 - 5.3 mmol/L    Carbon Dioxide (CO2) 19 (L) 22 - 29 mmol/L    Anion Gap 15 7 - 15 mmol/L    Urea Nitrogen 65.7 (H) 8.0 - 23.0 mg/dL    Creatinine 6.60 (H) 0.51 - 0.95 mg/dL    GFR Estimate 7 (L) >60 mL/min/1.73m2    Calcium 10.7 (H) 8.8 - 10.4 mg/dL    Chloride 104 98 - 107 mmol/L    Glucose 100 (H) 70 - 99 mg/dL    Alkaline Phosphatase 47 40 - 150 U/L    AST 21 0 - 45 U/L    ALT 9 0 - 50 U/L    Protein Total 7.6 6.4 - 8.3 g/dL    Albumin 4.3 3.5 - 5.2 g/dL    Bilirubin Total 0.6 <=1.2 mg/dL   [place] Draw     Status: None    Narrative    The following orders were created for panel order [place] Draw.  Procedure                               Abnormality         Status                     ---------                               -----------         ------                     Extra Red Top Tube[[id number]]                               Final result               Extra Purple Top Tube ON...[[id number]]                      Final result                 Please view results for these tests on the individual orders.   CBC with platelets and  differential     Status: Abnormal   Result Value Ref Range    WBC Count 8.0 4.0 - 11.0 10e3/uL    RBC Count 2.97 (L) 3.80 - 5.20 10e6/uL    Hemoglobin 9.1 (L) 11.7 - 15.7 g/dL    Hematocrit 27.5 (L) 35.0 - 47.0 %    MCV 93 78 - 100 fL    MCH 30.6 26.5 - 33.0 pg    MCHC 33.1 31.5 - 36.5 g/dL    RDW 12.8 10.0 - 15.0 %    Platelet Count 264 150 - 450 10e3/uL    % Neutrophils 73 %    % Lymphocytes 21 %    % Monocytes 4 %    % Eosinophils 1 %    % Basophils 1 %    % Immature Granulocytes 0 %    NRBCs per 100 WBC 0 <1 /100    Absolute Neutrophils 5.8 1.6 - 8.3 10e3/uL    Absolute Lymphocytes 1.7 0.8 - 5.3 10e3/uL    Absolute Monocytes 0.3 0.0 - 1.3 10e3/uL    Absolute Eosinophils 0.1 0.0 - 0.7 10e3/uL    Absolute Basophils 0.0 0.0 - 0.2 10e3/uL    Absolute Immature Granulocytes 0.0 <=0.4 10e3/uL    Absolute NRBCs 0.0 10e3/uL   Extra Red Top Tube     Status: None   Result Value Ref Range    Hold Specimen [facility]    Extra Purple Top Tube ON ICE     Status: None   Result Value Ref Range    Hold Specimen [facility]    EKG 12-lead, tracing only     Status: None   Result Value Ref Range    Systolic Blood Pressure  mmHg    Diastolic Blood Pressure  mmHg    Ventricular Rate 85 BPM    Atrial Rate 85 BPM    NV Interval 154 ms    QRS Duration 82 ms     ms    QTc 471 ms    P Axis 79 degrees    R AXIS 65 degrees    T Axis 270 degrees    Interpretation ECG       Sinus rhythm with Premature atrial complexes  Marked ST abnormality, possible inferior subendocardial injury  Abnormal ECG  When compared with ECG of 30-Oct-2024 10:03, (unconfirmed)  Premature atrial complexes are now Present  T wave inversion more evident in Lateral leads  Unconfirmed report - interpretation of this ECG is computer generated - see medical record for final interpretation  Confirmed by - EMERGENCY ROOM, PHYSICIAN ([id number]),  MARIAH SOLANO ([id number]) on 1/9/2025 10:53:52 AM     CBC with Platelets & Differential     Status: Abnormal    Narrative    The following  orders were created for panel order CBC with Platelets & Differential.  Procedure                               Abnormality         Status                     ---------                               -----------         ------                     CBC with platelets and d...[[id number]]  Abnormal            Final result                 Please view results for these tests on the individual orders.     Coni Diego PA-C  [facility]  Securely message with the [employer] Web Console (learn more here)  Text page via [facility] Paging/Directory  I discussed the patient with Dr. Correa and he agrees with the above plan.

## 2025-01-10 LAB
ALBUMIN SERPL ELPH-MCNC: 3.5 G/DL (ref 3.7–5.1)
ALPHA1 GLOB SERPL ELPH-MCNC: 0.4 G/DL (ref 0.2–0.4)
ALPHA2 GLOB SERPL ELPH-MCNC: 0.8 G/DL (ref 0.5–0.9)
ANA SER QL IF: NEGATIVE
ANCA AB PATTERN SER IF-IMP: NORMAL
ANION GAP SERPL CALCULATED.3IONS-SCNC: 13 MMOL/L (ref 7–15)
B-GLOBULIN SERPL ELPH-MCNC: 0.5 G/DL (ref 0.6–1)
BASOPHILS # BLD AUTO: 0 10E3/UL (ref 0–0.2)
BASOPHILS NFR BLD AUTO: 0 %
BUN SERPL-MCNC: 60.8 MG/DL (ref 8–23)
C-ANCA TITR SER IF: NORMAL {TITER}
C3 SERPL-MCNC: 99 MG/DL (ref 81–157)
C4 SERPL-MCNC: 13 MG/DL (ref 13–39)
CALCIUM SERPL-MCNC: 8.7 MG/DL (ref 8.8–10.4)
CHLORIDE SERPL-SCNC: 101 MMOL/L (ref 98–107)
CREAT SERPL-MCNC: 6.11 MG/DL (ref 0.51–0.95)
DSDNA AB SER-ACNC: 0.7 IU/ML
EGFRCR SERPLBLD CKD-EPI 2021: 7 ML/MIN/1.73M2
EOSINOPHIL # BLD AUTO: 0.2 10E3/UL (ref 0–0.7)
EOSINOPHIL NFR BLD AUTO: 2 %
ERYTHROCYTE [DISTWIDTH] IN BLOOD BY AUTOMATED COUNT: 12.6 % (ref 10–15)
FOLATE SERPL-MCNC: 12.5 NG/ML (ref 4.6–34.8)
GAMMA GLOB SERPL ELPH-MCNC: 1.3 G/DL (ref 0.7–1.6)
GLUCOSE SERPL-MCNC: 108 MG/DL (ref 70–99)
HCO3 SERPL-SCNC: 25 MMOL/L (ref 22–29)
HCT VFR BLD AUTO: 22.8 % (ref 35–47)
HGB BLD-MCNC: 7.4 G/DL (ref 11.7–15.7)
IMM GRANULOCYTES # BLD: 0 10E3/UL
IMM GRANULOCYTES NFR BLD: 0 %
IRON BINDING CAPACITY (ROCHE): 191 UG/DL (ref 240–430)
IRON BINDING CAPACITY (ROCHE): 192 UG/DL (ref 240–430)
IRON SATN MFR SERPL: 17 % (ref 15–46)
IRON SATN MFR SERPL: 20 % (ref 15–46)
IRON SERPL-MCNC: 32 UG/DL (ref 37–145)
IRON SERPL-MCNC: 38 UG/DL (ref 37–145)
LOCATION OF TASK: ABNORMAL
LOCATION OF TASK: NORMAL
LYMPHOCYTES # BLD AUTO: 2 10E3/UL (ref 0.8–5.3)
LYMPHOCYTES NFR BLD AUTO: 30 %
M PROTEIN SERPL ELPH-MCNC: 1.1 G/DL
MCH RBC QN AUTO: 29.8 PG (ref 26.5–33)
MCHC RBC AUTO-ENTMCNC: 32.5 G/DL (ref 31.5–36.5)
MCV RBC AUTO: 92 FL (ref 78–100)
MONOCYTES # BLD AUTO: 0.5 10E3/UL (ref 0–1.3)
MONOCYTES NFR BLD AUTO: 7 %
NEUTROPHILS # BLD AUTO: 4.1 10E3/UL (ref 1.6–8.3)
NEUTROPHILS NFR BLD AUTO: 60 %
NRBC # BLD AUTO: 0 10E3/UL
NRBC BLD AUTO-RTO: 0 /100
PLATELET # BLD AUTO: 213 10E3/UL (ref 150–450)
POTASSIUM SERPL-SCNC: 4 MMOL/L (ref 3.4–5.3)
PROT PATTERN SERPL ELPH-IMP: ABNORMAL
PROT PATTERN UR ELPH-IMP: NORMAL
RBC # BLD AUTO: 2.48 10E6/UL (ref 3.8–5.2)
SODIUM SERPL-SCNC: 139 MMOL/L (ref 135–145)
TSH SERPL DL<=0.005 MIU/L-ACNC: 2.44 UIU/ML (ref 0.3–4.2)
WBC # BLD AUTO: 6.8 10E3/UL (ref 4–11)

## 2025-01-10 PROCEDURE — 99254 IP/OBS CNSLTJ NEW/EST MOD 60: CPT | Performed by: INTERNAL MEDICINE

## 2025-01-10 PROCEDURE — 258N000002 HC RX IP 258 OP 250: Performed by: INTERNAL MEDICINE

## 2025-01-10 PROCEDURE — 250N000013 HC RX MED GY IP 250 OP 250 PS 637: Performed by: INTERNAL MEDICINE

## 2025-01-10 PROCEDURE — 36415 COLL VENOUS BLD VENIPUNCTURE: CPT | Performed by: INTERNAL MEDICINE

## 2025-01-10 PROCEDURE — 258N000003 HC RX IP 258 OP 636: Performed by: INTERNAL MEDICINE

## 2025-01-10 PROCEDURE — 84443 ASSAY THYROID STIM HORMONE: CPT | Performed by: INTERNAL MEDICINE

## 2025-01-10 PROCEDURE — 82607 VITAMIN B-12: CPT | Performed by: INTERNAL MEDICINE

## 2025-01-10 PROCEDURE — 83550 IRON BINDING TEST: CPT | Performed by: INTERNAL MEDICINE

## 2025-01-10 PROCEDURE — 250N000009 HC RX 250: Performed by: INTERNAL MEDICINE

## 2025-01-10 PROCEDURE — 82746 ASSAY OF FOLIC ACID SERUM: CPT | Performed by: INTERNAL MEDICINE

## 2025-01-10 PROCEDURE — 82728 ASSAY OF FERRITIN: CPT | Performed by: INTERNAL MEDICINE

## 2025-01-10 PROCEDURE — 120N000001 HC R&B MED SURG/OB

## 2025-01-10 PROCEDURE — 85004 AUTOMATED DIFF WBC COUNT: CPT | Performed by: PHYSICIAN ASSISTANT

## 2025-01-10 PROCEDURE — 99233 SBSQ HOSP IP/OBS HIGH 50: CPT | Performed by: INTERNAL MEDICINE

## 2025-01-10 PROCEDURE — 36415 COLL VENOUS BLD VENIPUNCTURE: CPT | Performed by: PHYSICIAN ASSISTANT

## 2025-01-10 PROCEDURE — 80048 BASIC METABOLIC PNL TOTAL CA: CPT | Performed by: PHYSICIAN ASSISTANT

## 2025-01-10 RX ORDER — DORZOLAMIDE HYDROCHLORIDE AND TIMOLOL MALEATE 20; 5 MG/ML; MG/ML
1 SOLUTION/ DROPS OPHTHALMIC 2 TIMES DAILY
Status: DISCONTINUED | OUTPATIENT
Start: 2025-01-10 | End: 2025-01-15 | Stop reason: HOSPADM

## 2025-01-10 RX ORDER — SODIUM CHLORIDE 450 MG/100ML
INJECTION, SOLUTION INTRAVENOUS CONTINUOUS
Status: ACTIVE | OUTPATIENT
Start: 2025-01-10 | End: 2025-01-10

## 2025-01-10 RX ORDER — MULTIVITAMIN,THERAPEUTIC
1 TABLET ORAL DAILY
Status: DISCONTINUED | OUTPATIENT
Start: 2025-01-10 | End: 2025-01-15 | Stop reason: HOSPADM

## 2025-01-10 RX ADMIN — DORZOLAMIDE HYDROCHLORIDE AND TIMOLOL MALEATE 1 DROP: 20; 5 SOLUTION/ DROPS OPHTHALMIC at 20:57

## 2025-01-10 RX ADMIN — SODIUM BICARBONATE: 84 INJECTION, SOLUTION INTRAVENOUS at 08:54

## 2025-01-10 RX ADMIN — SODIUM BICARBONATE: 84 INJECTION, SOLUTION INTRAVENOUS at 01:16

## 2025-01-10 RX ADMIN — SODIUM CHLORIDE: 4.5 INJECTION, SOLUTION INTRAVENOUS at 13:02

## 2025-01-10 RX ADMIN — CEPHALEXIN 250 MG: 250 CAPSULE ORAL at 20:34

## 2025-01-10 RX ADMIN — CEPHALEXIN 250 MG: 250 CAPSULE ORAL at 13:02

## 2025-01-10 ASSESSMENT — ACTIVITIES OF DAILY LIVING (ADL)
ADLS_ACUITY_SCORE: 26
ADLS_ACUITY_SCORE: 30
ADLS_ACUITY_SCORE: 26
ADLS_ACUITY_SCORE: 30
ADLS_ACUITY_SCORE: 30
ADLS_ACUITY_SCORE: 26
ADLS_ACUITY_SCORE: 30
ADLS_ACUITY_SCORE: 26
ADLS_ACUITY_SCORE: 30
ADLS_ACUITY_SCORE: 26
ADLS_ACUITY_SCORE: 30
ADLS_ACUITY_SCORE: 30
ADLS_ACUITY_SCORE: 26
ADLS_ACUITY_SCORE: 30

## 2025-01-10 NOTE — CONSULTS
RENAL CONSULTATION NOTE    REFERRING MD:  Coni Diego PA-C     REASON FOR CONSULTATION:  Acute renal failure     HPI:  64 y.o woman with hypertension, who was admitted on 1/9/2025 for severe renal failure.    She takes lisinopril for hypertension.   She saw her primary care provider, Dr. Lamb, on 10/18/2024.  She was taking lisinopril 20 mg daily.   Her blood pressure that that visit was /68.  She has palpation from SVT, and Toprol was added.   Subsequently, she stopped her lisinopril due to hypotension, and Toprol was decreased due to various symptoms.     She had serum creatinine of ~ 1 mg/dl on June of 2023, 2.46 mg/dl on 10/24 and 5.86 mg/dl on 1/8.  She was advised to come to there hospital for further evaluation.     Natalia is laying comfortably in bed.  She says she has had symptoms of nausea, metallic tastes and low appetite prior to starting the Toprol.   She lost ~ 8 lbs due to low appetite.   She had dry mouth after Toprol was started.  She denies using NSAIDs.  She denies taking another medications or OTC.  No cardiopulmonary complaints.  No aches and pain.   She says she continues to urinate normally.  Denies dysuria and hematuria.  No swelling.    I reviewed notes from aiden PCP (Dr. Lamb), ER (Dr. Ang)  and IM (Dr. Dieog). I discussed the case with Dr. Correa.     ROS:  A complete review of systems was performed and is negative except as noted above.    PMH:    Past Medical History:   Diagnosis Date    Anesthesia complication, initial encounter     was completely out with Midazolam 2 mg IV, Fentanyl 100 micrograms IV that was given at time of colonoscopy 7/2018     Dysplastic nevi     Glaucoma     Diagnosed in Spring 2010    Hypertension goal BP (blood pressure) < 140/90 at age 50     very strong family hx     Lumbago     stiffness in low back    Other malignant neoplasm of skin of trunk, except scrotum 2/02    Dr. Myles Lake, melanoma with negative sentinel node biopsy -  no chemo-Dr. Selene Gonzales-derm    Perimenopausal     Routine gyne exam     [facility] ob/gyn - Dr. Isabela Perez     Skin cancer, basal cell     multiple - 3 on nose, treated with interferon intralesionally x 1     Unspecified derangement, shoulder region     s/p horse-riding injury- no problem now    Unspecified musculoskeletal disorders and symptoms referable to neck     compressed vertebrae in neck- bothers pt rarely       PSH:    Past Surgical History:   Procedure Laterality Date    APPENDECTOMY      aprroximately 8-10 years agp    BREAST SURGERY      Breast biopsies    COLONOSCOPY N/A 2018    Procedure: COLONOSCOPY;  COLONOSCOPY ;  Surgeon: Ren Whitehead MD;  Location:  GI    ORTHOPEDIC SURGERY Right 2014    ORIF right ring finger    ZZC NONSPECIFIC PROCEDURE  3/02    malignant melanoma removed from abdomen with negative nodes-Dr. Acosta -surgeon       MEDICATIONS:    Current Facility-Administered Medications   Medication Dose Route Frequency Provider Last Rate Last Admin    [Held by provider] metoprolol succinate ER (TOPROL-XL) 24 hr half-tab 12.5 mg  12.5 mg Oral At Bedtime Coni Diego PA-C        Netarsudil-Latanoprost 0.02-0.005 % SOLN 1 drop - PATIENT'S OWN SUPPLY  1 drop Both Eyes At Bedtime Coni Diego PA-C   1 drop at 25 2149    sodium chloride (PF) 0.9% PF flush 3 mL  3 mL Intracatheter Q8H Coni Diego PA-C           ALLERGIES:    Allergies as of 2025 - Reviewed 2025   Allergen Reaction Noted    Benzoyl peroxide  2003    Ibuprofen  2003    Amoxicillin Rash 2003    Penicillins Rash 2003       FH:    Family History   Problem Relation Age of Onset    Hypertension Mother     Heart Disease Mother         bypass    Neuropathy Mother     Aortic Valve Replacement Mother 83    Hypertension Father     Coronary Artery Disease Father 83         in  from MI    Eye Disorder Maternal Grandmother         glacoma    Eye  Disorder Maternal Grandfather         glacoma    Hypertension Paternal Grandmother     Colon Cancer No family hx of        SH:    Social History     Socioeconomic History    Marital status: Single     Spouse name: Not on file    Number of children: 0    Years of education: 16    Highest education level: Not on file   Occupational History    Occupation: Technology      Employer: [employer]     Comment: used to [employer], still rides [employer]    Tobacco Use    Smoking status: Never    Smokeless tobacco: Never   Vaping Use    Vaping status: Never Used   Substance and Sexual Activity    Alcohol use: Yes     Comment: occ.    Drug use: No     Comment: no herbal meds either     Sexual activity: Yes     Partners: Male     Comment: postmenopausal - last true menses 10/2011    Other Topics Concern    Parent/sibling w/ CABG, MI or angioplasty before 65F 55M? Yes     Comment: mother bypass before 65     Service No    Blood Transfusions No    Caffeine Concern Yes     Comment: coffee 2 cups /day     Occupational Exposure Not Asked    Hobby Hazards Not Asked    Sleep Concern Not Asked    Stress Concern Not Asked    Weight Concern Not Asked    Special Diet Not Asked    Back Care Not Asked    Exercise Not Asked    Bike Helmet Yes     Comment: strongly encouraged to wear helmet , while riding horses, too     Seat Belt Yes     Comment: always     Self-Exams Yes     Comment: SBE encouraged monthly    Social History Narrative    Anabel Zhong is pt's mom.  - she had pt when she was 19 yrs old.      Social Drivers of Health     Financial Resource Strain: Low Risk  (1/9/2025)    Financial Resource Strain     Within the past 12 months, have you or your family members you live with been unable to get utilities (heat, electricity) when it was really needed?: No   Food Insecurity: Low Risk  (1/9/2025)    Food Insecurity     Within the past 12 months, did you worry that your food would run out before you got money to  "buy more?: No     Within the past 12 months, did the food you bought just not last and you didn t have money to get more?: No   Transportation Needs: Low Risk  (1/9/2025)    Transportation Needs     Within the past 12 months, has lack of transportation kept you from medical appointments, getting your medicines, non-medical meetings or appointments, work, or from getting things that you need?: No   Physical Activity: Insufficiently Active (10/17/2024)    Exercise Vital Sign     Days of Exercise per Week: 4 days     Minutes of Exercise per Session: 30 min   Stress: Stress Concern Present (10/17/2024)    Citizen of the [place] of Occupational Health - Occupational Stress Questionnaire     Feeling of Stress : To some extent   Social Connections: Unknown (10/17/2024)    Social Connection and Isolation Panel [NHANES]     Frequency of Communication with Friends and Family: Not on file     Frequency of Social Gatherings with Friends and Family: Once a week     Attends Islam Services: Not on file     Active Member of Clubs or Organizations: Not on file     Attends Club or Organization Meetings: Not on file     Marital Status: Not on file   Interpersonal Safety: Low Risk  (1/9/2025)    Interpersonal Safety     Do you feel physically and emotionally safe where you currently live?: Yes     Within the past 12 months, have you been hit, slapped, kicked or otherwise physically hurt by someone?: No     Within the past 12 months, have you been humiliated or emotionally abused in other ways by your partner or ex-partner?: No   Housing Stability: Low Risk  (1/9/2025)    Housing Stability     Do you have housing? : Yes     Are you worried about losing your housing?: No       PHYSICAL EXAM:    /55 (BP Location: Right arm)   Pulse 84   Temp 98.4  F (36.9  C) (Oral)   Resp 20   Ht 1.6 m (5' 3\")   Wt 54.1 kg (119 lb 3.2 oz)   LMP 10/31/2011   SpO2 94%   BMI 21.12 kg/m    GENERAL: NAD  HEENT:  Normocephalic. No gross " abnormalities.  Pupils equal.  MMM.    CV: RRR, + murmur  RESP: CTAB no wheezes.  GI: Abdomen soft, NT  MUSCULOSKELETAL: extremities nl - no gross deformities noted. No edema  SKIN: no suspicious lesions or rashes, dry to touch  NEURO:  Awake, alert and conversing normally  PSYCH: mood good, affect appropriate  LYMPH: No palpable ant/post cervical     LABS:      CBC RESULTS:     Recent Labs   Lab 01/10/25  0555 01/09/25  1112 01/08/25  1211   WBC 6.8 8.0 8.5   RBC 2.48* 2.97* 2.93*   HGB 7.4* 9.1* 9.2*   HCT 22.8* 27.5* 27.6*    264 266       BMP RESULTS:  Recent Labs   Lab 01/10/25  0555 01/09/25  1112 01/08/25  1244    137  138 135   POTASSIUM 4.0 5.0  5.0 5.2   CHLORIDE 101 104  104 102   CO2 25 17*  19* 20*   BUN 60.8* 66.4*  65.7* 60.1*   CR 6.11* 6.63*  6.60* 5.86*   * 101*  100* 101*   KELTON 8.7* 10.6*  10.7* 10.9*       INRNo lab results found in last 7 days.     DIAGNOSTICS:  Reviewed    Renal ultrasound:    RIGHT KIDNEY: Measures 8.4 x 3.7 x 3.2 cm. No hydronephrosis or masses. Few tiny echogenic foci are indeterminate, could reflect artifact, prominent pyramids or tiny intrarenal stones.     LEFT KIDNEY: Measures 8.7 x 3.6 x 4.5 cm. No hydronephrosis or masses. Few tiny echogenic foci are indeterminate, could reflect artifact, prominent pyramids or tiny intrarenal stones.     BLADDER: Nondistended.                                                                      IMPRESSION:  No hydronephrosis.      A/P:  64 y.o woman with hypertension and history of skin cancer, who was admitted severe renal failure of unclear etiology and some uremic symptoms.     # Severe acute-subacute renal failure: She had serum creatinine of ~ 1 mg/dl on June of 2023, 2.46 mg/dl on 10/24 and 5.86 mg/dl on 1/8.   + uremic symptoms   -UA with trace blood and 3 RBC   -UPCR 500 mg/dl   -hep B/C negative   -vasculitis panel pending   -Scr seems to be improving with IVF    # Hypertension:   -PTA Toprol and  was on lisinopril (stopped due to hypotension)    # Severe anemia: PLT is normal. Due to to renal failure vs another process.     #  FEN: Seems euvolemic. Hypercalcemia improved with IVF fluid.     Price:  # Vasculitis labs pending.  # Change IVF to 1/2 NS after this bag is done.  # Avoid NSAIDs, IV contrast and other nephrotoxins.  # Dose all medications to eGFR of <10  # Strict I/O  # No urgent needs for RRT at this time. She has some mild uremic symptoms.   # Possible renal biopsy pending vasculitis labs and kidney function.     Julio Cesar Hurtado MD  [facility]phrology  Office Phone: [phone]  Pager: [phone]

## 2025-01-10 NOTE — PLAN OF CARE
"End of shift note: Aox4, denies numbness or tingling. Pt up SBA. D5W 1,000 mL with sodium bicarbonate 150 mEq/L infusing @ 125 ml/hr. Ziopatch in place. Admission profile done.    Goal Outcome Evaluation:      Plan of Care Reviewed With: patient    Overall Patient Progress: improvingOverall Patient Progress: improving    Outcome Evaluation: Denies pain, VSS, afebrile, sating well on RA.      Problem: Adult Inpatient Plan of Care  Goal: Plan of Care Review  Description: The Plan of Care Review/Shift note should be completed every shift.  The Outcome Evaluation is a brief statement about your assessment that the patient is improving, declining, or no change.  This information will be displayed automatically on your shift  note.  Outcome: Progressing  Flowsheets (Taken 1/10/2025 1007)  Outcome Evaluation: Denies pain, VSS, afebrile, sating well on RA.  Plan of Care Reviewed With: patient  Overall Patient Progress: improving  Goal: Patient-Specific Goal (Individualized)  Description: You can add care plan individualizations to a care plan. Examples of Individualization might be:  \"Parent requests to be called daily at 9am for status\", \"I have a hard time hearing out of my right ear\", or \"Do not touch me to wake me up as it startles  me\".  Outcome: Progressing  Goal: Absence of Hospital-Acquired Illness or Injury  Outcome: Progressing  Intervention: Identify and Manage Fall Risk  Recent Flowsheet Documentation  Taken 1/10/2025 1004 by Carmen Lisa, RN  Safety Promotion/Fall Prevention: safety round/check completed  Taken 1/10/2025 0907 by Carmen Lisa, RN  Safety Promotion/Fall Prevention: safety round/check completed  Taken 1/10/2025 0708 by Carmen Lisa, RN  Safety Promotion/Fall Prevention: safety round/check completed  Taken 1/10/2025 0557 by Carmen Lisa, RN  Safety Promotion/Fall Prevention: safety round/check completed  Taken 1/10/2025 0531 by Carmen Lisa, RN  Safety Promotion/Fall Prevention: " safety round/check completed  Taken 1/10/2025 0419 by Carmen Lisa RN  Safety Promotion/Fall Prevention: safety round/check completed  Taken 1/10/2025 0300 by Carmen Lisa RN  Safety Promotion/Fall Prevention: safety round/check completed  Taken 1/10/2025 0118 by Carmen Lisa RN  Safety Promotion/Fall Prevention: safety round/check completed  Taken 1/10/2025 0024 by Carmen Lisa RN  Safety Promotion/Fall Prevention: safety round/check completed  Taken 1/9/2025 2330 by Carmen Lisa RN  Safety Promotion/Fall Prevention: safety round/check completed  Taken 1/9/2025 2248 by Carmen Lisa RN  Safety Promotion/Fall Prevention: safety round/check completed  Taken 1/9/2025 2200 by Carmen Lisa RN  Safety Promotion/Fall Prevention: safety round/check completed  Taken 1/9/2025 2100 by Carmen Lisa RN  Safety Promotion/Fall Prevention: safety round/check completed  Taken 1/9/2025 2010 by Carmen Lisa RN  Safety Promotion/Fall Prevention: safety round/check completed  Intervention: Prevent Skin Injury  Recent Flowsheet Documentation  Taken 1/10/2025 0557 by Carmen Lisa RN  Body Position: position changed independently  Taken 1/9/2025 2010 by Carmen Lisa RN  Body Position: position changed independently  Intervention: Prevent and Manage VTE (Venous Thromboembolism) Risk  Recent Flowsheet Documentation  Taken 1/10/2025 0557 by Carmen Lisa RN  VTE Prevention/Management: SCDs off (sequential compression devices)  Taken 1/9/2025 2010 by Carmen Lisa RN  VTE Prevention/Management: SCDs off (sequential compression devices)  Intervention: Prevent Infection  Recent Flowsheet Documentation  Taken 1/10/2025 0557 by Carmen Lisa RN  Infection Prevention:   rest/sleep promoted   hand hygiene promoted  Taken 1/9/2025 2010 by Carmen Lisa RN  Infection Prevention:   rest/sleep promoted   hand hygiene promoted  Goal: Optimal Comfort and Wellbeing  Outcome:  Progressing  Goal: Readiness for Transition of Care  Outcome: Progressing  Intervention: Mutually Develop Transition Plan  Recent Flowsheet Documentation  Taken 1/9/2025 2151 by Carmen Lisa RN  Equipment Currently Used at Home: none     Problem: Comorbidity Management  Goal: Blood Pressure in Desired Range  Outcome: Progressing  Intervention: Maintain Blood Pressure Management  Recent Flowsheet Documentation  Taken 1/10/2025 0557 by Carmen Lisa RN  Medication Review/Management: medications reviewed  Taken 1/9/2025 2010 by Carmen Lisa RN  Medication Review/Management: medications reviewed     Problem: Acute Kidney Injury/Impairment  Goal: Fluid and Electrolyte Balance  Outcome: Progressing  Goal: Improved Oral Intake  Outcome: Progressing  Goal: Effective Renal Function  Outcome: Progressing  Intervention: Monitor and Support Renal Function  Recent Flowsheet Documentation  Taken 1/10/2025 0557 by Carmen Lisa RN  Medication Review/Management: medications reviewed  Taken 1/9/2025 2010 by Carmen Lisa, RN  Medication Review/Management: medications reviewed

## 2025-01-10 NOTE — PROGRESS NOTES
CLINICAL NUTRITION SERVICES - ASSESSMENT NOTE    RECOMMENDATIONS FOR MDs/PROVIDERS TO ORDER:  Consider BR if continues to struggle with constipation    Consider nausea medication to help with appetite    Consider medication for indigestion (reports chronic issue)    Malnutrition Status:    Malnutrition Diagnosis: Severe malnutrition in the context of acute illness or injury    Malnutrition Present on Admission: Yes    Registered Dietitian Interventions:  Continue current diet order per provider    Nutrition supplements: Once per day. [facility] Renal to assist in adequate intakes    Adding MVI d/t concern for poor PO intake    Future/Additional Recommendations:  Recommended 4-6 smaller, more frequent intakes per day    Will continue to monitor PO intakes acutely and adjust nutrition supplements as appropriate.      REASON FOR ASSESSMENT  Positive admission nutrition risk screen of 2 - for weight loss and poor PO d/t decreased appetite.      PMH: hypertension, hyperlipidemia, and skin cancer      Per EMR, pt presented to ED on 1/9 for evaluation of worsening kidney fx. Pt admitted for acute renal failure superimposed on CKD.     SUBJECTIVE INFORMATION  Assessed patient in room.    NUTRITION HISTORY  - Information obtained from chart review and pt at bedside.  - Diet at home: Pt eats regular diet at home. She has tried to eat blander foods lately to minimize indigestion.  - Usual intakes: Reports that, for the last month, she has been eating 2-3 very small meals per day.   B: cold cereal, oatmeal, or eggs   L: either toast or nothing   D: has tried eggs, pancakes, and soups but does not tolerate that well. Sometimes will have a small turkey sandwich.   Snacks: sometimes sucks on candy to try to help w/ dry mouth  - Barriers to PO intakes: indigestion, dry mouth, and very frequent nausea (whether she is eating or not)  - Use of oral supplements: Reports that she has tried to drink Ensure or Boost to help with  "caloric intake, but states that they irritate her stomach and make her \"backed up\"  - Chewing/swallowing issues: No chewing issues noted. Stated that swallowing is difficult when her mouth gets very dry.  - Allergies: VINCE Cardona reported that her very poor appetite has been going on for about a month now. She reports that her weight loss has been going on slightly longer than that, but she is unsure as to what caused it. She stated that she lives a very active lifestyle and has a hobby farm at home. Noted that she has experienced significant decreases in strength and energy. She also reported that she is aware of how little she is eating and has been trying very hard to do better, but keeps running into the issue of frequent, consistent nausea as well as extreme indigestion. She also stated that it has been multiple days since she has had a bowel movement - she is unsure as to whether this is contributing to her poor appetite/indigestion, but does note how uncomfortable it is.  Pt was agreeable to trying a xkoto supplement once per day to help with intakes - we discussed that this is a supplement that is usually tolerated by those who struggle with indigestion.  We also discussed attempting to break meals up into smaller, more frequent intakes and how snacking throughout the day may help with adequate intakes as opposed to a large meal that leaves her feeling too full to eat something later on in the day.    Pt verbalized understanding of all information presented and denied further questions at this time.    RD will continue to follow as appropriate.       CURRENT NUTRITION ORDERS  Diet: Renal (non-dialysis)    CURRENT INTAKE/TOLERANCE  Per nursing flowsheet, 75% intake and fair appetite x 1 instance of documentation.     Per [facility], pt ordering well portioned meals appropriately since admission.     LABS  Nutrition-relevant labs: Reviewed  Noted: BUN 60.8(H), Creat 6.11(H), GFR " "7(L)    MEDICATIONS  Nutrition-relevant medications: Reviewed  Noted: sodium chloride @ 125 ml/hr    ANTHROPOMETRICS  Height: 160 cm (5' 3\")  Most Recent Weight: 54.1 kg (119 lb 3.2 oz)  IBW: 52.4 kg  % IBW: 103%  BMI (kg/m ): Body mass index is 21.12 kg/m . (Normal BMI)  Weight History:  Wt Readings from Last 10 Encounters:   01/10/25 54.1 kg (119 lb 3.2 oz)   01/08/25 54 kg (119 lb)   01/08/25 54.1 kg (119 lb 3.2 oz)   11/27/24 55.8 kg (123 lb)   10/18/24 58 kg (127 lb 14.4 oz)   05/25/23 62.6 kg (138 lb)   11/22/21 61.2 kg (135 lb)   05/28/20 65.3 kg (144 lb)   07/26/19 64.4 kg (142 lb)   04/22/19 64.5 kg (142 lb 3.2 oz)     -6.72% BW x 1 month       ASSESSED NUTRITION NEEDS  Dosing Weight: 54.1 kg, based on actual wt  Estimated Energy Needs: 25 - 30 kcals/kg  Justification: Repletion  Estimated Protein Needs: 1 - 1.2 grams of pro/kg  Justification: CKD and Repletion  Estimated Fluid Needs: 1 mL/kcal  Justification: Maintenance OR Per provider pending fluid status    SYSTEM FINDINGS      Skin/wounds:  Edema: 1+ L&R legs  Pressure Injury: None noted  Non-Healing Wound(s): None noted  Surgical Wound(s): None noted    GI symptoms:   Stooling Patterns Reviewed - No BM noted yet. Limited window of admission.       MALNUTRITION  % Weight Loss: > 5% in 1 month (severe)   % Intake:</=75% for >/= 1 month (severe)  Subcutaneous Fat Loss: Orbital: Mild and Fat overlying the ribs: Mild  Muscle Loss: Clavicles (pectoralis and deltoids): Mild, Shoulders (deltoids): Mild, and Calf (gastrocnemius): Mild  Fluid Accumulation/Edema: 1+ L&R Legs -> weak indicator and likely r/t renal failure superimposed on CKD. Not using as criterion.   Malnutrition Diagnosis: Severe malnutrition in the context of acute illness or injury  Malnutrition Present on Admission: Yes    NUTRITION DIAGNOSIS  Inadequate oral intake related to poor appetite and nausea as evidenced by pt reported intake, mild fat and muscle losses, and > 5% BW weight loss in " "one month    INTERVENTIONS  Medical food supplement therapy  Nutrition counseling strategies  See nutrition interventions above  Vitamin and mineral supplement therapy    Goals  Patient to consume % of nutritionally adequate meal trays TID, or the equivalent with supplements/snacks.     Monitoring/Evaluation  Progress toward goals will be monitored and evaluated per policy.        Lelo Zavala RD, LD  Clinical Dietitian  [facility] Message Group: \"Dietitian [Nathalie]\"  Office Phone: [phone]  Pagers: 3rd floor/ICU: [phone]  All other floors: [phone]  Weekend/holiday: [phone]    "

## 2025-01-10 NOTE — PROGRESS NOTES
[facility]    PROGRESS NOTE - Hospitalist Service    ASSESSMENT AND PLAN     Active Problems:    Acute renal failure superimposed on chronic kidney disease, unspecified acute renal failure type, unspecified CKD stage    Dulce Ma is a 64 year old female with PMH significant for hypertension, hyperlipidemia, and skin cancer who presents to the ED on 1/9/2025 for evaluation of worsening kidney function.    Brief history: Noted to have slight bump in creatinine 6/13/2023 at 1.06.  Creatinine of 2.46 noted in October 2024.  Patient was was to follow-up but was not seen until 1/8 due to personal issues.  Creatinine at that time noted to be 5.86.        Acute on subacute/chronic renal failure  Progressing since June 2023- noted above   Patient with symptoms of uremia- noting metallic taste and now sour taste with decreased appetite, nausea, intermittent vomiting with coughing, epigastric discomfort, fatigue, and weakness.    Stopped lisinopril 2 days prior to arrival  Creatinine on admission 6.63  Creatinine down to 6.11 after receiving IV hydration  Renal ultrasound reviewed with no hydronephrosis  Nephrology input appreciated.  Recommended D5W + 150 meq bicarb at 125 ml/hr. Check UPCR, SPEP/UPEP, complements, MONICA, ds DNA, ANCA, anti-GBM hep B/C  -Holding PTA Lisinopril      Abnormal UA, possible UTI  UC pending   Keflex initiated   UCx from October 2024 with pansensitive E. coli     Normocytic anemia  First noted June 2023.    Iron labs obtained October 2024 are normal.  Possibly anemia of chronic disease related to worsening kidney function- underlying malignancy?  Transfuse for hemoglobin less than 7  Hemoglobin 7.4 1/10/2025-likely delusional effect.  Monitor   Recheck Iron labs, TSH, Folic acid and B12      Symptomatic palpitations, SVT  EKG done by PCP noted to have ST T wave changes.  Underwent stress echocardiogram that was reviewed by cardiology did not show any evidence of structural  abnormality, usual wall thickness, or inducible ischemia even with limited exercise tolerance.  Currently has Zio patch in place assess for atrial fibrillation.  -continue current zio patch (placed on 12/30, to take off on 1/12)   -PTA Metoprolol held per nephro      HTN  Previously on Lisinopril but stopped this for a period of time when she was started on metoprolol.  Again stopped lisinopril 2 days ago due to elevated kidney function.  -PTA Metoprolol held per nephro     HLD  Not currently on medical management     Barriers to discharge: Improvement in kidney function, nephro recs     Anticipated length of stay: 2-3 days     Medically Ready for Discharge: Anticipated in 2-4 Days    Clinically Significant Risk Factors Present on Admission            # Hypercalcemia: Highest Ca = 10.9 mg/dL in last 2 days, will monitor as appropriate       # Acute Kidney Injury, unspecified: based on a >150% or 0.3 mg/dL increase in last creatinine compared to past 90 day average, will monitor renal function  # Hypertension: Noted on problem list      # Anemia: based on hgb <11                  Subjective:  Patient resting comfortably in bed.  Denies any current symptoms.  All questions answered.    PHYSICAL EXAM  Temp:  [97.7  F (36.5  C)-98.6  F (37  C)] 98.4  F (36.9  C)  Pulse:  [80-84] 84  Resp:  [16-20] 20  BP: (121-152)/(55-75) 121/55  SpO2:  [91 %-99 %] 94 %  Wt Readings from Last 1 Encounters:   01/10/25 54.1 kg (119 lb 3.2 oz)       Intake/Output Summary (Last 24 hours) at 1/10/2025 1126  Last data filed at 1/10/2025 0832  Gross per 24 hour   Intake 240 ml   Output 950 ml   Net -710 ml      Body mass index is 21.12 kg/m .    GENRL: Alert and answering questions appropriately. Not in acute distress. Lying in bed   CHEST: Breathing easily   EXTRM: trace pedal edema  NEURO: No focal neurological deficit. No involuntary movements. Normal mentation  PSYCH: Normal affect and mood.   INTGM: No skin rash. Pale     Medical  "Decision Making       55 MINUTES SPENT BY ME on the date of service doing chart review, history, exam, documentation & further activities per the note.      PERTINENT LABS/IMAGING:  Results for orders placed or performed during the hospital encounter of 01/09/25   US Renal Complete Non-Vascular    Impression    IMPRESSION:  No hydronephrosis.     Most Recent 3 CBC's:  Recent Labs   Lab Test 01/10/25  0555 01/09/25  1112 01/08/25  1211   WBC 6.8 8.0 8.5   HGB 7.4* 9.1* 9.2*   MCV 92 93 94    264 266     Most Recent 3 BMP's:  Recent Labs   Lab Test 01/10/25  0555 01/09/25  1112 01/08/25  1244    137  138 135   POTASSIUM 4.0 5.0  5.0 5.2   CHLORIDE 101 104  104 102   CO2 25 17*  19* 20*   BUN 60.8* 66.4*  65.7* 60.1*   CR 6.11* 6.63*  6.60* 5.86*   ANIONGAP 13 16*  15 13   KELTON 8.7* 10.6*  10.7* 10.9*   * 101*  100* 101*     Most Recent 2 LFT's:  Recent Labs   Lab Test 01/09/25  1112 01/08/25  1244   AST 21 21   ALT 9 10   ALKPHOS 47 48   BILITOTAL 0.6 0.5       Recent Labs   Lab Test 10/18/24  1216   CHOL 253*   HDL 48*   *   TRIG 148     Recent Labs   Lab Test 10/18/24  1216 06/13/23  0820 11/22/21  0952   * 160* 169*     Recent Labs   Lab Test 01/10/25  0555      POTASSIUM 4.0   CHLORIDE 101   CO2 25   *   BUN 60.8*   CR 6.11*   GFRESTIMATED 7*   KELTON 8.7*     No results for input(s): \"A1C\" in the last 32779 hours.  Recent Labs   Lab Test 01/10/25  0555 01/09/25  1112 01/08/25  1211   HGB 7.4* 9.1* 9.2*     No results for input(s): \"TROPONINI\" in the last 07115 hours.  No results for input(s): \"BNP\", \"NTBNPI\", \"NTBNP\" in the last 10991 hours.  Recent Labs   Lab Test 10/18/24  1216   TSH 2.16     No results for input(s): \"INR\" in the last 92072 hours.    Marine Heck, DO  Hospitalist Service  [facility]      "

## 2025-01-11 ENCOUNTER — APPOINTMENT (OUTPATIENT)
Dept: GENERAL RADIOLOGY | Facility: CLINIC | Age: 65
End: 2025-01-11
Attending: INTERNAL MEDICINE
Payer: COMMERCIAL

## 2025-01-11 LAB
ANION GAP SERPL CALCULATED.3IONS-SCNC: 11 MMOL/L (ref 7–15)
BACTERIA UR CULT: ABNORMAL
BACTERIA UR CULT: ABNORMAL
BASOPHILS # BLD AUTO: 0 10E3/UL (ref 0–0.2)
BASOPHILS NFR BLD AUTO: 0 %
BM IGG SER IF-ACNC: 0 AU/ML
BUN SERPL-MCNC: 55.8 MG/DL (ref 8–23)
CALCIUM SERPL-MCNC: 9 MG/DL (ref 8.8–10.4)
CHLORIDE SERPL-SCNC: 99 MMOL/L (ref 98–107)
CREAT SERPL-MCNC: 5.93 MG/DL (ref 0.51–0.95)
EGFRCR SERPLBLD CKD-EPI 2021: 7 ML/MIN/1.73M2
EOSINOPHIL # BLD AUTO: 0.3 10E3/UL (ref 0–0.7)
EOSINOPHIL NFR BLD AUTO: 4 %
ERYTHROCYTE [DISTWIDTH] IN BLOOD BY AUTOMATED COUNT: 12.7 % (ref 10–15)
FERRITIN SERPL-MCNC: 215 NG/ML (ref 11–328)
FERRITIN SERPL-MCNC: 221 NG/ML (ref 11–328)
GLUCOSE SERPL-MCNC: 105 MG/DL (ref 70–99)
HCO3 SERPL-SCNC: 29 MMOL/L (ref 22–29)
HCT VFR BLD AUTO: 27.5 % (ref 35–47)
HGB BLD-MCNC: 8.8 G/DL (ref 11.7–15.7)
IMM GRANULOCYTES # BLD: 0 10E3/UL
IMM GRANULOCYTES NFR BLD: 0 %
LDH SERPL L TO P-CCNC: 200 U/L (ref 0–250)
LYMPHOCYTES # BLD AUTO: 2.8 10E3/UL (ref 0.8–5.3)
LYMPHOCYTES NFR BLD AUTO: 32 %
MCH RBC QN AUTO: 30 PG (ref 26.5–33)
MCHC RBC AUTO-ENTMCNC: 32 G/DL (ref 31.5–36.5)
MCV RBC AUTO: 94 FL (ref 78–100)
MONOCYTES # BLD AUTO: 0.4 10E3/UL (ref 0–1.3)
MONOCYTES NFR BLD AUTO: 5 %
NEUTROPHILS # BLD AUTO: 5 10E3/UL (ref 1.6–8.3)
NEUTROPHILS NFR BLD AUTO: 59 %
NRBC # BLD AUTO: 0 10E3/UL
NRBC BLD AUTO-RTO: 0 /100
PLATELET # BLD AUTO: 259 10E3/UL (ref 150–450)
POTASSIUM SERPL-SCNC: 4.3 MMOL/L (ref 3.4–5.3)
RBC # BLD AUTO: 2.93 10E6/UL (ref 3.8–5.2)
SODIUM SERPL-SCNC: 139 MMOL/L (ref 135–145)
VIT B12 SERPL-MCNC: 366 PG/ML (ref 232–1245)
WBC # BLD AUTO: 8.6 10E3/UL (ref 4–11)

## 2025-01-11 PROCEDURE — 99233 SBSQ HOSP IP/OBS HIGH 50: CPT | Performed by: INTERNAL MEDICINE

## 2025-01-11 PROCEDURE — 82232 ASSAY OF BETA-2 PROTEIN: CPT | Performed by: INTERNAL MEDICINE

## 2025-01-11 PROCEDURE — 120N000001 HC R&B MED SURG/OB

## 2025-01-11 PROCEDURE — 82374 ASSAY BLOOD CARBON DIOXIDE: CPT | Performed by: PHYSICIAN ASSISTANT

## 2025-01-11 PROCEDURE — 86334 IMMUNOFIX E-PHORESIS SERUM: CPT | Performed by: PATHOLOGY

## 2025-01-11 PROCEDURE — 86335 IMMUNFIX E-PHORSIS/URINE/CSF: CPT | Performed by: PATHOLOGY

## 2025-01-11 PROCEDURE — 250N000013 HC RX MED GY IP 250 OP 250 PS 637: Performed by: INTERNAL MEDICINE

## 2025-01-11 PROCEDURE — 77075 RADEX OSSEOUS SURVEY COMPL: CPT

## 2025-01-11 PROCEDURE — 36415 COLL VENOUS BLD VENIPUNCTURE: CPT | Performed by: INTERNAL MEDICINE

## 2025-01-11 PROCEDURE — 82784 ASSAY IGA/IGD/IGG/IGM EACH: CPT | Performed by: INTERNAL MEDICINE

## 2025-01-11 PROCEDURE — 83521 IG LIGHT CHAINS FREE EACH: CPT | Performed by: INTERNAL MEDICINE

## 2025-01-11 PROCEDURE — 83615 LACTATE (LD) (LDH) ENZYME: CPT | Performed by: INTERNAL MEDICINE

## 2025-01-11 PROCEDURE — 82728 ASSAY OF FERRITIN: CPT | Performed by: INTERNAL MEDICINE

## 2025-01-11 PROCEDURE — 85004 AUTOMATED DIFF WBC COUNT: CPT | Performed by: INTERNAL MEDICINE

## 2025-01-11 PROCEDURE — 82565 ASSAY OF CREATININE: CPT | Performed by: PHYSICIAN ASSISTANT

## 2025-01-11 PROCEDURE — 83825 ASSAY OF MERCURY: CPT | Performed by: INTERNAL MEDICINE

## 2025-01-11 PROCEDURE — 83655 ASSAY OF LEAD: CPT | Performed by: INTERNAL MEDICINE

## 2025-01-11 PROCEDURE — 80048 BASIC METABOLIC PNL TOTAL CA: CPT | Performed by: PHYSICIAN ASSISTANT

## 2025-01-11 RX ADMIN — CEPHALEXIN 250 MG: 250 CAPSULE ORAL at 21:09

## 2025-01-11 RX ADMIN — CEPHALEXIN 250 MG: 250 CAPSULE ORAL at 10:01

## 2025-01-11 RX ADMIN — DORZOLAMIDE HYDROCHLORIDE AND TIMOLOL MALEATE 1 DROP: 20; 5 SOLUTION/ DROPS OPHTHALMIC at 10:01

## 2025-01-11 RX ADMIN — DORZOLAMIDE HYDROCHLORIDE AND TIMOLOL MALEATE 1 DROP: 20; 5 SOLUTION/ DROPS OPHTHALMIC at 21:09

## 2025-01-11 RX ADMIN — THERA TABS 1 TABLET: TAB at 10:01

## 2025-01-11 ASSESSMENT — ACTIVITIES OF DAILY LIVING (ADL)
ADLS_ACUITY_SCORE: 30

## 2025-01-11 NOTE — PROGRESS NOTES
[facility]  Hospitalist Progress Note  Admit 1/9/2025 10:34 AM    Name: Dulce Ma    MRN: [id number]  Provider:  Milton Brooks MD, [facility]    Date of Service: 01/11/2025     Reason for Stay (Diagnosis): Acute kidney injury         Summary of hospital stay & Assessment/Plan:   Summary of Stay: Dulce Ma is a 64 year old female who was admitted on 1/9/2025    64-year-old woman with hypertension, was admitted for severe renal failure. She had been taking lisinopril 20 mg daily for hypertension, prescribed by her primary care provider, Dr. Lamb, during a visit on 10/18/2024, where her blood pressure was noted to be 102/68. Due to hypotension, she stopped taking lisinopril and had her dose of Toprol, which was added for SVT-related palpitations, reduced. Her renal function has deteriorated significantly, with serum creatinine levels rising from ~1 mg/dL in June 2023 to 5.86 mg/dL on 1/8/2025. She was advised to come to the hospital for further evaluation.  Current Symptoms: Dulce reports symptoms of nausea, a metallic taste, and a decreased appetite, leading to an approximate 8-pound weight loss. These symptoms occurred prior to starting Toprol, after which she also experienced dry mouth. She denies using NSAIDs, other medications, or over-the-counter drugs. She reports no cardiopulmonary complaints.  Acute Renal Failure on CKD + uremic symptoms   Continue IV hydration.  Follow nephrology's recommendations for bicarbonate infusion and lab tests.  And urine heavy metal including arsenic with patient potential exposure although a hair or nail testing is more accurate for chronic exposure however patient does not have other signs of hyperpigmentation that are more typical of arsenic chronic exposure  Hold lisinopril as per current management.  Vasculitis labs pending.  Possible renal biopsy pending vasculitis labs and kidney function.     Possible UTI  Continue Keflex for 3 more  days.  Normocytic Anemia, combination of iron deficiency and chronic kidney disease if she has chronic arsenic poisoning that would also affect her blood count  Monitor hemoglobin levels.  Transfuse if hemoglobin drops below 7.  Rechecked iron borderline low, TSH,  and B12 are okay  Symptomatic Palpitations, SVT  Continue monitoring with Zio patch.  Hold metoprolol per nephrology.  Hypertension  Hold metoprolol as per nephrology's advice.  Reassess antihypertensive management based on renal function.  Hyperlipidemia no meds currently.  Severe malnutrition in the context of acute illness   Follow nutrition recommendation for supplement and follow-up      .      Clinically Significant Risk Factors            # Hypercalcemia: Highest Ca = 10.7 mg/dL in last 2 days, will monitor as appropriate       # Acute Kidney Injury, unspecified: based on a >150% or 0.3 mg/dL increase in last creatinine compared to past 90 day average, will monitor renal function  # Hypertension: Noted on problem list             # Severe Malnutrition: based on nutrition assessment, PRESENT ON ADMISSION            DVT Prophylaxis: Pneumatic Compression Devices  Code Status:  Full Code    Disposition Plan     Medically Ready for Discharge: Anticipated in 2-4 Days   prior living arrangement once Barriers to discharge include improvement in kidney function and cleared by nephrology.     Entered: Milton Brooks MD 01/11/2025, 7:21 AM                 Interval History:       Doing well I had a long discussion with her it seems that she has well water in her place and has a reverse osmosis for high arsenic and water she is suspecting that maybe her water system is not working and might have been exposed to arsenic.  She also complains of swollen submandibular lymph nodes  Today's plan detailed above discussed with nursing               Physical Exam:   Physical Exam   Temp: 98.6  F (37  C) Temp src: Oral BP: 126/62 Pulse: 90   Resp: 20 SpO2: 93 % O2  Device: None (Room air)    Vitals:    01/09/25 1754 01/10/25 0557 01/11/25 0545   Weight: 52.9 kg (116 lb 11.2 oz) 54.1 kg (119 lb 3.2 oz) 55.5 kg (122 lb 6.4 oz)     I/O last 3 completed shifts:  In: -   Out: 1500 [Urine:1500]          GENERAL:  Comfortable.   PSYCH: pleasant, oriented, No acute distress.  EYES: PERRLA, Normal conjunctiva.  Submandibular lymph nodes bilaterally are swollen not very tender  HEART:  Normal S1, S2 with +1 edema.  LUNGS:  Clear to auscultation, normal Respiratory effort.  ABDOMEN:  Soft, no hepatosplenomegaly, normal bowel sounds.  SKIN:  Dry to touch, No rash.  Neuro: Non focal with normal motor power, sensation, CN's and Reflexes.    Medications   Current Facility-Administered Medications   Medication Dose Route Frequency Provider Last Rate Last Admin    [Held by provider] sodium chloride 0.9 % infusion   Intravenous Continuous Coni Diego PA-C   Stopped at 01/09/25 1653     Current Facility-Administered Medications   Medication Dose Route Frequency Provider Last Rate Last Admin    cephALEXin (KEFLEX) capsule 250 mg  250 mg Oral Q12H [facility] (08/20) Marine Heck DO   250 mg at 01/10/25 2034    dorzolamide-timolol (COSOPT) ophthalmic solution 1 drop  1 drop Both Eyes BID Saul Fuentes MD   1 drop at 01/10/25 2057    [Held by provider] metoprolol succinate ER (TOPROL-XL) 24 hr half-tab 12.5 mg  12.5 mg Oral At Bedtime Marine Heck DO        multivitamin, therapeutic (THERA-VIT) tablet 1 tablet  1 tablet Oral Daily Marine Heck DO        Netarsudil-Latanoprost 0.02-0.005 % SOLN 1 drop - PATIENT'S OWN SUPPLY  1 drop Both Eyes At Bedtime Coni Diego PA-C   1 drop at 01/10/25 2039    sodium chloride (PF) 0.9% PF flush 3 mL  3 mL Intracatheter Q8H Coni Diego PA-C   3 mL at 01/11/25 0009     Data     -Data reviewed today:  I personally reviewed  all new labs and imaging results over the last 24 hours.    Recent Labs    Lab 01/11/25  0547 01/10/25  0555 01/09/25  1112   WBC 8.6 6.8 8.0   HGB 8.8* 7.4* 9.1*   HCT 27.5* 22.8* 27.5*   MCV 94 92 93    213 264     Recent Labs   Lab 01/11/25  0547 01/10/25  0555 01/09/25  1112    139 137  138   POTASSIUM 4.3 4.0 5.0  5.0   CHLORIDE 99 101 104  104   CO2 29 25 17*  19*   ANIONGAP 11 13 16*  15   * 108* 101*  100*   BUN 55.8* 60.8* 66.4*  65.7*   CR 5.93* 6.11* 6.63*  6.60*   GFRESTIMATED 7* 7* 6*  7*   KELTON 9.0 8.7* 10.6*  10.7*       No results found for this or any previous visit (from the past 24 hours).    This document was produced using voice recognition software

## 2025-01-11 NOTE — PLAN OF CARE
"/66 (BP Location: Right arm)   Pulse 82   Temp 98.2  F (36.8  C) (Oral)   Resp 20   Ht 1.6 m (5' 3\")   Wt 54.1 kg (119 lb 3.2 oz)   LMP 10/31/2011   SpO2 94%   BMI 21.12 kg/m      VSS, PT denies pain, SoB, CP. Started 0.45%NS this shift, good UoP.   "

## 2025-01-11 NOTE — PLAN OF CARE
"VS stable. No complaints of pain.     Goal Outcome Evaluation:      Plan of Care Reviewed With: patient    Overall Patient Progress: improvingOverall Patient Progress: improving    Outcome Evaluation: VS stable. No complaints of pain.      Problem: Adult Inpatient Plan of Care  Goal: Plan of Care Review  Description: The Plan of Care Review/Shift note should be completed every shift.  The Outcome Evaluation is a brief statement about your assessment that the patient is improving, declining, or no change.  This information will be displayed automatically on your shift  note.  Outcome: Progressing  Flowsheets (Taken 1/11/2025 0351)  Outcome Evaluation: VS stable. No complaints of pain.  Plan of Care Reviewed With: patient  Overall Patient Progress: improving  Goal: Patient-Specific Goal (Individualized)  Description: You can add care plan individualizations to a care plan. Examples of Individualization might be:  \"Parent requests to be called daily at 9am for status\", \"I have a hard time hearing out of my right ear\", or \"Do not touch me to wake me up as it startles  me\".  Outcome: Progressing  Goal: Absence of Hospital-Acquired Illness or Injury  Outcome: Progressing  Intervention: Identify and Manage Fall Risk  Recent Flowsheet Documentation  Taken 1/10/2025 2030 by Meghan Porter, RN  Safety Promotion/Fall Prevention:   clutter free environment maintained   lighting adjusted   nonskid shoes/slippers when out of bed  Intervention: Prevent Skin Injury  Recent Flowsheet Documentation  Taken 1/10/2025 2030 by Meghan Porter, RN  Body Position: position changed independently  Intervention: Prevent Infection  Recent Flowsheet Documentation  Taken 1/10/2025 2030 by Meghan Porter, RN  Infection Prevention: single patient room provided  Goal: Optimal Comfort and Wellbeing  Outcome: Progressing  Goal: Readiness for Transition of Care  Outcome: Progressing     Problem: Comorbidity Management  Goal: Blood Pressure in " Desired Range  Outcome: Progressing  Intervention: Maintain Blood Pressure Management  Recent Flowsheet Documentation  Taken 1/10/2025 2030 by Meghan Porter, RN  Medication Review/Management: medications reviewed     Problem: Acute Kidney Injury/Impairment  Goal: Fluid and Electrolyte Balance  Outcome: Progressing  Goal: Improved Oral Intake  Outcome: Progressing  Goal: Effective Renal Function  Outcome: Progressing  Intervention: Monitor and Support Renal Function  Recent Flowsheet Documentation  Taken 1/10/2025 2030 by Meghan Porter, RN  Medication Review/Management: medications reviewed

## 2025-01-11 NOTE — PROGRESS NOTES
Renal Medicine Progress Note            Assessment/Plan:     64 y.o woman with hypertension and history of skin cancer, who was admitted severe renal failure of unclear etiology and some uremic symptoms.      # Severe acute-subacute renal failure: She had serum creatinine of ~ 1 mg/dl on June of 2023, 2.46 mg/dl on 10/24 and 5.86 mg/dl on 1/8.               + uremic symptoms               -UA with trace blood and 3 RBC               -UPCR 500 mg/dl               -hep B/C negative               -MONICA, dsDNA, ANCA, complements are normal               -SPEP with monoclonal protein peak of 1.1    -UPEP with  monoclonal protein peak of ~ 30%     # Hypertension:               -PTA Toprol and was on lisinopril (stopped due to hypotension)     # Severe anemia: PLT is normal. Due to to renal failure vs another process.      #  FEN: Seems euvolemic. Hypercalcemia improved with IVF fluid.      Price:  # Monoclonal protein in SPEP/UPEP. Immunofixation and Kappa/lambda ratio pending. 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   # Please consult oncology  # Likely will need a kidney biopsy early next week    I discussed the case with Dr. Brooks.          Interval History:     Nausea is much better.  No emesis since admission  Appetite remains poor.  No cardiopulmonary complaints.           Medications and Allergies:     Current Facility-Administered Medications   Medication Dose Route Frequency Provider Last Rate Last Admin    cephALEXin (KEFLEX) capsule 250 mg  250 mg Oral Q12H [facility] (08/20) Marine Heck DO   250 mg at 01/11/25 1001    dorzolamide-timolol (COSOPT) ophthalmic solution 1 drop  1 drop Both Eyes BID Saul Fuentes MD   1 drop at 01/11/25 1001    [Held by provider] metoprolol succinate ER (TOPROL-XL) 24 hr half-tab 12.5 mg  12.5 mg Oral At Bedtime Marine Heck DO        multivitamin, therapeutic (THERA-VIT) tablet 1 tablet  1 tablet Oral Daily Marine Heck DO   " 1 tablet at 01/11/25 1001    Netarsudil-Latanoprost 0.02-0.005 % SOLN 1 drop - PATIENT'S OWN SUPPLY  1 drop Both Eyes At Bedtime Coni Diego PA-C   1 drop at 01/10/25 2039    sodium chloride (PF) 0.9% PF flush 3 mL  3 mL Intracatheter Q8H Coni Diego PA-C   3 mL at 01/11/25 1001        Allergies   Allergen Reactions    Benzoyl Peroxide      swelling    Ibuprofen      over long duration dev. hives    Amoxicillin Rash     Face; in early adulthood    Penicillins Rash     Face; in early adulthood            Physical Exam:   Vitals were reviewed   , Blood pressure 120/60, pulse 90, temperature 97.8  F (36.6  C), temperature source Oral, resp. rate 20, height 1.6 m (5' 3\"), weight 55.5 kg (122 lb 6.4 oz), last menstrual period 10/31/2011, SpO2 93%, not currently breastfeeding.    Wt Readings from Last 3 Encounters:   01/11/25 55.5 kg (122 lb 6.4 oz)   01/08/25 54 kg (119 lb)   01/08/25 54.1 kg (119 lb 3.2 oz)       Intake/Output Summary (Last 24 hours) at 1/11/2025 1106  Last data filed at 1/11/2025 0911  Gross per 24 hour   Intake --   Output 1610 ml   Net -1610 ml       GENERAL: NAD  HEENT:  Normocephalic. No gross abnormalities.  Pupils equal.  MMM.    CV: RRR, + murmur  RESP: CTAB no wheezes.  GI: Abdomen soft, NT  MUSCULOSKELETAL: extremities nl - no gross deformities noted. No edema  SKIN: no suspicious lesions or rashes, dry to touch  NEURO:  Awake, alert and conversing normally  PSYCH: mood good, affect appropriate  LYMPH: No palpable ant/post cervical          Data:     CBC RESULTS:     Recent Labs   Lab 01/11/25  0547 01/10/25  0555 01/09/25  1112 01/08/25  1211   WBC 8.6 6.8 8.0 8.5   RBC 2.93* 2.48* 2.97* 2.93*   HGB 8.8* 7.4* 9.1* 9.2*   HCT 27.5* 22.8* 27.5* 27.6*    213 264 266       Basic Metabolic Panel:  Recent Labs   Lab 01/11/25  0547 01/10/25  0555 01/09/25  1112 01/08/25  1244    139 137  138 135   POTASSIUM 4.3 4.0 5.0  5.0 5.2   CHLORIDE 99 101 104  104 102   CO2 " 29 25 17*  19* 20*   BUN 55.8* 60.8* 66.4*  65.7* 60.1*   CR 5.93* 6.11* 6.63*  6.60* 5.86*   * 108* 101*  100* 101*   KELTON 9.0 8.7* 10.6*  10.7* 10.9*       INRNo lab results found in last 7 days.   Attestation:   I have reviewed today's relevant vital signs, notes, medications, labs and imaging.    Julio Cesar Hurtado MD  [facility] - Nephrology  Office phone :[phone]  Pager: [phone]

## 2025-01-11 NOTE — CONSULTS
[facility]  CONSULT  MEDICAL ONCOLOGY/HEMATOLOGY  Nayan Guerrier MD       Dulce Ma MRN# [id number]   YOB: 1960  PCP Alida Lamb [phone] Age: 64 year old      Date of Admission:  1/9/2025         Assessment and Plan:   Monoclonal gammopathy with subtype pending and identified in the context of acute -subacute renal failure. I reviewed the diagnosis of monoclonal gammopathy with her and explained that I agree with the concern for possible myeloma. We reviewed different options for establishing the diagnosis. Her skeletal survey does not clearly show lytic lesions  but this test has low sensitivity for identifying bone lesions in myeloma. We next reviewed the option of bone marrow biopsy which would certainly be appropriate in this setting. Note plan for kidney biopsy and agree with this plan. Lastly, I recommended additional labs including quantitative immunoglobulins. Serum free light chains and 24 hour urine for UPELP. Additional imaging could also be considered after discharge including PET/CT  Acute-subacute renal failure with extensive work up to date showing no evidence for an autoimmune disorder or viral hepatitis.  3.   History of melanoma, treated with surgical excision and with no evidence of recurrence  4.   Nausea due to uremia  5.   Recent onset palpitations and for which she is undergoing work up through cardiology including use of a Zio patch. At the time a biopsy would be performed, the sample should also be tested for amyoidosis    Recommendations  Serum free light chains  24 hour urine for urine protein electrophoresis  Await results of immunofixation  Renal biopsy  Bone marrow biopsy   PET/CT as an outpatient   I discussed these recommendations with her and will see her in follow up tomorrow           Code Status:   Full Code           Chief Complaint:   Nausea    History is obtained from the patient         History of Present Illness:     SHAI Ma is referred by Dr. Milton Broosk for a hematology consultation regarding monoclonal gammopathy and for additional recommendations for work up    Ms. Ma is a 64 year old woman who has a history of hypertension and skin cancer who is admitted after presenting to her primary care physician with abnormal food taste, fatigue and nausea. Lab studies showed elevated creatinine and she was sent to the ER. Additional work up showed creatinine 6.6 and BUN 65.7 with calcium 10.7. Since admission to the hospital, she has received IV fluids and been seen by nephrology. U/S showed no evidence of hydronephrosis. Skeletal survey showed no definite lytic lesions. Serum protein electrophoresis showed a monoclonal protein measured at 1.1. Hepatitis serology negative. ANCA and MONICA not detected.C3 and C4 are both normal and DNA-ds antibodies not detected. Hematology/oncology consultation requested for additional recommendations for work up           Past Medical History:     Past Medical History:   Diagnosis Date    Anesthesia complication, initial encounter     was completely out with Midazolam 2 mg IV, Fentanyl 100 micrograms IV that was given at time of colonoscopy 7/2018     Dysplastic nevi     Glaucoma     Diagnosed in Spring 2010    Hypertension goal BP (blood pressure) < 140/90 at age 50     very strong family hx     Lumbago     stiffness in low back    Other malignant neoplasm of skin of trunk, except scrotum 2/02    Dr. Myles Lake, melanoma with negative sentinel node biopsy - no chemo-Dr. Selene Gonzales-derm    Perimenopausal     Routine gyne exam     [facility] ob/gyn - Dr. Isabela Perez     Skin cancer, basal cell     multiple - 3 on nose, treated with interferon intralesionally x 1     Unspecified derangement, shoulder region     s/p horse-riding injury- no problem now    Unspecified musculoskeletal disorders and symptoms referable to neck     compressed vertebrae in neck- bothers pt rarely               Past  Surgical History:     Past Surgical History:   Procedure Laterality Date    APPENDECTOMY  2008    aprroximately 8-10 years agp    BREAST SURGERY      Breast biopsies    COLONOSCOPY N/A 7/27/2018    Procedure: COLONOSCOPY;  COLONOSCOPY ;  Surgeon: Ren Whitehead MD;  Location:  GI    ORTHOPEDIC SURGERY Right 2014    ORIF right ring finger    ZZC NONSPECIFIC PROCEDURE  3/02    malignant melanoma removed from abdomen with negative nodes-Dr. Acosta -surgeon            Home Medications:     Prior to Admission medications    Medication Sig Last Dose Taking? Auth Provider Long Term End Date   atorvastatin (LIPITOR) 40 MG tablet Take 40 mg by mouth daily.  Yes Unknown, Entered By History No    clobetasol propionate (TEMOVATE) 0.05 % external cream Apply very scant amount to inner labia daily for up to 14 days at a time -ok to use every 2 months or so More than a month Yes Alida Lamb MD     dorzolamide-timolol (COSOPT) 2-0.5 % ophthalmic solution Place 1 drop into both eyes 2 times daily. 1/9/2025 Morning Yes Unknown, Entered By History No    metoprolol succinate ER (TOPROL XL) 25 MG 24 hr tablet Take 0.5 tablets (12.5 mg) by mouth daily. 1/8/2025 Bedtime Yes Alida Lamb MD Yes    Netarsudil-Latanoprost (ROCKLATAN) 0.02-0.005 % SOLN Place 1 drop into both eyes at bedtime.  Yes Unknown, Entered By History No             Allergies:     Allergies   Allergen Reactions    Benzoyl Peroxide      swelling    Ibuprofen      over long duration dev. hives    Amoxicillin Rash     Face; in early adulthood    Penicillins Rash     Face; in early adulthood            Social History:   Dulce Ma  reports that she has never smoked. She has never used smokeless tobacco. She reports current alcohol use. She reports that she does not use drugs.              Family History:     Family History   Problem Relation Age of Onset    Hypertension Mother     Heart Disease Mother         bypass    Neuropathy Mother      "Aortic Valve Replacement Mother 83    Hypertension Father     Coronary Artery Disease Father 83         in  from [place]    Eye Disorder Maternal Grandmother         glacoma    Eye Disorder Maternal Grandfather         glacoma    Hypertension Paternal Grandmother     Colon Cancer No family hx of               Review of Systems:   The 10 point Review of Systems is negative other than noted in the HPI or here.             Physical Exam:    , Blood pressure 120/60, pulse 90, temperature 97.8  F (36.6  C), temperature source Oral, resp. rate 20, height 1.6 m (5' 3\"), weight 55.5 kg (122 lb 6.4 oz), last menstrual period 10/31/2011, SpO2 93%, not currently breastfeeding.  122 lbs 6.4 oz    Constitutional: Weak appearing but comfortable   Psych: Mood and affect normal   Neuro: No localizing findings   Eyes: No sclera icterus   ENT: Ears and nose unremarkable   Lymph: No palpable adenopathy in the cervical or axillary regions   Cardiovascular: Regular heart rhythm. She is wearing a Zio patch   Lungs: Clear lung fields   Abdomen: Soft and non tender, liver edge and spleen tip not palpable   Skin: No rashes or petechiae   Musculoskeletal: Muscle groups symmetric. Muscle strength equal   Other:           Data:   All new lab and imaging data was reviewed.   Recent Labs   Lab Test 25  0547 01/10/25  0555   WBC 8.6 6.8   HGB 8.8* 7.4*   MCV 94 92    213      Recent Labs   Lab Test 25  0547 01/10/25  0555    139   POTASSIUM 4.3 4.0   CHLORIDE 99 101   CO2 29 25   BUN 55.8* 60.8*   CR 5.93* 6.11*   ANIONGAP 11 13   KELTON 9.0 8.7*   * 108*     No lab results found.    Invalid input(s): \"TROP\", \"TROPONINIES\"    "

## 2025-01-12 LAB
ANION GAP SERPL CALCULATED.3IONS-SCNC: 13 MMOL/L (ref 7–15)
BUN SERPL-MCNC: 56.4 MG/DL (ref 8–23)
CALCIUM SERPL-MCNC: 8.6 MG/DL (ref 8.8–10.4)
CHLORIDE SERPL-SCNC: 101 MMOL/L (ref 98–107)
CREAT SERPL-MCNC: 5.82 MG/DL (ref 0.51–0.95)
EGFRCR SERPLBLD CKD-EPI 2021: 8 ML/MIN/1.73M2
ERYTHROCYTE [DISTWIDTH] IN BLOOD BY AUTOMATED COUNT: 12.6 % (ref 10–15)
GLUCOSE SERPL-MCNC: 97 MG/DL (ref 70–99)
HCO3 SERPL-SCNC: 24 MMOL/L (ref 22–29)
HCT VFR BLD AUTO: 22.9 % (ref 35–47)
HGB BLD-MCNC: 7.4 G/DL (ref 11.7–15.7)
MCH RBC QN AUTO: 30.2 PG (ref 26.5–33)
MCHC RBC AUTO-ENTMCNC: 32.3 G/DL (ref 31.5–36.5)
MCV RBC AUTO: 94 FL (ref 78–100)
PLATELET # BLD AUTO: 187 10E3/UL (ref 150–450)
POTASSIUM SERPL-SCNC: 4.5 MMOL/L (ref 3.4–5.3)
RBC # BLD AUTO: 2.45 10E6/UL (ref 3.8–5.2)
SODIUM SERPL-SCNC: 138 MMOL/L (ref 135–145)
WBC # BLD AUTO: 6.2 10E3/UL (ref 4–11)

## 2025-01-12 PROCEDURE — 85048 AUTOMATED LEUKOCYTE COUNT: CPT | Performed by: INTERNAL MEDICINE

## 2025-01-12 PROCEDURE — 250N000013 HC RX MED GY IP 250 OP 250 PS 637: Performed by: INTERNAL MEDICINE

## 2025-01-12 PROCEDURE — 99232 SBSQ HOSP IP/OBS MODERATE 35: CPT | Performed by: INTERNAL MEDICINE

## 2025-01-12 PROCEDURE — 84166 PROTEIN E-PHORESIS/URINE/CSF: CPT | Performed by: PATHOLOGY

## 2025-01-12 PROCEDURE — 120N000001 HC R&B MED SURG/OB

## 2025-01-12 PROCEDURE — 82374 ASSAY BLOOD CARBON DIOXIDE: CPT | Performed by: PHYSICIAN ASSISTANT

## 2025-01-12 PROCEDURE — 81050 URINALYSIS VOLUME MEASURE: CPT | Performed by: PATHOLOGY

## 2025-01-12 PROCEDURE — 80048 BASIC METABOLIC PNL TOTAL CA: CPT | Performed by: PHYSICIAN ASSISTANT

## 2025-01-12 PROCEDURE — 36415 COLL VENOUS BLD VENIPUNCTURE: CPT | Performed by: INTERNAL MEDICINE

## 2025-01-12 PROCEDURE — 85041 AUTOMATED RBC COUNT: CPT | Performed by: INTERNAL MEDICINE

## 2025-01-12 PROCEDURE — 85018 HEMOGLOBIN: CPT | Performed by: INTERNAL MEDICINE

## 2025-01-12 PROCEDURE — 99233 SBSQ HOSP IP/OBS HIGH 50: CPT | Performed by: INTERNAL MEDICINE

## 2025-01-12 PROCEDURE — 84156 ASSAY OF PROTEIN URINE: CPT | Performed by: PATHOLOGY

## 2025-01-12 RX ADMIN — CEPHALEXIN 250 MG: 250 CAPSULE ORAL at 20:13

## 2025-01-12 RX ADMIN — DORZOLAMIDE HYDROCHLORIDE AND TIMOLOL MALEATE 1 DROP: 20; 5 SOLUTION/ DROPS OPHTHALMIC at 22:32

## 2025-01-12 RX ADMIN — DORZOLAMIDE HYDROCHLORIDE AND TIMOLOL MALEATE 1 DROP: 20; 5 SOLUTION/ DROPS OPHTHALMIC at 08:09

## 2025-01-12 RX ADMIN — CEPHALEXIN 250 MG: 250 CAPSULE ORAL at 08:08

## 2025-01-12 RX ADMIN — THERA TABS 1 TABLET: TAB at 08:08

## 2025-01-12 ASSESSMENT — ACTIVITIES OF DAILY LIVING (ADL)
ADLS_ACUITY_SCORE: 32
ADLS_ACUITY_SCORE: 30
ADLS_ACUITY_SCORE: 32
ADLS_ACUITY_SCORE: 30
ADLS_ACUITY_SCORE: 30
ADLS_ACUITY_SCORE: 32
ADLS_ACUITY_SCORE: 32
ADLS_ACUITY_SCORE: 30
ADLS_ACUITY_SCORE: 32
ADLS_ACUITY_SCORE: 36
ADLS_ACUITY_SCORE: 32
ADLS_ACUITY_SCORE: 30
ADLS_ACUITY_SCORE: 32
ADLS_ACUITY_SCORE: 30

## 2025-01-12 NOTE — PLAN OF CARE
"Pt A&Ox4.  VSS.  Denies pain.  On RA.  Moves SB to bathroom.  Bone scan completed today.  24 hour urine IP 2110 today to 2110 1/12.     Goal Outcome Evaluation:      Plan of Care Reviewed With: patient    Overall Patient Progress: improvingOverall Patient Progress: improving    Outcome Evaluation: VSS.  Denies pain.  Bone survey completed today.  24 hour urine IP.      Problem: Adult Inpatient Plan of Care  Goal: Plan of Care Review  Description: The Plan of Care Review/Shift note should be completed every shift.  The Outcome Evaluation is a brief statement about your assessment that the patient is improving, declining, or no change.  This information will be displayed automatically on your shift  note.  Outcome: Progressing  Flowsheets (Taken 1/11/2025 0182)  Outcome Evaluation: VSS.  Denies pain.  Bone survey completed today.  24 hour urine IP.  Plan of Care Reviewed With: patient  Overall Patient Progress: improving  Goal: Patient-Specific Goal (Individualized)  Description: You can add care plan individualizations to a care plan. Examples of Individualization might be:  \"Parent requests to be called daily at 9am for status\", \"I have a hard time hearing out of my right ear\", or \"Do not touch me to wake me up as it startles  me\".  Outcome: Progressing  Goal: Absence of Hospital-Acquired Illness or Injury  Outcome: Progressing  Intervention: Identify and Manage Fall Risk  Recent Flowsheet Documentation  Taken 1/11/2025 1624 by Kiara Murillo RN  Safety Promotion/Fall Prevention: safety round/check completed  Goal: Optimal Comfort and Wellbeing  Outcome: Progressing  Goal: Readiness for Transition of Care  Outcome: Progressing     Problem: Comorbidity Management  Goal: Blood Pressure in Desired Range  Outcome: Progressing     Problem: Acute Kidney Injury/Impairment  Goal: Fluid and Electrolyte Balance  Outcome: Progressing  Goal: Improved Oral Intake  Outcome: Progressing  Goal: Effective Renal " Function  Outcome: Progressing

## 2025-01-12 NOTE — PROGRESS NOTES
Renal Medicine Progress Note            Assessment/Plan:     64 y.o woman with hypertension and history of skin cancer, who was admitted with severe renal failure of unclear etiology and some uremic symptoms. Etiology of her severe renal failure is presumed due to monoclonal gammopathy at this time.      # Severe acute-subacute renal failure: She had serum creatinine of ~ 1 mg/dl on June of 2023, 2.46 mg/dl on 10/24 and 5.86 mg/dl on 1/8.               + uremic symptoms               -UA with trace blood and 3 RBC               -UPCR 500 mg/dl               -hep B/C negative               -MONICA, dsDNA, ANCA, complements are normal               -SPEP with monoclonal protein peak of 1.1               -UPEP with  monoclonal protein peak of ~ 30%     # Hypertension: BP is excellent wo BP medication.               -PTA Toprol and was on lisinopril (stopped due to hypotension)     # Severe anemia: PLT is normal. Due to to renal failure vs another process.      #  FEN: Seems euvolemic. Hypercalcemia improved with IVF fluid.      Price:  # She agreed to a kidney biopsy. Plan for it on  Tuesday.   # Anemia management defer to oncology    I discussed the case with Dr. Brooks.          Interval History:     She feels better.  Appetite and nausea are better.  No SOB.  Decent urine output.   Scr is stable.             Medications and Allergies:     Current Facility-Administered Medications   Medication Dose Route Frequency Provider Last Rate Last Admin    cephALEXin (KEFLEX) capsule 250 mg  250 mg Oral Q12H [facility] (08/20) Marine Heck DO   250 mg at 01/12/25 0808    dorzolamide-timolol (COSOPT) ophthalmic solution 1 drop  1 drop Both Eyes BID Saul Fuentes MD   1 drop at 01/12/25 0809    [Held by provider] metoprolol succinate ER (TOPROL-XL) 24 hr half-tab 12.5 mg  12.5 mg Oral At Bedtime Marine Heck DO        multivitamin, therapeutic (THERA-VIT) tablet 1 tablet  1 tablet Oral Daily Umang  "Marine Perez, DO   1 tablet at 01/12/25 0808    Netarsudil-Latanoprost 0.02-0.005 % SOLN 1 drop - PATIENT'S OWN SUPPLY  1 drop Both Eyes At Bedtime Coni Diego PA-C   1 drop at 01/11/25 2109    sodium chloride (PF) 0.9% PF flush 3 mL  3 mL Intracatheter Q8H Coni Diego PA-C   3 mL at 01/12/25 0809        Allergies   Allergen Reactions    Benzoyl Peroxide      swelling    Ibuprofen      over long duration dev. hives    Amoxicillin Rash     Face; in early adulthood    Penicillins Rash     Face; in early adulthood            Physical Exam:   Vitals were reviewed   , Blood pressure 131/66, pulse 76, temperature 97.8  F (36.6  C), temperature source Oral, resp. rate 18, height 1.6 m (5' 3\"), weight 54.9 kg (121 lb 1.6 oz), last menstrual period 10/31/2011, SpO2 96%, not currently breastfeeding.    Wt Readings from Last 3 Encounters:   01/12/25 54.9 kg (121 lb 1.6 oz)   01/08/25 54 kg (119 lb)   01/08/25 54.1 kg (119 lb 3.2 oz)       Intake/Output Summary (Last 24 hours) at 1/12/2025 1341  Last data filed at 1/12/2025 1300  Gross per 24 hour   Intake 240 ml   Output 1200 ml   Net -960 ml     GENERAL: NAD  HEENT:  Normocephalic. No gross abnormalities.  Pupils equal.  MMM.    CV: RRR, + murmur  RESP: CTAB no wheezes.  GI: Abdomen soft, NT  MUSCULOSKELETAL: extremities nl - no gross deformities noted. No edema  SKIN: no suspicious lesions or rashes, dry to touch  NEURO:  Awake, alert and conversing normally  PSYCH: mood good, affect appropriate  LYMPH: No palpable ant/post cervical          Data:     CBC RESULTS:     Recent Labs   Lab 01/12/25  0618 01/11/25  0547 01/10/25  0555 01/09/25  1112 01/08/25  1211   WBC 6.2 8.6 6.8 8.0 8.5   RBC 2.45* 2.93* 2.48* 2.97* 2.93*   HGB 7.4* 8.8* 7.4* 9.1* 9.2*   HCT 22.9* 27.5* 22.8* 27.5* 27.6*    259 213 264 266       Basic Metabolic Panel:  Recent Labs   Lab 01/12/25  0618 01/11/25  0547 01/10/25  0555 01/09/25  1112 01/08/25  1244    139 139 137  " 138 135   POTASSIUM 4.5 4.3 4.0 5.0  5.0 5.2   CHLORIDE 101 99 101 104  104 102   CO2 24 29 25 17*  19* 20*   BUN 56.4* 55.8* 60.8* 66.4*  65.7* 60.1*   CR 5.82* 5.93* 6.11* 6.63*  6.60* 5.86*   GLC 97 105* 108* 101*  100* 101*   KELTON 8.6* 9.0 8.7* 10.6*  10.7* 10.9*       INRNo lab results found in last 7 days.   Attestation:   I have reviewed today's relevant vital signs, notes, medications, labs and imaging.    Julio Cesar Hurtado MD  [facility] - [facility]phrology  Office phone :[phone]  Pager: [phone]

## 2025-01-12 NOTE — PROGRESS NOTES
[facility]  Hospitalist Progress Note  Admit 1/9/2025 10:34 AM    Name: Dulce Ma    MRN: [id number]  Provider:  Milton Brooks MD, [facility]    Date of Service: 01/12/2025     Reason for Stay (Diagnosis): Acute kidney injury         Summary of hospital stay & Assessment/Plan:   Summary of Stay: Dulce Ma is a 64 year old female who was admitted on 1/9/2025    64-year-old woman with hypertension, was admitted for severe renal failure. She had been taking lisinopril 20 mg daily for hypertension, prescribed by her primary care provider, Dr. Lamb, during a visit on 10/18/2024, where her blood pressure was noted to be 102/68. Due to hypotension, she stopped taking lisinopril and had her dose of Toprol, which was added for SVT-related palpitations, reduced. Her renal function has deteriorated significantly, with serum creatinine levels rising from ~1 mg/dL in June 2023 to 5.86 mg/dL on 1/8/2025. She was advised to come to the hospital for further evaluation.  Current Symptoms: Dulce reports symptoms of nausea, a metallic taste, and a decreased appetite, leading to an approximate 8-pound weight loss. These symptoms occurred prior to starting Toprol, after which she also experienced dry mouth. She denies using NSAIDs, other medications, or over-the-counter drugs. She reports no cardiopulmonary complaints.  Acute Renal Failure on CKD + uremic symptoms likely secondary to multiple myeloma  Renal managing IV fluid.  Follow nephrology's recommendations for bicarbonate infusion and lab tests.  Added urine heavy metal including arsenic with patient potential exposure although a hair or nail testing is more accurate for chronic exposure however patient does not have other signs of hyperpigmentation that are more typical of arsenic chronic exposure  Hold lisinopril as per current management.  Vasculitis labs pending.  Possible renal biopsy pending vasculitis labs and kidney function.     Highly  suspected multiple myeloma with abnormal UPEP hematology oncology were consulted, light chains and 24-hour urine protein electrophoresis ordered  Awaiting result of immunofixation  Bone and kidney biopsies are being considered by specialist  PET CT scan as outpatient    Possible UTI  Continue Keflex for 1 more day.    Normocytic Anemia, combination of iron deficiency and chronic kidney disease if she has multiple myeloma that would explain it  Monitor hemoglobin levels.  Transfuse if hemoglobin drops below 7.  Rechecked iron borderline low, TSH,  and B12 are okay  Symptomatic Palpitations, SVT  Continue monitoring with Zio patch.  Hold metoprolol per nephrology.  Hypertension  Hold metoprolol as per nephrology's advice.  Reassess antihypertensive management based on renal function.  Hyperlipidemia no meds currently.  Severe malnutrition in the context of acute illness   Follow nutrition recommendation for supplement and follow-up      .      Clinically Significant Risk Factors                     # Hypertension: Noted on problem list             # Severe Malnutrition: based on nutrition assessment, PRESENT ON ADMISSION            DVT Prophylaxis: Pneumatic Compression Devices  Code Status:  Full Code    Disposition Plan     Medically Ready for Discharge: Anticipated in 2-4 Days   prior living arrangement once Barriers to discharge include improvement in kidney function and specialist renal and oncology deciding what part of the workup needs to be done as inpatient versus outpatient.     Entered: Milton Brooks MD 01/12/2025, 9:11 AM                 Interval History:       Doing well, overwhelmed by the information that she might have multiple myeloma I told her that this is a working diagnosis not confirmed yet but suspected I also printed some basic information for her about multiple myeloma from up-to-date patient info  Today's plan detailed above discussed with nursing               Physical Exam:    Physical Exam   Temp: 97.8  F (36.6  C) Temp src: Oral BP: 131/66 Pulse: 76   Resp: 18 SpO2: 96 % O2 Device: None (Room air)    Vitals:    01/10/25 0557 01/11/25 0545 01/12/25 0552   Weight: 54.1 kg (119 lb 3.2 oz) 55.5 kg (122 lb 6.4 oz) 54.9 kg (121 lb 1.6 oz)     I/O last 3 completed shifts:  In: 240 [P.O.:240]  Out: 760 [Urine:760]          GENERAL:  Comfortable.   PSYCH: pleasant, oriented, No acute distress.  EYES: PERRLA, Normal conjunctiva.  Submandibular lymph nodes bilaterally are swollen not very tender  HEART:  Normal S1, S2 with +1 edema.  LUNGS:  Clear to auscultation, normal Respiratory effort.  ABDOMEN:  Soft, no hepatosplenomegaly, normal bowel sounds.  SKIN:  Dry to touch, No rash.  Neuro: Non focal with normal motor power, sensation, CN's and Reflexes.    Medications   Current Facility-Administered Medications   Medication Dose Route Frequency Provider Last Rate Last Admin    [Held by provider] sodium chloride 0.9 % infusion   Intravenous Continuous Coni Diego PA-C   Stopped at 01/09/25 1653     Current Facility-Administered Medications   Medication Dose Route Frequency Provider Last Rate Last Admin    cephALEXin (KEFLEX) capsule 250 mg  250 mg Oral Q12H [facility] (08/20) Marine Heck DO   250 mg at 01/12/25 0808    dorzolamide-timolol (COSOPT) ophthalmic solution 1 drop  1 drop Both Eyes BID Saul Fuentes MD   1 drop at 01/12/25 0809    [Held by provider] metoprolol succinate ER (TOPROL-XL) 24 hr half-tab 12.5 mg  12.5 mg Oral At Bedtime Marine Heck DO        multivitamin, therapeutic (THERA-VIT) tablet 1 tablet  1 tablet Oral Daily Marine Heck DO   1 tablet at 01/12/25 0808    Netarsudil-Latanoprost 0.02-0.005 % SOLN 1 drop - PATIENT'S OWN SUPPLY  1 drop Both Eyes At Bedtime Coni Diego PA-C   1 drop at 01/11/25 2109    sodium chloride (PF) 0.9% PF flush 3 mL  3 mL Intracatheter Q8H Coni Diego PA-C   3 mL at 01/12/25  0809     Data     -Data reviewed today:  I personally reviewed  all new labs and imaging results over the last 24 hours.    Recent Labs   Lab 01/12/25  0618 01/11/25  0547 01/10/25  0555   WBC 6.2 8.6 6.8   HGB 7.4* 8.8* 7.4*   HCT 22.9* 27.5* 22.8*   MCV 94 94 92    259 213     Recent Labs   Lab 01/12/25  0618 01/11/25  0547 01/10/25  0555    139 139   POTASSIUM 4.5 4.3 4.0   CHLORIDE 101 99 101   CO2 24 29 25   ANIONGAP 13 11 13   GLC 97 105* 108*   BUN 56.4* 55.8* 60.8*   CR 5.82* 5.93* 6.11*   GFRESTIMATED 8* 7* 7*   KELTON 8.6* 9.0 8.7*       Recent Results (from the past 24 hours)   XR Bone Survey Complete    Narrative    EXAM: XR BONE SURVEY COMPLETE  LOCATION: [facility]  DATE: 1/11/2025    INDICATION: suspected MM  COMPARISON: None.      Impression    IMPRESSION: No definite lytic lesion. No displaced fracture or endosteal scalloping concerning for impending pathologic fracture.    Mild bilateral acromioclavicular joint arthrosis. Surgical clips along the right chest wall. Intravenous catheter overlies the right antecubital fossa. Surgical clips along the left chest wall. External device overlies the left chest. Blunting of the   right costophrenic angle could represent atelectasis, scarring, or a small effusion. Mild bilateral hip arthrosis. Calcification adjacent to the left acetabulum.         This document was produced using voice recognition software

## 2025-01-12 NOTE — PLAN OF CARE
"VS stable. No complaints of pain.    Goal Outcome Evaluation:      Plan of Care Reviewed With: patient    Overall Patient Progress: improvingOverall Patient Progress: improving    Outcome Evaluation: VS stable. No complaints of pain.      Problem: Adult Inpatient Plan of Care  Goal: Plan of Care Review  Description: The Plan of Care Review/Shift note should be completed every shift.  The Outcome Evaluation is a brief statement about your assessment that the patient is improving, declining, or no change.  This information will be displayed automatically on your shift  note.  Outcome: Progressing  Flowsheets (Taken 1/12/2025 0402)  Outcome Evaluation: VS stable. No complaints of pain.  Plan of Care Reviewed With: patient  Overall Patient Progress: improving  Goal: Patient-Specific Goal (Individualized)  Description: You can add care plan individualizations to a care plan. Examples of Individualization might be:  \"Parent requests to be called daily at 9am for status\", \"I have a hard time hearing out of my right ear\", or \"Do not touch me to wake me up as it startles  me\".  Outcome: Progressing  Goal: Absence of Hospital-Acquired Illness or Injury  Outcome: Progressing  Intervention: Identify and Manage Fall Risk  Recent Flowsheet Documentation  Taken 1/12/2025 0000 by Meghan Porter RN  Safety Promotion/Fall Prevention:   clutter free environment maintained   lighting adjusted   nonskid shoes/slippers when out of bed  Intervention: Prevent Skin Injury  Recent Flowsheet Documentation  Taken 1/12/2025 0000 by Meghan Porter RN  Body Position: position changed independently  Intervention: Prevent and Manage VTE (Venous Thromboembolism) Risk  Recent Flowsheet Documentation  Taken 1/12/2025 0000 by Meghan Porter RN  VTE Prevention/Management: SCDs on (sequential compression devices)  Intervention: Prevent Infection  Recent Flowsheet Documentation  Taken 1/12/2025 0000 by Meghan Porter RN  Infection Prevention:   " rest/sleep promoted   single patient room provided  Goal: Optimal Comfort and Wellbeing  Outcome: Progressing  Goal: Readiness for Transition of Care  Outcome: Progressing     Problem: Comorbidity Management  Goal: Blood Pressure in Desired Range  Outcome: Progressing  Intervention: Maintain Blood Pressure Management  Recent Flowsheet Documentation  Taken 1/12/2025 0000 by Meghan Porter RN  Medication Review/Management: medications reviewed     Problem: Acute Kidney Injury/Impairment  Goal: Fluid and Electrolyte Balance  Outcome: Progressing  Goal: Improved Oral Intake  Outcome: Progressing  Goal: Effective Renal Function  Outcome: Progressing  Intervention: Monitor and Support Renal Function  Recent Flowsheet Documentation  Taken 1/12/2025 0000 by Meghan Porter, RN  Medication Review/Management: medications reviewed

## 2025-01-12 NOTE — PLAN OF CARE
"/69 (BP Location: Left arm)   Pulse 90   Temp 98.3  F (36.8  C) (Oral)   Resp 20   Ht 1.6 m (5' 3\")   Wt 55.5 kg (122 lb 6.4 oz)   LMP 10/31/2011   SpO2 93%   BMI 21.68 kg/m      VSS, PT denies pain, SoB, CP. Complete bone scan performed today. Ambulating well with SBA.   "

## 2025-01-12 NOTE — PROGRESS NOTES
[facility] Progress Note          Assessment and Plan:   Ms. Dulce Ma is a 64 year old woman who was admitted on 1/9/2025 with nuasea and fatigue and new findings of renal failure  Monoclonal gammopathy with subtype pending and identified in the context of acute -subacute renal failure. I again reviewed the diagnosis of monoclonal gammopathy with her and discussed the rationale for additional tests. I explained that a bone marrow biopsy is typically recommended as part of the evaluation and that this test could be performed as an outpatient.  Acute-subacute renal failure with extensive work up to date showing no evidence for an autoimmune disorder or viral hepatitis. We reviewed her recent labs. Her creatinine has decreased slightly since admission and she continues to make urine.  3.   History of melanoma, treated with surgical excision and with no evidence of recurrence  4.   Nausea due to uremia. She feels her symptoms ar eimproved  5.   Recent onset palpitations and for which she is undergoing work up through cardiology including use of a Zio patch. At the time a biopsy would be performed, the sample should also be tested for amyoidosis     Recommendations  Await results of additional tests including serum free light chains and 24 hour urine for urine protein electrophoresis  Await results of immunofixation  Renal biopsy  Bone marrow biopsy   PET/CT as an outpatient               Interval History:     She reports that nausea has lessened since admission. No other new symptoms              Medications:     Current Facility-Administered Medications   Medication Dose Route Frequency Provider Last Rate Last Admin    cephALEXin (KEFLEX) capsule 250 mg  250 mg Oral Q12H [facility] (08/20) Marine Heck DO   250 mg at 01/12/25 0808    dorzolamide-timolol (COSOPT) ophthalmic solution 1 drop  1 drop Both Eyes BID Saul Fuentes MD   1 drop at 01/12/25 0809    [Held by provider]  "metoprolol succinate ER (TOPROL-XL) 24 hr half-tab 12.5 mg  12.5 mg Oral At Bedtime Marine Heck DO        multivitamin, therapeutic (THERA-VIT) tablet 1 tablet  1 tablet Oral Daily Marine Heck DO   1 tablet at 01/12/25 0808    Netarsudil-Latanoprost 0.02-0.005 % SOLN 1 drop - PATIENT'S OWN SUPPLY  1 drop Both Eyes At Bedtime Coni Diego PA-C   1 drop at 01/11/25 2109    sodium chloride (PF) 0.9% PF flush 3 mL  3 mL Intracatheter Q8H Coni Diego PA-C   3 mL at 01/12/25 0809     Current Facility-Administered Medications   Medication Dose Route Frequency Provider Last Rate Last Admin    acetaminophen (TYLENOL) tablet 650 mg  650 mg Oral Q4H PRN Coni Diego PA-C        Or    acetaminophen (TYLENOL) Suppository 650 mg  650 mg Rectal Q4H PRN Coni Diego PA-C        lidocaine (LMX4) cream   Topical Q1H PRN Coni Diego PA-C        lidocaine 1 % 0.1-1 mL  0.1-1 mL Other Q1H PRN Coni Diego PA-C        ondansetron (ZOFRAN ODT) ODT tab 4 mg  4 mg Oral Q6H PRN Coni Diego PA-C        Or    ondansetron (ZOFRAN) injection 4 mg  4 mg Intravenous Q6H PRN Coni Diego PA-C        senna-docusate (SENOKOT-S/PERICOLACE) 8.6-50 MG per tablet 1 tablet  1 tablet Oral BID PRN Coni Diego PA-C        Or    senna-docusate (SENOKOT-S/PERICOLACE) 8.6-50 MG per tablet 2 tablet  2 tablet Oral BID PRN Coni Diego PA-C        sodium chloride (PF) 0.9% PF flush 3 mL  3 mL Intracatheter q1 min prn Coni Diego PA-C                      Physical Exam:   Blood pressure 131/66, pulse 76, temperature 97.8  F (36.6  C), temperature source Oral, resp. rate 18, height 1.6 m (5' 3\"), weight 54.9 kg (121 lb 1.6 oz), last menstrual period 10/31/2011, SpO2 96%, not currently breastfeeding.  Wt Readings from Last 4 Encounters:   01/12/25 54.9 kg (121 lb 1.6 oz)   01/08/25 54 kg (119 lb)   01/08/25 54.1 kg (119 lb 3.2 oz)   11/27/24 55.8 " kg (123 lb)         Vital Sign Ranges  Temperature Temp  Av.2  F (36.8  C)  Min: 97.8  F (36.6  C)  Max: 98.6  F (37  C)   Blood pressure Systolic (24hrs), Av , Min:116 , Max:131        Diastolic (24hrs), Av, Min:62, Max:69      Pulse Pulse  Av  Min: 76  Max: 80   Respirations Resp  Av.7  Min: 18  Max: 20   Pulse oximetry SpO2  Av.5 %  Min: 96 %  Max: 97 %         Intake/Output Summary (Last 24 hours) at 2025 1233  Last data filed at 2025 1028  Gross per 24 hour   Intake 240 ml   Output 1000 ml   Net -760 ml       Constitutional: Awake, alert, cooperative, no apparent distress     Lungs: Clear to auscultation bilaterally, no crackles or wheezing   Cardiovascular: Regular rate and rhythm. Zio patch present on left chest wall   Abdomen: Normal bowel sounds, soft, non-distended, non-tender   Skin: No rashes, no cyanosis, no edema   Other:           Data:   Laboratory:  CMP  Recent Labs   Lab 25  0618 25  0547 01/10/25  0555 25  1112 25  1244    139 139 137  138 135   POTASSIUM 4.5 4.3 4.0 5.0  5.0 5.2   CHLORIDE 101 99 101 104  104 102   CO2 24 29 25 17*  19* 20*   ANIONGAP 13 11 13 16*  15 13   GLC 97 105* 108* 101*  100* 101*   BUN 56.4* 55.8* 60.8* 66.4*  65.7* 60.1*   CR 5.82* 5.93* 6.11* 6.63*  6.60* 5.86*   GFRESTIMATED 8* 7* 7* 6*  7* 8*   KELTON 8.6* 9.0 8.7* 10.6*  10.7* 10.9*   PHOS  --   --   --  4.5  --    PROTTOTAL  --   --   --  7.6 7.6   ALBUMIN  --   --   --  4.3  4.3 4.2   BILITOTAL  --   --   --  0.6 0.5   ALKPHOS  --   --   --  47 48   AST  --   --   --  21 21   ALT  --   --   --  9 10     CBC  Recent Labs   Lab 25  0618 25  0547 01/10/25  0555 25  1112   WBC 6.2 8.6 6.8 8.0   RBC 2.45* 2.93* 2.48* 2.97*   HGB 7.4* 8.8* 7.4* 9.1*   HCT 22.9* 27.5* 22.8* 27.5*   MCV 94 94 92 93   MCH 30.2 30.0 29.8 30.6   MCHC 32.3 32.0 32.5 33.1   RDW 12.6 12.7 12.6 12.8    259 213 264     INRNo lab results found in  last 7 days.    Imaging data:  Recent Results (from the past 48 hours)   XR Bone Survey Complete    Narrative    EXAM: XR BONE SURVEY COMPLETE  LOCATION: [facility]  DATE: 1/11/2025    INDICATION: suspected MM  COMPARISON: None.      Impression    IMPRESSION: No definite lytic lesion. No displaced fracture or endosteal scalloping concerning for impending pathologic fracture.    Mild bilateral acromioclavicular joint arthrosis. Surgical clips along the right chest wall. Intravenous catheter overlies the right antecubital fossa. Surgical clips along the left chest wall. External device overlies the left chest. Blunting of the   right costophrenic angle could represent atelectasis, scarring, or a small effusion. Mild bilateral hip arthrosis. Calcification adjacent to the left acetabulum.           Nayan Guerrier MD

## 2025-01-13 LAB
ALBUMIN MFR UR ELPH: 29.8 MG/DL
ANION GAP SERPL CALCULATED.3IONS-SCNC: 13 MMOL/L (ref 7–15)
B2 MICROGLOB TUMOR MARKER SER-MCNC: 9.3 MG/L
BUN SERPL-MCNC: 57.1 MG/DL (ref 8–23)
CALCIUM SERPL-MCNC: 9 MG/DL (ref 8.8–10.4)
CHLORIDE SERPL-SCNC: 104 MMOL/L (ref 98–107)
COLLECT DURATION TIME UR: 24 H
CREAT SERPL-MCNC: 5.63 MG/DL (ref 0.51–0.95)
EGFRCR SERPLBLD CKD-EPI 2021: 8 ML/MIN/1.73M2
GLUCOSE SERPL-MCNC: 101 MG/DL (ref 70–99)
HCO3 SERPL-SCNC: 22 MMOL/L (ref 22–29)
IGA SERPL-MCNC: 23 MG/DL (ref 84–499)
IGG SERPL-MCNC: 1362 MG/DL (ref 610–1616)
IGM SERPL-MCNC: 24 MG/DL (ref 35–242)
INR PPP: 1.13 (ref 0.85–1.15)
KAPPA LC FREE SER-MCNC: 75.52 MG/DL (ref 0.33–1.94)
KAPPA LC FREE/LAMBDA FREE SER NEPH: 57.21 {RATIO} (ref 0.26–1.65)
LAMBDA LC FREE SERPL-MCNC: 1.32 MG/DL (ref 0.57–2.63)
POTASSIUM SERPL-SCNC: 4.4 MMOL/L (ref 3.4–5.3)
PROT 24H UR-MRATE: 476.8 MG/SPECIMEN
SODIUM SERPL-SCNC: 139 MMOL/L (ref 135–145)
SPECIMEN VOL UR: 1600 ML

## 2025-01-13 PROCEDURE — 250N000013 HC RX MED GY IP 250 OP 250 PS 637: Performed by: INTERNAL MEDICINE

## 2025-01-13 PROCEDURE — 36415 COLL VENOUS BLD VENIPUNCTURE: CPT | Performed by: INTERNAL MEDICINE

## 2025-01-13 PROCEDURE — 85610 PROTHROMBIN TIME: CPT | Performed by: NURSE PRACTITIONER

## 2025-01-13 PROCEDURE — 99232 SBSQ HOSP IP/OBS MODERATE 35: CPT | Performed by: INTERNAL MEDICINE

## 2025-01-13 PROCEDURE — 82374 ASSAY BLOOD CARBON DIOXIDE: CPT | Performed by: INTERNAL MEDICINE

## 2025-01-13 PROCEDURE — 80048 BASIC METABOLIC PNL TOTAL CA: CPT | Performed by: INTERNAL MEDICINE

## 2025-01-13 PROCEDURE — 36415 COLL VENOUS BLD VENIPUNCTURE: CPT | Performed by: NURSE PRACTITIONER

## 2025-01-13 PROCEDURE — 120N000001 HC R&B MED SURG/OB

## 2025-01-13 RX ADMIN — DORZOLAMIDE HYDROCHLORIDE AND TIMOLOL MALEATE 1 DROP: 20; 5 SOLUTION/ DROPS OPHTHALMIC at 21:47

## 2025-01-13 RX ADMIN — THERA TABS 1 TABLET: TAB at 08:00

## 2025-01-13 RX ADMIN — DORZOLAMIDE HYDROCHLORIDE AND TIMOLOL MALEATE 1 DROP: 20; 5 SOLUTION/ DROPS OPHTHALMIC at 08:00

## 2025-01-13 ASSESSMENT — ACTIVITIES OF DAILY LIVING (ADL)
ADLS_ACUITY_SCORE: 35
ADLS_ACUITY_SCORE: 36
ADLS_ACUITY_SCORE: 35
ADLS_ACUITY_SCORE: 36
ADLS_ACUITY_SCORE: 35
ADLS_ACUITY_SCORE: 36
ADLS_ACUITY_SCORE: 35
ADLS_ACUITY_SCORE: 36
ADLS_ACUITY_SCORE: 35
ADLS_ACUITY_SCORE: 36
ADLS_ACUITY_SCORE: 35
ADLS_ACUITY_SCORE: 36
ADLS_ACUITY_SCORE: 36
ADLS_ACUITY_SCORE: 35
ADLS_ACUITY_SCORE: 35
ADLS_ACUITY_SCORE: 36

## 2025-01-13 NOTE — PLAN OF CARE
"/73 (BP Location: Left arm)   Pulse 76   Temp 97.8  F (36.6  C) (Oral)   Resp 18   Ht 1.6 m (5' 3\")   Wt 54.9 kg (121 lb 1.6 oz)   LMP 10/31/2011   SpO2 96%   BMI 21.45 kg/m      Neuro: a/o x4  Cardiac: WNL  Lungs: WNL  GI: WNL  : WNL  Pain: denies  IV: saline locked  Meds: keflex  Labs/tests: Hgb 7.4. 24 hour urine done at 2110  Diet: renal  Activity: independent  Plan: Currently resting in bed, able to make need known.           Problem: Adult Inpatient Plan of Care  Goal: Plan of Care Review  Description: The Plan of Care Review/Shift note should be completed every shift.  The Outcome Evaluation is a brief statement about your assessment that the patient is improving, declining, or no change.  This information will be displayed automatically on your shift  note.  Outcome: Progressing  Flowsheets (Taken 1/12/2025 2004)  Outcome Evaluation: VS stable. No complaints of pain  Plan of Care Reviewed With: patient  Overall Patient Progress: improving  Goal: Patient-Specific Goal (Individualized)  Description: You can add care plan individualizations to a care plan. Examples of Individualization might be:  \"Parent requests to be called daily at 9am for status\", \"I have a hard time hearing out of my right ear\", or \"Do not touch me to wake me up as it startles  me\".  Outcome: Progressing  Goal: Absence of Hospital-Acquired Illness or Injury  Outcome: Progressing  Intervention: Identify and Manage Fall Risk  Recent Flowsheet Documentation  Taken 1/12/2025 0801 by Verito White, RN  Safety Promotion/Fall Prevention:   activity supervised   assistive device/personal items within reach   clutter free environment maintained   nonskid shoes/slippers when out of bed   safety round/check completed  Intervention: Prevent Skin Injury  Recent Flowsheet Documentation  Taken 1/12/2025 0801 by Verito White, RN  Body Position: position changed independently  Intervention: Prevent and Manage VTE (Venous Thromboembolism) " Risk  Recent Flowsheet Documentation  Taken 1/12/2025 0801 by Verito White RN  VTE Prevention/Management: SCDs off (sequential compression devices)  Intervention: Prevent Infection  Recent Flowsheet Documentation  Taken 1/12/2025 0801 by Verito White RN  Infection Prevention:   rest/sleep promoted   single patient room provided  Goal: Optimal Comfort and Wellbeing  Outcome: Progressing  Goal: Readiness for Transition of Care  Outcome: Progressing     Problem: Comorbidity Management  Goal: Blood Pressure in Desired Range  Outcome: Progressing  Intervention: Maintain Blood Pressure Management  Recent Flowsheet Documentation  Taken 1/12/2025 0801 by Verito White RN  Medication Review/Management: medications reviewed     Problem: Acute Kidney Injury/Impairment  Goal: Fluid and Electrolyte Balance  Outcome: Progressing  Goal: Improved Oral Intake  Outcome: Progressing  Goal: Effective Renal Function  Outcome: Progressing  Intervention: Monitor and Support Renal Function  Recent Flowsheet Documentation  Taken 1/12/2025 0801 by Verito White RN  Medication Review/Management: medications reviewed           Goal Outcome Evaluation:      Plan of Care Reviewed With: patient    Overall Patient Progress: improvingOverall Patient Progress: improving    Outcome Evaluation: VS stable. No complaints of pain

## 2025-01-13 NOTE — PLAN OF CARE
"A&Ox4, VSS on RA. Denies pain. Kidney biopsy tomorrow, NPO at 0000.    Goal Outcome Evaluation:      Plan of Care Reviewed With: patient    Overall Patient Progress: improvingOverall Patient Progress: improving    Outcome Evaluation: Continue POC    Problem: Adult Inpatient Plan of Care  Goal: Plan of Care Review  Description: The Plan of Care Review/Shift note should be completed every shift.  The Outcome Evaluation is a brief statement about your assessment that the patient is improving, declining, or no change.  This information will be displayed automatically on your shift  note.  Outcome: Progressing  Flowsheets (Taken 1/13/2025 1753)  Outcome Evaluation: Continue POC  Plan of Care Reviewed With: patient  Overall Patient Progress: improving  Goal: Patient-Specific Goal (Individualized)  Description: You can add care plan individualizations to a care plan. Examples of Individualization might be:  \"Parent requests to be called daily at 9am for status\", \"I have a hard time hearing out of my right ear\", or \"Do not touch me to wake me up as it startles  me\".  Outcome: Progressing  Goal: Absence of Hospital-Acquired Illness or Injury  Outcome: Progressing  Intervention: Identify and Manage Fall Risk  Recent Flowsheet Documentation  Taken 1/13/2025 0804 by Sukhjinder Calderon RN  Safety Promotion/Fall Prevention:   treat underlying cause   treat reversible contributory factors   room organization consistent   room near nurse's station   safety round/check completed  Intervention: Prevent and Manage VTE (Venous Thromboembolism) Risk  Recent Flowsheet Documentation  Taken 1/13/2025 0804 by Sukhjinder Calderon RN  VTE Prevention/Management: (ambulatory) other (see comments)  Goal: Optimal Comfort and Wellbeing  Outcome: Progressing  Goal: Readiness for Transition of Care  Outcome: Progressing     Problem: Comorbidity Management  Goal: Blood Pressure in Desired Range  Outcome: Progressing  Intervention: Maintain Blood Pressure " Management  Recent Flowsheet Documentation  Taken 1/13/2025 0804 by Sukhjinder Calderon RN  Medication Review/Management: medications reviewed     Problem: Acute Kidney Injury/Impairment  Goal: Fluid and Electrolyte Balance  Outcome: Progressing  Goal: Improved Oral Intake  Outcome: Progressing  Goal: Effective Renal Function  Outcome: Progressing  Intervention: Monitor and Support Renal Function  Recent Flowsheet Documentation  Taken 1/13/2025 0804 by Sukhjinder Calderon RN  Medication Review/Management: medications reviewed

## 2025-01-13 NOTE — PROGRESS NOTES
Renal Medicine Progress Note            Assessment/Plan:     64 y.o woman with hypertension and history of skin cancer, who was admitted with severe renal failure of unclear etiology      # Severe acute-subacute renal failure: She had serum creatinine of ~ 1 mg/dl on June of 2023, 2.46 mg/dl on 10/24 and 5.86 mg/dl on 1/8.                -UA with trace blood and 3 RBC               -UPCR 500 mg/dl               -hep B/C negative               -MONICA, dsDNA, ANCA, complements are normal               -SPEP with monoclonal protein peak of 1.1.  Kappa/lambda ratio > 50                -UPEP with  monoclonal protein peak of ~ 30%     # Hypertension: Fair control without medication               -PTA Toprol and was on lisinopril (stopped due to hypotension)     # Severe anemia: PLT is normal. Due to to renal failure vs another process.   Bone marrow biopsy per hematology     #  FEN: Seems euvolemic. Hypercalcemia improved with IVF fluid.      Discussion/recommendation -  Presented diagnosis of monoclonal gammopathy of renal significance.  Differential diagnosis of ATN.  Will need kidney biopsy to differentiate.  Kidney biopsy tomorrow  N.p.o. at midnight  Daily renal panel              Interval History:     No acute issues overnight.  Improved nausea.  Creatinine marginally better  Urine output 1.6 L              Medications and Allergies:     Current Facility-Administered Medications   Medication Dose Route Frequency Provider Last Rate Last Admin    dorzolamide-timolol (COSOPT) ophthalmic solution 1 drop  1 drop Both Eyes BID Saul Fuentes MD   1 drop at 01/13/25 0800    [Held by provider] metoprolol succinate ER (TOPROL-XL) 24 hr half-tab 12.5 mg  12.5 mg Oral At Bedtime Marine Heck DO        multivitamin, therapeutic (THERA-VIT) tablet 1 tablet  1 tablet Oral Daily Marine Heck DO   1 tablet at 01/13/25 0800    Netarsudil-Latanoprost 0.02-0.005 % SOLN 1 drop - PATIENT'S OWN SUPPLY  1  "drop Both Eyes At Bedtime Coni Diego PA-C   1 drop at 01/12/25 2142    sodium chloride (PF) 0.9% PF flush 3 mL  3 mL Intracatheter Q8H Coni Diego PA-C   3 mL at 01/13/25 0801        Allergies   Allergen Reactions    Benzoyl Peroxide      swelling    Ibuprofen      over long duration dev. hives    Amoxicillin Rash     Face; in early adulthood    Penicillins Rash     Face; in early adulthood            Physical Exam:   Vitals were reviewed   , Blood pressure (!) 147/72, pulse 83, temperature 98  F (36.7  C), temperature source Oral, resp. rate 16, height 1.6 m (5' 3\"), weight 54 kg (119 lb), last menstrual period 10/31/2011, SpO2 95%, not currently breastfeeding.    Wt Readings from Last 3 Encounters:   01/13/25 54 kg (119 lb)   01/08/25 54 kg (119 lb)   01/08/25 54.1 kg (119 lb 3.2 oz)         GENERAL: NAD  CV: RRR, + murmur.  No edema  RESP: CTAB no wheezes.  GI: Abdomen soft, NT  NEURO:  Awake, alert .  Nonfocal  PSYCH: mood good, affect appropriate         Data:     CBC RESULTS:     Recent Labs   Lab 01/12/25  0618 01/11/25  0547 01/10/25  0555 01/09/25  1112 01/08/25  1211   WBC 6.2 8.6 6.8 8.0 8.5   RBC 2.45* 2.93* 2.48* 2.97* 2.93*   HGB 7.4* 8.8* 7.4* 9.1* 9.2*   HCT 22.9* 27.5* 22.8* 27.5* 27.6*    259 213 264 266       Basic Metabolic Panel:  Recent Labs   Lab 01/13/25  0639 01/12/25  0618 01/11/25  0547 01/10/25  0555 01/09/25  1112 01/08/25  1244    138 139 139 137  138 135   POTASSIUM 4.4 4.5 4.3 4.0 5.0  5.0 5.2   CHLORIDE 104 101 99 101 104  104 102   CO2 22 24 29 25 17*  19* 20*   BUN 57.1* 56.4* 55.8* 60.8* 66.4*  65.7* 60.1*   CR 5.63* 5.82* 5.93* 6.11* 6.63*  6.60* 5.86*   * 97 105* 108* 101*  100* 101*   KELTON 9.0 8.6* 9.0 8.7* 10.6*  10.7* 10.9*       INRNo lab results found in last 7 days.   Attestation:   I have reviewed today's relevant vital signs, notes, medications, labs and imaging.    Blaze Bauer MD  [facility] - Nephrology "   [phone]

## 2025-01-13 NOTE — PROGRESS NOTES
Oncology/Hematology Follow Up Note:    Assessment and Plan:  Ms. Dulce Ma is a 64 year old woman who was admitted on 1/9/2025 with nuasea and fatigue and new findings of renal failure  Monoclonal gammopathy with subtype pending, but likely IgG kappa and identified in the context of acute -subacute renal failure. M spike 1.1.  Serum kappa to lambda FLC ratio in 50s.  Ca WNL.  WBC and Plt WNL but Hgb in 7-8 range of unclear etiology.  Partly related to CKD but monoclonal gammopathy could be contributing.  I reviewed the diagnosis of monoclonal gammopathy with her and discussed the rationale for additional tests. I explained that a bone marrow biopsy is typically recommended as part of the evaluation and that this test could be performed as an outpatient.  Acute-subacute renal failure with extensive work up to date showing no evidence for an autoimmune disorder or viral hepatitis. Her creatinine has decreased slightly since admission and she continues to make urine.  ARF may or may not be related to monoclonal gammopathy.  KIdney biopsy tomorrow.  3.   History of melanoma, treated with surgical excision and with no evidence of recurrence  4.   Nausea due to uremia. She feels her symptoms are improved     Recommendations  Agree with kidney biopsy tomorrow  Will have my  call her tomorrow to get her registered so we can order a PET scan and a bone marrow biopsy to be done ASAP in the outpatient setting      Malachi Brown M.D.  [facility]  [phone]       Subjective:    Saw the patient this morning.  She was doing OK with no new symptoms.  Still making good urine output.      Labs:  All labs reviewed    CBC  Recent Labs   Lab 01/12/25  0618 01/11/25  0547 01/10/25  0555   WBC 6.2 8.6 6.8   HGB 7.4* 8.8* 7.4*   MCV 94 94 92    259 213       CMP  Recent Labs   Lab 01/13/25  0639 01/12/25  0618 01/11/25  0547 01/10/25  0555 01/09/25  1112 01/08/25  1244    138 139 139 137  138 135  "  POTASSIUM 4.4 4.5 4.3 4.0 5.0  5.0 5.2   CHLORIDE 104 101 99 101 104  104 102   CO2 22 24 29 25 17*  19* 20*   ANIONGAP 13 13 11 13 16*  15 13   * 97 105* 108* 101*  100* 101*   BUN 57.1* 56.4* 55.8* 60.8* 66.4*  65.7* 60.1*   CR 5.63* 5.82* 5.93* 6.11* 6.63*  6.60* 5.86*   GFRESTIMATED 8* 8* 7* 7* 6*  7* 8*   KELTON 9.0 8.6* 9.0 8.7* 10.6*  10.7* 10.9*   PHOS  --   --   --   --  4.5  --    PROTTOTAL  --   --   --   --  7.6 7.6   ALBUMIN  --   --   --   --  4.3  4.3 4.2   BILITOTAL  --   --   --   --  0.6 0.5   ALKPHOS  --   --   --   --  47 48   AST  --   --   --   --  21 21   ALT  --   --   --   --  9 10       INR  Recent Labs   Lab 01/13/25  1355   INR 1.13       Blood CultureNo results for input(s): \"CULT\" in the last 168 hours.      Malachi Brown MD  [facility]  1/13/2025 5:53 PM        "

## 2025-01-13 NOTE — PLAN OF CARE
"Goal Outcome Evaluation:      Plan of Care Reviewed With: patient    Overall Patient Progress: improving    Outcome Evaluation:   Alert and oriented x 4 . VSS , Afebrile .  Denied any form of discomfort or pain at any site . Reported no nausea or vomiting .  Urine specimen collected over 24 hrs sent lab for Protein electrophoresis .     Problem: Adult Inpatient Plan of Care  Goal: Plan of Care Review  Description: The Plan of Care Review/Shift note should be completed every shift.  The Outcome Evaluation is a brief statement about your assessment that the patient is improving, declining, or no change.  This information will be displayed automatically on your shift  note.  Outcome: Progressing  Flowsheets (Taken 1/13/2025 0525)  Outcome Evaluation: vss  Plan of Care Reviewed With: patient  Overall Patient Progress: improving  Goal: Patient-Specific Goal (Individualized)  Description: You can add care plan individualizations to a care plan. Examples of Individualization might be:  \"Parent requests to be called daily at 9am for status\", \"I have a hard time hearing out of my right ear\", or \"Do not touch me to wake me up as it startles  me\".  Outcome: Progressing  Goal: Absence of Hospital-Acquired Illness or Injury  Outcome: Progressing  Intervention: Identify and Manage Fall Risk  Recent Flowsheet Documentation  Taken 1/12/2025 2243 by Navin Walker, RN  Safety Promotion/Fall Prevention: safety round/check completed  Taken 1/12/2025 2013 by Navin Walker, RN  Safety Promotion/Fall Prevention: safety round/check completed  Taken 1/12/2025 1940 by Navin Walker, RN  Safety Promotion/Fall Prevention:   clutter free environment maintained   nonskid shoes/slippers when out of bed   safety round/check completed   lighting adjusted   patient and family education   room near nurse's station  Intervention: Prevent Skin Injury  Recent Flowsheet Documentation  Taken 1/12/2025 1940 by Navin Walker, RN  Body Position: " position changed independently  Intervention: Prevent and Manage VTE (Venous Thromboembolism) Risk  Recent Flowsheet Documentation  Taken 1/12/2025 1940 by Navin Walker, RN  VTE Prevention/Management: SCDs off (sequential compression devices)  Intervention: Prevent Infection  Recent Flowsheet Documentation  Taken 1/12/2025 1940 by Navin Walker, RN  Infection Prevention:   rest/sleep promoted   single patient room provided   cohorting utilized  Goal: Optimal Comfort and Wellbeing  Outcome: Progressing  Goal: Readiness for Transition of Care  Outcome: Progressing

## 2025-01-13 NOTE — CONSULTS
Patient is on IR schedule Tuesday 1/14/25 for a Random native kidney biopsy with moderate sedation.     Dulce Ma is a 64 y.o woman with hypertension and history of skin cancer, who was admitted with severe renal failure of unclear etiology and some uremic symptoms. Etiology of her severe renal failure is presumed due to monoclonal gammopathy at this time.     -Labs WNL for procedure.    -Orders for NPO and antibiotics have been entered.   -Consent will be done prior to procedure.     Temp:  [97.8  F (36.6  C)-98.5  F (36.9  C)] 98  F (36.7  C)  Pulse:  [82-83] 83  Resp:  [14-18] 16  BP: (135-147)/(72-73) 147/72  SpO2:  [95 %] 95 %    ROUTINE ICU LABS (Last four results)  CMP  Recent Labs   Lab 01/13/25  0639 01/12/25  0618 01/11/25  0547 01/10/25  0555 01/09/25  1112 01/08/25  1244    138 139 139 137  138 135   POTASSIUM 4.4 4.5 4.3 4.0 5.0  5.0 5.2   CHLORIDE 104 101 99 101 104  104 102   CO2 22 24 29 25 17*  19* 20*   ANIONGAP 13 13 11 13 16*  15 13   * 97 105* 108* 101*  100* 101*   BUN 57.1* 56.4* 55.8* 60.8* 66.4*  65.7* 60.1*   CR 5.63* 5.82* 5.93* 6.11* 6.63*  6.60* 5.86*   GFRESTIMATED 8* 8* 7* 7* 6*  7* 8*   KELTON 9.0 8.6* 9.0 8.7* 10.6*  10.7* 10.9*   PHOS  --   --   --   --  4.5  --    PROTTOTAL  --   --   --   --  7.6 7.6   ALBUMIN  --   --   --   --  4.3  4.3 4.2   BILITOTAL  --   --   --   --  0.6 0.5   ALKPHOS  --   --   --   --  47 48   AST  --   --   --   --  21 21   ALT  --   --   --   --  9 10     CBC  Recent Labs   Lab 01/12/25  0618 01/11/25  0547 01/10/25  0555 01/09/25  1112   WBC 6.2 8.6 6.8 8.0   RBC 2.45* 2.93* 2.48* 2.97*   HGB 7.4* 8.8* 7.4* 9.1*   HCT 22.9* 27.5* 22.8* 27.5*   MCV 94 94 92 93   MCH 30.2 30.0 29.8 30.6   MCHC 32.3 32.0 32.5 33.1   RDW 12.6 12.7 12.6 12.8    259 213 264       Please contact the IR department at [phone] for procedural related questions.     Total time: 20 minutes     Thanks Sonal [facility] Interventional Radiology CNP  ([phone]) (phone [phone])

## 2025-01-13 NOTE — PROGRESS NOTES
[facility]  Hospitalist Progress Note  Admit 1/9/2025 10:34 AM    Name: Dulce Ma    MRN: [id number]  Provider:  Julio Fried MD      Date of Service: 01/13/2025     Reason for Stay (Diagnosis): Acute kidney injury         Summary of hospital stay & Assessment/Plan:   Summary of Stay: Dulce Ma is a 64 year old female who was admitted on 1/9/2025. She is a  64-year-old woman with hypertension, was admitted for severe renal failure. She had been taking lisinopril 20 mg daily for hypertension, prescribed by her primary care provider, Dr. Lamb, during a visit on 10/18/2024, where her blood pressure was noted to be 102/68. Due to hypotension, she stopped taking lisinopril and had her dose of Toprol, which was added for SVT-related palpitations, reduced. Her renal function has deteriorated significantly, with serum creatinine levels rising from ~1 mg/dL in June 2023 to 5.86 mg/dL on 1/8/2025. She was advised to come to the hospital for further evaluation.  Current Symptoms: Dulce reports symptoms of nausea, a metallic taste, and a decreased appetite, leading to an approximate 8-pound weight loss. These symptoms occurred prior to starting Toprol, after which she also experienced dry mouth. She denies using NSAIDs, other medications, or over-the-counter drugs. She reports no cardiopulmonary complaints.    Acute Renal Failure on chronic kidney disease,  + uremic symptoms likely secondary to multiple myeloma  Renal managing IV fluid.  Followed nephrology's recommendations for bicarbonate infusion and lab tests. Now off  Added urine heavy metal including arsenic with patient potential exposure although a hair or nail testing is more accurate for chronic exposure however patient does not have other signs of hyperpigmentation that are more typical of arsenic chronic exposure  Holding lisinopril as per current management.  Vasculitis labs pending.  Possible renal biopsy pending vasculitis  labs and kidney function.  Elevated kappa ffree light chains, pending protein electrophoresis, heavy metals, protein immunofixation serum,  Per notes, renal biopsy Tuesday 1/14 though I do not see it ordered, will be per renal, make NPO at midnight  Not requiring dialysis, potassium and bicarb stable. Creatinine stable up at 5.6.     Highly suspected multiple myeloma with abnormal UPEP hematology oncology were consulted, light chains and 24-hour urine protein electrophoresis ordered  Awaiting result of immunofixation, electrophoresis  Bone and kidney biopsies are being considered by specialist  PET CT scan as outpatient  Suspect bone marrow biopsy to be set up as an outpatient per oncology notes      Possible e coli UTI  Had 50-100k e coli, pansensitive, treated with course of keflex. Continue Keflex     Normocytic Anemia, combination of iron deficiency and chronic kidney disease if she has multiple myeloma that would explain it  Monitor hemoglobin levels.  Transfuse if hemoglobin drops below 7.  Rechecked iron borderline low, but ferritrin is over 200.  TSH,  and B12 are okay    Symptomatic Palpitations, SVT  Continue monitoring with Zio patch, was to end on 1/12/25, patient to send it back in to cardiology  Holding metoprolol per nephrology.  Pulse has been stable 70-80s    Hypertension  Hold metoprolol as per nephrology's advice.  Bp has been <<150.    Reassess antihypertensive management based on renal function.    Hyperlipidemia no meds currently.    Severe malnutrition in the context of acute illness   Follow nutrition recommendation for supplement and follow-up      Clinically Significant Risk Factors                     # Hypertension: Noted on problem list             # Severe Malnutrition: based on nutrition assessment, PRESENT ON ADMISSION            DVT Prophylaxis: Pneumatic Compression Devices  Code Status:  Full Code    Disposition Plan     Medically Ready for Discharge: Anticipated in 2-4 Days    prior living arrangement once Barriers to discharge include improvement in kidney function and specialist renal and oncology deciding what part of the workup needs to be done as inpatient versus outpatient., biopsies pending    Spoke with nursing, social work/case management, renal/oncology notes reviewed      Julio Fried MD 01/13/2025, 11:06 AM                 Interval History:   Patient is new to me. Her uremic symptoms of metallic taste has resolved, her nausea is better as well.  Awaiting plan from renal/oncology.  No new complaints.                     Physical Exam:   Physical Exam   Temp: 98  F (36.7  C) Temp src: Oral BP: (!) 147/72 Pulse: 83   Resp: 16 SpO2: 95 % O2 Device: None (Room air)    Vitals:    01/11/25 0545 01/12/25 0552 01/13/25 0516   Weight: 55.5 kg (122 lb 6.4 oz) 54.9 kg (121 lb 1.6 oz) 54 kg (119 lb)     I/O last 3 completed shifts:  In: 240 [P.O.:240]  Out: 1625 [Urine:1625]      Exam stable from yesterday  GENERAL:  Comfortable.  Appears stated age.  Does not appear ill  HEENT:  Submandibular lymph nodes bilaterally are swollen not very tender, MMM  HEART: RRR, no murmur   LUNGS:  Clear to auscultation, normal Respiratory effort. Good air movement  ABDOMEN:  Soft, no hepatosplenomegaly, normal bowel sounds. BS present  Neuro: No focal deficits  EXTR:  ALSTON, has trace bilateral ankle edema    Medications   Current Facility-Administered Medications   Medication Dose Route Frequency Provider Last Rate Last Admin    [Held by provider] sodium chloride 0.9 % infusion   Intravenous Continuous Coni Diego PA-C   Stopped at 01/09/25 1653     Current Facility-Administered Medications   Medication Dose Route Frequency Provider Last Rate Last Admin    dorzolamide-timolol (COSOPT) ophthalmic solution 1 drop  1 drop Both Eyes BID Saul Fuentes MD   1 drop at 01/13/25 0800    [Held by provider] metoprolol succinate ER (TOPROL-XL) 24 hr half-tab 12.5 mg  12.5 mg Oral At Bedtime  Marine Heck DO        multivitamin, therapeutic (THERA-VIT) tablet 1 tablet  1 tablet Oral Daily Marine Heck DO   1 tablet at 01/13/25 0800    Netarsudil-Latanoprost 0.02-0.005 % SOLN 1 drop - PATIENT'S OWN SUPPLY  1 drop Both Eyes At Bedtime Coni Diego PA-C   1 drop at 01/12/25 2142    sodium chloride (PF) 0.9% PF flush 3 mL  3 mL Intracatheter Q8H Coni Diego PA-C   3 mL at 01/13/25 0801     Data     -Data reviewed today:  I personally reviewed  all new labs and imaging results over the last 24 hours.    Recent Labs   Lab 01/12/25  0618 01/11/25  0547 01/10/25  0555   WBC 6.2 8.6 6.8   HGB 7.4* 8.8* 7.4*   HCT 22.9* 27.5* 22.8*   MCV 94 94 92    259 213     Recent Labs   Lab 01/13/25  0639 01/12/25  0618 01/11/25  0547    138 139   POTASSIUM 4.4 4.5 4.3   CHLORIDE 104 101 99   CO2 22 24 29   ANIONGAP 13 13 11   * 97 105*   BUN 57.1* 56.4* 55.8*   CR 5.63* 5.82* 5.93*   GFRESTIMATED 8* 8* 7*   KELTON 9.0 8.6* 9.0       No results found for this or any previous visit (from the past 24 hours).     This document was created using voice recognition technology.  Please excuse any typographical errors that may have occurred.  Please call with any questions.

## 2025-01-14 ENCOUNTER — APPOINTMENT (OUTPATIENT)
Dept: CT IMAGING | Facility: CLINIC | Age: 65
End: 2025-01-14
Attending: INTERNAL MEDICINE
Payer: COMMERCIAL

## 2025-01-14 LAB
ALBUMIN MFR UR ELPH: 16.3 %
ALPHA1 GLOB MFR UR ELPH: 15.8 %
ALPHA2 GLOB MFR UR ELPH: 18.7 %
ANION GAP SERPL CALCULATED.3IONS-SCNC: 14 MMOL/L (ref 7–15)
ARSENIC 24H UR-MRATE: NORMAL UG/D
ARSENIC UR-MCNC: <10 UG/L
ARSENIC/CREAT UR: NORMAL UG/G CRT
B-GLOBULIN MFR UR ELPH: 15.9 %
BUN SERPL-MCNC: 57.2 MG/DL (ref 8–23)
CALCIUM SERPL-MCNC: 9.1 MG/DL (ref 8.8–10.4)
CHLORIDE SERPL-SCNC: 102 MMOL/L (ref 98–107)
CREAT 24H UR-MRATE: NORMAL MG/D
CREAT SERPL-MCNC: 5.24 MG/DL (ref 0.51–0.95)
CREAT UR-MCNC: 57 MG/DL
EGFRCR SERPLBLD CKD-EPI 2021: 9 ML/MIN/1.73M2
GAMMA GLOB MFR UR ELPH: 33.3 %
GLUCOSE SERPL-MCNC: 97 MG/DL (ref 70–99)
HCO3 SERPL-SCNC: 21 MMOL/L (ref 22–29)
HGB BLD-MCNC: 8.5 G/DL (ref 11.7–15.7)
HOLD SPECIMEN: NORMAL
LEAD 24H UR-MRATE: NORMAL UG/D
LEAD UR-MCNC: <5 UG/L
LEAD/CREAT UR: NORMAL UG/G CRT
M PROTEIN MFR UR ELPH: 23.6 %
MERCURY 24H UR-MRATE: NORMAL UG/D
MERCURY UR-MCNC: <2.5 UG/L
MERCURY/CREAT UR: NORMAL UG/G CRT
POTASSIUM SERPL-SCNC: 4.5 MMOL/L (ref 3.4–5.3)
PROT ELPH PNL UR ELPH: NORMAL
PROT PATTERN SERPL IFE-IMP: NORMAL
PROT PATTERN UR ELPH-IMP: ABNORMAL
SODIUM SERPL-SCNC: 137 MMOL/L (ref 135–145)

## 2025-01-14 PROCEDURE — 88350 IMFLUOR EA ADDL 1ANTB STN PX: CPT | Performed by: INTERNAL MEDICINE

## 2025-01-14 PROCEDURE — 80048 BASIC METABOLIC PNL TOTAL CA: CPT | Performed by: INTERNAL MEDICINE

## 2025-01-14 PROCEDURE — 82374 ASSAY BLOOD CARBON DIOXIDE: CPT | Performed by: INTERNAL MEDICINE

## 2025-01-14 PROCEDURE — 88348 ELECTRON MICROSCOPY DX: CPT | Performed by: INTERNAL MEDICINE

## 2025-01-14 PROCEDURE — 85018 HEMOGLOBIN: CPT | Performed by: INTERNAL MEDICINE

## 2025-01-14 PROCEDURE — 88346 IMFLUOR 1ST 1ANTB STAIN PX: CPT | Performed by: INTERNAL MEDICINE

## 2025-01-14 PROCEDURE — 0TB13ZX EXCISION OF LEFT KIDNEY, PERCUTANEOUS APPROACH, DIAGNOSTIC: ICD-10-PCS | Performed by: RADIOLOGY

## 2025-01-14 PROCEDURE — 99232 SBSQ HOSP IP/OBS MODERATE 35: CPT | Performed by: INTERNAL MEDICINE

## 2025-01-14 PROCEDURE — 258N000003 HC RX IP 258 OP 636: Performed by: INTERNAL MEDICINE

## 2025-01-14 PROCEDURE — 250N000009 HC RX 250: Performed by: NURSE PRACTITIONER

## 2025-01-14 PROCEDURE — 120N000001 HC R&B MED SURG/OB

## 2025-01-14 PROCEDURE — 36415 COLL VENOUS BLD VENIPUNCTURE: CPT | Performed by: INTERNAL MEDICINE

## 2025-01-14 PROCEDURE — 250N000013 HC RX MED GY IP 250 OP 250 PS 637: Performed by: INTERNAL MEDICINE

## 2025-01-14 PROCEDURE — 88305 TISSUE EXAM BY PATHOLOGIST: CPT | Performed by: INTERNAL MEDICINE

## 2025-01-14 PROCEDURE — 250N000011 HC RX IP 250 OP 636: Performed by: NURSE PRACTITIONER

## 2025-01-14 PROCEDURE — 77012 CT SCAN FOR NEEDLE BIOPSY: CPT

## 2025-01-14 PROCEDURE — 88313 SPECIAL STAINS GROUP 2: CPT | Performed by: INTERNAL MEDICINE

## 2025-01-14 PROCEDURE — 272N000431 CT RENAL BIOPSY PERCUTANEOUS

## 2025-01-14 RX ORDER — NALOXONE HYDROCHLORIDE 0.4 MG/ML
0.2 INJECTION, SOLUTION INTRAMUSCULAR; INTRAVENOUS; SUBCUTANEOUS
Status: DISCONTINUED | OUTPATIENT
Start: 2025-01-14 | End: 2025-01-15 | Stop reason: HOSPADM

## 2025-01-14 RX ORDER — SODIUM CHLORIDE, SODIUM LACTATE, POTASSIUM CHLORIDE, CALCIUM CHLORIDE 600; 310; 30; 20 MG/100ML; MG/100ML; MG/100ML; MG/100ML
1000 INJECTION, SOLUTION INTRAVENOUS CONTINUOUS
Status: ACTIVE | OUTPATIENT
Start: 2025-01-14 | End: 2025-01-15

## 2025-01-14 RX ORDER — NALOXONE HYDROCHLORIDE 0.4 MG/ML
0.4 INJECTION, SOLUTION INTRAMUSCULAR; INTRAVENOUS; SUBCUTANEOUS
Status: DISCONTINUED | OUTPATIENT
Start: 2025-01-14 | End: 2025-01-15 | Stop reason: HOSPADM

## 2025-01-14 RX ORDER — ONDANSETRON 4 MG/1
4 TABLET, ORALLY DISINTEGRATING ORAL EVERY 6 HOURS PRN
Qty: 20 TABLET | Refills: 0 | Status: SHIPPED | OUTPATIENT
Start: 2025-01-14

## 2025-01-14 RX ORDER — FLUMAZENIL 0.1 MG/ML
0.2 INJECTION, SOLUTION INTRAVENOUS
Status: DISCONTINUED | OUTPATIENT
Start: 2025-01-14 | End: 2025-01-15 | Stop reason: HOSPADM

## 2025-01-14 RX ORDER — FENTANYL CITRATE 50 UG/ML
25-50 INJECTION, SOLUTION INTRAMUSCULAR; INTRAVENOUS EVERY 5 MIN PRN
Status: DISCONTINUED | OUTPATIENT
Start: 2025-01-14 | End: 2025-01-15 | Stop reason: HOSPADM

## 2025-01-14 RX ADMIN — FENTANYL CITRATE 50 MCG: 50 INJECTION, SOLUTION INTRAMUSCULAR; INTRAVENOUS at 12:01

## 2025-01-14 RX ADMIN — DORZOLAMIDE HYDROCHLORIDE AND TIMOLOL MALEATE 1 DROP: 20; 5 SOLUTION/ DROPS OPHTHALMIC at 20:08

## 2025-01-14 RX ADMIN — THERA TABS 1 TABLET: TAB at 08:01

## 2025-01-14 RX ADMIN — DORZOLAMIDE HYDROCHLORIDE AND TIMOLOL MALEATE 1 DROP: 20; 5 SOLUTION/ DROPS OPHTHALMIC at 08:01

## 2025-01-14 RX ADMIN — MIDAZOLAM 1 MG: 1 INJECTION INTRAMUSCULAR; INTRAVENOUS at 12:02

## 2025-01-14 RX ADMIN — LIDOCAINE HYDROCHLORIDE 10 ML: 10 INJECTION, SOLUTION EPIDURAL; INFILTRATION; INTRACAUDAL; PERINEURAL at 12:00

## 2025-01-14 RX ADMIN — SODIUM CHLORIDE, POTASSIUM CHLORIDE, SODIUM LACTATE AND CALCIUM CHLORIDE 1000 ML: 600; 310; 30; 20 INJECTION, SOLUTION INTRAVENOUS at 11:01

## 2025-01-14 NOTE — PRE-PROCEDURE
GENERAL PRE-PROCEDURE:   Procedure:  Kidney biopsy  Date/Time:  1/14/2025 11:37 AM    Verbal consent obtained?: Yes    Risks and benefits: Risks, benefits and alternatives were discussed    Consent given by:  Patient  Patient states understanding of procedure being performed: Yes    Patient's understanding of procedure matches consent: Yes    Procedure consent matches procedure scheduled: Yes    Appropriately NPO:  Yes  ASA Class:  2  Mallampati  :  Grade 2- soft palate, base of uvula, tonsillar pillars, and portion of posterior pharyngeal wall visible  Lungs:  Lungs clear with good breath sounds bilaterally  Heart:  Normal heart sounds and rate  History & Physical reviewed:  History and physical reviewed and no updates needed  Statement of review:  I have reviewed the lab findings, diagnostic data, medications, and the plan for sedation

## 2025-01-14 NOTE — PROGRESS NOTES
[facility]  Hospitalist Progress Note  Admit 1/9/2025 10:34 AM    Name: Dulce Ma    MRN: [id number]  Provider:  Julio Fried MD      Date of Service: 01/14/2025     Reason for Stay (Diagnosis): Acute kidney injury         Summary of hospital stay & Assessment/Plan:   Summary of Stay: Dulce Ma is a 64 year old female who was admitted on 1/9/2025. She is a  64-year-old woman with hypertension, was admitted for severe renal failure. She had been taking lisinopril 20 mg daily for hypertension, prescribed by her primary care provider, Dr. Lamb, during a visit on 10/18/2024, where her blood pressure was noted to be 102/68. Due to hypotension, she stopped taking lisinopril and had her dose of Toprol, which was added for SVT-related palpitations, reduced. Her renal function has deteriorated significantly, with serum creatinine levels rising from ~1 mg/dL in June 2023 to 5.86 mg/dL on 1/8/2025. She was advised to come to the hospital for further evaluation.  Current Symptoms: Dulce reports symptoms of nausea, a metallic taste, and a decreased appetite, leading to an approximate 8-pound weight loss. These symptoms occurred prior to starting Toprol, after which she also experienced dry mouth. She denies using NSAIDs, other medications, or over-the-counter drugs. She reports no cardiopulmonary complaints.    Acute Renal Failure on chronic kidney disease,  + uremic symptoms likely secondary to multiple myeloma  Renal managing IV fluid.  Followed nephrology's recommendations for bicarbonate infusion and lab tests. Now off  Added urine heavy metal including arsenic with patient potential exposure although a hair or nail testing is more accurate for chronic exposure however patient does not have other signs of hyperpigmentation that are more typical of arsenic chronic exposure  Holding lisinopril as per current management.  Vasculitis labs pending.  Possible renal biopsy pending vasculitis  labs and kidney function.  Elevated kappa ffree light chains, pending protein electrophoresis, heavy metals, protein immunofixation serum,  Rrenal biopsy Tuesday 1/14, is NPO at midnight, will watchh overnight  Not requiring dialysis, potassium and bicarb stable. Creatinine stable up at 5.24, potassium 4.5, bicarb 21    Highly suspected multiple myeloma with abnormal UPEP hematology oncology were consulted, light chains and 24-hour urine protein electrophoresis elevatedd  Protein immunofixation monoclonal IgG  Bone and kidney biopsies are being considered by specialist, kidney biopsy to be done 1/14 and bone marrow done outpatient   PET CT scan as outpatient  PET and bone marrow biopsy to be set up as an outpatient per oncology    Possible e coli UTI  Had 50-100k e coli, pansensitive, treated with course of keflex.    Normocytic Anemia, combination of iron deficiency and chronic kidney disease if she has multiple myeloma that would explain it  Monitor hemoglobin levels.  Transfuse if hemoglobin drops below 7.  Rechecked iron borderline low, but ferritrin is over 200.  TSH,  and B12 are okay    Symptomatic Palpitations, SVT  Continue monitoring with Zio patch, was to end on 1/12/25, patient to send it back in to cardiology  Holding metoprolol per nephrology.  Pulse has been stable 70-80s    Hypertension  Resumed metoprolol as per nephrology's advice.  Bp has been <<150.    Reassess antihypertensive management based on renal function.    Hyperlipidemia no meds currently.    Severe malnutrition in the context of acute illness   Follow nutrition recommendation for supplement and follow-up      Clinically Significant Risk Factors                     # Hypertension: Noted on problem list             # Severe Malnutrition: based on nutrition assessment             DVT Prophylaxis: Pneumatic Compression Devices  Code Status:  Full Code    Disposition Plan     Medically Ready for Discharge: Anticipated Tomorrow, will watch  overnight after her renal biopsy per nephrology     Spoke with nursing, social work/case management, renal/oncology notes reviewed once again      Julio Fried MD 01/14/2025, 9:50 AM                 Interval History:   Patient overall doing fine.  Nausea better, metal taste resolved. Is NPO awaiting renal biopsy today.  Creatinine slowly improving.                   Physical Exam:   Physical Exam   Temp: 97.8  F (36.6  C) Temp src: Oral BP: (!) 142/70 Pulse: 75   Resp: 16 SpO2: 96 % O2 Device: None (Room air)    Vitals:    01/12/25 0552 01/13/25 0516 01/14/25 0612   Weight: 54.9 kg (121 lb 1.6 oz) 54 kg (119 lb) (P) 53.3 kg (117 lb 8 oz)     I/O last 3 completed shifts:  In: 829 [P.O.:820; I.V.:9]  Out: 1300 [Urine:1300]      Exam stable from yesterday  GENERAL:  Comfortable.  Appears stated age.  Does not appear ill, appears stated age sitting up in bed  HEENT:  Submandibular lymph nodes bilaterally are less swollen not very tender, MMM  HEART: RRR, no murmur   LUNGS:  Clear to auscultation, normal Respiratory effort. Good air movement  ABDOMEN:  Soft, no hepatosplenomegaly, normal bowel sounds. BS present  Neuro: No focal deficits  EXTR:  ALSTON, has trace bilateral ankle edema    Medications   Current Facility-Administered Medications   Medication Dose Route Frequency Provider Last Rate Last Admin    [Held by provider] sodium chloride 0.9 % infusion   Intravenous Continuous Coni Diego PA-C   Stopped at 01/09/25 1653     Current Facility-Administered Medications   Medication Dose Route Frequency Provider Last Rate Last Admin    dorzolamide-timolol (COSOPT) ophthalmic solution 1 drop  1 drop Both Eyes BID Saul Fuentes MD   1 drop at 01/14/25 0801    [Held by provider] metoprolol succinate ER (TOPROL-XL) 24 hr half-tab 12.5 mg  12.5 mg Oral At Bedtime Marine Heck DO        multivitamin, therapeutic (THERA-VIT) tablet 1 tablet  1 tablet Oral Daily Marine Heck DO   1  tablet at 01/14/25 0801    Netarsudil-Latanoprost 0.02-0.005 % SOLN 1 drop - PATIENT'S OWN SUPPLY  1 drop Both Eyes At Bedtime Coni Diego PA-C   1 drop at 01/13/25 2147    sodium chloride (PF) 0.9% PF flush 3 mL  3 mL Intracatheter Q8H Coni Diego PA-C   3 mL at 01/14/25 0801     Data     -Data reviewed today:  I personally reviewed  all new labs and imaging results over the last 24 hours.    Recent Labs   Lab 01/12/25  0618 01/11/25  0547 01/10/25  0555   WBC 6.2 8.6 6.8   HGB 7.4* 8.8* 7.4*   HCT 22.9* 27.5* 22.8*   MCV 94 94 92    259 213     Recent Labs   Lab 01/14/25  0720 01/13/25  0639 01/12/25  0618    139 138   POTASSIUM 4.5 4.4 4.5   CHLORIDE 102 104 101   CO2 21* 22 24   ANIONGAP 14 13 13   GLC 97 101* 97   BUN 57.2* 57.1* 56.4*   CR 5.24* 5.63* 5.82*   GFRESTIMATED 9* 8* 8*   KELTON 9.1 9.0 8.6*       No results found for this or any previous visit (from the past 24 hours).     This document was created using voice recognition technology.  Please excuse any typographical errors that may have occurred.  Please call with any questions.

## 2025-01-14 NOTE — PLAN OF CARE
"Temp: 98.6  F (37  C) Temp src: Oral BP: (!) 148/72 Pulse: 84   Resp: 18   O2 Device: None (Room air)       A&Ox4. VSS. Up ad adeola, independent. No complaints overnight. NPO since midnight for renal biopsy today. Zio patch L chest. Continue plan of care.     Goal Outcome Evaluation:      Plan of Care Reviewed With: patient    Overall Patient Progress: no changeOverall Patient Progress: no change    Outcome Evaluation: NPO for renal biopsy today. Zio patch L chest.      Problem: Adult Inpatient Plan of Care  Goal: Plan of Care Review  Description: The Plan of Care Review/Shift note should be completed every shift.  The Outcome Evaluation is a brief statement about your assessment that the patient is improving, declining, or no change.  This information will be displayed automatically on your shift  note.  Outcome: Progressing  Flowsheets (Taken 1/14/2025 0453)  Outcome Evaluation: NPO for renal biopsy today. Zio patch L chest.  Plan of Care Reviewed With: patient  Overall Patient Progress: no change  Goal: Patient-Specific Goal (Individualized)  Description: You can add care plan individualizations to a care plan. Examples of Individualization might be:  \"Parent requests to be called daily at 9am for status\", \"I have a hard time hearing out of my right ear\", or \"Do not touch me to wake me up as it startles  me\".  Outcome: Progressing  Goal: Absence of Hospital-Acquired Illness or Injury  Outcome: Progressing  Intervention: Identify and Manage Fall Risk  Recent Flowsheet Documentation  Taken 1/13/2025 2145 by Vandana Aguilar RN  Safety Promotion/Fall Prevention:   safety round/check completed   room organization consistent   room near nurse's station   nonskid shoes/slippers when out of bed  Intervention: Prevent Skin Injury  Recent Flowsheet Documentation  Taken 1/13/2025 2145 by Vandana Aguilar, RN  Body Position: position changed independently  Intervention: Prevent and Manage VTE (Venous Thromboembolism) " Problem: SKIN/TISSUE INTEGRITY - ADULT  Goal: Skin integrity remains intact  Description  INTERVENTIONS  - Assess and document risk factors for pressure ulcer development  - Assess and document skin integrity  - Monitor for areas of redness and/or skin b Which pharmacy ?   Risk  Recent Flowsheet Documentation  Taken 1/13/2025 2145 by Vandana Aguilar RN  VTE Prevention/Management: (ambulatory) other (see comments)  Intervention: Prevent Infection  Recent Flowsheet Documentation  Taken 1/13/2025 2145 by Vandana Aguilar RN  Infection Prevention:   rest/sleep promoted   single patient room provided  Goal: Optimal Comfort and Wellbeing  Outcome: Progressing  Goal: Readiness for Transition of Care  Outcome: Progressing     Problem: Comorbidity Management  Goal: Blood Pressure in Desired Range  Outcome: Progressing  Intervention: Maintain Blood Pressure Management  Recent Flowsheet Documentation  Taken 1/13/2025 2145 by Vandana Aguilar RN  Medication Review/Management: medications reviewed     Problem: Acute Kidney Injury/Impairment  Goal: Fluid and Electrolyte Balance  Outcome: Progressing  Goal: Improved Oral Intake  Outcome: Progressing  Goal: Effective Renal Function  Outcome: Progressing  Intervention: Monitor and Support Renal Function  Recent Flowsheet Documentation  Taken 1/13/2025 2145 by Vandana Aguilar RN  Medication Review/Management: medications reviewed

## 2025-01-14 NOTE — PROGRESS NOTES
CLINICAL NUTRITION SERVICES - REASSESSMENT NOTE     RECOMMENDATIONS FOR MDs/PROVIDERS TO ORDER:  Diet order per MD    Malnutrition Status:    Severe malnutrition in the context of acute illness or injury  Malnutrition Present on Admission: Yes    Registered Dietitian Interventions:  Adjusted oral nutrition supplement per pt request - RD will order Nepro once pt is allowed diet  Provided CKD dietary guidelines handout to patient, encouraged her to use the [employer] website to find other reliable information   Intake encouragement appreciated       SUBJECTIVE INFORMATION  Assessed patient in room.    CURRENT NUTRITION ORDERS  Diet: currently NPO for renal biopsy today       CURRENT INTAKE/TOLERANCE  Per flowsheet documentation: 50-75% intakes are noted   Per review of [employer]: Patient has been ordering 3 meal trays per day and receiving one KF renal support supplement. 1-2 meals are low in protein.     Per MD note: uremic symptoms of metallic taste and nausea have improved.     Per patient report: Patient states eating has been going slightly better, she is starting to find items on the menu that she likes to eat. Reports nausea to be resolved. She does not like the [employer] renal supplement and would like to try something else. RD offered Nepro which pt was agreeable to have. RD encouraged meal ordering, reviewed appropriate servings of proteins to have with each meal. Pt also inquired about CKD diet guidelines. RD provided handout for patient and recommended using the [employer] website to find other reliable information. Answered all questions at this time.        NEW FINDINGS  Weight: fairly stable during admission   Vitals:    01/10/25 0557 01/11/25 0545 01/12/25 0552 01/13/25 0516   Weight: 54.1 kg (119 lb 3.2 oz) 55.5 kg (122 lb 6.4 oz) 54.9 kg (121 lb 1.6 oz) 54 kg (119 lb)    01/14/25 0612   Weight: (P) 53.3 kg (117 lb 8 oz)     Skin/wounds: no edema or PI noted    I/Os: 1 BM noted 1/13    Nutrition-relevant labs:   BUN 57.2, Cr 5.24, GFR 9    Nutrition-relevant medications:  MVI/M    ASSESSED NUTRITION NEEDS  Dosing Weight: 54.1 kg, based on actual wt  Estimated Energy Needs: 25 - 30 kcals/kg  Justification: Repletion  Estimated Protein Needs: 1 - 1.2 grams of pro/kg  Justification: CKD and Repletion  Estimated Fluid Needs: 1 mL/kcal  Justification: Maintenance OR Per provider pending fluid status    MALNUTRITION  % Weight Loss: > 5% in 1 month (severe)   % Intake:</=75% for >/= 1 month (severe)  Subcutaneous Fat Loss: Orbital: Mild and Fat overlying the ribs: Mild  Muscle Loss: Clavicles (pectoralis and deltoids): Mild, Shoulders (deltoids): Mild, and Calf (gastrocnemius): Mild  Fluid Accumulation/Edema: 1+ L&R Legs -> weak indicator and likely r/t renal failure superimposed on CKD. Not using as criterion.   Malnutrition Diagnosis: Severe malnutrition in the context of acute illness or injury  Malnutrition Present on Admission: Yes    EVALUATION OF THE PROGRESS TOWARD GOALS   Previous Goals  Patient to consume % of nutritionally adequate meal trays TID, or the equivalent with supplements/snacks.  Evaluation: Progressing    Previous Nutrition Diagnosis  Inadequate oral intake related to poor appetite and nausea as evidenced by pt reported intake, mild fat and muscle losses, and > 5% BW weight loss in one month  Evaluation: Improving    NUTRITION DIAGNOSIS  Inadequate oral intake related to slow to improve appetite as evidenced by documentation of intake, mild fat and muscle losses, and > 5% BW weight loss in one month PTA    INTERVENTIONS  Nutrition education content  Medical food supplement therapy  Nutrition counseling strategies  See nutrition interventions above  Vitamin and mineral supplement therapy    Goals  Demonstrates understanding of nutrition education  Patient to consume % of nutritionally adequate meal trays TID, or the equivalent with supplements/snacks.     Monitoring/Evaluation      Progress toward  "goals will be monitored and evaluated per policy.      Devika Tyson MS, RD, LD  Clinical Dietitian II  [facility] Message Group: \"Dietitian [Nathalie]\"  Office Phone: [phone]  Pagers: 3rd floor/ICU: [phone]  All other floors: [phone]  Weekend/holiday: [phone]    "

## 2025-01-14 NOTE — PLAN OF CARE
"A&Ox4, VSS on RA. No pain. 1 BM. IVF @75 mL/hr. Up ad Rose.     Goal Outcome Evaluation:      Plan of Care Reviewed With: patient    Overall Patient Progress: improvingOverall Patient Progress: improving    Outcome Evaluation: Puncture site WDL. No bleeding from dressing.     Problem: Adult Inpatient Plan of Care  Goal: Plan of Care Review  Description: The Plan of Care Review/Shift note should be completed every shift.  The Outcome Evaluation is a brief statement about your assessment that the patient is improving, declining, or no change.  This information will be displayed automatically on your shift  note.  1/14/2025 1517 by Sukhjinder Calderon RN  Outcome: Progressing  Flowsheets (Taken 1/14/2025 1517)  Outcome Evaluation: Puncture site WDL  Overall Patient Progress: improving  1/14/2025 1517 by Sukhjinder Calderon RN  Outcome: Progressing  Flowsheets (Taken 1/14/2025 1517)  Outcome Evaluation: Puncture site WDL  Plan of Care Reviewed With: patient  Overall Patient Progress: improving  Goal: Patient-Specific Goal (Individualized)  Description: You can add care plan individualizations to a care plan. Examples of Individualization might be:  \"Parent requests to be called daily at 9am for status\", \"I have a hard time hearing out of my right ear\", or \"Do not touch me to wake me up as it startles  me\".  1/14/2025 1517 by Sukhjinder Calderon RN  Outcome: Progressing  1/14/2025 1517 by Sukhjinder Calderon RN  Outcome: Progressing  Goal: Absence of Hospital-Acquired Illness or Injury  1/14/2025 1517 by Sukhjinder Calderon RN  Outcome: Progressing  1/14/2025 1517 by Sukhjinder Calderon RN  Outcome: Progressing  Intervention: Identify and Manage Fall Risk  Recent Flowsheet Documentation  Taken 1/14/2025 1106 by Sukhjinder Calderon RN  Safety Promotion/Fall Prevention:   treat underlying cause   treat reversible contributory factors   safety round/check completed   room organization consistent   room near nurse's station  Intervention: Prevent " and Manage VTE (Venous Thromboembolism) Risk  Recent Flowsheet Documentation  Taken 1/14/2025 1106 by Sukhjinder Calderon RN  VTE Prevention/Management: other (see comments)  Goal: Optimal Comfort and Wellbeing  1/14/2025 1517 by Sukhjinder Calderon RN  Outcome: Progressing  1/14/2025 1517 by Sukhjinder Calderon RN  Outcome: Progressing  Goal: Readiness for Transition of Care  1/14/2025 1517 by Sukhjinder Calderon RN  Outcome: Progressing  1/14/2025 1517 by Sukhjinder Calderon RN  Outcome: Progressing     Problem: Comorbidity Management  Goal: Blood Pressure in Desired Range  1/14/2025 1517 by Sukhjinder Calderon RN  Outcome: Progressing  1/14/2025 1517 by Sukhjinder Calderon RN  Outcome: Progressing  Intervention: Maintain Blood Pressure Management  Recent Flowsheet Documentation  Taken 1/14/2025 1106 by Sukhjinder Calderon RN  Medication Review/Management: medications reviewed     Problem: Acute Kidney Injury/Impairment  Goal: Fluid and Electrolyte Balance  1/14/2025 1517 by Sukhjinder Calderon RN  Outcome: Progressing  1/14/2025 1517 by Sukhjinder Calderon RN  Outcome: Progressing  Goal: Improved Oral Intake  1/14/2025 1517 by Sukhjinder Calderon RN  Outcome: Progressing  1/14/2025 1517 by Sukhjinder Calderon RN  Outcome: Progressing  Goal: Effective Renal Function  1/14/2025 1517 by Sukhjinder Calderon RN  Outcome: Progressing  1/14/2025 1517 by Sukhjinder Calderon RN  Outcome: Progressing  Intervention: Monitor and Support Renal Function  Recent Flowsheet Documentation  Taken 1/14/2025 1106 by Sukhjinder Calderon RN  Medication Review/Management: medications reviewed

## 2025-01-14 NOTE — PROGRESS NOTES
Renal Medicine Progress Note            Assessment/Plan:     64 y.o woman with hypertension and history of skin cancer, who was admitted with severe renal failure of unclear etiology      # Severe acute-subacute renal failure: She had serum creatinine of ~ 1 mg/dl on June of 2023, 2.46 mg/dl on 10/24 and 5.86 mg/dl on 1/8.                -UA with trace blood and 3 RBC               -UPCR 500 mg/dl               -hep B/C negative               -MONICA, dsDNA, ANCA, complements are normal               -SPEP with monoclonal protein peak of 1.1.  Kappa/lambda ratio > 50                -UPEP with  monoclonal protein peak of ~ 30%     # Hypertension: Fair control without medication               -PTA Toprol and was on lisinopril (stopped due to hypotension)     # Severe anemia: PLT is normal.  Hypocalcemia, severe anemia, renal failure,  high kappa/lambda ratio -> ?  Myeloma.  Noted plans for PET scan and bone marrow biopsy as outpatient.       #  FEN: Seems euvolemic. Hypercalcemia improved with IVF fluid.      Discussion/recommendation -  Presumptive diagnosis of monoclonal gammopathy of renal significance.  Differential diagnosis of ATN.  Will need kidney biopsy to differentiate.  Kidney biopsy today  LR 75 cc an hour for total of 1 L while n.p.o.  Cancel n.p.o. after biopsy  Daily renal panel    Monitor for bleed postbiopsy today.  Patient counseled on postbiopsy activity restriction, bedrest.  Hemoglobin 4-6 hours postbiopsy.  If labs stable, can be discharged in the morning.  Will follow closely in nephrology clinic on discharge and discussed biopsy results.              Interval History:     No acute issues overnight.  Improved nausea.  Creatinine slowly improving  Urine output 1.3 L              Medications and Allergies:     Current Facility-Administered Medications   Medication Dose Route Frequency Provider Last Rate Last Admin    dorzolamide-timolol (COSOPT) ophthalmic solution 1 drop  1 drop Both Eyes BID  "Saul Fuentes MD   1 drop at 01/14/25 0801    [Held by provider] metoprolol succinate ER (TOPROL-XL) 24 hr half-tab 12.5 mg  12.5 mg Oral At Bedtime Marine Heck DO        multivitamin, therapeutic (THERA-VIT) tablet 1 tablet  1 tablet Oral Daily Marine Heck DO   1 tablet at 01/14/25 0801    Netarsudil-Latanoprost 0.02-0.005 % SOLN 1 drop - PATIENT'S OWN SUPPLY  1 drop Both Eyes At Bedtime Coni Diego PA-C   1 drop at 01/13/25 2147    sodium chloride (PF) 0.9% PF flush 3 mL  3 mL Intracatheter Q8H Coni Diego PA-C   3 mL at 01/14/25 0801        Allergies   Allergen Reactions    Benzoyl Peroxide      swelling    Ibuprofen      over long duration dev. hives    Amoxicillin Rash     Face; in early adulthood    Penicillins Rash     Face; in early adulthood            Physical Exam:   Vitals were reviewed   , Blood pressure (!) 142/70, pulse 75, temperature 97.8  F (36.6  C), temperature source Oral, resp. rate 16, height 1.6 m (5' 3\"), weight (P) 53.3 kg (117 lb 8 oz), last menstrual period 10/31/2011, SpO2 96%, not currently breastfeeding.    Wt Readings from Last 3 Encounters:   01/14/25 (P) 53.3 kg (117 lb 8 oz)   01/08/25 54 kg (119 lb)   01/08/25 54.1 kg (119 lb 3.2 oz)         GENERAL: NAD  CV: RRR, + murmur.  No edema  RESP: CTAB no wheezes.  GI: Abdomen soft, NT  NEURO:  Awake, alert .  Nonfocal  PSYCH: mood good, affect appropriate         Data:     CBC RESULTS:     Recent Labs   Lab 01/12/25  0618 01/11/25  0547 01/10/25  0555 01/09/25  1112 01/08/25  1211   WBC 6.2 8.6 6.8 8.0 8.5   RBC 2.45* 2.93* 2.48* 2.97* 2.93*   HGB 7.4* 8.8* 7.4* 9.1* 9.2*   HCT 22.9* 27.5* 22.8* 27.5* 27.6*    259 213 264 266       Basic Metabolic Panel:  Recent Labs   Lab 01/14/25  0720 01/13/25  0639 01/12/25  0618 01/11/25  0547 01/10/25  0555 01/09/25  1112    139 138 139 139 137  138   POTASSIUM 4.5 4.4 4.5 4.3 4.0 5.0  5.0   CHLORIDE 102 104 101 99 101 104  " 104   CO2 21* 22 24 29 25 17*  19*   BUN 57.2* 57.1* 56.4* 55.8* 60.8* 66.4*  65.7*   CR 5.24* 5.63* 5.82* 5.93* 6.11* 6.63*  6.60*   GLC 97 101* 97 105* 108* 101*  100*   KELTON 9.1 9.0 8.6* 9.0 8.7* 10.6*  10.7*       INR  Recent Labs   Lab 01/13/25  1355   INR 1.13      Attestation:   I have reviewed today's relevant vital signs, notes, medications, labs and imaging.    Blaze Bauer MD  [facility] - Nephrology   [phone]

## 2025-01-14 NOTE — PROGRESS NOTES
Care Team for renal Biopsy: This RN, Dr TOMEKA Johns, ANKITA Ackerman RN, Lara CT technician.     Pt signs consent with Dr Johns at 1135 all questions and concerns were addressed.     Time out 1200    Sedation start time: 1203    Versed 1 mg @ 1203  Fentanyl 50 mcg @ 1203    Pt is tolerating procedure well, no pain noted throughout     D Stat clotting kit used post biopsy into the site per Dr Johns.    End time 1220

## 2025-01-15 VITALS
DIASTOLIC BLOOD PRESSURE: 69 MMHG | HEIGHT: 63 IN | OXYGEN SATURATION: 98 % | TEMPERATURE: 98.3 F | WEIGHT: 116.7 LBS | HEART RATE: 78 BPM | SYSTOLIC BLOOD PRESSURE: 138 MMHG | BODY MASS INDEX: 20.68 KG/M2 | RESPIRATION RATE: 18 BRPM

## 2025-01-15 LAB
ANION GAP SERPL CALCULATED.3IONS-SCNC: 15 MMOL/L (ref 7–15)
BUN SERPL-MCNC: 56.1 MG/DL (ref 8–23)
CALCIUM SERPL-MCNC: 9.5 MG/DL (ref 8.8–10.4)
CHLORIDE SERPL-SCNC: 101 MMOL/L (ref 98–107)
CREAT SERPL-MCNC: 5.19 MG/DL (ref 0.51–0.95)
EGFRCR SERPLBLD CKD-EPI 2021: 9 ML/MIN/1.73M2
GLUCOSE SERPL-MCNC: 103 MG/DL (ref 70–99)
HCO3 SERPL-SCNC: 21 MMOL/L (ref 22–29)
HGB BLD-MCNC: 8.8 G/DL (ref 11.7–15.7)
POTASSIUM SERPL-SCNC: 4.7 MMOL/L (ref 3.4–5.3)
SODIUM SERPL-SCNC: 137 MMOL/L (ref 135–145)

## 2025-01-15 PROCEDURE — 85018 HEMOGLOBIN: CPT | Performed by: INTERNAL MEDICINE

## 2025-01-15 PROCEDURE — 250N000013 HC RX MED GY IP 250 OP 250 PS 637: Performed by: INTERNAL MEDICINE

## 2025-01-15 PROCEDURE — 36415 COLL VENOUS BLD VENIPUNCTURE: CPT | Performed by: INTERNAL MEDICINE

## 2025-01-15 PROCEDURE — 99239 HOSP IP/OBS DSCHRG MGMT >30: CPT | Performed by: INTERNAL MEDICINE

## 2025-01-15 PROCEDURE — 99232 SBSQ HOSP IP/OBS MODERATE 35: CPT | Performed by: INTERNAL MEDICINE

## 2025-01-15 PROCEDURE — 80048 BASIC METABOLIC PNL TOTAL CA: CPT | Performed by: INTERNAL MEDICINE

## 2025-01-15 RX ADMIN — THERA TABS 1 TABLET: TAB at 09:25

## 2025-01-15 RX ADMIN — DORZOLAMIDE HYDROCHLORIDE AND TIMOLOL MALEATE 1 DROP: 20; 5 SOLUTION/ DROPS OPHTHALMIC at 09:26

## 2025-01-15 ASSESSMENT — ACTIVITIES OF DAILY LIVING (ADL)
ADLS_ACUITY_SCORE: 35

## 2025-01-15 NOTE — DISCHARGE SUMMARY
[facility]  Hospitalist Discharge Summary      Date of Admission:  1/9/2025  Date of Discharge:  1/15/2025  Discharging Provider: Julio Fried MD  Discharge Service: Hospitalist Service  Primary Care Physician   Alida Lamb    Discharge Diagnoses   Acute renal failure on chronic kidney disease  Renal biopsy  Likely multiple myeloma  E coli uti  Normocytic anemia  SVT  Hypertension  Hyperlipidemia  Severe malnutrition    Hospital Course   Summary of Stay: Dulce Ma is a 64 year old female who was admitted on 1/9/2025. She is a  64-year-old woman with hypertension, was admitted for severe renal failure. She had been taking lisinopril 20 mg daily for hypertension, prescribed by her primary care provider, Dr. Lamb, during a visit on 10/18/2024, where her blood pressure was noted to be 102/68. Due to hypotension, she stopped taking lisinopril and had her dose of Toprol, which was added for SVT-related palpitations, reduced. Her renal function has deteriorated significantly, with serum creatinine levels rising from ~1 mg/dL in June 2023 to 5.86 mg/dL on 1/8/2025. She was advised to come to the hospital for further evaluation.    Symptoms on admission: Dulce reported symptoms of nausea, a metallic taste, and a decreased appetite, leading to an approximate 8-pound weight loss. These symptoms occurred prior to starting Toprol, after which she also experienced dry mouth. She denies using NSAIDs, other medications, or over-the-counter drugs. She reports no cardiopulmonary complaints.     Acute Renal Failure on chronic kidney disease,  + uremic symptoms likely secondary to multiple myeloma  Renal gave the patient fluids   Followed nephrology's recommendations for bicarbonate infusion and lab tests. Now off  Added urine heavy metal including arsenic with patient potential exposure although a hair or nail testing is more accurate for chronic exposure however patient does not have  other signs of hyperpigmentation that are more typical of arsenic chronic exposure  Holding lisinopril as per current management.  Vasculitis labs negative  Renal biopsy done 1/14/25, repeat hemoglobin stable.   Elevated kappa free light chains,   Not requiring dialysis, potassium and bicarb stable. Creatinine stable at 5.19 at discharge, potassium 4.7, bicarb 21  Will need close nephrology follow up outpatient to follow up labs and biopsy results     Highly suspected multiple myeloma with abnormal UPEP hematology oncology were consulted, light chains and 24-hour urine protein electrophoresis elevatedd  Protein immunofixation monoclonal IgG   kidney biopsy to be done 1/14   PET scan and bone marrow to be done outpatient, oncology to set up  Outpatient oncology follow up        Possible e coli UTI  Had 50-100k e coli, pansensitive, treated with course of keflex.     Normocytic Anemia, combination of iron deficiency and chronic kidney disease if she has multiple myeloma that would explain it  Monitor hemoglobin levels.  Transfuse if hemoglobin drops below 7.  Rechecked iron borderline low, but ferritrin is over 200.  TSH,  and B12 are okay  Hemoglobin 8.8 at discharge     Symptomatic Palpitations, SVT  Continue monitoring with Zio patch, was to end on 1/12/25, patient to send it back in to cardiology  resumed metoprolol per nephrology.  Pulse has been stable 70-80s     Hypertension  Resumed metoprolol as per nephrology's advice.  Bp has been <<150.    Reassess antihypertensive management based on renal function.  Off lisinopril     Hyperlipidemia no meds currently.     Severe malnutrition in the context of acute illness   Follow nutrition recommendation for supplement and follow-up          Clinically Significant Risk Factors     # Severe Malnutrition: based on nutrition assessment      Significant Results and Procedures   Most Recent 3 CBC's:  Recent Labs   Lab Test 01/15/25  0650 01/14/25  1806 01/12/25  0618  01/11/25  0547 01/10/25  0555   WBC  --   --  6.2 8.6 6.8   HGB 8.8* 8.5* 7.4* 8.8* 7.4*   MCV  --   --  94 94 92   PLT  --   --  187 259 213     Most Recent 3 BMP's:  Recent Labs   Lab Test 01/15/25  0650 01/14/25  0720 01/13/25  0639    137 139   POTASSIUM 4.7 4.5 4.4   CHLORIDE 101 102 104   CO2 21* 21* 22   BUN 56.1* 57.2* 57.1*   CR 5.19* 5.24* 5.63*   ANIONGAP 15 14 13   KELTON 9.5 9.1 9.0   * 97 101*   ,   Results for orders placed or performed during the hospital encounter of 01/09/25   US Renal Complete Non-Vascular    Narrative    EXAM: US RENAL COMPLETE NON-VASCULAR  LOCATION: [facility]  DATE: 1/9/2025    INDICATION: CHATA  COMPARISON: None.  TECHNIQUE: Routine Bilateral Renal and Bladder Ultrasound.    FINDINGS:    RIGHT KIDNEY: Measures 8.4 x 3.7 x 3.2 cm. No hydronephrosis or masses. Few tiny echogenic foci are indeterminate, could reflect artifact, prominent pyramids or tiny intrarenal stones.    LEFT KIDNEY: Measures 8.7 x 3.6 x 4.5 cm. No hydronephrosis or masses. Few tiny echogenic foci are indeterminate, could reflect artifact, prominent pyramids or tiny intrarenal stones.    BLADDER: Nondistended.      Impression    IMPRESSION:  No hydronephrosis.   XR Bone Survey Complete    Narrative    EXAM: XR BONE SURVEY COMPLETE  LOCATION: [facility]  DATE: 1/11/2025    INDICATION: suspected MM  COMPARISON: None.      Impression    IMPRESSION: No definite lytic lesion. No displaced fracture or endosteal scalloping concerning for impending pathologic fracture.    Mild bilateral acromioclavicular joint arthrosis. Surgical clips along the right chest wall. Intravenous catheter overlies the right antecubital fossa. Surgical clips along the left chest wall. External device overlies the left chest. Blunting of the   right costophrenic angle could represent atelectasis, scarring, or a small effusion. Mild bilateral hip arthrosis. Calcification adjacent to the  left acetabulum.     CT Renal Biopsy Percutaneous    Narrative    EXAM:  1. RANDOM LEFT RENAL BIOPSY PERCUTANEOUS  2. CT-GUIDANCE  3. CONSCIOUS SEDATION  LOCATION: [facility]  DATE: 1/14/2025    INDICATION: Renal failure of unclear etiology. ? Monoclonal gammopathy related glomerular dis  renal failure  TECHNIQUE: Dose reduction techniques were used.    PROCEDURE: Informed consent obtained. Time out performed. The patient was placed into prone position. Right/Left flank was prepped and draped in sterile fashion. In addition to moderate sedation, 10 mL of 1% Xylocaine was infused into the local soft   tissues. Under CT guidance, a 17 gauge guiding needle was placed into the renal cortex. Coaxially, an 18 gauge needle was used to make 7 core biopsies. Tissue submitted in routine renal biopsy tubes.    No complications.    D-Stat flowable hemostatic agent injected through the guiding needle.    SEDATION: Versed 1 mg. Fentanyl 50 mcg. The procedure was performed with administration intravenous conscious sedation with appropriate preoperative, intraoperative, and postoperative evaluation.    17 minutes of supervised face to face conscious sedation time was provided by a radiology nurse under my direct supervision.      Impression    IMPRESSION:  1.  Successful CT-guided renal biopsy.      Reference CPT Code: 59417, ,          Follow up/instructions: patient needs close follow up with nephrology to follow up her labs and biopsy results.  She will be seen by oncology who will be setting up a PET scan and bone marrow biopsy sooner than later    Pending test results at discharge:     Unresulted Labs Ordered in the Past 30 Days of this Admission       Date and Time Order Name Status Description    1/14/2025 12:01 PM Renal Biopsy (Reference Laboratory) In process             Discharge Orders      Reason for your hospital stay    Renal failure, possible multiple myeloma     Follow-up and recommended labs  and tests     Patient needs nephrology follow up ASAP per their recommendations    Patient needs oncology follow up ASAP per their recommendations for PET scan and bone marrow biopsy     Activity    Your activity upon discharge: activity as tolerated     Discharge Instructions    Do not take any NSAIDs (ibuprofen, aleve) as these will worsen your kidney function     Diet    Follow this diet upon discharge: Current Diet:Orders Placed This Encounter      Snacks/Supplements Adult: Other; Lelo Cronin Renal at 2 PM daily; Between Meals      NPO for Medical/Clinical Reasons Except for: Meds     Diet    Stay away from high potassium foods, ie potatoes, bananas       Discharge Disposition   Discharged to home  Condition at discharge: Stable      Consultations This Hospital Stay   NEPHROLOGY IP CONSULT  HEMATOLOGY & ONCOLOGY IP CONSULT  INTERVENTIONAL RADIOLOGY ADULT/PEDS IP CONSULT    Code Status   Full Code    Time Spent on this Encounter   I, Julio Fried MD, personally saw the patient today and spent greater than 30 minutes discharging this patient. Labs reviewed, discussed with nursing, social work/case management, onc/renal notes reviewed        This document was created using voice recognition technology.  Please excuse any typographical errors that may have occurred.  Please call with any questions.       Julio Fried MD  [place] [id number] [facility]  [address] [place] [address]  Phone: [phone]  Fax: [phone]  ______________________________________________________________________    Physical Exam   Vital Signs: Temp: 98.3  F (36.8  C) Temp src: Oral BP: 138/69 Pulse: 78   Resp: 18 SpO2: 98 % O2 Device: None (Room air) Oxygen Delivery: 3 LPM  Weight: 116 lbs 11.2 oz      Exam stable from yesterday, denies pain at biopsy site  GENERAL:  Comfortable.  Appears stated age.  Does not appear ill, appears stated age sitting up in bed  HEENT:  Submandibular lymph nodes  bilaterally are less swollen not very tender, MMM  HEART: RRR, no murmur   LUNGS:  Clear to auscultation, normal Respiratory effort. Good air movement  ABDOMEN:  Soft, no hepatosplenomegaly, normal bowel sounds. BS present  Neuro: No focal deficits  EXTR:  GAMALIEL, has trace bilateral ankle edema         Discharge Medications   Current Discharge Medication List        START taking these medications    Details   ondansetron (ZOFRAN ODT) 4 MG ODT tab Take 1 tablet (4 mg) by mouth every 6 hours as needed.  Qty: 20 tablet, Refills: 0    Associated Diagnoses: Nausea           CONTINUE these medications which have NOT CHANGED    Details   atorvastatin (LIPITOR) 40 MG tablet Take 40 mg by mouth daily.      clobetasol propionate (TEMOVATE) 0.05 % external cream Apply very scant amount to inner labia daily for up to 14 days at a time -ok to use every 2 months or so  Qty: 15 g, Refills: 3    Comments: ### Profile Rx: patient will contact pharmacy when needed ###  Associated Diagnoses: Lichen sclerosus et atrophicus of the vulva      dorzolamide-timolol (COSOPT) 2-0.5 % ophthalmic solution Place 1 drop into both eyes 2 times daily.      metoprolol succinate ER (TOPROL XL) 25 MG 24 hr tablet Take 0.5 tablets (12.5 mg) by mouth daily.    Comments: Decreased secondary to low BP's - on this for PSVT/rate control.  Associated Diagnoses: Paroxysmal supraventricular tachycardia; Essential hypertension with goal blood pressure less than 140/90      Netarsudil-Latanoprost (ROCKLATAN) 0.02-0.005 % SOLN Place 1 drop into both eyes at bedtime.           Allergies   Allergies   Allergen Reactions    Benzoyl Peroxide      swelling    Ibuprofen      over long duration dev. hives    Amoxicillin Rash     Face; in early adulthood    Penicillins Rash     Face; in early adulthood

## 2025-01-15 NOTE — PROGRESS NOTES
Renal Medicine Progress Note            Assessment/Plan:     64 y.o woman with hypertension and history of skin cancer, who was admitted with severe renal failure of unclear etiology      # Severe acute-subacute renal failure: She had serum creatinine of ~ 1 mg/dl on June of 2023, 2.46 mg/dl on 10/24 and 5.86 mg/dl on 1/8.                -UA with trace blood and 3 RBC               -UPCR 500 mg/dl               -hep B/C negative               -MONICA, dsDNA, ANCA, complements are normal               -SPEP with monoclonal protein peak of 1.1.  Kappa/lambda ratio > 50                -UPEP with  monoclonal protein peak of ~ 30%     # Hypertension: Fair control without medication               -PTA Toprol and was on lisinopril (stopped due to hypotension)     # Severe anemia: PLT is normal.  Hypocalcemia, severe anemia, renal failure,  high kappa/lambda ratio -> ?  Myeloma.  Noted plans for PET scan and bone marrow biopsy as outpatient.       #  FEN: Seems euvolemic. Hypercalcemia improved with IVF fluid.      Discussion/recommendation -  Presumptive diagnosis of monoclonal gammopathy of renal significance.  Differential diagnosis of ATN.  Status post kidney biopsy on 1/14.  Results pending  No postbiopsy bleed or complications    Okay to discharge from renal standpoint.  Labs with PCP within a week.  Will set up follow-up with [facility] in 1-2 weeks -discussed biopsy results and further management.  Continue to hold blood pressure medications on discharge  Patient counseled on renal diet              Interval History:     No acute issues overnight.  Status post nearly kidney biopsy yesterday.  On left side.  No flank pain, hematuria.  Hemoglobin better  1.8 L urine output.  Creatinine stable/slightly better            Medications and Allergies:     Current Facility-Administered Medications   Medication Dose Route Frequency Provider Last Rate Last Admin    dorzolamide-timolol (COSOPT) ophthalmic solution 1 drop  " 1 drop Both Eyes BID Saul Fuentes MD   1 drop at 01/15/25 0926    [Held by provider] metoprolol succinate ER (TOPROL-XL) 24 hr half-tab 12.5 mg  12.5 mg Oral At Bedtime Marine Heck DO        multivitamin, therapeutic (THERA-VIT) tablet 1 tablet  1 tablet Oral Daily Marine Heck DO   1 tablet at 01/15/25 0925    Netarsudil-Latanoprost 0.02-0.005 % SOLN 1 drop - PATIENT'S OWN SUPPLY  1 drop Both Eyes At Bedtime Coni Diego PA-C   1 drop at 01/14/25 2000    sodium chloride (PF) 0.9% PF flush 3 mL  3 mL Intracatheter Q8H Coni Diego PA-C   3 mL at 01/15/25 0926        Allergies   Allergen Reactions    Benzoyl Peroxide      swelling    Ibuprofen      over long duration dev. hives    Amoxicillin Rash     Face; in early adulthood    Penicillins Rash     Face; in early adulthood            Physical Exam:   Vitals were reviewed   , Blood pressure 138/69, pulse 78, temperature 98.3  F (36.8  C), temperature source Oral, resp. rate 18, height 1.6 m (5' 3\"), weight 52.9 kg (116 lb 11.2 oz), last menstrual period 10/31/2011, SpO2 98%, not currently breastfeeding.    Wt Readings from Last 3 Encounters:   01/15/25 52.9 kg (116 lb 11.2 oz)   01/08/25 54 kg (119 lb)   01/08/25 54.1 kg (119 lb 3.2 oz)         GENERAL: NAD  CV: RRR, + murmur.  No edema  RESP: CTAB no wheezes.  GI: Abdomen soft, NT.  Biopsy site appears okay  NEURO:  Awake, alert .  Nonfocal  PSYCH: mood good, affect appropriate         Data:     CBC RESULTS:     Recent Labs   Lab 01/15/25  0650 01/14/25  1806 01/12/25  0618 01/11/25  0547 01/10/25  0555 01/09/25  1112 01/08/25  1211   WBC  --   --  6.2 8.6 6.8 8.0 8.5   RBC  --   --  2.45* 2.93* 2.48* 2.97* 2.93*   HGB 8.8* 8.5* 7.4* 8.8* 7.4* 9.1* 9.2*   HCT  --   --  22.9* 27.5* 22.8* 27.5* 27.6*   PLT  --   --  187 259 213 264 266       Basic Metabolic Panel:  Recent Labs   Lab 01/15/25  0650 01/14/25  0720 01/13/25  0639 01/12/25  0618 01/11/25  0547 " 01/10/25  0555    137 139 138 139 139   POTASSIUM 4.7 4.5 4.4 4.5 4.3 4.0   CHLORIDE 101 102 104 101 99 101   CO2 21* 21* 22 24 29 25   BUN 56.1* 57.2* 57.1* 56.4* 55.8* 60.8*   CR 5.19* 5.24* 5.63* 5.82* 5.93* 6.11*   * 97 101* 97 105* 108*   KELTON 9.5 9.1 9.0 8.6* 9.0 8.7*       INR  Recent Labs   Lab 01/13/25  1355   INR 1.13      Attestation:   I have reviewed today's relevant vital signs, notes, medications, labs and imaging.    Blaze Bauer MD  [facility] - Nephrology   [phone]

## 2025-01-15 NOTE — PLAN OF CARE
Goal Outcome Evaluation:      Plan of Care Reviewed With: patient, significant other    Overall Patient Progress: improvingOverall Patient Progress: improving    Outcome Evaluation: Medically ready for D/C per providers    AVS reviewed with pt and family. All questions answered, concerns addressed. Pt and family deny any further questions or concerns. PIV removed, no complications. All belongings returned. Pt escorted to front door by [place] staff.    Anne Gorman, DYLONN, RN  [facility]  Medical/Telemetry/IMC

## 2025-01-15 NOTE — PLAN OF CARE
[id number]    VSS on RA. A/O x4. Up independent. Denies pain/CP/SOB. Neph following. Likely discharge today, plan for OP f/up w/ oncology. Creat stable. Biopsy site bandage CDI.    Goal Outcome Evaluation:      Plan of Care Reviewed With: patient    Overall Patient Progress: improvingOverall Patient Progress: improving    Outcome Evaluation: Creatinine stable, biopsy site WDL, plan for OP f/up w/ oncology      Problem: Adult Inpatient Plan of Care  Goal: Plan of Care Review  Description: The Plan of Care Review/Shift note should be completed every shift.  The Outcome Evaluation is a brief statement about your assessment that the patient is improving, declining, or no change.  This information will be displayed automatically on your shift  note.  Outcome: Progressing  Flowsheets (Taken 1/15/2025 7808)  Outcome Evaluation: Creatinine stable, biopsy site WDL, plan for OP f/up w/ oncology  Plan of Care Reviewed With: patient  Overall Patient Progress: improving  Goal: Absence of Hospital-Acquired Illness or Injury  Outcome: Progressing  Goal: Optimal Comfort and Wellbeing  Outcome: Progressing  Goal: Readiness for Transition of Care  Outcome: Progressing     Problem: Comorbidity Management  Goal: Blood Pressure in Desired Range  Outcome: Progressing     Problem: Acute Kidney Injury/Impairment  Goal: Fluid and Electrolyte Balance  Outcome: Progressing  Goal: Improved Oral Intake  Outcome: Progressing  Goal: Effective Renal Function  Outcome: Progressing

## 2025-01-16 ENCOUNTER — TELEPHONE (OUTPATIENT)
Dept: FAMILY MEDICINE | Facility: CLINIC | Age: 65
End: 2025-01-16
Payer: COMMERCIAL

## 2025-01-16 ENCOUNTER — PATIENT OUTREACH (OUTPATIENT)
Dept: FAMILY MEDICINE | Facility: CLINIC | Age: 65
End: 2025-01-16
Payer: COMMERCIAL

## 2025-01-16 NOTE — TELEPHONE ENCOUNTER
Transitions of Care Outreach  Chief Complaint   Patient presents with    Hospital F/U       Most Recent Admission Date: 1/9/2025   Most Recent Admission Diagnosis: Acute UTI - N39.0  Acute renal failure superimposed on chronic kidney disease, unspecified acute renal failure type, unspecified CKD stage - N17.9, N18.9     Most Recent Discharge Date: 1/15/2025   Most Recent Discharge Diagnosis: Acute renal failure superimposed on chronic kidney disease, unspecified acute renal failure type, unspecified CKD stage - N17.9, N18.9  Acute UTI - N39.0  Nausea - R11.0     Transitions of Care Assessment    Discharge Assessment  How are you doing now that you are home?: doing fine, still feel weak, but doing ok. Finished heart monitor -mailed today. tried Anti-nausea pill this morning to increase appetite - nothting is tasting good. Trying to eat small meals, more frequently. patient is going to kidney-friendly [facility] they recommended. Talked to [facility] - bone marrow test scheduled for on Monday at noon - in [place]. Waiting on PET scan.Waiting for appt with nephrology - they've reached out.  How are your symptoms? (Red Flag symptoms escalate to triage hotline per guidelines): Improved  Do you know how to contact your clinic care team if you have future questions or changes to your health status? : Yes  Does the patient have their discharge instructions? : Yes  Does the patient have questions regarding their discharge instructions? : No  Were you started on any new medications or were there changes to any of your previous medications? : Yes (stopped lisinopril, decreaesed metoprolol, started on zofran)  Does the patient have all of their medications?: Yes  Do you have questions regarding any of your medications? : No  Do you have all of your needed medical supplies or equipment (DME)?  (i.e. oxygen tank, CPAP, cane, etc.): Yes    Follow up Plan     Discharge Follow-Up  Discharge follow up appointment scheduled in alignment  with recommended follow up timeframe or Transitions of Risk Category? (Low = within 30 days; Moderate= within 14 days; High= within 7 days): No  Patient's follow up appointment not scheduled: Patient declined scheduling support. Education on the importance of transitions of care follow up. Provided scheduling phone number. (patient wants to follow up after PET scan and biopsy completed. will call to schedule.)    Future Appointments   Date Time Provider Department Center   2/5/2025  3:00 PM [id number]       Outpatient Plan as outlined on AVS reviewed with patient.    For any urgent concerns, please contact our 24 hour nurse triage line: [phone] ([phone])       CARLOS BOTELLO RN

## 2025-01-16 NOTE — TELEPHONE ENCOUNTER
"Attempt # 1    Called # [phone]     Left a non detailed VM to call back at [phone] and ask for any available Triage Nurse.    What is she referring to exactly?  AVS notes:   \"[employer] [facility] at 2 PM daily; Between Meals\" - similar to [employer] but is low in potassium for people with renal issues.     CARLOS BOTELLO RN on 1/16/2025 at 2:48 PM   [facility]      "

## 2025-01-16 NOTE — TELEPHONE ENCOUNTER
General Call    Contacts       Contact Date/Time Type Contact Phone/Fax    01/16/2025 12:36 PM CST Phone (Incoming) Dulce Ma (Self) [phone] (M)          Reason for Call:  Follow up question post hospital discharge    What are your questions or concerns:  Patient was discharged from hospital following Renal Failure. She is needing a supplement during the day but wanting to consult with provider to determine what would be appropriate for her.     RN did stress importance of hospital follow up visit with provider - patient understands and states she is waiting on some additional testing prior to scheduling.    Routing to provider to advise.     Date of last appointment with provider: 1/8/25    Could we send this information to you in [employer] or would you prefer to receive a phone call?:   Patient would prefer a phone call   Okay to leave a detailed message?: Yes at Cell number on file:    Telephone Information:   Mobile [phone]

## 2025-01-20 ENCOUNTER — MYC MEDICAL ADVICE (OUTPATIENT)
Dept: FAMILY MEDICINE | Facility: CLINIC | Age: 65
End: 2025-01-20
Payer: COMMERCIAL

## 2025-02-05 ENCOUNTER — APPOINTMENT (OUTPATIENT)
Dept: GENERAL RADIOLOGY | Facility: CLINIC | Age: 65
End: 2025-02-05
Attending: INTERNAL MEDICINE
Payer: COMMERCIAL

## 2025-02-05 ENCOUNTER — HOSPITAL ENCOUNTER (INPATIENT)
Facility: CLINIC | Age: 65
End: 2025-02-05
Attending: EMERGENCY MEDICINE | Admitting: INTERNAL MEDICINE
Payer: COMMERCIAL

## 2025-02-05 DIAGNOSIS — I48.0 PAF (PAROXYSMAL ATRIAL FIBRILLATION) (H): ICD-10-CM

## 2025-02-05 DIAGNOSIS — N18.9 ACUTE ON CHRONIC RENAL INSUFFICIENCY: ICD-10-CM

## 2025-02-05 DIAGNOSIS — K59.00 CONSTIPATION, UNSPECIFIED CONSTIPATION TYPE: ICD-10-CM

## 2025-02-05 DIAGNOSIS — I48.0 PAROXYSMAL ATRIAL FIBRILLATION (H): ICD-10-CM

## 2025-02-05 DIAGNOSIS — I47.10 SVT (SUPRAVENTRICULAR TACHYCARDIA): ICD-10-CM

## 2025-02-05 DIAGNOSIS — N28.9 ACUTE ON CHRONIC RENAL INSUFFICIENCY: ICD-10-CM

## 2025-02-05 DIAGNOSIS — K21.00 GASTROESOPHAGEAL REFLUX DISEASE WITH ESOPHAGITIS WITHOUT HEMORRHAGE: ICD-10-CM

## 2025-02-05 DIAGNOSIS — C43.59 MALIGNANT MELANOMA OF SKIN OF TRUNK (H): Primary | ICD-10-CM

## 2025-02-05 DIAGNOSIS — N39.0 ACUTE UTI: ICD-10-CM

## 2025-02-05 DIAGNOSIS — E85.81 AL AMYLOIDOSIS (H): ICD-10-CM

## 2025-02-05 DIAGNOSIS — K92.1 MELENA: ICD-10-CM

## 2025-02-05 DIAGNOSIS — C90.00 MULTIPLE MYELOMA NOT HAVING ACHIEVED REMISSION (H): ICD-10-CM

## 2025-02-05 LAB
ALBUMIN SERPL BCG-MCNC: 3.9 G/DL (ref 3.5–5.2)
ALBUMIN UR-MCNC: 50 MG/DL
ALP SERPL-CCNC: 56 U/L (ref 40–150)
ALT SERPL W P-5'-P-CCNC: 12 U/L (ref 0–50)
ANION GAP SERPL CALCULATED.3IONS-SCNC: 21 MMOL/L (ref 7–15)
APPEARANCE UR: CLEAR
AST SERPL W P-5'-P-CCNC: 20 U/L (ref 0–45)
BACTERIA #/AREA URNS HPF: ABNORMAL /HPF
BASOPHILS # BLD AUTO: 0 10E3/UL (ref 0–0.2)
BASOPHILS NFR BLD AUTO: 0 %
BILIRUB SERPL-MCNC: 0.3 MG/DL
BILIRUB UR QL STRIP: NEGATIVE
BUN SERPL-MCNC: 105.1 MG/DL (ref 8–23)
CALCIUM SERPL-MCNC: 9.7 MG/DL (ref 8.8–10.4)
CHLORIDE SERPL-SCNC: 95 MMOL/L (ref 98–107)
CK SERPL-CCNC: 54 U/L (ref 26–192)
COLOR UR AUTO: ABNORMAL
CREAT SERPL-MCNC: 10.28 MG/DL (ref 0.51–0.95)
EGFRCR SERPLBLD CKD-EPI 2021: 4 ML/MIN/1.73M2
EOSINOPHIL # BLD AUTO: 0 10E3/UL (ref 0–0.7)
EOSINOPHIL NFR BLD AUTO: 0 %
ERYTHROCYTE [DISTWIDTH] IN BLOOD BY AUTOMATED COUNT: 13 % (ref 10–15)
FLUAV RNA SPEC QL NAA+PROBE: NEGATIVE
FLUBV RNA RESP QL NAA+PROBE: NEGATIVE
GLUCOSE SERPL-MCNC: 109 MG/DL (ref 70–99)
GLUCOSE UR STRIP-MCNC: NEGATIVE MG/DL
HCO3 SERPL-SCNC: 18 MMOL/L (ref 22–29)
HCT VFR BLD AUTO: 30.1 % (ref 35–47)
HGB BLD-MCNC: 10 G/DL (ref 11.7–15.7)
HGB UR QL STRIP: ABNORMAL
HOLD SPECIMEN: NORMAL
IMM GRANULOCYTES # BLD: 0.1 10E3/UL
IMM GRANULOCYTES NFR BLD: 1 %
KETONES UR STRIP-MCNC: NEGATIVE MG/DL
LEUKOCYTE ESTERASE UR QL STRIP: ABNORMAL
LYMPHOCYTES # BLD AUTO: 0.9 10E3/UL (ref 0.8–5.3)
LYMPHOCYTES NFR BLD AUTO: 10 %
MAGNESIUM SERPL-MCNC: 2.9 MG/DL (ref 1.7–2.3)
MCH RBC QN AUTO: 30.1 PG (ref 26.5–33)
MCHC RBC AUTO-ENTMCNC: 33.2 G/DL (ref 31.5–36.5)
MCV RBC AUTO: 91 FL (ref 78–100)
MONOCYTES # BLD AUTO: 0.4 10E3/UL (ref 0–1.3)
MONOCYTES NFR BLD AUTO: 4 %
NEUTROPHILS # BLD AUTO: 7.8 10E3/UL (ref 1.6–8.3)
NEUTROPHILS NFR BLD AUTO: 86 %
NITRATE UR QL: NEGATIVE
NRBC # BLD AUTO: 0 10E3/UL
NRBC BLD AUTO-RTO: 0 /100
PH UR STRIP: 5.5 [PH] (ref 5–7)
PHOSPHATE SERPL-MCNC: 6.3 MG/DL (ref 2.5–4.5)
PLATELET # BLD AUTO: 280 10E3/UL (ref 150–450)
POTASSIUM SERPL-SCNC: 4.6 MMOL/L (ref 3.4–5.3)
PROT SERPL-MCNC: 8.3 G/DL (ref 6.4–8.3)
RBC # BLD AUTO: 3.32 10E6/UL (ref 3.8–5.2)
RBC URINE: 3 /HPF
RSV RNA SPEC NAA+PROBE: NEGATIVE
SARS-COV-2 RNA RESP QL NAA+PROBE: NEGATIVE
SODIUM SERPL-SCNC: 134 MMOL/L (ref 135–145)
SP GR UR STRIP: 1.01 (ref 1–1.03)
SQUAMOUS EPITHELIAL: <1 /HPF
UROBILINOGEN UR STRIP-MCNC: NORMAL MG/DL
WBC # BLD AUTO: 9.1 10E3/UL (ref 4–11)
WBC URINE: 52 /HPF

## 2025-02-05 PROCEDURE — 87186 SC STD MICRODIL/AGAR DIL: CPT | Performed by: EMERGENCY MEDICINE

## 2025-02-05 PROCEDURE — 96361 HYDRATE IV INFUSION ADD-ON: CPT

## 2025-02-05 PROCEDURE — 87637 SARSCOV2&INF A&B&RSV AMP PRB: CPT | Performed by: INTERNAL MEDICINE

## 2025-02-05 PROCEDURE — 120N000001 HC R&B MED SURG/OB

## 2025-02-05 PROCEDURE — 99254 IP/OBS CNSLTJ NEW/EST MOD 60: CPT | Performed by: INTERNAL MEDICINE

## 2025-02-05 PROCEDURE — 71045 X-RAY EXAM CHEST 1 VIEW: CPT

## 2025-02-05 PROCEDURE — 250N000011 HC RX IP 250 OP 636: Performed by: EMERGENCY MEDICINE

## 2025-02-05 PROCEDURE — 82374 ASSAY BLOOD CARBON DIOXIDE: CPT | Performed by: EMERGENCY MEDICINE

## 2025-02-05 PROCEDURE — 258N000003 HC RX IP 258 OP 636: Performed by: INTERNAL MEDICINE

## 2025-02-05 PROCEDURE — 258N000003 HC RX IP 258 OP 636: Performed by: EMERGENCY MEDICINE

## 2025-02-05 PROCEDURE — 82550 ASSAY OF CK (CPK): CPT | Performed by: EMERGENCY MEDICINE

## 2025-02-05 PROCEDURE — 83735 ASSAY OF MAGNESIUM: CPT | Performed by: EMERGENCY MEDICINE

## 2025-02-05 PROCEDURE — 82310 ASSAY OF CALCIUM: CPT | Performed by: EMERGENCY MEDICINE

## 2025-02-05 PROCEDURE — 85025 COMPLETE CBC W/AUTO DIFF WBC: CPT | Performed by: EMERGENCY MEDICINE

## 2025-02-05 PROCEDURE — 81001 URINALYSIS AUTO W/SCOPE: CPT | Performed by: EMERGENCY MEDICINE

## 2025-02-05 PROCEDURE — 36415 COLL VENOUS BLD VENIPUNCTURE: CPT | Performed by: EMERGENCY MEDICINE

## 2025-02-05 PROCEDURE — 250N000011 HC RX IP 250 OP 636: Performed by: INTERNAL MEDICINE

## 2025-02-05 PROCEDURE — 96374 THER/PROPH/DIAG INJ IV PUSH: CPT

## 2025-02-05 PROCEDURE — 99223 1ST HOSP IP/OBS HIGH 75: CPT | Performed by: INTERNAL MEDICINE

## 2025-02-05 PROCEDURE — 84100 ASSAY OF PHOSPHORUS: CPT | Performed by: EMERGENCY MEDICINE

## 2025-02-05 PROCEDURE — 99285 EMERGENCY DEPT VISIT HI MDM: CPT | Mod: 25

## 2025-02-05 RX ORDER — ACYCLOVIR 200 MG/1
200 CAPSULE ORAL 2 TIMES DAILY
COMMUNITY

## 2025-02-05 RX ORDER — GUAIFENESIN 200 MG/10ML
200 LIQUID ORAL EVERY 4 HOURS PRN
Status: DISCONTINUED | OUTPATIENT
Start: 2025-02-05 | End: 2025-02-13 | Stop reason: HOSPADM

## 2025-02-05 RX ORDER — CALCIUM CARBONATE 500 MG/1
1000 TABLET, CHEWABLE ORAL 4 TIMES DAILY PRN
Status: DISCONTINUED | OUTPATIENT
Start: 2025-02-05 | End: 2025-02-13 | Stop reason: HOSPADM

## 2025-02-05 RX ORDER — MONTELUKAST SODIUM 10 MG/1
10 TABLET ORAL SEE ADMIN INSTRUCTIONS
COMMUNITY

## 2025-02-05 RX ORDER — PROCHLORPERAZINE MALEATE 5 MG/1
10 TABLET ORAL EVERY 6 HOURS PRN
Status: DISCONTINUED | OUTPATIENT
Start: 2025-02-05 | End: 2025-02-13 | Stop reason: HOSPADM

## 2025-02-05 RX ORDER — LORAZEPAM 2 MG/ML
0.5 INJECTION INTRAMUSCULAR EVERY 6 HOURS PRN
Status: DISCONTINUED | OUTPATIENT
Start: 2025-02-05 | End: 2025-02-11

## 2025-02-05 RX ORDER — SODIUM CHLORIDE 9 MG/ML
INJECTION, SOLUTION INTRAVENOUS CONTINUOUS
Status: DISCONTINUED | OUTPATIENT
Start: 2025-02-05 | End: 2025-02-08

## 2025-02-05 RX ORDER — LIDOCAINE 40 MG/G
CREAM TOPICAL
Status: DISCONTINUED | OUTPATIENT
Start: 2025-02-05 | End: 2025-02-13 | Stop reason: HOSPADM

## 2025-02-05 RX ORDER — ONDANSETRON 2 MG/ML
4 INJECTION INTRAMUSCULAR; INTRAVENOUS ONCE
Status: COMPLETED | OUTPATIENT
Start: 2025-02-05 | End: 2025-02-05

## 2025-02-05 RX ORDER — ACETAMINOPHEN 325 MG/1
650 TABLET ORAL EVERY 4 HOURS PRN
Status: DISCONTINUED | OUTPATIENT
Start: 2025-02-05 | End: 2025-02-13 | Stop reason: HOSPADM

## 2025-02-05 RX ORDER — CEFTRIAXONE 1 G/1
1 INJECTION, POWDER, FOR SOLUTION INTRAMUSCULAR; INTRAVENOUS EVERY 24 HOURS
Status: DISCONTINUED | OUTPATIENT
Start: 2025-02-05 | End: 2025-02-13 | Stop reason: HOSPADM

## 2025-02-05 RX ORDER — OXYCODONE HYDROCHLORIDE 5 MG/1
5 TABLET ORAL EVERY 4 HOURS PRN
Status: DISCONTINUED | OUTPATIENT
Start: 2025-02-05 | End: 2025-02-13 | Stop reason: HOSPADM

## 2025-02-05 RX ORDER — DEXAMETHASONE 4 MG/1
10 TABLET ORAL SEE ADMIN INSTRUCTIONS
COMMUNITY
Start: 2025-01-28

## 2025-02-05 RX ORDER — BISACODYL 10 MG
10 SUPPOSITORY, RECTAL RECTAL DAILY PRN
Status: DISCONTINUED | OUTPATIENT
Start: 2025-02-05 | End: 2025-02-13 | Stop reason: HOSPADM

## 2025-02-05 RX ORDER — ACETAMINOPHEN 650 MG/1
650 SUPPOSITORY RECTAL EVERY 4 HOURS PRN
Status: DISCONTINUED | OUTPATIENT
Start: 2025-02-05 | End: 2025-02-13 | Stop reason: HOSPADM

## 2025-02-05 RX ORDER — ONDANSETRON 4 MG/1
4 TABLET, ORALLY DISINTEGRATING ORAL EVERY 6 HOURS PRN
Status: DISCONTINUED | OUTPATIENT
Start: 2025-02-05 | End: 2025-02-13 | Stop reason: HOSPADM

## 2025-02-05 RX ORDER — CEFTRIAXONE 1 G/1
1 INJECTION, POWDER, FOR SOLUTION INTRAMUSCULAR; INTRAVENOUS ONCE
Status: DISCONTINUED | OUTPATIENT
Start: 2025-02-05 | End: 2025-02-05

## 2025-02-05 RX ORDER — B COMPLEX, C NO.20/FOLIC ACID 1 MG
1 CAPSULE ORAL DAILY
Status: DISCONTINUED | OUTPATIENT
Start: 2025-02-06 | End: 2025-02-13 | Stop reason: HOSPADM

## 2025-02-05 RX ORDER — AMOXICILLIN 250 MG
1 CAPSULE ORAL 2 TIMES DAILY PRN
Status: DISCONTINUED | OUTPATIENT
Start: 2025-02-05 | End: 2025-02-13 | Stop reason: HOSPADM

## 2025-02-05 RX ORDER — SODIUM CHLORIDE 9 MG/ML
INJECTION, SOLUTION INTRAVENOUS ONCE
Status: COMPLETED | OUTPATIENT
Start: 2025-02-05 | End: 2025-02-06

## 2025-02-05 RX ORDER — CYCLOPHOSPHAMIDE 50 MG/1
350 CAPSULE ORAL SEE ADMIN INSTRUCTIONS
COMMUNITY

## 2025-02-05 RX ORDER — PROCHLORPERAZINE MALEATE 10 MG
10 TABLET ORAL EVERY 6 HOURS PRN
COMMUNITY
Start: 2025-01-24

## 2025-02-05 RX ORDER — B COMPLEX, C NO.20/FOLIC ACID 1 MG
1 CAPSULE ORAL DAILY
Status: DISCONTINUED | OUTPATIENT
Start: 2025-02-05 | End: 2025-02-05

## 2025-02-05 RX ORDER — ONDANSETRON 2 MG/ML
4 INJECTION INTRAMUSCULAR; INTRAVENOUS EVERY 6 HOURS PRN
Status: DISCONTINUED | OUTPATIENT
Start: 2025-02-05 | End: 2025-02-13 | Stop reason: HOSPADM

## 2025-02-05 RX ORDER — AMOXICILLIN 250 MG
2 CAPSULE ORAL 2 TIMES DAILY PRN
Status: DISCONTINUED | OUTPATIENT
Start: 2025-02-05 | End: 2025-02-13 | Stop reason: HOSPADM

## 2025-02-05 RX ADMIN — CEFTRIAXONE 1 G: 1 INJECTION, POWDER, FOR SOLUTION INTRAMUSCULAR; INTRAVENOUS at 18:12

## 2025-02-05 RX ADMIN — SODIUM CHLORIDE: 9 INJECTION, SOLUTION INTRAVENOUS at 15:22

## 2025-02-05 RX ADMIN — SODIUM CHLORIDE: 9 INJECTION, SOLUTION INTRAVENOUS at 18:19

## 2025-02-05 RX ADMIN — ONDANSETRON 4 MG: 2 INJECTION, SOLUTION INTRAMUSCULAR; INTRAVENOUS at 14:55

## 2025-02-05 ASSESSMENT — ACTIVITIES OF DAILY LIVING (ADL)
ADLS_ACUITY_SCORE: 56

## 2025-02-05 ASSESSMENT — COLUMBIA-SUICIDE SEVERITY RATING SCALE - C-SSRS
1. IN THE PAST MONTH, HAVE YOU WISHED YOU WERE DEAD OR WISHED YOU COULD GO TO SLEEP AND NOT WAKE UP?: NO
2. HAVE YOU ACTUALLY HAD ANY THOUGHTS OF KILLING YOURSELF IN THE PAST MONTH?: NO
6. HAVE YOU EVER DONE ANYTHING, STARTED TO DO ANYTHING, OR PREPARED TO DO ANYTHING TO END YOUR LIFE?: NO

## 2025-02-05 NOTE — ED TRIAGE NOTES
Patient ambulatory reporting kidney failure. Patient was seen by internal med this morning and needs dialysis and was advised to come to the ER.

## 2025-02-05 NOTE — PHARMACY-ADMISSION MEDICATION HISTORY
Pharmacy Intern Admission Medication History    Admission medication history is complete. The information provided in this note is only as accurate as the sources available at the time of the update.    Information Source(s): Patient, Patient's pharmacy, and CareEverywhere/SureScripts via in-person and phone    Pertinent Information: Patient started cycle 1 of chemotherapy with Bortezomib and Daratumumab on 2/3- next infusion is planned for 2/10. She was prescribed montelukast to take 3 days following chemotherapy and then PRN. I confirmed cyclophosphamide dose with [facility] ([phone]). They also informed me that her Acyclovir dose was reduced from 400mg BID to 200mg BID on 1/31.     Changes made to PTA medication list:  Added: Cyclophosphamide, Dexamethasone, Montelukast, Compazine, Zofran   Deleted: Atorvastatin (pt reported she never started it), Temovate cream, Metoprolol (stopped due to side effects)   Changed: None    Allergies reviewed with patient and updates made in EHR: yes    Medication History Completed By: Urszula Osullivan RPH 2/5/2025 4:36 PM    PTA Med List   Medication Sig Last Dose/Taking    acyclovir (ZOVIRAX) 200 MG capsule Take 200 mg by mouth 2 times daily. 2/4/2025    cycloPHOSphamide (CYTOXAN) 50 MG capsule Take 350 mg by mouth See Admin Instructions Take on day 1, 8, 15, and 22 of 28 day cycle Past Week    dexAMETHasone (DECADRON) 4 MG tablet Take 10 tablets by mouth See Admin Instructions. TAKE 40MG (10 TABLETS) ON DAYS 1, 8, 15 AND 22 OF EACH CYCLE ONE HOUR BEFORE DARATUMUMAB Past Week    dorzolamide-timolol (COSOPT) 2-0.5 % ophthalmic solution Place 1 drop into both eyes 2 times daily. 2/5/2025 Morning    montelukast (SINGULAIR) 10 MG tablet Take 10 mg by mouth See Admin Instructions. Sunday- Tues on cycle 1 of chemotherapy and then PRN 2/4/2025    Netarsudil-Latanoprost (ROCKLATAN) 0.02-0.005 % SOLN Place 1 drop into both eyes at bedtime. More than a month    ondansetron  (ZOFRAN ODT) 4 MG ODT tab Take 1 tablet (4 mg) by mouth every 6 hours as needed. Taking As Needed    prochlorperazine (COMPAZINE) 10 MG tablet Take 10 mg by mouth every 6 hours as needed for nausea or vomiting. Taking As Needed

## 2025-02-05 NOTE — ED PROVIDER NOTES
Emergency Department Note      History of Present Illness     Chief Complaint   Abnormal Labs    HPI   Dulce Ma is a 64 year old female with history of basal cell skin cancer, acute renal failure, hyperlipidemia, and hypertension who presents to the ED with her friend for evaluation of abnormal labs. The patient reports that she is coming from her nephrologist who expressed concern with her kidney function tests. She reports that she needs dialysis and that her chemotherapy has been causing her to experience weight loss, nausea, and vomiting. She states that she hasn't had dialysis in the past and denies any urinary issues.     Independent Historian   None    Review of External Notes   Nephrology visit 2/5/25; Dr. Gandara    Past Medical History     Medical History and Problem List   h/o Malignant melanoma of skin of trunk   Essential hypertension with goal blood pressure less than 140/90  Chronic constipation  Glaucoma  Dysplastic nevi  Skin cancer, basal cell  Lichen sclerosus et atrophicus of the vulva   Hyperlipidemia LDL goal <130  Bilateral leg edema  Vitamin D deficiency  Infection due to 2019 novel coronavirus  Chronic right shoulder pain   Impingement syndrome, shoulder, right  Mixed incontinence urge and stress  Irregular heartbeat  Paroxysmal supraventricular tachycardia (H)- on echocardiogram 10/2024  Elevated serum creatinine  Decreased GFR  Acute renal failure superimposed on chronic kidney    Medications   atorvastatin (LIPITOR) 40 MG tablet  clobetasol propionate (TEMOVATE) 0.05 % external cream  dorzolamide-timolol (COSOPT) 2-0.5 % ophthalmic solution  metoprolol succinate ER (TOPROL XL) 25 MG 24 hr tablet  Netarsudil-Latanoprost (ROCKLATAN) 0.02-0.005 % SOLN  ondansetron (ZOFRAN ODT) 4 MG ODT tab    Surgical History   Past Surgical History:   Procedure Laterality Date    APPENDECTOMY  2008    aprroximately 8-10 years agp    BREAST SURGERY      Breast biopsies    COLONOSCOPY N/A 7/27/2018     "Procedure: COLONOSCOPY;  COLONOSCOPY ;  Surgeon: Ren Whitehead MD;  Location:  GI    ORTHOPEDIC SURGERY Right 2014    ORIF right ring finger    ZZC NONSPECIFIC PROCEDURE  3/02    malignant melanoma removed from abdomen with negative nodes-Dr. Acosta -surgeon       Physical Exam     Patient Vitals for the past 24 hrs:   BP Temp Pulse Resp SpO2 Height Weight   02/05/25 1255 (!) 156/75 97.4  F (36.3  C) 82 14 99 % 1.6 m (5' 3\") 49.8 kg (109 lb 12.6 oz)     Physical Exam  ***    Diagnostics     Lab Results   Labs Ordered and Resulted from Time of ED Arrival to Time of ED Departure - No data to display    Imaging   No orders to display     EKG   ECG taken at ***, ECG read at ***  ***   *** as compared to prior, dated ***/***/***.  Rate *** bpm. WY interval *** ms. QRS duration *** ms. QT/QTc ***/*** ms. P-R-T axes *** *** ***.    Independent Interpretation   {IndependentReview:417823::\"None\"}    ED Course      Medications Administered   Medications - No data to display    Procedures   Procedures     Discussion of Management   {Consults/Care Discussions:922666::\"None\"}    ED Course   ED Course as of 02/05/25 1355   Wed Feb 05, 2025   1346 I obtained history and examined the patient as noted above.        Additional Documentation  {EPPAAdditionalPhrase:396891::\"None\"}    Medical Decision Making / Diagnosis     CMS Diagnoses: {Sepsis/Septic Shock/Stemi/Stroke:656036::\"None\"}    MIPS       {EPPA MIPS:143241::\"None\"}    ARLETTE Ma is a 64 year old female ***    Disposition   The patient was admitted to the hospital.     Diagnosis   No diagnosis found.     Discharge Medications   New Prescriptions    No medications on file     Scribe Disclosure:  I, Howard Negrete, am serving as a scribe at 1:55 PM on 2/5/2025 to document services personally performed by Leodan Toledo MD based on my observations and the provider's statements to me.     " Urine Trace (*)     pH Urine 5.5      Protein Albumin Urine 50 (*)     Urobilinogen Urine Normal      Nitrite Urine Negative      Leukocyte Esterase Urine Large (*)     Bacteria Urine Few (*)     RBC Urine 3 (*)     WBC Urine 52 (*)     Squamous Epithelials Urine <1     CBC WITH PLATELETS AND DIFFERENTIAL - Abnormal    WBC Count 9.1      RBC Count 3.32 (*)     Hemoglobin 10.0 (*)     Hematocrit 30.1 (*)     MCV 91      MCH 30.1      MCHC 33.2      RDW 13.0      Platelet Count 280      % Neutrophils 86      % Lymphocytes 10      % Monocytes 4      % Eosinophils 0      % Basophils 0      % Immature Granulocytes 1      NRBCs per 100 WBC 0      Absolute Neutrophils 7.8      Absolute Lymphocytes 0.9      Absolute Monocytes 0.4      Absolute Eosinophils 0.0      Absolute Basophils 0.0      Absolute Immature Granulocytes 0.1      Absolute NRBCs 0.0     RENAL PANEL - Abnormal    Sodium 138      Potassium 3.4      Chloride 99      Carbon Dioxide (CO2) 23      Anion Gap 16 (*)     Glucose 90      Urea Nitrogen 56.5 (*)     Creatinine 5.29 (*)     GFR Estimate 8 (*)     Calcium 8.2 (*)     Albumin 3.5      Phosphorus 2.9     CBC WITH PLATELETS - Abnormal    WBC Count 5.5      RBC Count 2.89 (*)     Hemoglobin 8.7 (*)     Hematocrit 26.1 (*)     MCV 90      MCH 30.1      MCHC 33.3      RDW 13.0      Platelet Count 210     CK TOTAL - Normal    CK 54     INFLUENZA A/B, RSV AND SARS-COV2 PCR - Normal    Influenza A PCR Negative      Influenza B PCR Negative      RSV PCR Negative      SARS CoV2 PCR Negative         Imaging   IR CVC Tunnel Placement > 5 Yrs of Age   Final Result   IMPRESSION:     Tunneled dialysis catheter placement, ready for immediate use.      XR Chest Port 1 View   Final Result   IMPRESSION: Bandlike scarring or atelectasis in the lung bases. Surgical clips both axillary regions. Chest otherwise negative. Lungs otherwise clear with no acute focal consolidation.      MRI Cardiac w/contrast and stress    (Results  Pending)         Independent Interpretation   None    ED Course      Medications Administered   Medications - No data to display    Procedures   Procedures     Discussion of Management   Admitting Hospitalist,    Nephrology,      ED Course   ED Course as of 02/05/25 1355   Wed Feb 05, 2025   1346 I obtained history and examined the patient as noted above.        Additional Documentation  None    Medical Decision Making / Diagnosis     CMS Diagnoses: None    MIPS       None    MDM   Dulce Ma is a 64 year old female who is referred to the emergency room for assessment of increased kidney function.  Patient's history of chronic renal insufficiency was seen by nephrology and sent to the emergency room.  She has had multiple episodes of vomiting.  Looks dry.  Creatinine is 10 up from 5.  Suspect a component of prerenal but according to nephrology they are suspicious she may need dialysis.  Regardless we will admit to the hospital for IV fluids and assessment for tunneled catheter and dialysis as part of an inpatient stay.  Care was discussed with the hospitalist and was admitted as an inpatient.    Disposition   The patient was admitted to the hospital.     Diagnosis     ICD-10-CM    1. h/o Malignant melanoma of skin of trunk - Dr. Myles Lake - [facility]- rechecks w/ him 1x/year  C43.59       2. Acute on chronic renal insufficiency  N28.9 Primary Care - Care Coordination Referral    N18.9       3. Acute UTI  N39.0       4. PAF (paroxysmal atrial fibrillation) (H)  I48.0 MRI Cardiac w/contrast and stress     Follow-Up with Cardiology      5. Multiple myeloma not having achieved remission (H)  C90.00 MRI Cardiac w/contrast and stress     Follow-Up with Cardiology           Discharge Medications   Current Discharge Medication List        Scribe Disclosure:  I, Howard Negrete, am serving as a scribe at 1:55 PM on 2/5/2025 to document services personally performed by Leodan Toledo MD based on my  observations and the provider's statements to me.        Leodan Toledo MD  02/09/25 [id number]

## 2025-02-05 NOTE — ED NOTES
Bed: ED25  Expected date:   Expected time:   Means of arrival:   Comments:  Last patient still in room ED39

## 2025-02-05 NOTE — LETTER
Dialysis Intake Checklist      Your Name: Christy Avendaño RN Contact Phone Number: [phone]     Fax Number and Email: [phone]  [email or web address]/Practice: [facility]     Patient Name: Dulce Ma   Referring Nephrologist: DR. HAMLIN     Requested Facility(s) or Zip Code: [address] [place] [address]     Patient Started Date of Dialysis: 2/6/25      Estimated Outpatient Start Date of Dialysis: 2/9   Treatment Duration & Frequency: 3 x weekly     Preferred Schedule: Tuesday, Thursday, and Saturday AM     Is the patient employed? No   Is there a working shift we can accommodate?  No   Is patient flexible? Yes Shift: prefers 2 nd shift but will be flexible       Modality:   In-Center Hemo   Diagnosis:   End Stage Renal Disease (ESRD - Stage 5 Chronic Kidney Disease)     Access Type(s):   CVC    If only a CVC, is there an active AV Access Plan (e.g., Vessel Mapping, Surgeon Consult, etc.)?:   unknown         Where will this patient be discharged to? Home     Is this patient trach or vent dependent? No     Does this patient have an L-VAD or Life Vest? No     Does this patient have outpatient dialysis history? No     Does this patient receive continuous medication via infusion pumps? No     Daily Care - Activity Management/Activity assistance needed?   Activity Management: ambulated to bathroom   Activity Assistance Provided: assistance, stand-by          Can this patient sit in a standard chair to dialyze? Yes:      Require treatment in a bed?  No     Is the patient HEP B positive? No     Can this patient sign their own legal consents? Yes:      Other special needs? no       Allergies:   Allergies   Allergen Reactions    Benzoyl Peroxide      swelling    Ibuprofen      over long duration dev. hives    Amoxicillin Rash     Face; in early adulthood    Tolerated cephalexin 01/2025    Penicillins Rash     Face; in early adulthood.    Tolerated cephalexin 01/2025         Medication List:   Current Facility-Administered Medications   Medication Dose Route Frequency Provider Last Rate Last Admin    - MEDICATION INSTRUCTIONS for Dialysis Patients -   Does not apply See Admin Instructions Francisco Correa MD        acetaminophen (TYLENOL) tablet 650 mg  650 mg Oral Q4H PRN Ra Elmore DO        Or    acetaminophen (TYLENOL) Suppository 650 mg  650 mg Rectal Q4H PRN Ra Elmore DO        alteplase (CATHFLO ACTIVASE) injection 2 mg  2 mg Intracatheter Q1H PRN Jj Leiva MD        alteplase (CATHFLO ACTIVASE) injection 2 mg  2 mg Intracatheter Q1H PRN Jj Leiva MD        benzocaine-menthol (CHLORASEPTIC) 6-10 MG lozenge 1 lozenge  1 lozenge Buccal Q1H PRN Ra Elmore DO        bisacodyl (DULCOLAX) suppository 10 mg  10 mg Rectal Daily PRN Ra Elmore DO        calcium carbonate (TUMS) chewable tablet 1,000 mg  1,000 mg Oral 4x Daily PRN Ra Elmore DO        cefTRIAXone (ROCEPHIN) 1 g vial to attach to  mL bag for ADULTS or NS 50 mL bag for PEDS  1 g Intravenous Q24H Ra Elmore DO   Stopped at 02/06/25 0700    guaiFENesin (ROBITUSSIN) 20 mg/mL solution 200 mg  200 mg Oral Q4H PRN Ra Elmore DO        heparin 1000 unit/mL DIALYSIS Cath LOCK - RED Lumen  1.3-2.6 mL Intracatheter Once in dialysis/CRRT Jj Leiva MD        lidocaine (LMX4) cream   Topical Q1H PRN Ra Elmore DO        lidocaine 1 % 0.1-1 mL  0.1-1 mL Other Q1H PRN Ra Elmore DO        LORazepam (ATIVAN) injection 0.5 mg  0.5 mg Intravenous Q6H PRN Ra Elmore DO        melatonin tablet 5 mg  5 mg Oral At Bedtime PRN Ra Elmore DO        multivitamin RENAL (TRIPHROCAPS) capsule 1 capsule  1 capsule Oral Daily Jj Leiva MD        naloxone (NARCAN) injection 0.2 mg  0.2 mg Intravenous Q2 Min PRN Francisco Correa MD        Or    naloxone (NARCAN) injection 0.4 mg  0.4 mg Intravenous Q2 Min PRN Sunny,  Francisco GONZALEZ MD        Or    naloxone (NARCAN) injection 0.2 mg  0.2 mg Intramuscular Q2 Min PRN Francisco Correa MD        Or    naloxone (NARCAN) injection 0.4 mg  0.4 mg Intramuscular Q2 Min PRN Francisco Correa MD        ondansetron (ZOFRAN ODT) ODT tab 4 mg  4 mg Oral Q6H PRN Ra Elmore DO        Or    ondansetron (ZOFRAN) injection 4 mg  4 mg Intravenous Q6H PRN Ra Elmore DO   4 mg at 02/06/25 0040    oxyCODONE (ROXICODONE) tablet 5 mg  5 mg Oral Q4H PRN Ra Elmore DO        oxyCODONE IR (ROXICODONE) half-tab 2.5 mg  2.5 mg Oral Q4H PRN Ra Elmore DO        prochlorperazine (COMPAZINE) injection 10 mg  10 mg Intravenous Q6H PRN Ra Elmore DO        Or    prochlorperazine (COMPAZINE) tablet 10 mg  10 mg Oral Q6H PRN Ra Elmore DO        senna-docusate (SENOKOT-S/PERICOLACE) 8.6-50 MG per tablet 1 tablet  1 tablet Oral BID PRN Ra Elmore DO        Or    senna-docusate (SENOKOT-S/PERICOLACE) 8.6-50 MG per tablet 2 tablet  2 tablet Oral BID PRN Ra Elmore DO        sodium chloride (PF) 0.9% PF flush 10 mL  10 mL Intracatheter Q15 Min PRN Jj Leiva MD        sodium chloride (PF) 0.9% PF flush 10 mL  10 mL Intracatheter Q15 Min PRN Jj Leiva MD        sodium chloride (PF) 0.9% PF flush 3 mL  3 mL Intracatheter Q8H Ra Elmore DO   3 mL at 02/06/25 1232    sodium chloride (PF) 0.9% PF flush 3 mL  3 mL Intracatheter q1 min prn Ra Elmore DO        sodium chloride (PF) 0.9% PF flush 9 mL  9 mL Intracatheter During Dialysis/CRRT (from stock) Jj Leiva MD        sodium chloride (PF) 0.9% PF flush 9 mL  9 mL Intracatheter During Dialysis/CRRT (from stock) Jj Leiva MD        sodium chloride 0.9 % infusion   Intravenous Continuous Jj Leiva MD 50 mL/hr at 02/06/25 1231 New Bag at 02/06/25 1231    sodium chloride 0.9% BOLUS 100-150 mL  100-150 mL Intravenous Q15 Min PRN Jj Leiva MD         "sodium chloride 0.9% BOLUS 200 mL  200 mL Hemodialysis Machine Once Jj Leiva MD        sodium chloride 0.9% BOLUS 250 mL  250 mL Intravenous Once in dialysis/CRRT Jj Leiva MD              HEP Panel:  Hep B Antigen (HBsAG):   Lab Results   Component Value Date    HEPBANG Nonreactive 01/09/2025     Hep B Surface Antibody (HBsAb):   Lab Results   Component Value Date    AUSAB <3.50 01/09/2025     Hep B Total Core Antibody: No results found for: \"HBCAB\"     Chest X-Ray:   Results for orders placed during the hospital encounter of 02/05/25    XR CHEST PORT 1 VIEW    Narrative  EXAM: XR CHEST PORT 1 VIEW  LOCATION: [facility]  DATE: 2/5/2025    INDICATION: Cough  COMPARISON: PET/CT 1/30/2025 reviewed.    Impression  IMPRESSION: Bandlike scarring or atelectasis in the lung bases. Surgical clips both axillary regions. Chest otherwise negative. Lungs otherwise clear with no acute focal consolidation.       PPD:  M TUBERCULOSIS RESULT: No results found for: \"TBRSLT\"  M TUBERCULOSIS ANTIGEN VALUE: No results found for: \"TBAGN\"             "

## 2025-02-05 NOTE — CONSULTS
[facility]    Nephrology Consultation     Date of Admission:  2/5/2025    Assessment & Plan     Dulce Ma is a 64 year old female who was admitted on 2/5/2025.     1) CKD 5:  Due to amyloidosis due in turn to multiple myeloma.  Her CKD has advanced enough that she has uremic symptoms .  She needs to start dialysis.     2) Multiple Myeloma:  She has started treatment under direction of Malachi Brown with Darzalex, Faspro, Velcade, Cytoxan and dexamethasone.      3) Anemia: Due to CKD 5 and MM    4) Hyponatremia - mild. Due to reduced free water clearance in CKD 5.     5) Metabolic Acidosis: Due to CKD 5.    6) Hyperphosphatemia:  Due to CKD 5.    Plan:    IR consult for tunneled catheter placement.  I expect this will happen tomorrow.  Initiate hemodialysis tomorrow.  She will need placement into a dialysis unit of her choice.    Hep B serols  Quantiferon gold.        Jj Leiva MD  [facility] Nephrology  [phone]    Reason for Consult     I was asked to see the patient for CKD 5 and uremia.    Primary Care Physician     Alida Lamb    Chief Complaint     I feel terrible.      History is obtained from the patient and chart review.      History of Present Illness     Dulce Ma is a 64 year old female with CKD 5 who presents with uremia.      She is admitted 2/5/25 via ED after presenting because of uremic symptoms.      She has been followed by our group both inpt and outpt.    Most recently she saw Dr. Gandara in our office 1/22/25.    She presented with advanced kidney disease and proteinuria that was eventually evaluated by kidney biopsy.  Biopsy showed amyloidosis AL Kappa type.  There was also acute tubular injury, 20% interstitial fibrosis and tubular atrophy and severe arteriosclerosis.    She had mild uremic symptoms 1/22/25.    She did see Dr. Malachi Brown of [facility].    Bone marrow biopsy did confirm multiple myeloma.  She did start treatment with  Darzalex, Faspro, Velcade, Cytoxan and dexamethasone.      Her uremic symptoms have progressed to the point that she cannot keep anything down.  She feels weak and nauseated.    Not SOB.        Past Medical History   I have reviewed this patient's medical history and updated it with pertinent information if needed.   Past Medical History:   Diagnosis Date    Anesthesia complication, initial encounter     was completely out with Midazolam 2 mg IV, Fentanyl 100 micrograms IV that was given at time of colonoscopy 7/2018     Dysplastic nevi     Glaucoma     Diagnosed in Spring 2010    Hypertension goal BP (blood pressure) < 140/90 at age 50     very strong family hx     Lumbago     stiffness in low back    Other malignant neoplasm of skin of trunk, except scrotum 2/02    Dr. Myles Lake, melanoma with negative sentinel node biopsy - no chemo-Dr. Selene Gonzales-derm    Perimenopausal     Routine gyne exam     [facility] ob/gyn - Dr. Isabela Perez     Skin cancer, basal cell     multiple - 3 on nose, treated with interferon intralesionally x 1     Unspecified derangement, shoulder region     s/p horse-riding injury- no problem now    Unspecified musculoskeletal disorders and symptoms referable to neck     compressed vertebrae in neck- bothers pt rarely       Past Surgical History   I have reviewed this patient's surgical history and updated it with pertinent information if needed.  Past Surgical History:   Procedure Laterality Date    APPENDECTOMY  2008    aprroximately 8-10 years agp    BREAST SURGERY      Breast biopsies    COLONOSCOPY N/A 7/27/2018    Procedure: COLONOSCOPY;  COLONOSCOPY ;  Surgeon: Ren Whitehead MD;  Location:  GI    ORTHOPEDIC SURGERY Right 2014    ORIF right ring finger    ZZC NONSPECIFIC PROCEDURE  3/02    malignant melanoma removed from abdomen with negative nodes-Dr. Acosta -surgeon       Prior to Admission Medications   Prior to Admission Medications   Prescriptions Last Dose Informant  Patient Reported? Taking?   Netarsudil-Latanoprost (ROCKLATAN) 0.02-0.005 % SOLN   Yes No   Sig: Place 1 drop into both eyes at bedtime.   atorvastatin (LIPITOR) 40 MG tablet   Yes No   Sig: Take 40 mg by mouth daily.   clobetasol propionate (TEMOVATE) 0.05 % external cream   No No   Sig: Apply very scant amount to inner labia daily for up to 14 days at a time -ok to use every 2 months or so   dorzolamide-timolol (COSOPT) 2-0.5 % ophthalmic solution   Yes No   Sig: Place 1 drop into both eyes 2 times daily.   metoprolol succinate ER (TOPROL XL) 25 MG 24 hr tablet   Yes No   Sig: Take 0.5 tablets (12.5 mg) by mouth daily.   ondansetron (ZOFRAN ODT) 4 MG ODT tab   No No   Sig: Take 1 tablet (4 mg) by mouth every 6 hours as needed.      Facility-Administered Medications: None     Allergies   Allergies   Allergen Reactions    Benzoyl Peroxide      swelling    Ibuprofen      over long duration dev. hives    Amoxicillin Rash     Face; in early adulthood    Penicillins Rash     Face; in early adulthood       Social History   I have reviewed this patient's social history and updated it with pertinent information if needed. Dulce GIBBONS Ma  reports that she has never smoked. She has never used smokeless tobacco. She reports current alcohol use. She reports that she does not use drugs.    Family History   I have reviewed this patient's family history and updated it with pertinent information if needed.   Family History   Problem Relation Age of Onset    Hypertension Mother     Heart Disease Mother         bypass    Neuropathy Mother     Aortic Valve Replacement Mother 83    Hypertension Father     Coronary Artery Disease Father 83         in  from MI    Eye Disorder Maternal Grandmother         glacoma    Eye Disorder Maternal Grandfather         glacoma    Hypertension Paternal Grandmother     Colon Cancer No family hx of        Review of Systems   The 10 point Review of Systems is negative other than noted in the  HPI.     Physical Exam   Temp: 97.4  F (36.3  C)   BP: 126/53 Pulse: 83   Resp: 14 SpO2: 99 %      Vital Signs with Ranges  Temp:  [97.4  F (36.3  C)] 97.4  F (36.3  C)  Pulse:  [82-94] 83  Resp:  [14] 14  BP: (125-158)/(53-82) 126/53  SpO2:  [96 %-99 %] 99 %  109 lbs 12.63 oz    GENERAL: appears ill  HEENT:  Normocephalic. No gross abnormalities.  Pupils equal.  MMM.  Dentition is ok.  CV: RRR, no murmurs, no clicks, gallops, or rubs, no edema, no carotid bruits  RESP: Clear bilaterally with good efforts  GI: Abdomen soft/nt/nd, BS normal. No masses, organomegaly  MUSCULOSKELETAL: extremities nl - no gross deformities noted  SKIN: no suspicious lesions or rashes, dry to touch  NEURO:  Strength normal and symmetric.   PSYCH: mood and affect appropriate  LYMPH: No palpable ant/post cervical and supraclavicular adenopathy    Data   BMP  Recent Labs   Lab 02/05/25  1358   *   POTASSIUM 4.6   CHLORIDE 95*   KELTON 9.7   CO2 18*   .1*   CR 10.28*   *     [email or web address](phos:4)  CBC)  Recent Labs   Lab 02/05/25  1358   WBC 9.1   HGB 10.0*   HCT 30.1*   MCV 91        Recent Labs   Lab 02/05/25  1358   AST 20   ALT 12   ALKPHOS 56   BILITOTAL 0.3     No lab results found in last 7 days.  25 OH Vit D2   Date Value Ref Range Status   05/28/2020 <5 ug/L Final     25 OH Vitamin D2   Date Value Ref Range Status   10/18/2024 <5 ug/L Final     25 OH Vit D3   Date Value Ref Range Status   05/28/2020 48 ug/L Final     25 OH Vitamin D3   Date Value Ref Range Status   10/18/2024 78 ug/L Final     25 OH Vit D total   Date Value Ref Range Status   05/28/2020 <53 20 - 75 ug/L Final     Comment:     Season, race, dietary intake, and treatment affect the concentration of   25-hydroxy-Vitamin D. Values may decrease during winter months and increase   during summer months. Values 20-29 ug/L may indicate Vitamin D insufficiency   and values <20 ug/L may indicate Vitamin D deficiency.  This test was developed and its  performance characteristics determined by the   [facility].   It has not been cleared or approved by the FDA. The laboratory is regulated   under CLIA as qualified to perform high-complexity testing. This test is used   for clinical purposes. It should not be regarded as investigational or for   research.       25 OH Vit D Total   Date Value Ref Range Status   10/18/2024 <83 (H) 20 - 75 ug/L Final     Comment:     Season, race, dietary intake, and treatment affect the concentration of 25-hydroxy-Vitamin D. Values may decrease during winter months and increase during summer months. Values 20-29 ug/L may indicate Vitamin D insufficiency and values <20 ug/L may indicate Vitamin D deficiency.

## 2025-02-05 NOTE — H&P
[facility]    History and Physical - Hospitalist Service       Date of Admission:  2/5/2025    Assessment & Plan      Dulce Ma is a 64 year old female admitted on 2/5/2025. She has a past medical history significant for multiple myeloma, end-stage kidney disease, hypertension, hyperlipidemia.  She presented to emergency room with profuse nausea and vomiting after recently starting chemotherapy.  Found to have worsening of end-stage kidney disease.    End-stage kidney disease.  -Creatinine now greater than 10.  -Nephrology consult.  -Dialysis diet.  -N.p.o. after midnight.    Multiple myeloma.  -Recently started chemotherapy.  -Oncology consult.    Nausea and vomiting.  -Multiple episodes of vomiting the last few days since starting chemo.  -Appears dehydrated.  -Gentle IV fluids overnight with plan for dialysis to initiate soon.  -Antiemetics as needed.    Urinary tract infection.  -Continue ceftriaxone 1 g IV every 24 hours.  -Monitor urine culture results.    Hyponatremia.  Hypochloremia.  -Mild.  Sodium 134, chloride 95.  -Nephrology planning for dialysis initiation.  -Recheck metabolic panel intermittently.    Malnutrition.  -Registered dietitian consult.    Anemia.  -Oncology consult.  -Recheck CBC tomorrow.        Diet: NPO per Anesthesia Guidelines for Procedure/Surgery Except for: Meds, Ice Chips    DVT Prophylaxis: Pneumatic Compression Devices  Butler Catheter: Not present  Lines: None     Cardiac Monitoring: None  Code Status:  Full code.    Clinically Significant Risk Factors Present on Admission         # Hyponatremia: Lowest Na = 134 mmol/L in last 2 days, will monitor as appropriate  # Hypochloremia: Lowest Cl = 95 mmol/L in last 2 days, will monitor as appropriate     # Anion Gap Metabolic Acidosis: Highest Anion Gap = 21 mmol/L in last 2 days, will monitor and treat as appropriate     # Acute Kidney Injury, unspecified: based on a >150% or 0.3 mg/dL increase in last  creatinine compared to past 90 day average, will monitor renal function  # Hypertension: Noted on problem list      # Anemia: based on hgb <11                  Disposition Plan     Medically Ready for Discharge: Anticipated in 2-4 Days           Ra Elmore DO  Hospitalist Service  [facility]  Securely message with Medical Referral Source (more info)  Text page via [facility] Paging/Directory     ______________________________________________________________________    Chief Complaint   Nausea and vomiting.    History is obtained from the patient    History of Present Illness   Dulce Ma is a 64 year old female who has a past medical history significant for multiple myeloma, end-stage kidney disease, hypertension, hyperlipidemia.  She recently started chemotherapy for multiple myeloma this week.  Began having nausea and profuse vomiting night after starting chemotherapy.  Vomiting all day yesterday.  Continues to vomit today.  Also has a cough.  Sore throat.  Chills at times.  Had previously been constipated.  Was able to have a bowel movement today.  Does not noticed any obvious diarrhea.  No blood in the stools or vomit.  No chest pain.  No other acute complaints.  Feeling a bit better after nausea medications given in the ER.      Past Medical History    Past Medical History:   Diagnosis Date    Anesthesia complication, initial encounter     was completely out with Midazolam 2 mg IV, Fentanyl 100 micrograms IV that was given at time of colonoscopy 7/2018     Dysplastic nevi     Glaucoma     Diagnosed in Spring 2010    Hypertension goal BP (blood pressure) < 140/90 at age 50     very strong family hx     Lumbago     stiffness in low back    Other malignant neoplasm of skin of trunk, except scrotum 2/02    Dr. Myles Lake, melanoma with negative sentinel node biopsy - no chemo-Dr. Selene Gonzales-derm    Perimenopausal     Routine gyne exam     [facility] ob/gyn - Dr. Isabela Perez     Skin cancer,  basal cell     multiple - 3 on nose, treated with interferon intralesionally x 1     Unspecified derangement, shoulder region     s/p horse-riding injury- no problem now    Unspecified musculoskeletal disorders and symptoms referable to neck     compressed vertebrae in neck- bothers pt rarely       Past Surgical History   Past Surgical History:   Procedure Laterality Date    APPENDECTOMY  2008    aprroximately 8-10 years agp    BREAST SURGERY      Breast biopsies    COLONOSCOPY N/A 7/27/2018    Procedure: COLONOSCOPY;  COLONOSCOPY ;  Surgeon: Ren Whitehead MD;  Location:  GI    ORTHOPEDIC SURGERY Right 2014    ORIF right ring finger    ZZC NONSPECIFIC PROCEDURE  3/02    malignant melanoma removed from abdomen with negative nodes-Dr. Acosta -surgeon       Prior to Admission Medications   Prior to Admission Medications   Prescriptions Last Dose Informant Patient Reported? Taking?   Netarsudil-Latanoprost (ROCKLATAN) 0.02-0.005 % SOLN More than a month  Yes Yes   Sig: Place 1 drop into both eyes at bedtime.   acyclovir (ZOVIRAX) 200 MG capsule 2/4/2025  Yes Yes   Sig: Take 200 mg by mouth 2 times daily.   cycloPHOSphamide (CYTOXAN) 50 MG capsule Past Week  Yes Yes   Sig: Take 350 mg by mouth See Admin Instructions Take on day 1, 8, 15, and 22 of 28 day cycle   dexAMETHasone (DECADRON) 4 MG tablet Past Week  Yes Yes   Sig: Take 10 tablets by mouth See Admin Instructions. TAKE 40MG (10 TABLETS) ON DAYS 1, 8, 15 AND 22 OF EACH CYCLE ONE HOUR BEFORE DARATUMUMAB   dorzolamide-timolol (COSOPT) 2-0.5 % ophthalmic solution 2/5/2025 Morning  Yes Yes   Sig: Place 1 drop into both eyes 2 times daily.   montelukast (SINGULAIR) 10 MG tablet 2/4/2025  Yes Yes   Sig: Take 10 mg by mouth See Admin Instructions. Sunday- Tues on cycle 1 of chemotherapy and then PRN   ondansetron (ZOFRAN ODT) 4 MG ODT tab   No Yes   Sig: Take 1 tablet (4 mg) by mouth every 6 hours as needed.   prochlorperazine (COMPAZINE) 10 MG tablet   Yes Yes    Sig: Take 10 mg by mouth every 6 hours as needed for nausea or vomiting.      Facility-Administered Medications: None        Allergies   Allergies   Allergen Reactions    Benzoyl Peroxide      swelling    Ibuprofen      over long duration dev. hives    Amoxicillin Rash     Face; in early adulthood    Tolerated cephalexin 01/2025    Penicillins Rash     Face; in early adulthood.    Tolerated cephalexin 01/2025        Physical Exam   Vital Signs: Temp: 97.4  F (36.3  C)   BP: 118/77 Pulse: 82   Resp: 14 SpO2: 96 %      Weight: 109 lbs 12.63 oz    Gen: Thin, NAD, A&Ox3.  Eyes:  PERRL, sclera anicteric.  OP:  MMM, no lesions.  Neck:  Supple.  CV:  Regular, no murmurs.  Lung:  CTA b/l, normal effort.  Ab:  +BS, soft.  Skin:  Warm, dry to touch.  No rash.  Ext:  No pitting edema LE b/l.      Medical Decision Making       80 MINUTES SPENT BY ME on the date of service doing chart review, history, exam, documentation & further activities per the note.      Data     I have personally reviewed the following data over the past 24 hrs:    9.1  \   10.0 (L)   / 280     134 (L) 95 (L) 105.1 (H) /  109 (H)   4.6 18 (L) 10.28 (H) \     ALT: 12 AST: 20 AP: 56 TBILI: 0.3   ALB: 3.9 TOT PROTEIN: 8.3 LIPASE: N/A       Imaging results reviewed over the past 24 hrs:   No results found for this or any previous visit (from the past 24 hours).

## 2025-02-06 ENCOUNTER — APPOINTMENT (OUTPATIENT)
Dept: INTERVENTIONAL RADIOLOGY/VASCULAR | Facility: CLINIC | Age: 65
End: 2025-02-06
Attending: INTERNAL MEDICINE
Payer: COMMERCIAL

## 2025-02-06 VITALS
SYSTOLIC BLOOD PRESSURE: 132 MMHG | OXYGEN SATURATION: 94 % | HEART RATE: 92 BPM | TEMPERATURE: 98.9 F | BODY MASS INDEX: 19.45 KG/M2 | WEIGHT: 109.79 LBS | HEIGHT: 63 IN | DIASTOLIC BLOOD PRESSURE: 68 MMHG | RESPIRATION RATE: 18 BRPM

## 2025-02-06 LAB
ALBUMIN SERPL BCG-MCNC: 3.5 G/DL (ref 3.5–5.2)
ANION GAP SERPL CALCULATED.3IONS-SCNC: 16 MMOL/L (ref 7–15)
ANION GAP SERPL CALCULATED.3IONS-SCNC: 20 MMOL/L (ref 7–15)
ATRIAL RATE - MUSE: 92 BPM
ATRIAL RATE - MUSE: 94 BPM
BACTERIA UR CULT: ABNORMAL
BACTERIA UR CULT: ABNORMAL
BUN SERPL-MCNC: 55.6 MG/DL (ref 8–23)
BUN SERPL-MCNC: 56.5 MG/DL (ref 8–23)
CALCIUM SERPL-MCNC: 8.1 MG/DL (ref 8.8–10.4)
CALCIUM SERPL-MCNC: 8.2 MG/DL (ref 8.8–10.4)
CHLORIDE SERPL-SCNC: 97 MMOL/L (ref 98–107)
CHLORIDE SERPL-SCNC: 99 MMOL/L (ref 98–107)
CREAT SERPL-MCNC: 5.29 MG/DL (ref 0.51–0.95)
CREAT SERPL-MCNC: 6.13 MG/DL (ref 0.51–0.95)
DIASTOLIC BLOOD PRESSURE - MUSE: NORMAL MMHG
DIASTOLIC BLOOD PRESSURE - MUSE: NORMAL MMHG
EGFRCR SERPLBLD CKD-EPI 2021: 7 ML/MIN/1.73M2
EGFRCR SERPLBLD CKD-EPI 2021: 8 ML/MIN/1.73M2
ERYTHROCYTE [DISTWIDTH] IN BLOOD BY AUTOMATED COUNT: 13 % (ref 10–15)
GLUCOSE SERPL-MCNC: 84 MG/DL (ref 70–99)
GLUCOSE SERPL-MCNC: 90 MG/DL (ref 70–99)
HBV CORE AB SERPL QL IA: NONREACTIVE
HBV CORE IGM SERPL QL IA: NONREACTIVE
HBV SURFACE AG SERPL QL IA: NONREACTIVE
HCO3 SERPL-SCNC: 19 MMOL/L (ref 22–29)
HCO3 SERPL-SCNC: 23 MMOL/L (ref 22–29)
HCT VFR BLD AUTO: 26.1 % (ref 35–47)
HGB BLD-MCNC: 8.7 G/DL (ref 11.7–15.7)
INTERPRETATION ECG - MUSE: NORMAL
INTERPRETATION ECG - MUSE: NORMAL
MAGNESIUM SERPL-MCNC: 2 MG/DL (ref 1.7–2.3)
MCH RBC QN AUTO: 30.1 PG (ref 26.5–33)
MCHC RBC AUTO-ENTMCNC: 33.3 G/DL (ref 31.5–36.5)
MCV RBC AUTO: 90 FL (ref 78–100)
P AXIS - MUSE: 56 DEGREES
P AXIS - MUSE: 63 DEGREES
PHOSPHATE SERPL-MCNC: 2.9 MG/DL (ref 2.5–4.5)
PLATELET # BLD AUTO: 210 10E3/UL (ref 150–450)
POTASSIUM SERPL-SCNC: 3.4 MMOL/L (ref 3.4–5.3)
POTASSIUM SERPL-SCNC: 4 MMOL/L (ref 3.4–5.3)
PR INTERVAL - MUSE: 142 MS
PR INTERVAL - MUSE: 144 MS
QRS DURATION - MUSE: 84 MS
QRS DURATION - MUSE: 86 MS
QT - MUSE: 356 MS
QT - MUSE: 378 MS
QTC - MUSE: 440 MS
QTC - MUSE: 472 MS
R AXIS - MUSE: 52 DEGREES
R AXIS - MUSE: 70 DEGREES
RBC # BLD AUTO: 2.89 10E6/UL (ref 3.8–5.2)
SODIUM SERPL-SCNC: 136 MMOL/L (ref 135–145)
SODIUM SERPL-SCNC: 138 MMOL/L (ref 135–145)
SYSTOLIC BLOOD PRESSURE - MUSE: NORMAL MMHG
SYSTOLIC BLOOD PRESSURE - MUSE: NORMAL MMHG
T AXIS - MUSE: -84 DEGREES
T AXIS - MUSE: 262 DEGREES
VENTRICULAR RATE- MUSE: 92 BPM
VENTRICULAR RATE- MUSE: 94 BPM
WBC # BLD AUTO: 5.5 10E3/UL (ref 4–11)

## 2025-02-06 PROCEDURE — 86705 HEP B CORE ANTIBODY IGM: CPT | Performed by: INTERNAL MEDICINE

## 2025-02-06 PROCEDURE — 93005 ELECTROCARDIOGRAM TRACING: CPT

## 2025-02-06 PROCEDURE — 999N000157 HC STATISTIC RCP TIME EA 10 MIN

## 2025-02-06 PROCEDURE — 250N000009 HC RX 250: Performed by: NURSE PRACTITIONER

## 2025-02-06 PROCEDURE — 0JH63XZ INSERTION OF TUNNELED VASCULAR ACCESS DEVICE INTO CHEST SUBCUTANEOUS TISSUE AND FASCIA, PERCUTANEOUS APPROACH: ICD-10-PCS | Performed by: RADIOLOGY

## 2025-02-06 PROCEDURE — 5A1D70Z PERFORMANCE OF URINARY FILTRATION, INTERMITTENT, LESS THAN 6 HOURS PER DAY: ICD-10-PCS | Performed by: INTERNAL MEDICINE

## 2025-02-06 PROCEDURE — 90935 HEMODIALYSIS ONE EVALUATION: CPT | Performed by: INTERNAL MEDICINE

## 2025-02-06 PROCEDURE — C1752 CATH,HEMODIALYSIS,SHORT-TERM: HCPCS

## 2025-02-06 PROCEDURE — 120N000001 HC R&B MED SURG/OB

## 2025-02-06 PROCEDURE — 80069 RENAL FUNCTION PANEL: CPT | Performed by: INTERNAL MEDICINE

## 2025-02-06 PROCEDURE — 76937 US GUIDE VASCULAR ACCESS: CPT

## 2025-02-06 PROCEDURE — 36415 COLL VENOUS BLD VENIPUNCTURE: CPT | Performed by: INTERNAL MEDICINE

## 2025-02-06 PROCEDURE — 85027 COMPLETE CBC AUTOMATED: CPT | Performed by: INTERNAL MEDICINE

## 2025-02-06 PROCEDURE — 258N000003 HC RX IP 258 OP 636: Performed by: INTERNAL MEDICINE

## 2025-02-06 PROCEDURE — C1769 GUIDE WIRE: HCPCS

## 2025-02-06 PROCEDURE — 90935 HEMODIALYSIS ONE EVALUATION: CPT

## 2025-02-06 PROCEDURE — C1894 INTRO/SHEATH, NON-LASER: HCPCS

## 2025-02-06 PROCEDURE — 93010 ELECTROCARDIOGRAM REPORT: CPT | Performed by: INTERNAL MEDICINE

## 2025-02-06 PROCEDURE — 93010 ELECTROCARDIOGRAM REPORT: CPT | Mod: XP | Performed by: INTERNAL MEDICINE

## 2025-02-06 PROCEDURE — 83735 ASSAY OF MAGNESIUM: CPT | Performed by: HOSPITALIST

## 2025-02-06 PROCEDURE — 250N000011 HC RX IP 250 OP 636: Performed by: INTERNAL MEDICINE

## 2025-02-06 PROCEDURE — 02H633Z INSERTION OF INFUSION DEVICE INTO RIGHT ATRIUM, PERCUTANEOUS APPROACH: ICD-10-PCS | Performed by: RADIOLOGY

## 2025-02-06 PROCEDURE — 36415 COLL VENOUS BLD VENIPUNCTURE: CPT | Performed by: HOSPITALIST

## 2025-02-06 PROCEDURE — 86481 TB AG RESPONSE T-CELL SUSP: CPT | Performed by: INTERNAL MEDICINE

## 2025-02-06 PROCEDURE — 86704 HEP B CORE ANTIBODY TOTAL: CPT | Performed by: INTERNAL MEDICINE

## 2025-02-06 PROCEDURE — 87340 HEPATITIS B SURFACE AG IA: CPT | Performed by: INTERNAL MEDICINE

## 2025-02-06 PROCEDURE — 250N000011 HC RX IP 250 OP 636: Mod: JZ | Performed by: NURSE PRACTITIONER

## 2025-02-06 PROCEDURE — 250N000013 HC RX MED GY IP 250 OP 250 PS 637: Performed by: INTERNAL MEDICINE

## 2025-02-06 PROCEDURE — 82310 ASSAY OF CALCIUM: CPT | Performed by: HOSPITALIST

## 2025-02-06 PROCEDURE — 99233 SBSQ HOSP IP/OBS HIGH 50: CPT | Performed by: INTERNAL MEDICINE

## 2025-02-06 PROCEDURE — 250N000011 HC RX IP 250 OP 636: Performed by: RADIOLOGY

## 2025-02-06 RX ORDER — NALOXONE HYDROCHLORIDE 0.4 MG/ML
0.2 INJECTION, SOLUTION INTRAMUSCULAR; INTRAVENOUS; SUBCUTANEOUS
Status: DISCONTINUED | OUTPATIENT
Start: 2025-02-06 | End: 2025-02-13 | Stop reason: HOSPADM

## 2025-02-06 RX ORDER — NALOXONE HYDROCHLORIDE 0.4 MG/ML
0.4 INJECTION, SOLUTION INTRAMUSCULAR; INTRAVENOUS; SUBCUTANEOUS
Status: DISCONTINUED | OUTPATIENT
Start: 2025-02-06 | End: 2025-02-13 | Stop reason: HOSPADM

## 2025-02-06 RX ORDER — HEPARIN SODIUM 1000 [USP'U]/ML
1-5 INJECTION, SOLUTION INTRAVENOUS; SUBCUTANEOUS ONCE
Status: COMPLETED | OUTPATIENT
Start: 2025-02-06 | End: 2025-02-06

## 2025-02-06 RX ORDER — NALOXONE HYDROCHLORIDE 0.4 MG/ML
0.2 INJECTION, SOLUTION INTRAMUSCULAR; INTRAVENOUS; SUBCUTANEOUS
Status: DISCONTINUED | OUTPATIENT
Start: 2025-02-06 | End: 2025-02-06

## 2025-02-06 RX ORDER — NALOXONE HYDROCHLORIDE 0.4 MG/ML
0.4 INJECTION, SOLUTION INTRAMUSCULAR; INTRAVENOUS; SUBCUTANEOUS
Status: DISCONTINUED | OUTPATIENT
Start: 2025-02-06 | End: 2025-02-06

## 2025-02-06 RX ORDER — FENTANYL CITRATE 50 UG/ML
25-50 INJECTION, SOLUTION INTRAMUSCULAR; INTRAVENOUS EVERY 5 MIN PRN
Status: DISCONTINUED | OUTPATIENT
Start: 2025-02-06 | End: 2025-02-06

## 2025-02-06 RX ORDER — CLINDAMYCIN PHOSPHATE 900 MG/50ML
900 INJECTION, SOLUTION INTRAVENOUS
Status: COMPLETED | OUTPATIENT
Start: 2025-02-06 | End: 2025-02-06

## 2025-02-06 RX ORDER — FLUMAZENIL 0.1 MG/ML
0.2 INJECTION, SOLUTION INTRAVENOUS
Status: DISCONTINUED | OUTPATIENT
Start: 2025-02-06 | End: 2025-02-06

## 2025-02-06 RX ADMIN — Medication 1 CAPSULE: at 14:19

## 2025-02-06 RX ADMIN — ONDANSETRON 4 MG: 2 INJECTION INTRAMUSCULAR; INTRAVENOUS at 21:35

## 2025-02-06 RX ADMIN — LIDOCAINE HYDROCHLORIDE 14 ML: 10 INJECTION, SOLUTION EPIDURAL; INFILTRATION; INTRACAUDAL; PERINEURAL at 08:50

## 2025-02-06 RX ADMIN — HEPARIN SODIUM 3200 UNITS: 1000 INJECTION INTRAVENOUS; SUBCUTANEOUS at 08:51

## 2025-02-06 RX ADMIN — SODIUM CHLORIDE: 9 INJECTION, SOLUTION INTRAVENOUS at 00:37

## 2025-02-06 RX ADMIN — ONDANSETRON 4 MG: 2 INJECTION INTRAMUSCULAR; INTRAVENOUS at 14:24

## 2025-02-06 RX ADMIN — CLINDAMYCIN PHOSPHATE 900 MG: 900 INJECTION, SOLUTION INTRAVENOUS at 08:30

## 2025-02-06 RX ADMIN — CEFTRIAXONE 1 G: 1 INJECTION, POWDER, FOR SOLUTION INTRAMUSCULAR; INTRAVENOUS at 18:11

## 2025-02-06 RX ADMIN — SODIUM CHLORIDE: 9 INJECTION, SOLUTION INTRAVENOUS at 12:31

## 2025-02-06 RX ADMIN — FENTANYL CITRATE 25 MCG: 50 INJECTION, SOLUTION INTRAMUSCULAR; INTRAVENOUS at 08:41

## 2025-02-06 RX ADMIN — MIDAZOLAM 1 MG: 1 INJECTION INTRAMUSCULAR; INTRAVENOUS at 08:42

## 2025-02-06 RX ADMIN — SODIUM CHLORIDE 500 ML: 9 INJECTION, SOLUTION INTRAVENOUS at 10:25

## 2025-02-06 RX ADMIN — ONDANSETRON 4 MG: 2 INJECTION INTRAMUSCULAR; INTRAVENOUS at 00:40

## 2025-02-06 RX ADMIN — HEPARIN SODIUM 1600 UNITS: 1000 INJECTION INTRAVENOUS; SUBCUTANEOUS at 10:27

## 2025-02-06 ASSESSMENT — ACTIVITIES OF DAILY LIVING (ADL)
ADLS_ACUITY_SCORE: 41
ADLS_ACUITY_SCORE: 58
ADLS_ACUITY_SCORE: 41
ADLS_ACUITY_SCORE: 58
ADLS_ACUITY_SCORE: 41
ADLS_ACUITY_SCORE: 41
DEPENDENT_IADLS:: INDEPENDENT
ADLS_ACUITY_SCORE: 58
ADLS_ACUITY_SCORE: 56
ADLS_ACUITY_SCORE: 58
ADLS_ACUITY_SCORE: 58
ADLS_ACUITY_SCORE: 41
ADLS_ACUITY_SCORE: 58
ADLS_ACUITY_SCORE: 41
ADLS_ACUITY_SCORE: 58
ADLS_ACUITY_SCORE: 41
ADLS_ACUITY_SCORE: 58
ADLS_ACUITY_SCORE: 41
ADLS_ACUITY_SCORE: 56
ADLS_ACUITY_SCORE: 41

## 2025-02-06 NOTE — DISCHARGE INSTRUCTIONS
Caring for Your Tunneled Dialysis Catheter  ____________________________________________    Patient Name: Dulce Ma  Today's Date: February 6, 2025    The radiologist who performed your procedure was:  Dr. Montiel    When you get home:  No driving or drinking alcohol until tomorrow. You may still have side effects from the medicine you received today. (You may feel drowsy, unsteady or forgetful.)  You should have an adult with you for your first six hours at home.  You may go back to your regular diet today.   If you take aspirin or Plavix, you may begin taking it again tomorrow. You may restart all other medicines today. Use pain medicine as directed.  Avoid heavy lifting or the excess use of your shoulder for three days.  Keep the neck bandage clean and dry for three days. Do not shower unless it is covered with plastic.  After three days you may use a Band-Aid on your neck. Keep using Band-Aids until the wound has healed.    What is a tunneled dialysis catheter?   This is a special tube (catheter) that is threaded (tunneled) under your skin and then placed in a vein near your collarbone. Some people have it for a short time, others have it for their entire lives.    We will use this tube to access your bloodstream for dialysis. After each treatment, your dialysis team will flush the tube with a drug called heparin. This will keep the tube from clotting. They will then change your bandage.    What does it look like?   The catheter is a Y-shaped tube. The two ends, called ports, are each covered with a cap. A clamp near each port helps prevent infection, bleeding and air in the vein.   The exit site (where the tube comes out of your skin) stays covered with a clear bandage.    The entrance site, or tunneling site, is higher up on the neck. It is covered with small pieces of tape called Steri-Strips.     How should I care for the catheter?  Prevent injury and infection:  Infection may occur if germs enter  your bloodstream around the exit or entrance sites. To prevent an infection:  Do not swim while you have the catheter.   You may shower if you first cover the bandage with plastic. You may take a bath if the water stays below your waist. (Sponge bathe your upper body to keep the bandage from getting wet.)  Take your temperature by mouth daily. If you have a fever over 100 F (37.8 C), call your doctor.  Check the skin around the tube each day for redness, swelling, drainage or tenderness. These are all signs of infection.  You will need to avoid heavy activity. Avoid contact sports.    If you have oozing or bleeding from the catheter site  Put direct pressure on the wound for 5 to 10 minutes with a gauze pad.  If you still have bleeding after 10 minutes, call your doctor.  If you are bleeding a lot and can't control it with direct pressure, call 911.    Check your catheter each day  Make sure the clamps are closed over both ends of the tubing. Check that the caps are tight.  If a cap is loose or comes off, you may have bleeding from the tube, or air could get into the bloodstream. To prevent this, make sure the clamp on the catheter is closed. Wrap the end of the tube in a clean gauze and call your doctor or dialysis unit at once.  Your catheter is not likely to fall out. It is sewn into your skin with stitches. (Stitches are removed or will fall out in a couple weeks.) Also, a small cuff under the skin helps to hold the catheter in place. If the catheter does fall out, put firm pressure on the exit site with a clean gauze. Then, call your doctor or dialysis unit at once.     When should I call my doctor or dialysis unit?  Call right away if:  Your temperature under the tongue is over 100 F (37.8 C).  The skin around the tube feels tender or you see drainage.  You have trouble breathing.  You have unusual chest or shoulder pain.   A cap comes off.  The catheter falls out.    [facility]  Department:  [facility] at [phone]      If you are deaf or hard of hearing, please let us know. We provide many free services including sign language interpreters, oral interpreters, TTYs, telephone amplifiers, note takers and written materials    Discharge instructions:   The CVC breaks or leaks:  1. Place the emergency clamp between the break and your skin.  2. If you don t have a clamp, fold or pinch off the tubing below the hole or  break in the line.  3. Call the nurse or clinic right away.  You have chest pain, shortness of breath, or feel faint:  1. Look for a hole or loose end cap.  2. Put the emergency clamp on the CVC as close to the skin as you can.  3. Remain calm and call 911. Then explain your symptoms and say that  you have a central line.  4. Lie on your left side.

## 2025-02-06 NOTE — IR NOTE
Procedure CVC Tunnel Placement  Versed 1 mg  Fentanyl 25 mcg   Sedation time 11 min  Other meds: 900 mg clindamycin  1% Lidocaine 14 ml  Fluoro time 0.4 min  AK 1 mGy

## 2025-02-06 NOTE — SEDATION DOCUMENTATION
Post Procedure Summary:  Prior to the start of the procedure and with procedural staff participation, I verbally confirmed the patient s identity using two indicators, relevant allergies, that the procedure was appropriate and matched the consent or emergent situation, and that the correct equipment/implants were available. Immediately prior to starting the procedure I conducted the Time Out with the procedural staff and re-confirmed the patient s name, procedure, and site/side. (The Joint Commission universal protocol was followed.)  Yes                                        Sedatives: Fentanyl and Midazolam (Versed)     Vital signs, airway and pulse oximetry were monitored and remained stable throughout the procedure and sedation was maintained until the procedure was complete.  The patient was monitored by staff until sedation discharge criteria were met.     Patient tolerance: Patient tolerated the procedure well with no immediate complications.     Time of sedation in minutes: 11 Minutes minutes from beginning to end of physician one to one monitoring.

## 2025-02-06 NOTE — PROGRESS NOTES
Pt is alert and orientated x4 Pleasant. Pt up SBA with IV pole to bathroom.   Pt on TELE SR with T depression 50-90's. NPO at Midnight. NS at 100 ml/hr.  Pt has a cough, did not want anything for it.  Pt has hx of multiple myeloma, end stage kidney disease, HTN, HLP.   Dialysis diet until midnight. Oncology consult in.  PIV infusing fluids.

## 2025-02-06 NOTE — PROGRESS NOTES
[facility]    ED Boarding Nurse Handoff Addendum Report:    Date/time: 2/6/2025, 6:24 AM    Activity Level: standby    Fall Risk: No    Active Infusions: NS @ 100    Vital signs (within last 30 minutes):    Vitals:    02/05/25 1700 02/05/25 1720 02/05/25 2010 02/06/25 0005   BP: 118/64 110/42 134/73 127/65   BP Location:   Right arm Right arm   Pulse: 86   93   Resp:   18 16   Temp:   98.3  F (36.8  C) 98.2  F (36.8  C)   TempSrc:   Oral Oral   SpO2: 96% 95%  94%   Weight:       Height:           Focused assessment within last 30 minutes:  A&Ox4. VSS on room air, afebrile. Denies pain. IV zofran given for intermittent nausea. NPO. Up SBA. Tele SR, bigeminy PAC's per tele tech.     ED Boarding Nurse name: Hanane Meyer RN

## 2025-02-06 NOTE — PROGRESS NOTES
"Oncology/Hematology Follow Up Note:    Assessment and Plan:  #1 AL amyloidosis  - With at least kidney involvement, may have cardiac involvement as well with elevated troponin and BNP  - She does NOT have multiple myeloma  - Started Darzalex, Cytoxan, Velcade and dexamethasone this past Monday 2/3    PLAN:  - Due for Darzalex, Cytoxan, Velcade and dex again next Monday.  Will coordinate with nephrology on dialysis schedule.  Very good chance of response (>90%), but unclear if her kidney function will get better with treatment.  - Will also get a non-urgent cardiac MRI, inpatient or outpatient, to evaluate for cardiac amyloidosis    #2 Anemia  - Due to ESRD and AL amyloidosis.    PLAN:  - WAYNE per nephrology    Malachi Brown M.D.  [facility]  [phone]    Subjective:    Patient was admitted to hospital to start dialysis.  Felt very tired when I visited this afternoon.  Was nauseous before dialysis but this has gotten better.      Labs:  All labs reviewed    CBC  Recent Labs   Lab 02/06/25  1037 02/05/25  1358   WBC 5.5 9.1   HGB 8.7* 10.0*   MCV 90 91    280       CMP  Recent Labs   Lab 02/06/25  1037 02/05/25  1358    134*   POTASSIUM 3.4 4.6   CHLORIDE 99 95*   CO2 23 18*   ANIONGAP 16* 21*   GLC 90 109*   BUN 56.5* 105.1*   CR 5.29* 10.28*   GFRESTIMATED 8* 4*   KELTON 8.2* 9.7   MAG  --  2.9*   PHOS 2.9 6.3*   PROTTOTAL  --  8.3   ALBUMIN 3.5 3.9   BILITOTAL  --  0.3   ALKPHOS  --  56   AST  --  20   ALT  --  12       INRNo lab results found in last 7 days.    Blood CultureNo results for input(s): \"CULT\" in the last 168 hours.      Malachi Brown MD  [facility]  2/6/2025 5:15 PM        "

## 2025-02-06 NOTE — PROGRESS NOTES
Potassium   Date Value Ref Range Status   02/05/2025 4.6 3.4 - 5.3 mmol/L Final   11/22/2021 4.0 3.4 - 5.3 mmol/L Final   05/28/2020 4.8 3.4 - 5.3 mmol/L Final     Hemoglobin   Date Value Ref Range Status   02/05/2025 10.0 (L) 11.7 - 15.7 g/dL Final   05/28/2020 12.8 11.7 - 15.7 g/dL Final     Creatinine   Date Value Ref Range Status   02/05/2025 10.28 (H) 0.51 - 0.95 mg/dL Final   05/28/2020 0.78 0.52 - 1.04 mg/dL Final     Urea Nitrogen   Date Value Ref Range Status   02/05/2025 105.1 (H) 8.0 - 23.0 mg/dL Final   11/22/2021 19 7 - 30 mg/dL Final   05/28/2020 23 7 - 30 mg/dL Final     Sodium   Date Value Ref Range Status   02/05/2025 134 (L) 135 - 145 mmol/L Final   05/28/2020 138 133 - 144 mmol/L Final     INR   Date Value Ref Range Status   01/13/2025 1.13 0.85 - 1.15 Final       DIALYSIS PROCEDURE NOTE  Hepatitis status of previous patient on machine log was checked and verified ok to use with this patients hepatitis status.  Patient dialyzed for 2 hrs. on a K2 bath with no fluid removal.  A BFR of 200 ml/min was obtained via a right tunneled CVC.  The treatment plan was discussed with Dr. Leiva during the treatment.    Total heparin received during the treatment: 0 units.     Line flushed, clamped and capped with heparin 1:1000 1.6 mL (1600 units) per lumen    Meds  given: None   Complications: None      Person educated: patient. Knowledge base substantial. Barriers to learning: none. Educated on procedure via verbal mode. Patient verbalized understanding.   ICEBOAT? Timeout performed pre-treatment  I: Patient was identified using 2 identifiers  C:  Consent Signed Yes  E: Equipment preventative maintenance is current and dialysis delivery system OK to use  B: Hepatitis B Surface Antigen: Negative; Draw Date: 01/09/2025      Hepatitis B Surface Antibody: Susceptible; Draw Date: 01/09/2025  O: Dialysis orders present and complete prior to treatment  A: Vascular access verified and assessed prior to treatment  T:  Treatment was performed at a clinically appropriate time  ?: Patient was allowed to ask questions and address concerns prior to treatment  See Adult Hemodialysis flowsheet in EPIC for further details and post assessment.  Machine water alarm in place and functioning. Transducer pods intact and checked every 15min.   Pt assisted with repositioning throughout dialysis treatment.  Pt returned via cart.  Chlorine/Chloramine water system checked every 4 hours.        Post treatment report given to CHANTELL Bravo RN regarding pt tolerated first HD treatment.       IKE Day RN

## 2025-02-06 NOTE — PROGRESS NOTES
Inpatient Dialysis Progress Note            Assessment and Plan:     Dulce Ma is a 64 year old female who was admitted on 2/5/2025.      1) CKD 5:  Due to amyloidosis due in turn to multiple myeloma.  Her CKD has advanced enough that she has uremic symptoms .  HD #1 today.  HD #2 tomorrow.        2) Multiple Myeloma:  She has started treatment under direction of Malachi Brown with Darzalex, Faspro, Velcade, Cytoxan and dexamethasone.       3) Anemia: Due to CKD 5 and MM     4) Hyponatremia - mild. Due to reduced free water clearance in CKD 5. HD will help.       5) Metabolic Acidosis: Due to CKD 5. HD will help.     6) Hyperphosphatemia:  Due to CKD 5.  HD will help.      Plan:     HD #1 today  HD #2 tomorrow.  Placement in outpt HD - discussed options.  She would like to stay with [facility] team so prefers [facility].          Interval History:     Feels much the same.  Metallic taste in mouth.  IV fluid overnight.  Nothing by mouth.  Still has some nausea.         Dialysis Parameters:     Wt Readings from Last 4 Encounters:   02/05/25 49.8 kg (109 lb 12.6 oz)   01/15/25 52.9 kg (116 lb 11.2 oz)   01/08/25 54 kg (119 lb)   01/08/25 54.1 kg (119 lb 3.2 oz)     No intake/output data recorded.  BP Readings from Last 3 Encounters:   02/06/25 117/61   01/15/25 138/69   01/08/25 133/52       Routine, ONE TIME, Starting today For 1 Occurrences  Weight Loss (kg): keep even  Dialysis Temp: 36.5  C  Access Device: CVC  Access Site: R IJ  Dialyzer: Revaclear  Dialysis Bath: K 2  Blood Flow Rate (mL/min): 200  Total Treatment Time (hrs): 2  Heparin: no         Medications and Allergies:   Reviewed in EPIC    Current Facility-Administered Medications   Medication Dose Route Frequency Provider Last Rate Last Admin    cefTRIAXone (ROCEPHIN) 1 g vial to attach to  mL bag for ADULTS or NS 50 mL bag for PEDS  1 g Intravenous Q24H Ra Elmore DO   Stopped at 02/06/25 0700    heparin 1000 unit/mL DIALYSIS  Cath LOCK - RED Lumen  1.3-2.6 mL Intracatheter Once in dialysis/CRRT Jj Leiva MD        multivitamin RENAL (TRIPHROCAPS) capsule 1 capsule  1 capsule Oral Daily Jj Leiva MD        No heparin via hemodialysis machine   Does not apply Once Jj Leiva MD        sodium chloride (PF) 0.9% PF flush 3 mL  3 mL Intracatheter Q8H Ra Elmore DO        sodium chloride (PF) 0.9% PF flush 9 mL  9 mL Intracatheter During Dialysis/CRRT (from stock) Jj Leiva MD        sodium chloride (PF) 0.9% PF flush 9 mL  9 mL Intracatheter During Dialysis/CRRT (from stock) Jj Leiva MD        sodium chloride 0.9% BOLUS 200 mL  200 mL Hemodialysis Machine Once Jj Leiva MD        sodium chloride 0.9% BOLUS 250 mL  250 mL Intravenous Once in dialysis/CRRT Jj Leiva MD         Current Facility-Administered Medications   Medication Dose Route Frequency Provider Last Rate Last Admin    acetaminophen (TYLENOL) tablet 650 mg  650 mg Oral Q4H PRN Ra Elmore DO        Or    acetaminophen (TYLENOL) Suppository 650 mg  650 mg Rectal Q4H PRN Ra Elmore DO        alteplase (CATHFLO ACTIVASE) injection 2 mg  2 mg Intracatheter Q1H PRN Jj Leiva MD        alteplase (CATHFLO ACTIVASE) injection 2 mg  2 mg Intracatheter Q1H PRN Jj Leiva MD        benzocaine-menthol (CHLORASEPTIC) 6-10 MG lozenge 1 lozenge  1 lozenge Buccal Q1H PRN Ra Elmore DO        bisacodyl (DULCOLAX) suppository 10 mg  10 mg Rectal Daily PRN Ra Elmore DO        calcium carbonate (TUMS) chewable tablet 1,000 mg  1,000 mg Oral 4x Daily PRN Ra Elmore DO        fentaNYL (PF) (SUBLIMAZE) injection 25-50 mcg  25-50 mcg Intravenous Q5 Min PRN Irene Martinez APRN CNP   25 mcg at 02/06/25 0841    flumazenil (ROMAZICON) injection 0.2 mg  0.2 mg Intravenous q1 min prn Gibranuh, Irene Ines, APRN CNP        guaiFENesin (ROBITUSSIN) 20 mg/mL solution 200 mg   200 mg Oral Q4H PRN Ra Elmore DO        lidocaine (LMX4) cream   Topical Q1H PRN Ra Elmore,         lidocaine 1 % 0.1-1 mL  0.1-1 mL Other Q1H PRN Ra Elmore,         LORazepam (ATIVAN) injection 0.5 mg  0.5 mg Intravenous Q6H PRN Ra Elmore,         melatonin tablet 5 mg  5 mg Oral At Bedtime PRN Ra Elmore, DO        midazolam (VERSED) injection 0.5-2 mg  0.5-2 mg Intravenous Q4 Min PRN [facility] CNP   1 mg at 02/06/25 0842    naloxone (NARCAN) injection 0.2 mg  0.2 mg Intravenous Q2 Min PRN [facility] CNP        Or    naloxone (NARCAN) injection 0.4 mg  0.4 mg Intravenous Q2 Min PRN [facility] CNP        Or    naloxone (NARCAN) injection 0.2 mg  0.2 mg Intramuscular Q2 Min PRN [facility] CNP        Or    naloxone (NARCAN) injection 0.4 mg  0.4 mg Intramuscular Q2 Min PRN [facility] CNP        ondansetron (ZOFRAN ODT) ODT tab 4 mg  4 mg Oral Q6H PRN Ra Elmore DO        Or    ondansetron (ZOFRAN) injection 4 mg  4 mg Intravenous Q6H PRN Ra Elmore,    4 mg at 02/06/25 0040    oxyCODONE (ROXICODONE) tablet 5 mg  5 mg Oral Q4H PRN aR Elmore DO        oxyCODONE IR (ROXICODONE) half-tab 2.5 mg  2.5 mg Oral Q4H PRN Ra Elmore DO        prochlorperazine (COMPAZINE) injection 10 mg  10 mg Intravenous Q6H PRN Ra Elmore,         Or    prochlorperazine (COMPAZINE) tablet 10 mg  10 mg Oral Q6H PRN Ra Elmore,         senna-docusate (SENOKOT-S/PERICOLACE) 8.6-50 MG per tablet 1 tablet  1 tablet Oral BID PRN Ra Elmore DO        Or    senna-docusate (SENOKOT-S/PERICOLACE) 8.6-50 MG per tablet 2 tablet  2 tablet Oral BID PRN Ra Elmore DO        sodium chloride (PF) 0.9% PF flush 10 mL  10 mL Intracatheter Q15 Min PRN Jj Leiva MD        sodium chloride (PF) 0.9% PF flush 10 mL   10 mL Intracatheter Q15 Min PRN Jj Leiva MD        sodium chloride (PF) 0.9% PF flush 3 mL  3 mL Intracatheter q1 min prn Ra Elmore DO        sodium chloride 0.9% BOLUS 100-150 mL  100-150 mL Intravenous Q15 Min PRN Jj Leiva MD            Allergies   Allergen Reactions    Benzoyl Peroxide      swelling    Ibuprofen      over long duration dev. hives    Amoxicillin Rash     Face; in early adulthood    Tolerated cephalexin 01/2025    Penicillins Rash     Face; in early adulthood.    Tolerated cephalexin 01/2025              Labs:     BMP  Recent Labs   Lab 02/05/25  1358   *   POTASSIUM 4.6   CHLORIDE 95*   KELTON 9.7   CO2 18*   .1*   CR 10.28*   *     CBC  Recent Labs   Lab 02/05/25  1358   WBC 9.1   HGB 10.0*   HCT 30.1*   MCV 91        Lab Results   Component Value Date    AST 20 02/05/2025    ALT 12 02/05/2025    GGT 15 01/08/2025    ALKPHOS 56 02/05/2025    BILITOTAL 0.3 02/05/2025            Physical Exam:   Vitals were reviewed in [facility]    Wt Readings from Last 3 Encounters:   02/05/25 49.8 kg (109 lb 12.6 oz)   01/15/25 52.9 kg (116 lb 11.2 oz)   01/08/25 54 kg (119 lb)     No intake or output data in the 24 hours ending 02/06/25 1032    GENERAL APPEARANCE: pleasant, no distress, a & o  HEENT:  Eyes/ears/nose grossly normal, neck supple  RESP: lungs clear to auscultation with good efforts, no crackles, rhonchi or wheezes  CV: regular rate and rhythm, normal S1 S2, no murmur, click or rub   ABDOMEN: soft, nontender, bowel sounds normal  EXTREMITIES/SKIN: no edema, no rashes or lesions     Pt seen on dialysis.  Stable run.  Good BFR.      Attestation:  I have reviewed today's vital signs, notes, medications, labs and imaging.     Jj Leiva MD  [facility] - Nephrology  [phone]

## 2025-02-06 NOTE — PROGRESS NOTES
Hospitalist Medicine Progress Note   [facility]       Dulce Ma is a *** year old ***         Date of Admission:  2/5/2025  Assessment & Plan   ***            Plan:   ***  Consult cardiology    Diet: Combination Diet Full Liquid; Renal Diet (dialysis)    DVT Prophylaxis: {DVT PROPHYLAXIS:875361}  Butler Catheter: Not present  Code Status: Full Code         Medically Ready for Discharge: {TIME; MEDICALLY READY FOR DISCHARGE:95619875}    Clinically Significant Risk Factors Present on Admission   { TIP  This section helps capture the illness of the patient on admission.     - Review diagnoses highlighted in blue; right click, edit & delete if not appropriate   - If blank, no additional diagnoses identified   :15627}      # Hyponatremia: Lowest Na = 134 mmol/L in last 2 days, will monitor as appropriate  # Hypochloremia: Lowest Cl = 95 mmol/L in last 2 days, will monitor as appropriate  # Hypocalcemia: Lowest Ca = 8.2 mg/dL in last 2 days, will monitor and replace as appropriate    # Anion Gap Metabolic Acidosis: Highest Anion Gap = 21 mmol/L in last 2 days, will monitor and treat as appropriate      # Hypertension: Noted on problem list      # Anemia: based on hgb <11                        The patient's care was discussed with the Patient and ***.    Francisco Correa MD  Hospitalist Service  [facility]    ______________________________________________________________________    Interval History     Symptoms   ***    Review of Systems:   ***     Data reviewed today: I reviewed all medications, new labs and imaging results over the last 24 hours.     Physical Exam   Vital Signs: Temp: 97.8  F (36.6  C) Temp src: Oral BP: 129/72 Pulse: 76   Resp: 16 SpO2: 96 % O2 Device: None (Room air) Oxygen Delivery: 2 LPM  Weight: 109 lbs 12.63 oz      GENERAL: Patient is not*** in acute distress  HEENT: ***EOM+,Conjunctiva is clear ***  NECK: *** no Jugular Venous distention  HEART: S1  S2 ***regular Rate and Rhythm,*** there is *** no murmur,   LUNGS: Respirations are *** not laboured, Lungs are *** clear to auscultation ***without Crepitations or Wheezing   ABDOMEN: Soft ***, there is no*** tenderness ***, Bowel Sounds are *** Positive   LOWER LIMBS: no *** Pedal Edema *** Bilaterally   CNS: *** Alert, *** Oriented x 3, Moving all the Four Limbs ***    Data   Recent Labs   Lab 02/06/25  1037 02/05/25  1358   WBC 5.5 9.1   HGB 8.7* 10.0*   MCV 90 91    280    134*   POTASSIUM 3.4 4.6   CHLORIDE 99 95*   CO2 23 18*   BUN 56.5* 105.1*   CR 5.29* 10.28*   ANIONGAP 16* 21*   KELTON 8.2* 9.7   GLC 90 109*   ALBUMIN 3.5 3.9   PROTTOTAL  --  8.3   BILITOTAL  --  0.3   ALKPHOS  --  56   ALT  --  12   AST  --  20         Recent Results (from the past 24 hours)   XR Chest Port 1 View    Narrative    EXAM: XR CHEST PORT 1 VIEW  LOCATION: [facility]  DATE: 2/5/2025    INDICATION: Cough  COMPARISON: PET/CT 1/30/2025 reviewed.      Impression    IMPRESSION: Bandlike scarring or atelectasis in the lung bases. Surgical clips both axillary regions. Chest otherwise negative. Lungs otherwise clear with no acute focal consolidation.   IR CVC Tunnel Placement > 5 Yrs of Age    Narrative    [facility]  LOCATION: [facility]  DATE: 2/6/2025    PROCEDURE:  TUNNELED DIALYSIS CATHETER PLACEMENT    INTERVENTIONAL RADIOLOGIST: Vlad Montiel MD    INDICATION: 64-year-old female with history of multiple myeloma now with end-stage renal disease. Tunneled dialysis catheter placement requested.    CONSENT: The risks, benefits and alternatives of the above procedures were discussed with the patient in detail. All questions were answered. Informed consent was given to proceed.    MODERATE SEDATION: Versed 1 mg IV; Fentanyl 25 mcg IV.  During the timeout, immediately prior to the administration of medications, the patient was reassessed for adequacy to receive conscious  sedation. Under physician supervision, Versed and fentanyl   were administered for moderate sedation. Pulse oximetry, heart rate and blood pressure were continuously monitored by an independent trained observer. The physician spent 15 minutes of face-to-face sedation time with the patient.    CONTRAST: None.  ANTIBIOTICS: 900 mg clindamycin IV.  ADDITIONAL MEDICATIONS: None.    FLUOROSCOPIC TIME: 0.4 minutes.  RADIATION DOSE: Air Kerma: 1 mGy.    COMPLICATIONS: No immediate complications.    STERILE BARRIER TECHNIQUE: Maximum sterile barrier technique was used. Cutaneous antisepsis was performed at the operative site with application of 2% chlorhexidine and large sterile drape. Prior to the procedure, the  and assistant performed   hand hygiene and wore hat, mask, sterile gown, and sterile gloves during the entire procedure.    PROCEDURE:     1% lidocaine was used for local anesthesia. Using real-time ultrasound guidance, the right internal jugular vein was accessed. A permanent ultrasound image was saved, documenting patency and compressibility. A subcutaneous tunnel was created requiring a   second incision. Using this access, a 14.5 Turkmen, 19 cm tip to cuff palindrome dialysis catheter was advanced until the tip was in the upper right atrium. The catheter was tested and found to flush and aspirate appropriately. The catheter was   heparinized and secured to the skin.    FINDINGS:  1. Ultrasound shows an anechoic and compressible right internal jugular vein.  2. Completion fluoroscopic image demonstrates the tunneled dialysis catheter tip lies in the upper right atrium.      Impression    IMPRESSION:    Tunneled dialysis catheter placement, ready for immediate use.        None.    FLUOROSCOPIC TIME: 0.4 minutes.  RADIATION DOSE: Air Kerma: 1 mGy.    COMPLICATIONS: No immediate complications.    STERILE BARRIER TECHNIQUE: Maximum sterile barrier technique was used. Cutaneous antisepsis was performed at the operative site with application of 2% chlorhexidine and large sterile drape. Prior to the procedure, the  and assistant performed   hand hygiene and wore hat, mask, sterile gown, and sterile gloves during the entire procedure.    PROCEDURE:     1% lidocaine was used for local anesthesia. Using real-time ultrasound guidance, the right internal jugular vein was accessed. A permanent ultrasound image was saved, documenting patency and compressibility. A subcutaneous tunnel was created requiring a   second incision. Using this access, a 14.5 English, 19 cm tip to cuff palindrome dialysis catheter was advanced until the tip was in the upper right atrium. The catheter was tested and found to flush and aspirate appropriately. The catheter was   heparinized and secured to the skin.    FINDINGS:  1. Ultrasound shows an anechoic and compressible right internal jugular vein.  2. Completion fluoroscopic image demonstrates the tunneled dialysis catheter tip lies in the upper right atrium.      Impression    IMPRESSION:    Tunneled dialysis catheter placement, ready for immediate use.

## 2025-02-06 NOTE — CONSULTS
Patient is on IR schedule today Thursday 2/6/25 for a Tunneled dialysis catheter placement with IV moderate sedation.     Dulce Ma is a 64 year old female admitted on 2/5/2025. She has a past medical history significant for multiple myeloma, end-stage kidney disease, hypertension, hyperlipidemia.  She presented to emergency room with profuse nausea and vomiting after recently starting chemotherapy.  Found to have worsening of end-stage kidney disease.    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 Her CKD has advanced enough that she has uremic symptoms . She needs to start dialysis     Consult for dialysis catheter placement requested.     -Labs WNL for procedure.    -Orders for NPO and antibiotics have been entered.   -Consent will be done prior to procedure.     Temp:  [97.4  F (36.3  C)-98.3  F (36.8  C)] 98.2  F (36.8  C)  Pulse:  [82-94] 93  Resp:  [14-18] 16  BP: (110-158)/(42-82) 127/65  SpO2:  [94 %-99 %] 94 %    ROUTINE ICU LABS (Last four results)  CMP  Recent Labs   Lab 02/05/25  1358   *   POTASSIUM 4.6   CHLORIDE 95*   CO2 18*   ANIONGAP 21*   *   .1*   CR 10.28*   GFRESTIMATED 4*   KELTON 9.7   MAG 2.9*   PHOS 6.3*   PROTTOTAL 8.3   ALBUMIN 3.9   BILITOTAL 0.3   ALKPHOS 56   AST 20   ALT 12     CBC  Recent Labs   Lab 02/05/25  1358   WBC 9.1   RBC 3.32*   HGB 10.0*   HCT 30.1*   MCV 91   MCH 30.1   MCHC 33.2   RDW 13.0        INRNo lab results found in last 7 days.  Arterial Blood GasNo lab results found in last 7 days.    Past Medical History:   Diagnosis Date    Anesthesia complication, initial encounter     was completely out with Midazolam 2 mg IV, Fentanyl 100 micrograms IV that was given at time of colonoscopy 7/2018     Dysplastic nevi     Glaucoma     Diagnosed in Spring 2010    Hypertension goal BP (blood pressure) < 140/90 at age 50     very strong family hx     Lumbago     stiffness in low back    Other malignant neoplasm of  skin of trunk, except scrotum 2/02    Dr. Myles Lake, melanoma with negative sentinel node biopsy - no chemo-Dr. Selene Gonzales-derm    Perimenopausal     Routine gyne exam     [facility] - Dr. Isabela Perez     Skin cancer, basal cell     multiple - 3 on nose, treated with interferon intralesionally x 1     Unspecified derangement, shoulder region     s/p horse-riding injury- no problem now    Unspecified musculoskeletal disorders and symptoms referable to neck     compressed vertebrae in neck- bothers pt rarely        Allergies   Allergen Reactions    Benzoyl Peroxide      swelling    Ibuprofen      over long duration dev. hives    Amoxicillin Rash     Face; in early adulthood    Tolerated cephalexin 01/2025    Penicillins Rash     Face; in early adulthood.    Tolerated cephalexin 01/2025     Please contact the IR department at [phone] for procedural related questions.     Total time: 20 minutes    Thanks, Sonal [facility] CNP ([phone]) (phone [phone])

## 2025-02-06 NOTE — PROCEDURES
[facility]    Procedure: Right chest tunneled HD catheter placement    Date/Time: 2/6/2025 8:55 AM    Performed by: Vlad Montiel MD  Authorized by: Vlad Montiel MD  IR Fellow Physician:    Pre Procedure Diagnosis: ESRD  Post Procedure Diagnosis: ESRD    UNIVERSAL PROTOCOL   Site Marked: NA  Prior Images Obtained and Reviewed:  Yes  Required items: Required blood products, implants, devices and special equipment available    Patient identity confirmed:  Arm band, provided demographic data, hospital-assigned identification number and verbally with patient  Patient was reevaluated immediately before administering moderate or deep sedation or anesthesia  Confirmation Checklist:  Correct equipment/implants were available, procedure was appropriate and matched the consent or emergent situation, relevant allergies and patient's identity using two indicators  Time out: Immediately prior to the procedure a time out was called    Universal Protocol: the Joint Commission Universal Protocol was followed    Preparation: Patient was prepped and draped in usual sterile fashion       ANESTHESIA    Anesthesia:  Local infiltration  Local Anesthetic:  Lidocaine 1% without epinephrine      SEDATION  Patient Sedated: Yes    Sedation Type:  Moderate (conscious) sedation  Sedation:  Fentanyl and midazolam  Vital signs: Vital signs monitored during sedation    See dictated procedure note for full details.  Findings: Patient tolerated the procedure without issue.    Specimens: none    Procedural Complications: None    Condition: Stable    Plan: HD catheter ready for immediate use.      PROCEDURE    Patient Tolerance:  Patient tolerated the procedure well with no immediate complications  Length of time physician/provider present for 1:1 monitoring during sedation:  0-22 min

## 2025-02-06 NOTE — PRE-PROCEDURE
GENERAL PRE-PROCEDURE:   Procedure:  Tunneled HD catheter placement    Written consent obtained?: Yes    Risks and benefits: Risks, benefits and alternatives were discussed    Consent given by:  Patient  Patient states understanding of procedure being performed: Yes    Patient's understanding of procedure matches consent: Yes    Procedure consent matches procedure scheduled: Yes    Expected level of sedation:  Moderate  Appropriately NPO:  Yes  ASA Class:  2  Mallampati  :  Grade 2- soft palate, base of uvula, tonsillar pillars, and portion of posterior pharyngeal wall visible  Lungs:  Lungs clear with good breath sounds bilaterally  Heart:  Normal heart sounds and rate  History & Physical reviewed:  History and physical reviewed and no updates needed  Statement of review:  I have reviewed the lab findings, diagnostic data, medications, and the plan for sedation

## 2025-02-06 NOTE — CONSULTS
Care Management Initial Consult    General Information  Assessment completed with: Patient,    Type of CM/SW Visit: Initial Assessment    Primary Care Provider verified and updated as needed: Yes   Readmission within the last 30 days: current reason for admission unrelated to previous admission      Reason for Consult: care coordination/care conference, discharge planning  Advance Care Planning: Advance Care Planning Reviewed: no concerns identified        Communication Assessment  Patient's communication style: spoken language (English or Bilingual)    Hearing Difficulty or Deaf: no   Wear Glasses or Blind: yes    Cognitive  Cognitive/Neuro/Behavioral: WDL                      Living Environment:   People in home: parent(s)     Current living Arrangements: house      Able to return to prior arrangements: yes     Family/Social Support:  Care provided by: self  Provides care for: no one  Marital Status: Single  Support system: Parent(s)            Current Resources:   Patient receiving home care services: No      Community Resources: None  Equipment currently used at home: none  Supplies currently used at home:      Employment/Financial:  Employment Status:          Does the patient's insurance plan have a 3 day qualifying hospital stay waiver?  Yes     Which insurance plan 3 day waiver is available? Alternative insurance waiver    Will the waiver be used for post-acute placement? No    Lifestyle & Psychosocial Needs:  Social Drivers of Health     Food Insecurity: Low Risk  (2/6/2025)    Food Insecurity     Within the past 12 months, did you worry that your food would run out before you got money to buy more?: No     Within the past 12 months, did the food you bought just not last and you didn t have money to get more?: No   Depression: Not at risk (1/8/2025)    PHQ-2     PHQ-2 Score: 2   Housing Stability: Low Risk  (2/6/2025)    Housing Stability     Do you have housing? : Yes     Are you worried about losing your  housing?: No   Tobacco Use: Low Risk  (2/5/2025)    Received from [facility]    Patient History     Smoking Tobacco Use: Never     Smokeless Tobacco Use: Never     Passive Exposure: Never   Financial Resource Strain: Low Risk  (2/6/2025)    Financial Resource Strain     Within the past 12 months, have you or your family members you live with been unable to get utilities (heat, electricity) when it was really needed?: No   Alcohol Use: Not on file   Transportation Needs: Low Risk  (2/6/2025)    Transportation Needs     Within the past 12 months, has lack of transportation kept you from medical appointments, getting your medicines, non-medical meetings or appointments, work, or from getting things that you need?: No   Physical Activity: Insufficiently Active (10/17/2024)    Exercise Vital Sign     Days of Exercise per Week: 4 days     Minutes of Exercise per Session: 30 min   Interpersonal Safety: Low Risk  (2/6/2025)    Interpersonal Safety     Do you feel physically and emotionally safe where you currently live?: Yes     Within the past 12 months, have you been hit, slapped, kicked or otherwise physically hurt by someone?: No     Within the past 12 months, have you been humiliated or emotionally abused in other ways by your partner or ex-partner?: No   Stress: Stress Concern Present (10/17/2024)    [employer] - Occupational Stress Questionnaire     Feeling of Stress : To some extent   Social Connections: Unknown (10/17/2024)    Social Connection and Isolation Panel [NHANES]     Frequency of Communication with Friends and Family: Not on file     Frequency of Social Gatherings with Friends and Family: Once a week     Attends Buddhist Services: Not on file     Active Member of Clubs or Organizations: Not on file     Attends Club or Organization Meetings: Not on file     Marital Status: Not on file   Health Literacy: Not on file       Functional Status:  Prior to admission  patient needed assistance:   Dependent ADLs:: Independent  Dependent IADLs:: Independent     Mental Health Status:  Mental Health Status: No Current Concerns       Values/Beliefs:  Spiritual, Cultural Beliefs, Voodoo Practices, Values that affect care: no            Discussed  Partnership in Safe Discharge Planning  document with patient/family: No    Additional Information:  Care management consult for discharge planning/disposition. Patient also has an elevated unplanned rehospitalization risk score. Patient admitted with end-stage kidney disease, multiple myeloma, nausea and vomiting, UTI. Patient will need outpatient hemodialysis.     Met with patient at bedside. She would like the [facility] location for [facility]. She was uncertain what days would be better as she stated they would have to work around chemotherapy. She thinks at this time chemo is on Mondays but unsure if that will be the case. Will request T,Th,Sa schedule and she prefers 2nd shift.     Filled out [facility] Checklist and faxed along with patient records to [facility].    Care management to follow for discharge planning/coordination of dialysis.     Next Steps: follow up with Trav Avendaño RN  Care Coordinator  [facility]

## 2025-02-07 ENCOUNTER — APPOINTMENT (OUTPATIENT)
Dept: OCCUPATIONAL THERAPY | Facility: CLINIC | Age: 65
End: 2025-02-07
Attending: INTERNAL MEDICINE
Payer: COMMERCIAL

## 2025-02-07 LAB
ALBUMIN SERPL BCG-MCNC: 2.8 G/DL (ref 3.5–5.2)
ANION GAP SERPL CALCULATED.3IONS-SCNC: 17 MMOL/L (ref 7–15)
ATRIAL RATE - MUSE: 94 BPM
BACTERIA UR CULT: ABNORMAL
BACTERIA UR CULT: ABNORMAL
BUN SERPL-MCNC: 58.9 MG/DL (ref 8–23)
CALCIUM SERPL-MCNC: 8.1 MG/DL (ref 8.8–10.4)
CHLORIDE SERPL-SCNC: 100 MMOL/L (ref 98–107)
CREAT SERPL-MCNC: 6.59 MG/DL (ref 0.51–0.95)
DIASTOLIC BLOOD PRESSURE - MUSE: NORMAL MMHG
EGFRCR SERPLBLD CKD-EPI 2021: 7 ML/MIN/1.73M2
GAMMA INTERFERON BACKGROUND BLD IA-ACNC: 0.05 IU/ML
GLUCOSE SERPL-MCNC: 83 MG/DL (ref 70–99)
HCO3 SERPL-SCNC: 20 MMOL/L (ref 22–29)
INTERPRETATION ECG - MUSE: NORMAL
M TB IFN-G BLD-IMP: ABNORMAL
M TB IFN-G CD4+ BCKGRND COR BLD-ACNC: 0.45 IU/ML
MITOGEN IGNF BCKGRD COR BLD-ACNC: 0 IU/ML
MITOGEN IGNF BCKGRD COR BLD-ACNC: 0 IU/ML
P AXIS - MUSE: 56 DEGREES
PHOSPHATE SERPL-MCNC: 5.6 MG/DL (ref 2.5–4.5)
POTASSIUM SERPL-SCNC: 4 MMOL/L (ref 3.4–5.3)
PR INTERVAL - MUSE: 142 MS
QRS DURATION - MUSE: 84 MS
QT - MUSE: 378 MS
QTC - MUSE: 472 MS
QUANTIFERON MITOGEN: 0.5 IU/ML
QUANTIFERON NIL TUBE: 0.05 IU/ML
QUANTIFERON TB1 TUBE: 0.05 IU/ML
QUANTIFERON TB2 TUBE: 0.05
R AXIS - MUSE: 52 DEGREES
SODIUM SERPL-SCNC: 137 MMOL/L (ref 135–145)
SYSTOLIC BLOOD PRESSURE - MUSE: NORMAL MMHG
T AXIS - MUSE: -84 DEGREES
VENTRICULAR RATE- MUSE: 94 BPM

## 2025-02-07 PROCEDURE — 97535 SELF CARE MNGMENT TRAINING: CPT | Mod: GO | Performed by: OCCUPATIONAL THERAPIST

## 2025-02-07 PROCEDURE — 90935 HEMODIALYSIS ONE EVALUATION: CPT | Performed by: INTERNAL MEDICINE

## 2025-02-07 PROCEDURE — 258N000003 HC RX IP 258 OP 636: Performed by: INTERNAL MEDICINE

## 2025-02-07 PROCEDURE — 99232 SBSQ HOSP IP/OBS MODERATE 35: CPT | Performed by: INTERNAL MEDICINE

## 2025-02-07 PROCEDURE — 36415 COLL VENOUS BLD VENIPUNCTURE: CPT | Performed by: INTERNAL MEDICINE

## 2025-02-07 PROCEDURE — 90937 HEMODIALYSIS REPEATED EVAL: CPT

## 2025-02-07 PROCEDURE — 82310 ASSAY OF CALCIUM: CPT | Performed by: INTERNAL MEDICINE

## 2025-02-07 PROCEDURE — 250N000013 HC RX MED GY IP 250 OP 250 PS 637: Performed by: INTERNAL MEDICINE

## 2025-02-07 PROCEDURE — 250N000011 HC RX IP 250 OP 636: Performed by: INTERNAL MEDICINE

## 2025-02-07 PROCEDURE — 120N000001 HC R&B MED SURG/OB

## 2025-02-07 PROCEDURE — 99223 1ST HOSP IP/OBS HIGH 75: CPT | Performed by: INTERNAL MEDICINE

## 2025-02-07 PROCEDURE — 97166 OT EVAL MOD COMPLEX 45 MIN: CPT | Mod: GO | Performed by: OCCUPATIONAL THERAPIST

## 2025-02-07 PROCEDURE — 82374 ASSAY BLOOD CARBON DIOXIDE: CPT | Performed by: INTERNAL MEDICINE

## 2025-02-07 RX ORDER — AMIODARONE HYDROCHLORIDE 200 MG/1
200 TABLET ORAL 2 TIMES DAILY
Status: COMPLETED | OUTPATIENT
Start: 2025-02-11 | End: 2025-02-12

## 2025-02-07 RX ORDER — AMIODARONE HYDROCHLORIDE 200 MG/1
200 TABLET ORAL DAILY
Status: DISCONTINUED | OUTPATIENT
Start: 2025-02-13 | End: 2025-02-13 | Stop reason: HOSPADM

## 2025-02-07 RX ORDER — AMIODARONE HYDROCHLORIDE 200 MG/1
400 TABLET ORAL 2 TIMES DAILY
Status: COMPLETED | OUTPATIENT
Start: 2025-02-07 | End: 2025-02-10

## 2025-02-07 RX ADMIN — AMIODARONE HYDROCHLORIDE 400 MG: 200 TABLET ORAL at 21:14

## 2025-02-07 RX ADMIN — HEPARIN SODIUM 1600 UNITS: 1000 INJECTION INTRAVENOUS; SUBCUTANEOUS at 13:21

## 2025-02-07 RX ADMIN — PROCHLORPERAZINE EDISYLATE 10 MG: 5 INJECTION INTRAMUSCULAR; INTRAVENOUS at 18:17

## 2025-02-07 RX ADMIN — SODIUM CHLORIDE 250 ML: 9 INJECTION, SOLUTION INTRAVENOUS at 10:50

## 2025-02-07 RX ADMIN — Medication: at 12:23

## 2025-02-07 RX ADMIN — AMIODARONE HYDROCHLORIDE 400 MG: 200 TABLET ORAL at 14:33

## 2025-02-07 RX ADMIN — ONDANSETRON 4 MG: 2 INJECTION INTRAMUSCULAR; INTRAVENOUS at 17:10

## 2025-02-07 RX ADMIN — CEFTRIAXONE 1 G: 1 INJECTION, POWDER, FOR SOLUTION INTRAMUSCULAR; INTRAVENOUS at 17:10

## 2025-02-07 RX ADMIN — LORAZEPAM 0.5 MG: 2 INJECTION INTRAMUSCULAR; INTRAVENOUS at 19:30

## 2025-02-07 RX ADMIN — SODIUM CHLORIDE 200 ML: 9 INJECTION, SOLUTION INTRAVENOUS at 10:00

## 2025-02-07 RX ADMIN — SODIUM CHLORIDE: 9 INJECTION, SOLUTION INTRAVENOUS at 09:13

## 2025-02-07 RX ADMIN — HEPARIN SODIUM 1600 UNITS: 1000 INJECTION INTRAVENOUS; SUBCUTANEOUS at 13:22

## 2025-02-07 RX ADMIN — ONDANSETRON 4 MG: 2 INJECTION INTRAMUSCULAR; INTRAVENOUS at 11:02

## 2025-02-07 RX ADMIN — Medication 1 CAPSULE: at 09:14

## 2025-02-07 ASSESSMENT — ACTIVITIES OF DAILY LIVING (ADL)
ADLS_ACUITY_SCORE: 41
PREVIOUS_RESPONSIBILITIES: MEAL PREP;HOUSEKEEPING;LAUNDRY;SHOPPING;MEDICATION MANAGEMENT;FINANCES;DRIVING
ADLS_ACUITY_SCORE: 41

## 2025-02-07 NOTE — PLAN OF CARE
"Goal Outcome Evaluation:      Plan of Care Reviewed With: patient    Overall Patient Progress: improvingOverall Patient Progress: improving    Outcome Evaluation: Patient c/o discomfort where CVC was placed, declined tylenol. IV zofran x1 for nausea. Tolerated some broth. Tele - SR ST depression. Tele tech called to report possible third degree heart block with HR into the 40s. Provider notified. ECG - SR ST abnormality. Cardiology consult.    /72 (BP Location: Right arm)   Pulse 76   Temp 97.8  F (36.6  C) (Oral)   Resp 16   Ht 1.6 m (5' 3\")   Wt 49.8 kg (109 lb 12.6 oz)   LMP 10/31/2011   SpO2 96%   BMI 19.45 kg/m       Problem: Adult Inpatient Plan of Care  Goal: Plan of Care Review  Description: The Plan of Care Review/Shift note should be completed every shift.  The Outcome Evaluation is a brief statement about your assessment that the patient is improving, declining, or no change.  This information will be displayed automatically on your shift  note.  Outcome: Progressing  Flowsheets (Taken 2/6/2025 1820)  Outcome Evaluation: Patient c/o discomfort where CVC was placed, declined tylenol. IV zofran x1 for nausea. Tolerated some broth. Tele - SR ST depression. Tele tech called to report possible third degree heart block with HR into the 40s. Provider notified. ECG - SR ST abnormality. Cardiology consult.  Plan of Care Reviewed With: patient  Overall Patient Progress: improving  Goal: Patient-Specific Goal (Individualized)  Description: You can add care plan individualizations to a care plan. Examples of Individualization might be:  \"Parent requests to be called daily at 9am for status\", \"I have a hard time hearing out of my right ear\", or \"Do not touch me to wake me up as it startles  me\".  Outcome: Progressing  Goal: Absence of Hospital-Acquired Illness or Injury  Outcome: Progressing  Intervention: Identify and Manage Fall Risk  Recent Flowsheet Documentation  Taken 2/6/2025 1200 by Shelly" CAITLIN Mtz  Safety Promotion/Fall Prevention: safety round/check completed  Intervention: Prevent Skin Injury  Recent Flowsheet Documentation  Taken 2/6/2025 1200 by Bibi Bravo RN  Body Position: position changed independently  Intervention: Prevent and Manage VTE (Venous Thromboembolism) Risk  Recent Flowsheet Documentation  Taken 2/6/2025 1200 by Bibi Bravo RN  VTE Prevention/Management: SCDs off (sequential compression devices)  Goal: Optimal Comfort and Wellbeing  Outcome: Progressing  Intervention: Monitor Pain and Promote Comfort  Recent Flowsheet Documentation  Taken 2/6/2025 1555 by Bibi Bravo RN  Pain Management Interventions: declines  Taken 2/6/2025 1200 by Bibi Bravo RN  Pain Management Interventions: declines  Goal: Readiness for Transition of Care  Outcome: Progressing

## 2025-02-07 NOTE — PROGRESS NOTES
Potassium   Date Value Ref Range Status   02/07/2025 4.0 3.4 - 5.3 mmol/L Final   11/22/2021 4.0 3.4 - 5.3 mmol/L Final   05/28/2020 4.8 3.4 - 5.3 mmol/L Final     Hemoglobin   Date Value Ref Range Status   02/06/2025 8.7 (L) 11.7 - 15.7 g/dL Final   05/28/2020 12.8 11.7 - 15.7 g/dL Final     Creatinine   Date Value Ref Range Status   02/07/2025 6.59 (H) 0.51 - 0.95 mg/dL Final   05/28/2020 0.78 0.52 - 1.04 mg/dL Final     Urea Nitrogen   Date Value Ref Range Status   02/07/2025 58.9 (H) 8.0 - 23.0 mg/dL Final   11/22/2021 19 7 - 30 mg/dL Final   05/28/2020 23 7 - 30 mg/dL Final     Sodium   Date Value Ref Range Status   02/07/2025 137 135 - 145 mmol/L Final   05/28/2020 138 133 - 144 mmol/L Final     INR   Date Value Ref Range Status   01/13/2025 1.13 0.85 - 1.15 Final       DIALYSIS PROCEDURE NOTE  Hepatitis status of previous patient on machine log was checked and verified ok to use with this patients hepatitis status.  Patient dialyzed for 2.5 hrs. on a K3 bath with a net fluid removal of  0L.  A BFR of 250 ml/min was obtained via a tunneled R CVC.      The treatment plan was discussed with Dr. Leiva during the treatment.    Total heparin received during the treatment: 0 units.   Line flushed, clamped and capped with heparin 1:1000 1.6 mL (1600 units) per lumen    Meds  given: none   Complications: none      Person educated: patient. Knowledge base limited. Barriers to learning: none. Educated on procedure via verbal mode. Patient verbalized understanding.   ICEBOAT? Timeout performed pre-treatment  I: Patient was identified using 2 identifiers  C:  Consent Signed Yes  E: Equipment preventative maintenance is current and dialysis delivery system OK to use  B:    Latest Reference Range & Units 02/06/25 10:37 02/06/25 14:12   Hep B Surface Agn Nonreactive   Nonreactive   Hepatitis B Core Betzaida Nonreactive   Nonreactive   Hepatitis B Core IgM Nonreactive  Nonreactive      O: Dialysis orders present and complete  prior to treatment  A: Vascular access verified and assessed prior to treatment  T: Treatment was performed at a clinically appropriate time  ?: Patient was allowed to ask questions and address concerns prior to treatment  See Adult Hemodialysis flowsheet in EPIC for further details and post assessment.  Machine water alarm in place and functioning. Transducer pods intact and checked every 15min.   Pt assisted with repositioning throughout dialysis treatment.  Pt returned via wheelchair.  Chlorine/Chloramine water system checked every 4 hours.  Outpatient Dialysis at D    Post treatment report given to CAITLIN Maldonado regarding 0 L of fluid removed, last /71.    Sujatha Butler RN

## 2025-02-07 NOTE — PLAN OF CARE
"Patient is A&Ox4 VSS, on tele - SR with ST depression. Up SBA with IV pole. Voice is very weak and soft. Had HD run today with no fluid taken off. Another run planned for tomorrow. Full liquid diet. Intermittently nauseous, gave zofran PRN. Cardiology following, started on amiodarone BID. NS running at 50mL/hr. Poor appetite. On rocephin Q24 for UTI    Goal Outcome Evaluation:      Plan of Care Reviewed With: patient    Overall Patient Progress: no changeOverall Patient Progress: no change    Outcome Evaluation: Tele - SR with ST depression, zofran for nausea, HD run, cardiology following, started amiodarone      Problem: Adult Inpatient Plan of Care  Goal: Plan of Care Review  Description: The Plan of Care Review/Shift note should be completed every shift.  The Outcome Evaluation is a brief statement about your assessment that the patient is improving, declining, or no change.  This information will be displayed automatically on your shift  note.  Outcome: Progressing  Flowsheets (Taken 2/7/2025 1631)  Outcome Evaluation: Tele - SR with ST depression, zofran for nausea, HD run, cardiology following, started amiodarone  Plan of Care Reviewed With: patient  Overall Patient Progress: no change  Goal: Patient-Specific Goal (Individualized)  Description: You can add care plan individualizations to a care plan. Examples of Individualization might be:  \"Parent requests to be called daily at 9am for status\", \"I have a hard time hearing out of my right ear\", or \"Do not touch me to wake me up as it startles  me\".  Outcome: Progressing  Goal: Absence of Hospital-Acquired Illness or Injury  Outcome: Progressing  Intervention: Identify and Manage Fall Risk  Recent Flowsheet Documentation  Taken 2/7/2025 0915 by Rosie Rockwell, RN  Safety Promotion/Fall Prevention:   safety round/check completed   activity supervised   clutter free environment maintained   lighting adjusted   nonskid shoes/slippers when out of " bed  Intervention: Prevent Skin Injury  Recent Flowsheet Documentation  Taken 2/7/2025 0915 by Rosie Rockwell, RN  Body Position: position changed independently  Intervention: Prevent and Manage VTE (Venous Thromboembolism) Risk  Recent Flowsheet Documentation  Taken 2/7/2025 0915 by Rosie Rockwell, RN  VTE Prevention/Management: SCDs off (sequential compression devices)  Goal: Optimal Comfort and Wellbeing  Outcome: Progressing  Goal: Readiness for Transition of Care  Outcome: Progressing     Problem: Nausea and Vomiting  Goal: Nausea and Vomiting Relief  Outcome: Progressing

## 2025-02-07 NOTE — PROGRESS NOTES
02/07/25 1520   Appointment Info   Signing Clinician's Name / Credentials (OT) Erendira Loyola OTR/L   Living Environment   People in Home parent(s)   Current Living Arrangements house   Home Accessibility stairs to enter home;stairs within home   Number of Stairs, Main Entrance 5   Stair Railings, Main Entrance railings on both sides of stairs   Number of Stairs, Within Home, Primary greater than 10 stairs   Transportation Anticipated car, drives self;family or friend will provide   Living Environment Comments pt lives with mom, mom can not assist physically   Self-Care   Usual Activity Tolerance good   Current Activity Tolerance fair   Equipment Currently Used at Home none  (mom has a shower chair pt can use)   Fall history within last six months no   Activity/Exercise/Self-Care Comment pt reports indep with I/ADLs and mobility without AD; pt has horses in the barn on the yard, a friend is taking care of them currently; pt could walk to the barn and back a week ago   Instrumental Activities of Daily Living (IADL)   Previous Responsibilities meal prep;housekeeping;laundry;shopping;medication management;finances;driving   IADL Comments pt and her mom share home chores; friends assist with driving intermittently   General Information   Onset of Illness/Injury or Date of Surgery 02/05/25   Referring Physician Ra Elmore, DO   Patient/Family Therapy Goal Statement (OT) home   Additional Occupational Profile Info/Pertinent History of Current Problem Per chart: Dulce Ma is a 64 year old female admitted on 2/5/2025. She has a past medical history significant for multiple myeloma, end-stage kidney disease, hypertension, hyperlipidemia.  She presented to emergency room with profuse nausea and vomiting after recently starting chemotherapy.  Found to have worsening of end-stage kidney disease.   Existing Precautions/Restrictions fall   General Observations and Info activity order: up with assist prn   Cognitive  Status Examination   Orientation Status orientation to person, place and time   Affect/Mental Status (Cognitive) WFL;sad/depressed affect   Cognitive Status Comments no cognitive concerns noted with interview   Sensory   Sensory Quick Adds sensation intact   Pain Assessment   Patient Currently in Pain No   Posture   Posture forward head position;protracted shoulders   Range of Motion Comprehensive   Comment, General Range of Motion WFL   Strength Comprehensive (MMT)   Comment, General Manual Muscle Testing (MMT) Assessment pt demonstrates antigravity strength, decreased functional activity tolerance   Coordination   Upper Extremity Coordination No deficits were identified   Bed Mobility   Bed Mobility supine-sit;sit-supine   Supine-Sit Nimitz (Bed Mobility) contact guard   Sit-Supine Nimitz (Bed Mobility) contact guard   Transfers   Transfers sit-stand transfer;toilet transfer;shower transfer   Sit-Stand Transfer   Sit-Stand Nimitz (Transfers) contact guard;verbal cues   Assistive Device (Sit-Stand Transfers)   (IV pole)   Shower Transfer   Nimitz Level (Shower Transfer) contact guard   Shower Transfer Comments per clinical judgement; tub/shower   Toilet Transfer   Nimitz Level (Toilet Transfer) contact guard   Assistive Device (Toilet Transfer) grab bars/safety frame   Balance   Balance Comments good seated; mildly unsteady ambulating in room holding IV pole   Activities of Daily Living   BADL Assessment/Intervention lower body dressing;grooming;toileting   Lower Body Dressing Assessment/Training   Nimitz Level (Lower Body Dressing) contact guard assist   Grooming Assessment/Training   Nimitz Level (Grooming) contact guard assist   Toileting   Nimitz Level (Toileting) contact guard assist   Clinical Impression   Criteria for Skilled Therapeutic Interventions Met (OT) Yes, treatment indicated   OT Diagnosis impaired ADLs   OT Problem List-Impairments impacting ADL  problems related to;activity tolerance impaired;balance;strength   Assessment of Occupational Performance 3-5 Performance Deficits   Identified Performance Deficits dressing, toileting, bathing, home chores   Planned Therapy Interventions (OT) ADL retraining;strengthening;transfer training;home program guidelines;progressive activity/exercise   Clinical Decision Making Complexity (OT) detailed assessment/moderate complexity   Risk & Benefits of therapy have been explained evaluation/treatment results reviewed;patient   OT Total Evaluation Time   OT Eval, Moderate Complexity Minutes (77763) 10   OT Goals   Therapy Frequency (OT) Daily   OT Predicted Duration/Target Date for Goal Attainment 02/14/25   OT Goals Hygiene/Grooming;Lower Body Dressing;Transfers;Toilet Transfer/Toileting;OT Goal 1;Home Management   OT: Hygiene/Grooming modified independent;while standing   OT: Lower Body Dressing Modified independent   OT: Transfer Modified independent;with assistive device  (tub/shower)   OT: Toilet Transfer/Toileting Modified independent;toilet transfer;cleaning and garment management   OT: Home Management Supervision/stand-by assist;with light demand household tasks;ambulatory level   OT: Goal 1 SBA with functional ambulation for household distances   OT Discharge Planning   OT Plan hallway mobility with/without IV pole, tub/shower tx, endurance adls   OT Discharge Recommendation (DC Rec) home with assist;home with home care occupational therapy  (HHPT)   OT Rationale for DC Rec Pt moving well, limited this session by nausea and fatigue.  Anticipate she will progress to home with assist for heavy home chores, and may benefit from HHOT/PT to maximize safety/independence with adls/mobility.   OT Brief overview of current status SBA with IV pole to bathroom, limited by nausea/fatigue   OT Total Distance Amb During Session (feet) 45   Total Session Time   Timed Code Treatment Minutes 13   Total Session Time (sum of timed and  untimed services) 23

## 2025-02-07 NOTE — PROGRESS NOTES
Hospitalist Medicine Progress Note   [facility]       Dulce Ma is a 64 year old lady with multiple myeloma, ESRD, HTN, hypercholesteremia came to [facility] 2/5/2025 with nausea, vomiting after having recently started chemotherapy.  Her creatinine at admission was 10.28  sodium 134 potassium 4.6 chloride 95 bicarb 18 and glucose 109.  Hemoglobin was 10.  Patient did have tunneled dialysis catheter placement by interventional radiologist Dr. Vlad Montiel MD on 2/6/2025 to start dialysis she did have a short run of sinus tachycardia which was thought to be?  SVT as it spontaneously resolved with heart rates fluctuating between 40 and 140 cardiology was consulted       Date of Admission:  2/5/2025  Assessment & Plan     Paroxysmal atrial tachycardia  ?  SVT  History of atrial fibrillation in the past  Patient was evaluated by cardiology and started on amiodarone  Plan is to give anticoagulation later if needed    End-stage kidney disease.  Patient had dialysis  Appreciate nephrology consultation  -Dialysis diet.  Management as per nephrology  Plan is to get a seat for the patient in the [facility] in [facility]     Multiple myeloma.  -Recently started chemotherapy.  -Oncology consulted who wants to coordinate with nephrology regarding further treatment with chemotherapy.     Nausea and vomiting.  -Multiple episodes of vomiting the last few days since starting chemo.  -Appears dehydrated.  -Gentle IV fluids overnight with plan for dialysis to initiate soon.  -Antiemetics as needed.     Acute E. coli urinary tract infection.  -Continue ceftriaxone 1 g IV every 24 hours.  Colitis pansensitive     Hyponatremia.  Hypochloremia.  -Mild.  Sodium 134, chloride 95.  Both of which have normalized  -Nephrology planning for dialysis initiation.  -Recheck metabolic panel intermittently.     Malnutrition.  -Registered dietitian consult.     Anemia.  -Oncology  following  -Recheck CBC tomorrow.            Plan:   Amiodarone was started for atrial flutter  Discharge in 1 to 2 days when okay with consulting specialists    Diet: Combination Diet Full Liquid; Renal Diet (dialysis)    DVT Prophylaxis: Heparin SQ  Butler Catheter: Not present  Code Status: Full Code         Medically Ready for Discharge: Anticipated in 2-4 Days    Clinically Significant Risk Factors         # Hyponatremia: Lowest Na = 134 mmol/L in last 2 days, will monitor as appropriate  # Hypochloremia: Lowest Cl = 95 mmol/L in last 2 days, will monitor as appropriate   # Hypercalcemia: corrected calcium is >10.1, will monitor as appropriate   # Anion Gap Metabolic Acidosis: Highest Anion Gap = 21 mmol/L in last 2 days, will monitor and treat as appropriate  # Hypoalbuminemia: Lowest albumin = 2.8 g/dL at 2/7/2025  6:30 AM, will monitor as appropriate     # Hypertension: Noted on problem list                               The patient's care was discussed with the Patient and RN.    Francisco Correa MD  Hospitalist Service  [facility]    ______________________________________________________________________    Interval History     Symptoms   Patient had an episode of SVT last evening  Denies any chest pain or shortness of breath today    Review of Systems:   She has dialysis today    Data reviewed today: I reviewed all medications, new labs and imaging results over the last 24 hours.     Physical Exam   Vital Signs: Temp: 99.1  F (37.3  C) Temp src: Oral BP: 132/73 Pulse: 82   Resp: 16 SpO2: 98 % O2 Device: None (Room air)    Weight: 109 lbs 12.63 oz      GENERAL: Patient is not in acute distress  HEENT: EOM+,Conjunctiva is clear   NECK:  no Jugular Venous distention  HEART: S1 S2 regular Rate and Rhythm, there is  no murmur,   LUNGS: Respirations are  not laboured, Lungs are  clear to auscultation without Crepitations or Wheezing   ABDOMEN: Soft , there is no tenderness , Bowel Sounds are   Positive   LOWER LIMBS:  Pedal Edema  Bilaterally   CNS:  Alert,  Oriented x 3, Moving all the Four Limbs     Data   Recent Labs   Lab 02/07/25  0630 02/06/25 2023 02/06/25  1037 02/05/25  1358   WBC  --   --  5.5 9.1   HGB  --   --  8.7* 10.0*   MCV  --   --  90 91   PLT  --   --  210 280    136 138 134*   POTASSIUM 4.0 4.0 3.4 4.6   CHLORIDE 100 97* 99 95*   CO2 20* 19* 23 18*   BUN 58.9* 55.6* 56.5* 105.1*   CR 6.59* 6.13* 5.29* 10.28*   ANIONGAP 17* 20* 16* 21*   KELTON 8.1* 8.1* 8.2* 9.7   GLC 83 84 90 109*   ALBUMIN 2.8*  --  3.5 3.9   PROTTOTAL  --   --   --  8.3   BILITOTAL  --   --   --  0.3   ALKPHOS  --   --   --  56   ALT  --   --   --  12   AST  --   --   --  20         No results found for this or any previous visit (from the past 24 hours).

## 2025-02-07 NOTE — PLAN OF CARE
"Goal Outcome Evaluation:      Plan of Care Reviewed With: patient    Overall Patient Progress: no changeOverall Patient Progress: no change    Vitals are Temp: 98.9  F (37.2  C) Temp src: Oral BP: 132/68 Pulse: 92   Resp: 18 SpO2: 94 %.  Patient is Alert and Oriented x4. They are SBA with no assistive devices .  Pt is a Full liquid diet.  They are denying pain.  Patient has Normal Saline 0.9% running at 50 mL per hour. Zofran x1. Tele SR.     Pt had RRT in the evening. Tele called that pt HR was 140s. Pt c/o palpation that went away on it's own afterwards. See Flowsheets and MD notes. Cardiology consult .     Problem: Adult Inpatient Plan of Care  Goal: Plan of Care Review  Description: The Plan of Care Review/Shift note should be completed every shift.  The Outcome Evaluation is a brief statement about your assessment that the patient is improving, declining, or no change.  This information will be displayed automatically on your shift  note.  Outcome: Progressing  Flowsheets (Taken 2/7/2025 0615)  Plan of Care Reviewed With: patient  Overall Patient Progress: no change  Goal: Patient-Specific Goal (Individualized)  Description: You can add care plan individualizations to a care plan. Examples of Individualization might be:  \"Parent requests to be called daily at 9am for status\", \"I have a hard time hearing out of my right ear\", or \"Do not touch me to wake me up as it startles  me\".  Outcome: Progressing  Goal: Absence of Hospital-Acquired Illness or Injury  Outcome: Progressing  Intervention: Identify and Manage Fall Risk  Recent Flowsheet Documentation  Taken 2/6/2025 2000 by Katey Sandoval RN  Safety Promotion/Fall Prevention:   clutter free environment maintained   safety round/check completed  Intervention: Prevent and Manage VTE (Venous Thromboembolism) Risk  Recent Flowsheet Documentation  Taken 2/6/2025 2000 by Katey Sandoval RN  VTE Prevention/Management: SCDs off (sequential compression " devices)  Intervention: Prevent Infection  Recent Flowsheet Documentation  Taken 2/6/2025 2000 by Katey Sandoval, RN  Infection Prevention:   hand hygiene promoted   rest/sleep promoted   single patient room provided  Goal: Optimal Comfort and Wellbeing  Outcome: Progressing  Goal: Readiness for Transition of Care  Outcome: Progressing     Problem: Nausea and Vomiting  Goal: Nausea and Vomiting Relief  Outcome: Progressing  Intervention: Prevent and Manage Nausea and Vomiting  Recent Flowsheet Documentation  Taken 2/6/2025 2000 by Katey Sandoval, RN  Nausea/Vomiting Interventions: antiemetic

## 2025-02-07 NOTE — CONSULTS
Cardiology Consultation      Dulce Ma MRN# [id number]   YOB: 1960 Age: 64 year old   Date of Admission: 2/5/2025     Reason for consult: Arrhythmia           Assessment and Plan:     Paroxysmal atrial flutter versus SVT, paroxysmal atrial fibrillation in the past on stress echocardiogram in the setting of end-stage renal disease requiring initiation of hemodialysis  ST-T changes may be related to the renal failure, now improved.  In the setting of her uremia, will not make too much about the AV block.  Will try initiation of oral loading amiodarone.  Anticoagulation is reasonable especially in the setting of malignancy, however with her treatment for multiple myeloma she may become anemic and thrombocytopenic.  If we can rhythm control, theoretically we may be able to lower her stroke risk if anticoagulation needs to be held in the future.      Multiple myeloma without evidence of infiltrative cardiomyopathy on echo 10/2024  Agree with oncology that outpatient cardiac MRI is reasonable.  This is only done at [facility] and we can schedule this in follow-up.    Will order cardiology follow-up for 1 month and cardiac MRI in the interim.  Anticoagulation to be decided by oncology.    High complexity  in a patient presenting with arrhythmia, uremia with initiation of hemodialysis and active cancer on chemotherapy        The longitudinal plan of care for the diagnosis(es)/condition(s) as documented were addressed during this visit. Due to the added complexity in care, I will continue to support Dulce in the subsequent management and with ongoing continuity of care.          Chief Complaint:   Abnormal Labs           History of Present Illness:   This patient is a 64 year old female with recent diagnosis of multiple myeloma started on treatment and progression of myeloma kidney to end-stage renal disease requiring dialysis.    She received tunneled catheter and has started dialysis today.    Initially  "when she presented, she had ST-T abnormalities possibly from uremia, now improved.  She has had paroxysmal SVT versus atypical flutter in the past.  She was seen in cardiology in 2024 by Dr. Alan order stress echocardiogram that was low workload but no ischemia seen.  LV walls were normal thickness not suggestive of infiltrative cardiomyopathy.    She did have exertional provoked paroxysmal atrial fibrillation versus PSVT on that stress test.         Physical Exam:   Vitals were reviewed  Blood pressure 134/61, pulse 90, temperature 99.1  F (37.3  C), temperature source Oral, resp. rate 16, height 1.6 m (5' 3\"), weight 49.8 kg (109 lb 12.6 oz), last menstrual period 10/31/2011, SpO2 96%, not currently breastfeeding.  Temperatures:  Current - Temp: 99.1  F (37.3  C); Max - Temp  Av.3  F (36.8  C)  Min: 97.8  F (36.6  C)  Max: 99.1  F (37.3  C)  Respiration range: Resp  Av.4  Min: 16  Max: 18  Pulse range: Pulse  Av.5  Min: 76  Max: 147  Blood pressure range: Systolic (24hrs), Av , Min:116 , Max:150   ; Diastolic (24hrs), Av, Min:61, Max:76    Pulse oximetry range: SpO2  Av.1 %  Min: 93 %  Max: 97 %    Intake/Output Summary (Last 24 hours) at 2025 1135  Last data filed at 2025 1600  Gross per 24 hour   Intake 150 ml   Output --   Net 150 ml     Constitutional:   awake, alert, cooperative, no apparent distress, and appears stated age     Eyes:   Lids and lashes normal, pupils equal, round and reactive to light, extra ocular muscles intact, sclera clear, conjunctiva normal     Neck:   supple, symmetrical, trachea midline,      Back:   symmetric     Lungs:   Symmetric     Cardiovascular:   Regular     Abdomen:   non-tender     Musculoskeletal:   motor strength is 5 out of 5 all extremities bilaterally     Neurologic:   Grossly nonfocal     Skin:   normal skin color, texture, turgor     Additional findings:                    Past Medical History:   I have reviewed this " patient's past medical history  Past Medical History:   Diagnosis Date    Anesthesia complication, initial encounter     was completely out with Midazolam 2 mg IV, Fentanyl 100 micrograms IV that was given at time of colonoscopy 7/2018     Dysplastic nevi     Glaucoma     Diagnosed in Spring 2010    Hypertension goal BP (blood pressure) < 140/90 at age 50     very strong family hx     Lumbago     stiffness in low back    Other malignant neoplasm of skin of trunk, except scrotum 2/02    Dr. Myles Lake, melanoma with negative sentinel node biopsy - no chemo-Dr. Selene Gonzales-derm    Perimenopausal     Routine gyne exam     [facility] ob[facility] - Dr. Isabela Perez     Skin cancer, basal cell     multiple - 3 on nose, treated with interferon intralesionally x 1     Unspecified derangement, shoulder region     s/p horse-riding injury- no problem now    Unspecified musculoskeletal disorders and symptoms referable to neck     compressed vertebrae in neck- bothers pt rarely             Past Surgical History:   I have reviewed this patient's past surgical history  Past Surgical History:   Procedure Laterality Date    APPENDECTOMY  2008    aprroximately 8-10 years agp    BREAST SURGERY      Breast biopsies    COLONOSCOPY N/A 7/27/2018    Procedure: COLONOSCOPY;  COLONOSCOPY ;  Surgeon: Ren Whitehead MD;  Location: [facility]    IR CVC TUNNEL PLACEMENT > 5 YRS OF AGE  2/6/2025    ORTHOPEDIC SURGERY Right 2014    ORIF right ring finger    ZZC NONSPECIFIC PROCEDURE  3/02    malignant melanoma removed from abdomen with negative nodes-Dr. Acosta -surgeon               Social History:   I have reviewed this patient's social history  Social History     Tobacco Use    Smoking status: Never    Smokeless tobacco: Never   Substance Use Topics    Alcohol use: Yes     Comment: occ.             Family History:   I have reviewed this patient's family history  Family History   Problem Relation Age of Onset    Hypertension Mother     Heart  Disease Mother         bypass    Neuropathy Mother     Aortic Valve Replacement Mother 83    Hypertension Father     Coronary Artery Disease Father 83         in  from MI    Eye Disorder Maternal Grandmother         glacoma    Eye Disorder Maternal Grandfather         glacoma    Hypertension Paternal Grandmother     Colon Cancer No family hx of              Allergies:     Allergies   Allergen Reactions    Benzoyl Peroxide      swelling    Ibuprofen      over long duration dev. hives    Amoxicillin Rash     Face; in early adulthood    Tolerated cephalexin 2025    Penicillins Rash     Face; in early adulthood.    Tolerated cephalexin 2025             Medications:   I have reviewed this patient's current medications  Medications Prior to Admission   Medication Sig Dispense Refill Last Dose/Taking    acyclovir (ZOVIRAX) 200 MG capsule Take 200 mg by mouth 2 times daily.   2025    cycloPHOSphamide (CYTOXAN) 50 MG capsule Take 350 mg by mouth See Admin Instructions Take on day 1, 8, 15, and 22 of 28 day cycle   Past Week    dexAMETHasone (DECADRON) 4 MG tablet Take 10 tablets by mouth See Admin Instructions. TAKE 40MG (10 TABLETS) ON DAYS 1, 8, 15 AND 22 OF EACH CYCLE ONE HOUR BEFORE DARATUMUMAB   Past Week    dorzolamide-timolol (COSOPT) 2-0.5 % ophthalmic solution Place 1 drop into both eyes 2 times daily.   2025 Morning    montelukast (SINGULAIR) 10 MG tablet Take 10 mg by mouth See Admin Instructions. - Tues on cycle 1 of chemotherapy and then PRN   2025    Netarsudil-Latanoprost (ROCKLATAN) 0.02-0.005 % SOLN Place 1 drop into both eyes at bedtime.   More than a month    ondansetron (ZOFRAN ODT) 4 MG ODT tab Take 1 tablet (4 mg) by mouth every 6 hours as needed. 20 tablet 0 Taking As Needed    prochlorperazine (COMPAZINE) 10 MG tablet Take 10 mg by mouth every 6 hours as needed for nausea or vomiting.   Taking As Needed     Current Facility-Administered Medications   Medication Dose  Route Frequency Provider Last Rate Last Admin    - MEDICATION INSTRUCTIONS for Dialysis Patients -   Does not apply See Admin Instructions Francisco Correa MD        acetaminophen (TYLENOL) tablet 650 mg  650 mg Oral Q4H PRN Ra Elmore DO        Or    acetaminophen (TYLENOL) Suppository 650 mg  650 mg Rectal Q4H PRN Ra Elmore DO        alteplase (CATHFLO ACTIVASE) injection 2 mg  2 mg Intracatheter Q1H PRN Jj Leiva MD        alteplase (CATHFLO ACTIVASE) injection 2 mg  2 mg Intracatheter Q1H PRN Jj Leiva MD        amiodarone (PACERONE) tablet 400 mg  400 mg Oral or FT or NG tube BID Ralph Mead MD        Followed by    [START ON 2/11/2025] amiodarone (PACERONE) tablet 200 mg  200 mg Oral or FT or NG tube BID Ralph Mead MD        Followed by    [START ON 2/13/2025] amiodarone (PACERONE) tablet 200 mg  200 mg Oral or FT or NG tube Daily Ralph Mead MD        benzocaine-menthol (CHLORASEPTIC) 6-10 MG lozenge 1 lozenge  1 lozenge Buccal Q1H PRN Ra Elmore DO        bisacodyl (DULCOLAX) suppository 10 mg  10 mg Rectal Daily PRN Ra Elmore DO        calcium carbonate (TUMS) chewable tablet 1,000 mg  1,000 mg Oral 4x Daily PRN Ra Elmore DO        cefTRIAXone (ROCEPHIN) 1 g vial to attach to  mL bag for ADULTS or NS 50 mL bag for PEDS  1 g Intravenous Q24H Ra Elmore, DO 0 mL/hr at 02/06/25 0700 1 g at 02/06/25 1811    guaiFENesin (ROBITUSSIN) 20 mg/mL solution 200 mg  200 mg Oral Q4H PRN Ra Elmore DO        sodium chloride 0.9% DIALYSIS Cath LOCK - RED Lumen  10 mL Intracatheter Once in dialysis/CRRT Jj Leiva MD        Followed by    heparin 1000 unit/mL DIALYSIS Cath LOCK - RED Lumen  1.3-2.6 mL Intracatheter Once in dialysis/CRRT Jj Leiva MD        sodium chloride 0.9% DIALYSIS Cath LOCK - BLUE Lumen  10 mL Intracatheter Once in dialysis/CRRT Jj Leiva MD        Followed by     heparin 1000 unit/mL DIALYSIS Cath LOCK -BLUE Lumen  1.3-2.6 mL Intracatheter Once in dialysis/CRRT Jj Leiva MD        lidocaine (LMX4) cream   Topical Q1H PRN Ra Elmore DO        lidocaine 1 % 0.1-1 mL  0.1-1 mL Other Q1H PRN Ra Elmore DO        LORazepam (ATIVAN) injection 0.5 mg  0.5 mg Intravenous Q6H PRN Ra Elmore DO        melatonin tablet 5 mg  5 mg Oral At Bedtime PRN Ra Elmore DO        multivitamin RENAL (TRIPHROCAPS) capsule 1 capsule  1 capsule Oral Daily Jj Leiva MD   1 capsule at 02/07/25 0914    naloxone (NARCAN) injection 0.2 mg  0.2 mg Intravenous Q2 Min PRN Francisco Correa MD        Or    naloxone (NARCAN) injection 0.4 mg  0.4 mg Intravenous Q2 Min PRN Francisco Correa MD        Or    naloxone (NARCAN) injection 0.2 mg  0.2 mg Intramuscular Q2 Min PRN Francisco Correa MD        Or    naloxone (NARCAN) injection 0.4 mg  0.4 mg Intramuscular Q2 Min PRN Francisco Correa MD        No heparin via hemodialysis machine   Does not apply Once Jj Leiva MD        ondansetron (ZOFRAN ODT) ODT tab 4 mg  4 mg Oral Q6H PRN Ra Elmore DO        Or    ondansetron (ZOFRAN) injection 4 mg  4 mg Intravenous Q6H PRN Ra Elmore DO   4 mg at 02/07/25 1102    oxyCODONE (ROXICODONE) tablet 5 mg  5 mg Oral Q4H PRN Ra Elmore DO        oxyCODONE IR (ROXICODONE) half-tab 2.5 mg  2.5 mg Oral Q4H PRN Ra Elmore DO        prochlorperazine (COMPAZINE) injection 10 mg  10 mg Intravenous Q6H PRN Ra Elmore DO        Or    prochlorperazine (COMPAZINE) tablet 10 mg  10 mg Oral Q6H PRN Ra Elmore DO        senna-docusate (SENOKOT-S/PERICOLACE) 8.6-50 MG per tablet 1 tablet  1 tablet Oral BID PRN Ra Elmore DO        Or    senna-docusate (SENOKOT-S/PERICOLACE) 8.6-50 MG per tablet 2 tablet  2 tablet Oral BID PRN Ra Elmore, DO        sodium chloride (PF) 0.9% PF flush 10 mL  10 mL Intracatheter Q15  Min PRN Jj Leiva MD        sodium chloride (PF) 0.9% PF flush 10 mL  10 mL Intracatheter Q15 Min PRN Jj Leiva MD        sodium chloride (PF) 0.9% PF flush 3 mL  3 mL Intracatheter Q8H Ra Elmore D, DO   3 mL at 02/06/25 1232    sodium chloride (PF) 0.9% PF flush 3 mL  3 mL Intracatheter q1 min prn Ra Elmore D, DO        sodium chloride (PF) 0.9% PF flush 9 mL  9 mL Intracatheter During Dialysis/CRRT (from stock) Jj Leiva MD        sodium chloride (PF) 0.9% PF flush 9 mL  9 mL Intracatheter During Dialysis/CRRT (from stock) Jj Leiva MD        sodium chloride 0.9 % infusion   Intravenous Continuous Jj Leiva MD 50 mL/hr at 02/07/25 0913 New Bag at 02/07/25 0913    sodium chloride 0.9% BOLUS 100-150 mL  100-150 mL Intravenous Q15 Min PRN Jj Leiva MD        sodium chloride 0.9% BOLUS 200 mL  200 mL Hemodialysis Machine Once Jj Leiva MD        sodium chloride 0.9% BOLUS 250 mL  250 mL Intravenous Once in dialysis/CRRT Jj Leiva MD        sodium chloride 0.9% BOLUS 500 mL  500 mL Hemodialysis Machine Once Jj Leiva MD                 Review of Systems:     The 10 point Review of Systems is negative other than noted in the HPI            Data:   All laboratory data reviewed  Results for orders placed or performed during the hospital encounter of 02/05/25 (from the past 24 hours)   Hepatitis B surface antigen   Result Value Ref Range    Hepatitis B Surface Antigen Nonreactive Nonreactive   Hepatitis B core antibody   Result Value Ref Range    Hepatitis B Core Antibody Total Nonreactive Nonreactive   EKG 12-lead, tracing only   Result Value Ref Range    Systolic Blood Pressure  mmHg    Diastolic Blood Pressure  mmHg    Ventricular Rate 92 BPM    Atrial Rate 92 BPM    NH Interval 144 ms    QRS Duration 86 ms     ms    QTc 440 ms    P Axis 63 degrees    R AXIS 70 degrees    T Axis 262 degrees    Interpretation ECG       Sinus  rhythm  Marked ST abnormality, possible inferior subendocardial injury  Abnormal ECG     EKG 12-lead, tracing only   Result Value Ref Range    Systolic Blood Pressure  mmHg    Diastolic Blood Pressure  mmHg    Ventricular Rate 94 BPM    Atrial Rate 94 BPM    SD Interval 142 ms    QRS Duration 84 ms     ms    QTc 472 ms    P Axis 56 degrees    R AXIS 52 degrees    T Axis -84 degrees    Interpretation ECG       Sinus rhythm  ST & T wave abnormality, consider inferolateral ischemia  Abnormal ECG     Basic metabolic panel   Result Value Ref Range    Sodium 136 135 - 145 mmol/L    Potassium 4.0 3.4 - 5.3 mmol/L    Chloride 97 (L) 98 - 107 mmol/L    Carbon Dioxide (CO2) 19 (L) 22 - 29 mmol/L    Anion Gap 20 (H) 7 - 15 mmol/L    Urea Nitrogen 55.6 (H) 8.0 - 23.0 mg/dL    Creatinine 6.13 (H) 0.51 - 0.95 mg/dL    GFR Estimate 7 (L) >60 mL/min/1.73m2    Calcium 8.1 (L) 8.8 - 10.4 mg/dL    Glucose 84 70 - 99 mg/dL   Magnesium   Result Value Ref Range    Magnesium 2.0 1.7 - 2.3 mg/dL   Renal panel   Result Value Ref Range    Sodium 137 135 - 145 mmol/L    Potassium 4.0 3.4 - 5.3 mmol/L    Chloride 100 98 - 107 mmol/L    Carbon Dioxide (CO2) 20 (L) 22 - 29 mmol/L    Anion Gap 17 (H) 7 - 15 mmol/L    Glucose 83 70 - 99 mg/dL    Urea Nitrogen 58.9 (H) 8.0 - 23.0 mg/dL    Creatinine 6.59 (H) 0.51 - 0.95 mg/dL    GFR Estimate 7 (L) >60 mL/min/1.73m2    Calcium 8.1 (L) 8.8 - 10.4 mg/dL    Albumin 2.8 (L) 3.5 - 5.2 g/dL    Phosphorus 5.6 (H) 2.5 - 4.5 mg/dL       Lab Results   Component Value Date    CHOL 253 10/18/2024    CHOL 235 05/28/2020     Lab Results   Component Value Date    HDL 48 10/18/2024    HDL 60 05/28/2020     Lab Results   Component Value Date     10/18/2024     05/28/2020     Lab Results   Component Value Date    TRIG 148 10/18/2024    TRIG 140 05/28/2020     Lab Results   Component Value Date    CHOLHDLRATIO 3.7 07/20/2015     TSH   Date Value Ref Range Status   01/10/2025 2.44 0.30 - 4.20 uIU/mL  Final   05/28/2020 1.31 0.40 - 4.00 mU/L Final         EKG results:    Echocardiology:    Cardiac stress testing:    Cardiac angiography:    Clinically Significant Risk Factors         # Hyponatremia: Lowest Na = 134 mmol/L in last 2 days, will monitor as appropriate  # Hypochloremia: Lowest Cl = 95 mmol/L in last 2 days, will monitor as appropriate   # Hypercalcemia: corrected calcium is >10.1, will monitor as appropriate   # Anion Gap Metabolic Acidosis: Highest Anion Gap = 21 mmol/L in last 2 days, will monitor and treat as appropriate  # Hypoalbuminemia: Lowest albumin = 2.8 g/dL at 2/7/2025  6:30 AM, will monitor as appropriate     # Hypertension: Noted on problem list

## 2025-02-07 NOTE — CONSULTS
"CLINICAL NUTRITION SERVICES - ASSESSMENT NOTE    RECOMMENDATIONS FOR MDs/PROVIDERS TO ORDER:  Please advance diet as medically appropriate   If patient continues to not tolerate oral intake, would highly recommend starting nutrition support     Malnutrition Status:    Malnutrition Diagnosis: Severe malnutrition in the context of acute illness or injury, chronic illness or injury   Malnutrition Present on Admission: Yes    Registered Dietitian Interventions:  Ordered Novasource Renal BID to support intake  Continue Triphrocaps renal MVI  Oral intake encouragement appreciated       REASON FOR ASSESSMENT  Provider order - malnutrition  Patient also have positive malnutrition screen of poor appetite and weight loss.     SUBJECTIVE INFORMATION  Assessed patient in room.    PMH: AL amyloidosis, end-stage kidney disease, hypertension, hyperlipidemia.     Patient admitted for profuse nausea and vomiting after recently starting chemotherapy.  Found to have worsening of end-stage kidney disease. Patient has a dialysis catheter placed on 2/6, started on dialysis yesterday as well.     NUTRITION HISTORY  Patient familiar to [facility] dietitians. During previous admission in January she met severe malnutrition criteria. She previously trialed KF renal (vanilla). Ate 2-3 small meals/day at the time, reported to eat bland foods d/t indigestion symptoms. 1 month of poor appetite, nausea, dry mouth reported.     Patient reports that since last admission in January, eating has not been going well. When asked what she has been able to eat she said \"not much\".     CURRENT NUTRITION ORDERS  Diet: Full Liquid, Renal Diet (dialysis)    CURRENT INTAKE/TOLERANCE  Per nursing notes, pt is having some nausea and metallic taste in mouth.     Pt was NPO on admit. Advanced to FLD on 2/6. No intakes noted in chart.   Patient reports that she has had very poor appetite and nausea. She was wondering if her diet had advanced yet, RD informed her this " "would be advanced by her doctor. RD encouraged small frequent intakes and supplements. Patient was agreeable to have NovasEasycausece renal supplement. She reports to not like Sweepery or Dasher because it tastes chalky.     LABS  Nutrition-relevant labs:  BUN 58.9, Cr 6.59, phos 5.6 (H)  -- Nephrology is following    MEDICATIONS  Nutrition-relevant medications: Reviewed    SYSTEM FINDINGS    Skin/wounds: no edema or PI noted    I/Os: no BM noted      ANTHROPOMETRICS  Height: 160 cm (5' 3\")  Most Recent Weight: 49.8 kg (109 lb 12.6 oz)  IBW: 115 lbs  % IBW: 94%  BMI (kg/m ): Normal BMI  Weight History: -14% in 4 months, -6% in < 1 month  Wt Readings from Last 20 Encounters:   02/05/25 49.8 kg (109 lb 12.6 oz)   01/15/25 52.9 kg (116 lb 11.2 oz)   01/08/25 54 kg (119 lb)   01/08/25 54.1 kg (119 lb 3.2 oz)   11/27/24 55.8 kg (123 lb)   10/18/24 58 kg (127 lb 14.4 oz)   05/25/23 62.6 kg (138 lb)   11/22/21 61.2 kg (135 lb)   05/28/20 65.3 kg (144 lb)   07/26/19 64.4 kg (142 lb)   04/22/19 64.5 kg (142 lb 3.2 oz)   01/11/19 65.4 kg (144 lb 3.2 oz)   07/27/18 62.6 kg (138 lb)   06/22/18 62.8 kg (138 lb 6.4 oz)   09/15/17 65.4 kg (144 lb 3.2 oz)   05/25/17 65.8 kg (145 lb)   07/22/16 65.5 kg (144 lb 6 oz)   07/20/15 64.9 kg (143 lb)   07/11/14 62.6 kg (138 lb)   06/29/13 62.9 kg (138 lb 11.2 oz)        ASSESSED NUTRITION NEEDS  Dosing Weight: 49.8 kg, based on actual wt  Estimated Energy Needs: 30 - 35 kcals/kg  Justification: Increased needs  Estimated Protein Needs: 1 - 1.2 grams of pro/kg  Justification: Dialysis  Estimated Fluid Needs: Per provider pending fluid status      MALNUTRITION  % Intake: </=50% for >/= 1 month (severe)  % Weight Loss: > 5% in 1 month (severe)   Subcutaneous Fat Loss: Orbital: moderate and Triceps: Mild  Muscle Loss: Clavicles (pectoralis and deltoids): Mild, Shoulders (deltoids): Mild, and Calf (gastrocnemius): Mild   Fluid Accumulation/Edema: None noted  Malnutrition Diagnosis: Severe " "malnutrition in the context of acute illness or injury, chronic illness or injury   Malnutrition Present on Admission: Yes    NUTRITION DIAGNOSIS  Inadequate oral intake related to n/v as evidenced by pt report intake PTA, unintentional wt loss.     INTERVENTIONS  Medical food supplement therapy  Nutrition counseling strategies    Goals  MD to advance diet as medically appropriate   Patient to consume 5-=75% of nutritionally adequate meal trays 4-6x daily, or the equivalent with supplements/snacks.     Monitoring/Evaluation  Progress toward goals will be monitored and evaluated per policy.      Devika Tyson MS, RD, LD  Clinical Dietitian II  [facility] Message Group: \"Dietitian [Nathalie]\"  Office Phone: [phone]  Pagers: 3rd floor/ICU: [phone]  All other floors: [phone]  Weekend/holiday: [phone]    "

## 2025-02-07 NOTE — PROGRESS NOTES
Inpatient Dialysis Progress Note            Assessment and Plan:     Dulce Ma is a 64 year old female who was admitted on 2/5/2025.      1) CKD 5:  Due to amyloidosis.   Her CKD has advanced enough that she has uremic symptoms .  HD #2 today.  HD #3 tomorrow.        2) AL Amyloidosis:  She does not have multiple myeloma.  She has started treatment under direction of Malachi Brown with Darzalex, Faspro, Velcade, Cytoxan and dexamethasone.       3) Anemia: Due to CKD 5 and amyloidosis.       4) Hyponatremia - mild. Due to reduced free water clearance in CKD 5. HD will help.       5) Metabolic Acidosis: Due to CKD 5. HD will help.     6) Hyperphosphatemia:  Due to CKD 5.  HD will help.     7) Tachycardia and Bradycardia: Cards to see.  Amyloid Heart ?      Plan:     HD #2 today  HD #3 tomorrow.  Placement in outpt HD - discussed options.  She would like to stay with [facility] team so prefers [facility].  They may be full.      INTERVAL HISTORY    She feels about the same.  She has had some chicken broth and some hot cereal this AM.  Still some nausea.    She has had some tachycardia and bradycardia.  Cardiology consult has been placed.          Dialysis Parameters:     Wt Readings from Last 4 Encounters:   02/05/25 49.8 kg (109 lb 12.6 oz)   01/15/25 52.9 kg (116 lb 11.2 oz)   01/08/25 54 kg (119 lb)   01/08/25 54.1 kg (119 lb 3.2 oz)     I/O last 3 completed shifts:  In: 150 [P.O.:150]  Out: 0   BP Readings from Last 3 Encounters:   02/07/25 134/61   01/15/25 138/69   01/08/25 133/52       Routine, ONE TIME, Starting today For 1 Occurrences  Weight Loss (kg): keep even  Dialysis Temp: 36.5  C  Access Device: CVC  Access Site: R IJ  Dialyzer: Revaclear  Dialysis Bath: K 3  Blood Flow Rate (mL/min): 250  Total Treatment Time (hrs): 2.5  Heparin: no         Medications and Allergies:   Reviewed in EPIC    Current Facility-Administered Medications   Medication Dose Route Frequency Provider Last Rate Last Admin     - MEDICATION INSTRUCTIONS for Dialysis Patients -   Does not apply See Admin Instructions Francisco Correa MD        amiodarone (PACERONE) tablet 400 mg  400 mg Oral or FT or NG tube BID Ralph Mead MD        Followed by    [START ON 2/11/2025] amiodarone (PACERONE) tablet 200 mg  200 mg Oral or FT or NG tube BID Ralph Mead MD        Followed by    [START ON 2/13/2025] amiodarone (PACERONE) tablet 200 mg  200 mg Oral or FT or NG tube Daily Ralph Mead MD        cefTRIAXone (ROCEPHIN) 1 g vial to attach to  mL bag for ADULTS or NS 50 mL bag for PEDS  1 g Intravenous Q24H Ra Elmore, DO 0 mL/hr at 02/06/25 0700 1 g at 02/06/25 1811    sodium chloride 0.9% DIALYSIS Cath LOCK - RED Lumen  10 mL Intracatheter Once in dialysis/CRRT Jj Leiva MD        Followed by    heparin 1000 unit/mL DIALYSIS Cath LOCK - RED Lumen  1.3-2.6 mL Intracatheter Once in dialysis/CRRT Jj Leiva MD        sodium chloride 0.9% DIALYSIS Cath LOCK - BLUE Lumen  10 mL Intracatheter Once in dialysis/CRRT Jj Leiva MD        Followed by    heparin 1000 unit/mL DIALYSIS Cath LOCK -BLUE Lumen  1.3-2.6 mL Intracatheter Once in dialysis/CRRT Jj Leiva MD        multivitamin RENAL (TRIPHROCAPS) capsule 1 capsule  1 capsule Oral Daily Jj Leiva MD   1 capsule at 02/07/25 0914    No heparin via hemodialysis machine   Does not apply Once Jj Leiva MD        sodium chloride (PF) 0.9% PF flush 3 mL  3 mL Intracatheter Q8H Ra Elmore, DO   3 mL at 02/06/25 1232    sodium chloride (PF) 0.9% PF flush 9 mL  9 mL Intracatheter During Dialysis/CRRT (from stock) Jj Leiva MD        sodium chloride (PF) 0.9% PF flush 9 mL  9 mL Intracatheter During Dialysis/CRRT (from stock) Jj Leiva MD        sodium chloride 0.9% BOLUS 200 mL  200 mL Hemodialysis Machine Once Jj Leiva MD        sodium chloride 0.9% BOLUS 250 mL  250 mL Intravenous Once in  dialysis/CRRT Jj Leiva MD        sodium chloride 0.9% BOLUS 500 mL  500 mL Hemodialysis Machine Once Jj Leiva MD         Current Facility-Administered Medications   Medication Dose Route Frequency Provider Last Rate Last Admin    acetaminophen (TYLENOL) tablet 650 mg  650 mg Oral Q4H PRN Ra Elmore DO        Or    acetaminophen (TYLENOL) Suppository 650 mg  650 mg Rectal Q4H PRN Ra Elmore DO        alteplase (CATHFLO ACTIVASE) injection 2 mg  2 mg Intracatheter Q1H PRN Jj Leiva MD        alteplase (CATHFLO ACTIVASE) injection 2 mg  2 mg Intracatheter Q1H PRN Jj Leiva MD        benzocaine-menthol (CHLORASEPTIC) 6-10 MG lozenge 1 lozenge  1 lozenge Buccal Q1H PRN Ra Elmore DO        bisacodyl (DULCOLAX) suppository 10 mg  10 mg Rectal Daily PRN Ra Elmore DO        calcium carbonate (TUMS) chewable tablet 1,000 mg  1,000 mg Oral 4x Daily PRN Ra Elmore DO        guaiFENesin (ROBITUSSIN) 20 mg/mL solution 200 mg  200 mg Oral Q4H PRN aR Elmore,         lidocaine (LMX4) cream   Topical Q1H PRN Ra Elmore,         lidocaine 1 % 0.1-1 mL  0.1-1 mL Other Q1H PRN Ra Elmore,         LORazepam (ATIVAN) injection 0.5 mg  0.5 mg Intravenous Q6H PRN Ra Elmore, DO        melatonin tablet 5 mg  5 mg Oral At Bedtime PRN Ra Elmore,         naloxone (NARCAN) injection 0.2 mg  0.2 mg Intravenous Q2 Min PRN Francisco Correa MD        Or    naloxone (NARCAN) injection 0.4 mg  0.4 mg Intravenous Q2 Min PRN Francisco Correa MD        Or    naloxone (NARCAN) injection 0.2 mg  0.2 mg Intramuscular Q2 Min PRN Francisco Correa MD        Or    naloxone (NARCAN) injection 0.4 mg  0.4 mg Intramuscular Q2 Min PRN Francisco Correa MD        ondansetron (ZOFRAN ODT) ODT tab 4 mg  4 mg Oral Q6H PRN Ra Elmore DO        Or    ondansetron (ZOFRAN) injection 4 mg  4 mg Intravenous Q6H PRN Ra Elmore, DO   4  mg at 02/07/25 1102    oxyCODONE (ROXICODONE) tablet 5 mg  5 mg Oral Q4H PRN Ra Elmore DO        oxyCODONE IR (ROXICODONE) half-tab 2.5 mg  2.5 mg Oral Q4H PRN Ra Elmore DO        prochlorperazine (COMPAZINE) injection 10 mg  10 mg Intravenous Q6H PRN Ra Elmore DO        Or    prochlorperazine (COMPAZINE) tablet 10 mg  10 mg Oral Q6H PRN Ra Elmore DO        senna-docusate (SENOKOT-S/PERICOLACE) 8.6-50 MG per tablet 1 tablet  1 tablet Oral BID PRN Ra Elmore DO        Or    senna-docusate (SENOKOT-S/PERICOLACE) 8.6-50 MG per tablet 2 tablet  2 tablet Oral BID PRN Ra Elmore DO        sodium chloride (PF) 0.9% PF flush 10 mL  10 mL Intracatheter Q15 Min PRN Jj Leiva MD        sodium chloride (PF) 0.9% PF flush 10 mL  10 mL Intracatheter Q15 Min PRN Jj Leiva MD        sodium chloride (PF) 0.9% PF flush 3 mL  3 mL Intracatheter q1 min prn Ra Elmore DO        sodium chloride 0.9% BOLUS 100-150 mL  100-150 mL Intravenous Q15 Min PRN Jj Leiva MD            Allergies   Allergen Reactions    Benzoyl Peroxide      swelling    Ibuprofen      over long duration dev. hives    Amoxicillin Rash     Face; in early adulthood    Tolerated cephalexin 01/2025    Penicillins Rash     Face; in early adulthood.    Tolerated cephalexin 01/2025              Labs:     BMP  Recent Labs   Lab 02/07/25  0630 02/06/25 2023 02/06/25  1037 02/05/25  1358    136 138 134*   POTASSIUM 4.0 4.0 3.4 4.6   CHLORIDE 100 97* 99 95*   KELTON 8.1* 8.1* 8.2* 9.7   CO2 20* 19* 23 18*   BUN 58.9* 55.6* 56.5* 105.1*   CR 6.59* 6.13* 5.29* 10.28*   GLC 83 84 90 109*     CBC  Recent Labs   Lab 02/06/25  1037 02/05/25  1358   WBC 5.5 9.1   HGB 8.7* 10.0*   HCT 26.1* 30.1*   MCV 90 91    280     Lab Results   Component Value Date    AST 20 02/05/2025    ALT 12 02/05/2025    GGT 15 01/08/2025    ALKPHOS 56 02/05/2025    BILITOTAL 0.3 02/05/2025             Physical Exam:   [facility]    Wt Readings from Last 3 Encounters:   02/05/25 49.8 kg (109 lb 12.6 oz)   01/15/25 52.9 kg (116 lb 11.2 oz)   01/08/25 54 kg (119 lb)       Intake/Output Summary (Last 24 hours) at 2/7/2025 1137  Last data filed at 2/6/2025 1600  Gross per 24 hour   Intake 150 ml   Output --   Net 150 ml       GENERAL APPEARANCE: pleasant, still weak voice  HEENT:  Eyes/ears/nose grossly normal, neck supple  RESP: lungs clear to auscultation with good efforts, no crackles, rhonchi or wheezes  CV: regular rate and rhythm, normal S1 S2, no murmur, click or rub   ABDOMEN: soft, nontender, bowel sounds normal  EXTREMITIES/SKIN: no edema, no rashes or lesions     Pt seen on dialysis.  Stable run.  Good BFR.      Attestation:  I have reviewed today's vital signs, notes, medications, labs and imaging.     Jj Leiva MD  [facility] - Nephrology  [phone]

## 2025-02-07 NOTE — PLAN OF CARE
Short runs of sinus tachycardia to the 140.  Rates then came down to the 90s to 100s.  EKG with sinus rhythm.  Apparently had heart rate of 40 earlier on telemetry with concern for third-degree heart block.  Cardiology consult placed earlier in the day.    -Suspect short runs of SVT  -Will not treat at this time given spontaneously resolves  -Hold off on beta-blocker given recent bradycardia event    Madan Gong MD

## 2025-02-07 NOTE — PROGRESS NOTES
Care Management Follow Up    Length of Stay (days): 2    Expected Discharge Date: 02/07/2025     Concerns to be Addressed: discharge planning     Patient plan of care discussed at interdisciplinary rounds: Yes    Anticipated Discharge Disposition: Dialysis Services      Anticipated Discharge Services: None  Anticipated Discharge DME: None    Patient/family educated on Medicare website which has current facility and service quality ratings:    Education Provided on the Discharge Plan:    Patient/Family in Agreement with the Plan: yes    Referrals Placed by CM/SW:    Private pay costs discussed: Not applicable    Discussed  Partnership in Safe Discharge Planning  document with patient/family: No     Handoff Completed: No, handoff not indicated or clinically appropriate    Additional Information:  Received call from [facility] admissions. They stated patient accepted for the [facility] location in [place]. They are a Mon, Wed, Fri facility only. They are waiting to hear back from the facility for a chair time.   Will update patient.     Next Steps: follow up with Trav for chair time    Christy Avendaño RN  Care Coordinator  [facility]

## 2025-02-08 LAB
ALBUMIN SERPL BCG-MCNC: 2.9 G/DL (ref 3.5–5.2)
ANION GAP SERPL CALCULATED.3IONS-SCNC: 16 MMOL/L (ref 7–15)
BUN SERPL-MCNC: 32.7 MG/DL (ref 8–23)
CALCIUM SERPL-MCNC: 8.3 MG/DL (ref 8.8–10.4)
CHLORIDE SERPL-SCNC: 97 MMOL/L (ref 98–107)
CREAT SERPL-MCNC: 4.83 MG/DL (ref 0.51–0.95)
EGFRCR SERPLBLD CKD-EPI 2021: 9 ML/MIN/1.73M2
ERYTHROCYTE [DISTWIDTH] IN BLOOD BY AUTOMATED COUNT: 12.6 % (ref 10–15)
GLUCOSE SERPL-MCNC: 98 MG/DL (ref 70–99)
HCO3 SERPL-SCNC: 22 MMOL/L (ref 22–29)
HCT VFR BLD AUTO: 24.6 % (ref 35–47)
HGB BLD-MCNC: 7.8 G/DL (ref 11.7–15.7)
HGB BLD-MCNC: 8 G/DL (ref 11.7–15.7)
MAGNESIUM SERPL-MCNC: 2 MG/DL (ref 1.7–2.3)
MCH RBC QN AUTO: 29.5 PG (ref 26.5–33)
MCHC RBC AUTO-ENTMCNC: 32.5 G/DL (ref 31.5–36.5)
MCV RBC AUTO: 91 FL (ref 78–100)
PHOSPHATE SERPL-MCNC: 4.6 MG/DL (ref 2.5–4.5)
PLATELET # BLD AUTO: 239 10E3/UL (ref 150–450)
POTASSIUM SERPL-SCNC: 4.1 MMOL/L (ref 3.4–5.3)
RBC # BLD AUTO: 2.71 10E6/UL (ref 3.8–5.2)
SODIUM SERPL-SCNC: 135 MMOL/L (ref 135–145)
WBC # BLD AUTO: 7.7 10E3/UL (ref 4–11)

## 2025-02-08 PROCEDURE — 90935 HEMODIALYSIS ONE EVALUATION: CPT | Performed by: INTERNAL MEDICINE

## 2025-02-08 PROCEDURE — 258N000003 HC RX IP 258 OP 636: Performed by: INTERNAL MEDICINE

## 2025-02-08 PROCEDURE — 90935 HEMODIALYSIS ONE EVALUATION: CPT

## 2025-02-08 PROCEDURE — 36415 COLL VENOUS BLD VENIPUNCTURE: CPT | Performed by: INTERNAL MEDICINE

## 2025-02-08 PROCEDURE — 120N000001 HC R&B MED SURG/OB

## 2025-02-08 PROCEDURE — 80069 RENAL FUNCTION PANEL: CPT | Performed by: INTERNAL MEDICINE

## 2025-02-08 PROCEDURE — 250N000011 HC RX IP 250 OP 636: Performed by: INTERNAL MEDICINE

## 2025-02-08 PROCEDURE — 97535 SELF CARE MNGMENT TRAINING: CPT | Mod: GO

## 2025-02-08 PROCEDURE — 250N000013 HC RX MED GY IP 250 OP 250 PS 637: Performed by: INTERNAL MEDICINE

## 2025-02-08 PROCEDURE — 85018 HEMOGLOBIN: CPT | Performed by: INTERNAL MEDICINE

## 2025-02-08 PROCEDURE — 634N000001 HC RX 634: Mod: JZ | Performed by: INTERNAL MEDICINE

## 2025-02-08 PROCEDURE — 83735 ASSAY OF MAGNESIUM: CPT | Performed by: INTERNAL MEDICINE

## 2025-02-08 PROCEDURE — 99232 SBSQ HOSP IP/OBS MODERATE 35: CPT | Performed by: INTERNAL MEDICINE

## 2025-02-08 PROCEDURE — 250N000009 HC RX 250: Performed by: INTERNAL MEDICINE

## 2025-02-08 RX ORDER — PANTOPRAZOLE SODIUM 40 MG/1
40 TABLET, DELAYED RELEASE ORAL
Status: DISCONTINUED | OUTPATIENT
Start: 2025-02-08 | End: 2025-02-13 | Stop reason: HOSPADM

## 2025-02-08 RX ADMIN — SODIUM CHLORIDE 200 ML: 9 INJECTION, SOLUTION INTRAVENOUS at 16:00

## 2025-02-08 RX ADMIN — AMIODARONE HYDROCHLORIDE 400 MG: 200 TABLET ORAL at 09:09

## 2025-02-08 RX ADMIN — Medication 1 CAPSULE: at 09:10

## 2025-02-08 RX ADMIN — AMIODARONE HYDROCHLORIDE 400 MG: 200 TABLET ORAL at 20:38

## 2025-02-08 RX ADMIN — CEFTRIAXONE 1 G: 1 INJECTION, POWDER, FOR SOLUTION INTRAMUSCULAR; INTRAVENOUS at 20:30

## 2025-02-08 RX ADMIN — EPOETIN ALFA-EPBX 10000 UNITS: 10000 INJECTION, SOLUTION INTRAVENOUS; SUBCUTANEOUS at 15:57

## 2025-02-08 RX ADMIN — LORAZEPAM 0.5 MG: 2 INJECTION INTRAMUSCULAR; INTRAVENOUS at 09:09

## 2025-02-08 RX ADMIN — HEPARIN SODIUM 1600 UNITS: 1000 INJECTION INTRAVENOUS; SUBCUTANEOUS at 15:58

## 2025-02-08 RX ADMIN — PANTOPRAZOLE SODIUM 40 MG: 40 TABLET, DELAYED RELEASE ORAL at 18:01

## 2025-02-08 RX ADMIN — HEPARIN SODIUM 1600 UNITS: 1000 INJECTION INTRAVENOUS; SUBCUTANEOUS at 15:59

## 2025-02-08 RX ADMIN — SODIUM CHLORIDE 250 ML: 9 INJECTION, SOLUTION INTRAVENOUS at 16:00

## 2025-02-08 RX ADMIN — PANTOPRAZOLE SODIUM 40 MG: 40 INJECTION, POWDER, FOR SOLUTION INTRAVENOUS at 13:16

## 2025-02-08 RX ADMIN — LORAZEPAM 0.5 MG: 2 INJECTION INTRAMUSCULAR; INTRAVENOUS at 20:35

## 2025-02-08 ASSESSMENT — ACTIVITIES OF DAILY LIVING (ADL)
ADLS_ACUITY_SCORE: 41
ADLS_ACUITY_SCORE: 43
ADLS_ACUITY_SCORE: 41
ADLS_ACUITY_SCORE: 41
ADLS_ACUITY_SCORE: 43
ADLS_ACUITY_SCORE: 41
ADLS_ACUITY_SCORE: 41
ADLS_ACUITY_SCORE: 43
ADLS_ACUITY_SCORE: 41
ADLS_ACUITY_SCORE: 43
ADLS_ACUITY_SCORE: 41
ADLS_ACUITY_SCORE: 41

## 2025-02-08 NOTE — PROGRESS NOTES
Potassium   Date Value Ref Range Status   02/08/2025 4.1 3.4 - 5.3 mmol/L Final   11/22/2021 4.0 3.4 - 5.3 mmol/L Final   05/28/2020 4.8 3.4 - 5.3 mmol/L Final     Hemoglobin   Date Value Ref Range Status   02/08/2025 8.0 (L) 11.7 - 15.7 g/dL Final   05/28/2020 12.8 11.7 - 15.7 g/dL Final     Creatinine   Date Value Ref Range Status   02/08/2025 4.83 (H) 0.51 - 0.95 mg/dL Final   05/28/2020 0.78 0.52 - 1.04 mg/dL Final     Urea Nitrogen   Date Value Ref Range Status   02/08/2025 32.7 (H) 8.0 - 23.0 mg/dL Final   11/22/2021 19 7 - 30 mg/dL Final   05/28/2020 23 7 - 30 mg/dL Final     Sodium   Date Value Ref Range Status   02/08/2025 135 135 - 145 mmol/L Final   05/28/2020 138 133 - 144 mmol/L Final     INR   Date Value Ref Range Status   01/13/2025 1.13 0.85 - 1.15 Final       DIALYSIS PROCEDURE NOTE  Hepatitis status of previous patient on machine log was checked and verified ok to use with this patients hepatitis status.  Patient dialyzed for 3 hrs. on a K3 bath with no fluid removal.  A BFR of 300 ml/min was obtained via a right CVC.  The treatment plan was discussed with Dr. Leiva during the treatment.    Total heparin received during the treatment: 0 units.     Line flushed, clamped and capped with heparin 1:1000 1.6 mL (1600 units) per lumen    Meds  given: Epoetin 61724   Complications: None      Person educated: patient. Knowledge base substantial. Barriers to learning: none. Educated on procedure via verbal mode. Patient verbalized understanding.   ICEBOAT? Timeout performed pre-treatment  I: Patient was identified using 2 identifiers  C:  Consent Signed Yes  E: Equipment preventative maintenance is current and dialysis delivery system OK to use  B: Hepatitis B Surface Antigen: Negative; Draw Date: 02/06/2025      Hepatitis B Surface Antibody: Susceptible; Draw Date: 02/06/2025  O: Dialysis orders present and complete prior to treatment  A: Vascular access verified and assessed prior to treatment  T:  Treatment was performed at a clinically appropriate time  ?: Patient was allowed to ask questions and address concerns prior to treatment  See Adult Hemodialysis flowsheet in EPIC for further details and post assessment.  Machine water alarm in place and functioning. Transducer pods intact and checked every 15min.   Pt assisted with repositioning throughout dialysis treatment.  Pt returned via wheelchair.  Chlorine/Chloramine water system checked every 4 hours.  *3rd HD treatment      Post treatment report given to DENINS Rockwell RN regarding pt tolerating 3rd HD treatment.     IKE Day RN

## 2025-02-08 NOTE — PLAN OF CARE
"Patient A&OX4 VSS on RA, intermittent nausea - gave PRN ativan x1. Full liquid diet, Fluids discontinued. HD run today. GI consulted for black stools.    Goal Outcome Evaluation:      Plan of Care Reviewed With: patient    Overall Patient Progress: no changeOverall Patient Progress: no change    Outcome Evaluation: Ativan for nausea, fluids D/c, GI consult for black stools        Problem: Adult Inpatient Plan of Care  Goal: Plan of Care Review  Description: The Plan of Care Review/Shift note should be completed every shift.  The Outcome Evaluation is a brief statement about your assessment that the patient is improving, declining, or no change.  This information will be displayed automatically on your shift  note.  Outcome: Progressing  Flowsheets (Taken 2/8/2025 1445)  Outcome Evaluation: Ativan for nausea, fluids D/c, GI consult for black stools  Plan of Care Reviewed With: patient  Overall Patient Progress: no change  Goal: Patient-Specific Goal (Individualized)  Description: You can add care plan individualizations to a care plan. Examples of Individualization might be:  \"Parent requests to be called daily at 9am for status\", \"I have a hard time hearing out of my right ear\", or \"Do not touch me to wake me up as it startles  me\".  Outcome: Progressing  Goal: Absence of Hospital-Acquired Illness or Injury  Outcome: Progressing  Intervention: Identify and Manage Fall Risk  Recent Flowsheet Documentation  Taken 2/8/2025 1115 by Rosie Rockwell RN  Safety Promotion/Fall Prevention:   safety round/check completed   activity supervised  Taken 2/8/2025 0900 by Rosie Rockwell RN  Safety Promotion/Fall Prevention:   safety round/check completed   activity supervised  Intervention: Prevent Skin Injury  Recent Flowsheet Documentation  Taken 2/8/2025 1115 by Rosie Rockwell RN  Body Position: position changed independently  Taken 2/8/2025 0900 by Rosie Rockwell RN  Body Position: position changed " independently  Intervention: Prevent and Manage VTE (Venous Thromboembolism) Risk  Recent Flowsheet Documentation  Taken 2/8/2025 0900 by Rosie Rockwell RN  VTE Prevention/Management: SCDs off (sequential compression devices)  Goal: Optimal Comfort and Wellbeing  Outcome: Progressing  Goal: Readiness for Transition of Care  Outcome: Progressing     Problem: Nausea and Vomiting  Goal: Nausea and Vomiting Relief  Outcome: Progressing  Intervention: Prevent and Manage Nausea and Vomiting  Recent Flowsheet Documentation  Taken 2/8/2025 0900 by Rosie Rockwell RN  Nausea/Vomiting Interventions: antiemetic     Problem: Skin Injury Risk Increased  Goal: Skin Health and Integrity  Outcome: Progressing  Intervention: Optimize Skin Protection  Recent Flowsheet Documentation  Taken 2/8/2025 1115 by Rosie Rockwell, RN  Activity Management: activity adjusted per tolerance  Head of Bed (HOB) Positioning: HOB at 45 degrees  Taken 2/8/2025 0900 by Rosie Rockwell RN  Activity Management: activity adjusted per tolerance  Head of Bed (HOB) Positioning: HOB at 30 degrees

## 2025-02-08 NOTE — PROGRESS NOTES
Hospitalist Medicine Progress Note   [facility]       Dulce Ma is a 64 year old lady with multiple myeloma, ESRD, HTN, hypercholesteremia came to [facility] 2/5/2025 with nausea, vomiting after having recently started chemotherapy.  Her creatinine at admission was 10.28  sodium 134 potassium 4.6 chloride 95 bicarb 18 and glucose 109.  Hemoglobin was 10.  Patient did have tunneled dialysis catheter placement by interventional radiologist Dr. Vlad Montiel MD on 2/6/2025 to start dialysis she did have a short run of sinus tachycardia which was thought to be?  SVT as it spontaneously resolved with heart rates fluctuating between 40 and 140 cardiology was consulted       Date of Admission:  2/5/2025  Assessment & Plan     Paroxysmal atrial tachycardia  ?  SVT  History of atrial fibrillation in the past  Patient was evaluated by cardiology and started on amiodarone  Plan is to give anticoagulation later if needed    End-stage kidney disease.  Patient had dialysis  Appreciate nephrology consultation  -Dialysis diet.  Management as per nephrology  [facility]     AL amyloidosis   -Recently started chemotherapy.  -Oncology consulted who wants to coordinate with nephrology regarding further treatment with chemotherapy.     Nausea and vomiting.  -Multiple episodes of vomiting the last few days since starting chemo.  -Appears dehydrated.  -Gentle IV fluids overnight with plan for dialysis to initiate soon.  -Antiemetics as needed.     Acute E. coli urinary tract infection.  -Continue ceftriaxone 1 g IV every 24 hours.  Colitis pansensitive     Hyponatremia.  Hypochloremia.  -Mild.  Sodium 134, chloride 95.  Both of which have normalized  -Nephrology planning for dialysis initiation.  -Recheck metabolic panel intermittently.     Malnutrition.  -Registered dietitian consult.     Anemia.  -Oncology  following  -Recheck CBC tomorrow.            Plan:   Had dialysis today  Amiodarone was started for atrial flutter  Discharge in 1 to 2 days when okay with consulting specialists    Diet: Combination Diet Full Liquid; Renal Diet (dialysis)    DVT Prophylaxis: Heparin SQ  Butler Catheter: Not present  Code Status: Full Code         Medically Ready for Discharge: Anticipated in 2-4 Days    Clinically Significant Risk Factors          # Hypochloremia: Lowest Cl = 97 mmol/L in last 2 days, will monitor as appropriate     # Anion Gap Metabolic Acidosis: Highest Anion Gap = 20 mmol/L in last 2 days, will monitor and treat as appropriate  # Hypoalbuminemia: Lowest albumin = 2.8 g/dL at 2/7/2025  6:30 AM, will monitor as appropriate     # Hypertension: Noted on problem list               # Severe Malnutrition: based on nutrition assessment, PRESENT ON ADMISSION                The patient's care was discussed with the Patient and RN.    Francisco Correa MD  Hospitalist Service  [facility]    ______________________________________________________________________    Interval History     Symptoms   No new symptoms  Review of Systems:   She has dialysis today    Data reviewed today: I reviewed all medications, new labs and imaging results over the last 24 hours.     Physical Exam   Vital Signs: Temp: 98  F (36.7  C) Temp src: Oral BP: (!) 150/83 Pulse: 93   Resp: 16 SpO2: 94 % O2 Device: None (Room air)    Weight: 109 lbs 12.63 oz      GENERAL: Patient is not in acute distress  HEENT: EOM+,Conjunctiva is clear   NECK:  no Jugular Venous distention  HEART: S1 S2 regular Rate and Rhythm, there is  no murmur,   LUNGS: Respirations are  not laboured, Lungs are  clear to auscultation without Crepitations or Wheezing   ABDOMEN: Soft , there is no tenderness , Bowel Sounds are  Positive   LOWER LIMBS:  Pedal Edema  Bilaterally   CNS:  Alert,  Oriented x 3, Moving all the Four Limbs     Data   Recent Labs   Lab  02/08/25  1134 02/08/25  1012 02/07/25  0630 02/06/25 2023 02/06/25  1037 02/05/25  1358   WBC 7.7  --   --   --  5.5 9.1   HGB 8.0*  --   --   --  8.7* 10.0*   MCV 91  --   --   --  90 91     --   --   --  210 280   NA  --  135 137 136 138 134*   POTASSIUM  --  4.1 4.0 4.0 3.4 4.6   CHLORIDE  --  97* 100 97* 99 95*   CO2  --  22 20* 19* 23 18*   BUN  --  32.7* 58.9* 55.6* 56.5* 105.1*   CR  --  4.83* 6.59* 6.13* 5.29* 10.28*   ANIONGAP  --  16* 17* 20* 16* 21*   KELTON  --  8.3* 8.1* 8.1* 8.2* 9.7   GLC  --  98 83 84 90 109*   ALBUMIN  --  2.9* 2.8*  --  3.5 3.9   PROTTOTAL  --   --   --   --   --  8.3   BILITOTAL  --   --   --   --   --  0.3   ALKPHOS  --   --   --   --   --  56   ALT  --   --   --   --   --  12   AST  --   --   --   --   --  20         No results found for this or any previous visit (from the past 24 hours).

## 2025-02-08 NOTE — PROGRESS NOTES
Inpatient Dialysis Progress Note            Assessment and Plan:     Dulce Ma is a 64 year old female who was admitted on 2/5/2025.      1) CKD 5:  Due to amyloidosis.   Her CKD has advanced enough that she has uremic symptoms .  HD #2 today.  HD #3 tomorrow.        2) AL Amyloidosis:  She does not have multiple myeloma.  She has started treatment under direction of Malachi Brown with Darzalex, Faspro, Velcade, Cytoxan and dexamethasone.       3) Anemia: Due to CKD 5 and amyloidosis.  Plus now Gi bleed ?  Black stools.       4) Hyponatremia - mild. Due to reduced free water clearance in CKD 5. HD will help.       5) Metabolic Acidosis: Due to CKD 5. HD will help.     6) Hyperphosphatemia:  Due to CKD 5.  HD will help.      7) Tachycardia and Bradycardia: Cards to see.  Amyloid Heart ?      Plan:     HD #3 today  HD #4 Monday - perhaps in AM so that chemo can follow in afternoon ?  Placement in outpt HD - discussed options.  She would like to stay with [facility] team so prefers [facility]. They have agreed to take her.  Awaiting a chair time.  GI consult  PPI          Interval History:     Some black stools.  Last night and this AM.  HGB has fallen  from 10 to 8.    HR was in 130s for a time this AM.        Dialysis Parameters:     Wt Readings from Last 4 Encounters:   02/05/25 49.8 kg (109 lb 12.6 oz)   01/15/25 52.9 kg (116 lb 11.2 oz)   01/08/25 54 kg (119 lb)   01/08/25 54.1 kg (119 lb 3.2 oz)     I/O last 3 completed shifts:  In: 3694.17 [I.V.:3694.17]  Out: 0   BP Readings from Last 3 Encounters:   02/08/25 (!) 150/83   01/15/25 138/69   01/08/25 133/52       Routine, ONE TIME, Starting today For 1 Occurrences  Weight Loss (kg): keep ewven  Dialysis Temp: 36.5  C  Access Device: CVC  Access Site: R IJ  Dialyzer: Revaclear  Dialysis Bath: K 3  Blood Flow Rate (mL/min): 300  Total Treatment Time (hrs): 3  Heparin: no         Medications and Allergies:   Reviewed in EPIC    Current Facility-Administered  Medications   Medication Dose Route Frequency Provider Last Rate Last Admin    - MEDICATION INSTRUCTIONS for Dialysis Patients -   Does not apply See Admin Instructions Francisco Correa MD        amiodarone (PACERONE) tablet 400 mg  400 mg Oral or FT or NG tube BID Ralph Mead MD   400 mg at 02/08/25 0909    Followed by    [START ON 2/11/2025] amiodarone (PACERONE) tablet 200 mg  200 mg Oral or FT or NG tube BID Ralph Mead MD        Followed by    [START ON 2/13/2025] amiodarone (PACERONE) tablet 200 mg  200 mg Oral or FT or NG tube Daily Ralph Mead MD        cefTRIAXone (ROCEPHIN) 1 g vial to attach to  mL bag for ADULTS or NS 50 mL bag for PEDS  1 g Intravenous Q24H Ra Elmore, DO 0 mL/hr at 02/06/25 0700 1 g at 02/07/25 1710    epoetin aparna-epbx (RETACRIT) injection 10,000 Units  10,000 Units Intravenous Once in dialysis/CRRT Jj Leiva MD        sodium chloride 0.9% DIALYSIS Cath LOCK - RED Lumen  10 mL Intracatheter Once in dialysis/CRRT Jj Leiva MD        Followed by    heparin 1000 unit/mL DIALYSIS Cath LOCK - RED Lumen  1.3-2.6 mL Intracatheter Once in dialysis/CRRT Jj Leiva MD        sodium chloride 0.9% DIALYSIS Cath LOCK - BLUE Lumen  10 mL Intracatheter Once in dialysis/CRRT Jj Leiva MD        Followed by    heparin 1000 unit/mL DIALYSIS Cath LOCK -BLUE Lumen  1.3-2.6 mL Intracatheter Once in dialysis/CRRT Jj Leiva MD        multivitamin RENAL (TRIPHROCAPS) capsule 1 capsule  1 capsule Oral Daily Jj Leiva MD   1 capsule at 02/08/25 0910    No heparin via hemodialysis machine   Does not apply Once Jj Leiva MD        pantoprazole (PROTONIX) IV push injection 40 mg  40 mg Intravenous Daily with breakfast Luigi Jones MD   40 mg at 02/08/25 1316    sodium chloride (PF) 0.9% PF flush 3 mL  3 mL Intracatheter Q8H Ra Elmore, DO   3 mL at 02/06/25 1232    sodium chloride (PF) 0.9% PF flush 9 mL  9 mL  Intracatheter During Dialysis/CRRT (from stock) Jj Leiva MD        sodium chloride (PF) 0.9% PF flush 9 mL  9 mL Intracatheter During Dialysis/CRRT (from stock) Jj Leiva MD        sodium chloride 0.9% BOLUS 200 mL  200 mL Hemodialysis Machine Once Jj Leiva MD        sodium chloride 0.9% BOLUS 250 mL  250 mL Intravenous Once in dialysis/CRRT Jj Leiva MD         Current Facility-Administered Medications   Medication Dose Route Frequency Provider Last Rate Last Admin    acetaminophen (TYLENOL) tablet 650 mg  650 mg Oral Q4H PRN Ra Elmore DO        Or    acetaminophen (TYLENOL) Suppository 650 mg  650 mg Rectal Q4H PRN Ra Elmore DO        alteplase (CATHFLO ACTIVASE) injection 2 mg  2 mg Intracatheter Q1H PRN Jj Leiva MD        alteplase (CATHFLO ACTIVASE) injection 2 mg  2 mg Intracatheter Q1H PRN Jj Leiva MD        benzocaine-menthol (CHLORASEPTIC) 6-10 MG lozenge 1 lozenge  1 lozenge Buccal Q1H PRN Ra Elmore DO        bisacodyl (DULCOLAX) suppository 10 mg  10 mg Rectal Daily PRN Ra Elmore DO        calcium carbonate (TUMS) chewable tablet 1,000 mg  1,000 mg Oral 4x Daily PRN Ra Elmore DO        guaiFENesin (ROBITUSSIN) 20 mg/mL solution 200 mg  200 mg Oral Q4H PRN Ra Elmore DO        lidocaine (LMX4) cream   Topical Q1H PRN Ra Elmore,         lidocaine 1 % 0.1-1 mL  0.1-1 mL Other Q1H PRN Ra Elmore DO        LORazepam (ATIVAN) injection 0.5 mg  0.5 mg Intravenous Q6H PRN Ra Elmore,    0.5 mg at 02/08/25 0909    melatonin tablet 5 mg  5 mg Oral At Bedtime PRN Ra Elmore DO        naloxone (NARCAN) injection 0.2 mg  0.2 mg Intravenous Q2 Min PRN Francisco Correa MD        Or    naloxone (NARCAN) injection 0.4 mg  0.4 mg Intravenous Q2 Min PRN Francisco Correa MD        Or    naloxone (NARCAN) injection 0.2 mg  0.2 mg Intramuscular Q2 Min PRN Francisco Correa MD        Or     naloxone (NARCAN) injection 0.4 mg  0.4 mg Intramuscular Q2 Min PRN Francisco Correa MD        ondansetron (ZOFRAN ODT) ODT tab 4 mg  4 mg Oral Q6H PRN Ra Elmore DO        Or    ondansetron (ZOFRAN) injection 4 mg  4 mg Intravenous Q6H PRN aR Elmore DO   4 mg at 02/07/25 1710    oxyCODONE (ROXICODONE) tablet 5 mg  5 mg Oral Q4H PRN Ra Elmore DO        oxyCODONE IR (ROXICODONE) half-tab 2.5 mg  2.5 mg Oral Q4H PRN Ra Elmore DO        prochlorperazine (COMPAZINE) injection 10 mg  10 mg Intravenous Q6H PRN Ra Elmore DO   10 mg at 02/07/25 1817    Or    prochlorperazine (COMPAZINE) tablet 10 mg  10 mg Oral Q6H PRN Ra Elmore DO        senna-docusate (SENOKOT-S/PERICOLACE) 8.6-50 MG per tablet 1 tablet  1 tablet Oral BID PRN Ra Elmore DO        Or    senna-docusate (SENOKOT-S/PERICOLACE) 8.6-50 MG per tablet 2 tablet  2 tablet Oral BID PRN Ra Elmore DO        sodium chloride (PF) 0.9% PF flush 10 mL  10 mL Intracatheter Q15 Min PRN Jj Leiva MD        sodium chloride (PF) 0.9% PF flush 10 mL  10 mL Intracatheter Q15 Min PRN Jj Leiva MD        sodium chloride (PF) 0.9% PF flush 3 mL  3 mL Intracatheter q1 min prn Ra Elmore DO        sodium chloride 0.9% BOLUS 100-150 mL  100-150 mL Intravenous Q15 Min PRN Jj Leiva MD            Allergies   Allergen Reactions    Benzoyl Peroxide      swelling    Ibuprofen      over long duration dev. hives    Amoxicillin Rash     Face; in early adulthood    Tolerated cephalexin 01/2025    Penicillins Rash     Face; in early adulthood.    Tolerated cephalexin 01/2025              Labs:     BMP  Recent Labs   Lab 02/08/25  1012 02/07/25  0630 02/06/25 2023 02/06/25  1037    137 136 138   POTASSIUM 4.1 4.0 4.0 3.4   CHLORIDE 97* 100 97* 99   KELTON 8.3* 8.1* 8.1* 8.2*   CO2 22 20* 19* 23   BUN 32.7* 58.9* 55.6* 56.5*   CR 4.83* 6.59* 6.13* 5.29*   GLC 98 83 84 90      CBC  Recent Labs   Lab 02/08/25  1134 02/06/25  1037 02/05/25  1358   WBC 7.7 5.5 9.1   HGB 8.0* 8.7* 10.0*   HCT 24.6* 26.1* 30.1*   MCV 91 90 91    210 280     Lab Results   Component Value Date    AST 20 02/05/2025    ALT 12 02/05/2025    GGT 15 01/08/2025    ALKPHOS 56 02/05/2025    BILITOTAL 0.3 02/05/2025            Physical Exam:   Vitals were reviewed in [facility]    Wt Readings from Last 3 Encounters:   02/05/25 49.8 kg (109 lb 12.6 oz)   01/15/25 52.9 kg (116 lb 11.2 oz)   01/08/25 54 kg (119 lb)       Intake/Output Summary (Last 24 hours) at 2/8/2025 1340  Last data filed at 2/8/2025 0200  Gross per 24 hour   Intake 3694.17 ml   Output --   Net 3694.17 ml       GENERAL APPEARANCE: pleasant, no distress, a & o  HEENT:  Eyes/ears/nose grossly normal, neck supple  RESP: lungs clear to auscultation with good efforts, no crackles, rhonchi or wheezes  CV: regular rate and rhythm, normal S1 S2, no murmur, click or rub   ABDOMEN: soft, nontender, bowel sounds normal  EXTREMITIES/SKIN: no edema, no rashes or lesions     Pt seen on dialysis.  Stable run.  Good BFR.      Attestation:  I have reviewed today's vital signs, notes, medications, labs and imaging.     Jj Leiva MD  [facility] - Nephrology  [phone]

## 2025-02-08 NOTE — PROGRESS NOTES
"Oncology/Hematology Follow Up Note:    Assessment and Plan:  #1 AL amyloidosis  - With at least kidney involvement, may have cardiac involvement as well with elevated troponin and BNP  - She does NOT have multiple myeloma  - Started Darzalex, Cytoxan, Velcade and dexamethasone this past Monday 2/3     PLAN:  - Due for Darzalex, Cytoxan, Velcade and dex again next Monday.  Will coordinate with nephrology on dialysis schedule.  Very good chance of response (>90%), but unclear if her kidney function will get better with treatment.  - Will also get a non-urgent cardiac MRI in the outpatient setting, to evaluate for cardiac amyloidosis     #2 Anemia  - Due to ESRD and AL amyloidosis.     PLAN:  - WAYNE per nephrology    #3 Paroxysmal atrial flutter  - OK from my standpoint to start anticoagulation if cardiology thinks it's beneficial.  I do not anticipate her amyloidosis treatment causing significant thrombocytopenia     Malachi Brown M.D.  [facility]  [phone]    Subjective:    Patient developed paroxysmal atrial flutter versus SVT yesterday, which resolved spontaneously.  Feels less tired today than yesterday.      Labs:  All labs reviewed    CBC  Recent Labs   Lab 02/06/25  1037 02/05/25  1358   WBC 5.5 9.1   HGB 8.7* 10.0*   MCV 90 91    280       CMP  Recent Labs   Lab 02/07/25  0630 02/06/25 2023 02/06/25  1037 02/05/25  1358    136 138 134*   POTASSIUM 4.0 4.0 3.4 4.6   CHLORIDE 100 97* 99 95*   CO2 20* 19* 23 18*   ANIONGAP 17* 20* 16* 21*   GLC 83 84 90 109*   BUN 58.9* 55.6* 56.5* 105.1*   CR 6.59* 6.13* 5.29* 10.28*   GFRESTIMATED 7* 7* 8* 4*   KELTON 8.1* 8.1* 8.2* 9.7   MAG  --  2.0  --  2.9*   PHOS 5.6*  --  2.9 6.3*   PROTTOTAL  --   --   --  8.3   ALBUMIN 2.8*  --  3.5 3.9   BILITOTAL  --   --   --  0.3   ALKPHOS  --   --   --  56   AST  --   --   --  20   ALT  --   --   --  12       INRNo lab results found in last 7 days.    Blood CultureNo results for input(s): \"CULT\" in the last 168 " hours.      Malachi Brown MD  [facility]  2/7/2025 8:05 PM

## 2025-02-08 NOTE — PROVIDER NOTIFICATION
MD Notification    Notified Person: MD    Notified Person Name: Francisco Correa    Notification Date/Time: 2/8 1115    Notification Interaction: vocera message    Purpose of Notification: per tele, patient HR sustaining in 130s    Orders Received: acknowledged    Comments:

## 2025-02-08 NOTE — PLAN OF CARE
"Goal Outcome Evaluation:      Plan of Care Reviewed With: patient    Overall Patient Progress: no changeOverall Patient Progress: no change    Outcome Evaluation: Patient alert and oriented x4. Up with assist of 1 with ambulation in room. Patient rested well between care. PRN ativan given x1 for nausea with relief. NS infusing at 50mL/hr.      Problem: Adult Inpatient Plan of Care  Goal: Plan of Care Review  Description: The Plan of Care Review/Shift note should be completed every shift.  The Outcome Evaluation is a brief statement about your assessment that the patient is improving, declining, or no change.  This information will be displayed automatically on your shift  note.  Outcome: Progressing  Flowsheets (Taken 2/8/2025 0519)  Outcome Evaluation: Patient alert and oriented x4. Up with assist of 1 with ambulation in room. Patient rested well between care. PRN ativan given x1 for nausea with relief. NS infusing at 50mL/hr.  Plan of Care Reviewed With: patient  Overall Patient Progress: no change  Goal: Patient-Specific Goal (Individualized)  Description: You can add care plan individualizations to a care plan. Examples of Individualization might be:  \"Parent requests to be called daily at 9am for status\", \"I have a hard time hearing out of my right ear\", or \"Do not touch me to wake me up as it startles  me\".  Outcome: Progressing  Goal: Absence of Hospital-Acquired Illness or Injury  Outcome: Progressing  Intervention: Identify and Manage Fall Risk  Recent Flowsheet Documentation  Taken 2/8/2025 0100 by Johnson Garrett RN  Safety Promotion/Fall Prevention: activity supervised  Taken 2/7/2025 1930 by Johnson Garrett RN  Safety Promotion/Fall Prevention: activity supervised  Intervention: Prevent Skin Injury  Recent Flowsheet Documentation  Taken 2/8/2025 0100 by Johnson Garrett RN  Body Position: position changed independently  Taken 2/7/2025 1930 by Johnson Garrett RN  Body Position: position changed " independently  Intervention: Prevent and Manage VTE (Venous Thromboembolism) Risk  Recent Flowsheet Documentation  Taken 2/8/2025 0100 by Johnson Garrett RN  VTE Prevention/Management: SCDs off (sequential compression devices)  Taken 2/7/2025 1930 by Johnson Garrett RN  VTE Prevention/Management: SCDs off (sequential compression devices)  Intervention: Prevent Infection  Recent Flowsheet Documentation  Taken 2/8/2025 0125 by Johnson Garrett RN  Infection Prevention:   hand hygiene promoted   rest/sleep promoted   single patient room provided  Goal: Optimal Comfort and Wellbeing  Outcome: Progressing  Goal: Readiness for Transition of Care  Outcome: Progressing     Problem: Nausea and Vomiting  Goal: Nausea and Vomiting Relief  Outcome: Progressing  Intervention: Prevent and Manage Nausea and Vomiting  Recent Flowsheet Documentation  Taken 2/8/2025 0100 by Johnson Garrett RN  Nausea/Vomiting Interventions: antiemetic  Taken 2/7/2025 1930 by Johnson Garrett RN  Nausea/Vomiting Interventions: antiemetic     Problem: Skin Injury Risk Increased  Goal: Skin Health and Integrity  Outcome: Progressing  Intervention: Optimize Skin Protection  Recent Flowsheet Documentation  Taken 2/8/2025 0100 by Johnson Garrett RN  Activity Management:   activity adjusted per tolerance   ambulated to bathroom   back to bed  Head of Bed (HOB) Positioning: HOB at 20-30 degrees  Taken 2/7/2025 1930 by Johnson Garrett RN  Activity Management:   activity adjusted per tolerance   ambulated to bathroom   back to bed  Head of Bed (HOB) Positioning: HOB at 20-30 degrees

## 2025-02-08 NOTE — PROVIDER NOTIFICATION
MD Notification    Notified Person: MD    Notified Person Name: Francisco Correa    Notification Date/Time: 02/08 0918    Notification Interaction: vocera message    Purpose of Notification: per patient report patient started having black stools, once last night and once this morning    Orders Received: acknowledged by MD    Comments:

## 2025-02-08 NOTE — PROGRESS NOTES
Oncology/Hematology Follow Up Note:    Assessment and Plan:  #1 AL amyloidosis  - With at least kidney involvement, may have cardiac involvement as well with elevated troponin and BNP  - She does NOT have multiple myeloma  - Started Darzalex, Cytoxan, Velcade and dexamethasone this past Monday 2/3     PLAN:  - Due for Darzalex, Cytoxan, Velcade and dex again next Monday.  Will coordinate with nephrology on dialysis schedule.  Very good chance of response (>90%), but unclear if her kidney function will get better with treatment.  - Will also get a non-urgent cardiac MRI in the outpatient setting, to evaluate for cardiac amyloidosis     #2 Anemia  - Due to ESRD and AL amyloidosis.  -Patient also reported 3 episodes of dark-colored stools since yesterday     PLAN:  - WAYNE per nephrology.  -Started on Protonix added blood CBC.  -May need consultation with gastroenterology    #3 Paroxysmal atrial flutter  - OK from my standpoint to start anticoagulation if cardiology thinks it's beneficial.  I do not anticipate her amyloidosis treatment causing significant thrombocytopenia     Luigi Jones  [facility]  [phone]    Subjective:    Patient developed paroxysmal atrial flutter versus SVT yesterday, which resolved spontaneously.  Feels less tired today than yesterday.    2/8: Patient reports that she has had 3 episodes of dialysis since hospital admission she continues to feel quite fatigued she reported 3 episodes of dark-colored stools she denies any chest pain shortness of breath    Labs:  All labs reviewed    CBC  Recent Labs   Lab 02/08/25  1134 02/06/25  1037 02/05/25  1358   WBC 7.7 5.5 9.1   HGB 8.0* 8.7* 10.0*   MCV 91 90 91    210 280       CMP  Recent Labs   Lab 02/08/25  1012 02/07/25  0630 02/06/25  2023 02/06/25  1037 02/05/25  1358    137 136 138 134*   POTASSIUM 4.1 4.0 4.0 3.4 4.6   CHLORIDE 97* 100 97* 99 95*   CO2 22 20* 19* 23 18*   ANIONGAP 16* 17* 20* 16* 21*   GLC 98 83 84 90 109*  "  BUN 32.7* 58.9* 55.6* 56.5* 105.1*   CR 4.83* 6.59* 6.13* 5.29* 10.28*   GFRESTIMATED 9* 7* 7* 8* 4*   KELTON 8.3* 8.1* 8.1* 8.2* 9.7   MAG 2.0  --  2.0  --  2.9*   PHOS 4.6* 5.6*  --  2.9 6.3*   PROTTOTAL  --   --   --   --  8.3   ALBUMIN 2.9* 2.8*  --  3.5 3.9   BILITOTAL  --   --   --   --  0.3   ALKPHOS  --   --   --   --  56   AST  --   --   --   --  20   ALT  --   --   --   --  12       INRNo lab results found in last 7 days.    Blood CultureNo results for input(s): \"CULT\" in the last 168 hours.            "

## 2025-02-08 NOTE — PROGRESS NOTES
Cardiology chart review.  Possible black stools and anemia.  Sinus rhythm.    On oral amiodarone.  Anticoagulation too high risk at this point, may be readdressed in the future if recurrent atrial fibrillation despite the amiodarone.    Follow-up in cardiology clinic 2 to 4 weeks.  Follow-up cardiac MRI and clinic visit ordered by me.    Will sign off, please call with questions.

## 2025-02-09 ENCOUNTER — APPOINTMENT (OUTPATIENT)
Dept: OCCUPATIONAL THERAPY | Facility: CLINIC | Age: 65
End: 2025-02-09
Payer: COMMERCIAL

## 2025-02-09 LAB
ALBUMIN SERPL BCG-MCNC: 3.1 G/DL (ref 3.5–5.2)
ANION GAP SERPL CALCULATED.3IONS-SCNC: 16 MMOL/L (ref 7–15)
BUN SERPL-MCNC: 18.2 MG/DL (ref 8–23)
CALCIUM SERPL-MCNC: 8.4 MG/DL (ref 8.8–10.4)
CHLORIDE SERPL-SCNC: 100 MMOL/L (ref 98–107)
CREAT SERPL-MCNC: 3.58 MG/DL (ref 0.51–0.95)
EGFRCR SERPLBLD CKD-EPI 2021: 14 ML/MIN/1.73M2
GLUCOSE SERPL-MCNC: 122 MG/DL (ref 70–99)
HCO3 SERPL-SCNC: 21 MMOL/L (ref 22–29)
HGB BLD-MCNC: 8.9 G/DL (ref 11.7–15.7)
PHOSPHATE SERPL-MCNC: 3.2 MG/DL (ref 2.5–4.5)
POTASSIUM SERPL-SCNC: 3.9 MMOL/L (ref 3.4–5.3)
SODIUM SERPL-SCNC: 137 MMOL/L (ref 135–145)

## 2025-02-09 PROCEDURE — 82310 ASSAY OF CALCIUM: CPT | Performed by: INTERNAL MEDICINE

## 2025-02-09 PROCEDURE — 120N000001 HC R&B MED SURG/OB

## 2025-02-09 PROCEDURE — 36415 COLL VENOUS BLD VENIPUNCTURE: CPT | Performed by: INTERNAL MEDICINE

## 2025-02-09 PROCEDURE — 97530 THERAPEUTIC ACTIVITIES: CPT | Mod: GO

## 2025-02-09 PROCEDURE — 84100 ASSAY OF PHOSPHORUS: CPT | Performed by: INTERNAL MEDICINE

## 2025-02-09 PROCEDURE — 99232 SBSQ HOSP IP/OBS MODERATE 35: CPT | Performed by: INTERNAL MEDICINE

## 2025-02-09 PROCEDURE — 250N000013 HC RX MED GY IP 250 OP 250 PS 637: Performed by: INTERNAL MEDICINE

## 2025-02-09 PROCEDURE — 250N000011 HC RX IP 250 OP 636: Performed by: INTERNAL MEDICINE

## 2025-02-09 PROCEDURE — 85018 HEMOGLOBIN: CPT | Performed by: INTERNAL MEDICINE

## 2025-02-09 RX ADMIN — PANTOPRAZOLE SODIUM 40 MG: 40 TABLET, DELAYED RELEASE ORAL at 18:32

## 2025-02-09 RX ADMIN — PANTOPRAZOLE SODIUM 40 MG: 40 TABLET, DELAYED RELEASE ORAL at 08:32

## 2025-02-09 RX ADMIN — AMIODARONE HYDROCHLORIDE 400 MG: 200 TABLET ORAL at 08:32

## 2025-02-09 RX ADMIN — AMIODARONE HYDROCHLORIDE 400 MG: 200 TABLET ORAL at 21:05

## 2025-02-09 RX ADMIN — LORAZEPAM 0.5 MG: 2 INJECTION INTRAMUSCULAR; INTRAVENOUS at 21:04

## 2025-02-09 RX ADMIN — CEFTRIAXONE 1 G: 1 INJECTION, POWDER, FOR SOLUTION INTRAMUSCULAR; INTRAVENOUS at 18:32

## 2025-02-09 RX ADMIN — Medication 1 CAPSULE: at 08:32

## 2025-02-09 ASSESSMENT — ACTIVITIES OF DAILY LIVING (ADL)
ADLS_ACUITY_SCORE: 43

## 2025-02-09 NOTE — PROGRESS NOTES
Hospitalist Medicine Progress Note   [facility]       Dulce Ma is a 64 year old lady with multiple myeloma, ESRD, HTN, hypercholesteremia came to [facility] 2/5/2025 with nausea, vomiting after having recently started chemotherapy.  Her creatinine at admission was 10.28  sodium 134 potassium 4.6 chloride 95 bicarb 18 and glucose 109.  Hemoglobin was 10.  Patient did have tunneled dialysis catheter placement by interventional radiologist Dr. Vlad Montiel MD on 2/6/2025 to start dialysis she did have a short run of sinus tachycardia which was thought to be?  SVT as it spontaneously resolved with heart rates fluctuating between 40 and 140 cardiology was consulted       Date of Admission:  2/5/2025  Assessment & Plan     Paroxysmal atrial tachycardia  ?  SVT  History of atrial fibrillation in the past  Patient was evaluated by cardiology and started on amiodarone  Plan is to give anticoagulation later if needed    End-stage kidney disease.  Patient had dialysis  Appreciate nephrology consultation  -Dialysis diet.  Management as per nephrology  Plan is to get a [facility] for the patient in the [facility]     AL amyloidosis   -Recently started chemotherapy.  -Oncology consulted who wants to coordinate with nephrology regarding further treatment with chemotherapy.     Nausea and vomiting.  -Multiple episodes of vomiting the last few days since starting chemo.  -Appears dehydrated.  -Gentle IV fluids overnight with plan for dialysis to initiate soon.  -Antiemetics as needed.     Acute E. coli urinary tract infection.  -Continue ceftriaxone 1 g IV every 24 hours.  Colitis pansensitive     Hyponatremia.  Hypochloremia.  -Mild.  Sodium 134, chloride 95.  Both of which have normalized  -Nephrology planning for dialysis initiation.  -Recheck metabolic panel intermittently.     Malnutrition.  -Registered dietitian consult.     Anemia.  -Oncology  following  -Recheck CBC tomorrow.            Plan:   Had dialysis today    Discharge in am     Diet: Combination Diet Full Liquid; Renal Diet (dialysis)    DVT Prophylaxis: Heparin SQ  Butler Catheter: Not present  Code Status: Full Code         Medically Ready for Discharge: Anticipated in 2-4 Days    Clinically Significant Risk Factors          # Hypochloremia: Lowest Cl = 97 mmol/L in last 2 days, will monitor as appropriate      # Hypoalbuminemia: Lowest albumin = 2.8 g/dL at 2/7/2025  6:30 AM, will monitor as appropriate     # Hypertension: Noted on problem list               # Severe Malnutrition: based on nutrition assessment, PRESENT ON ADMISSION                The patient's care was discussed with the Patient and RN.    Francisco Correa MD  Hospitalist Service  [facility]    ______________________________________________________________________    Interval History     Symptoms   No new symptoms  Met in dialysis     Review of Systems:   Weak but walked     Data reviewed today: I reviewed all medications, new labs and imaging results over the last 24 hours.     Physical Exam   Vital Signs: Temp: 98.4  F (36.9  C) Temp src: Oral BP: 132/62 Pulse: 97   Resp: 16 SpO2: 97 % O2 Device: None (Room air)    Weight: 109 lbs 12.63 oz      GENERAL: Patient is not in acute distress  HEENT: EOM+,Conjunctiva is clear   NECK:  no Jugular Venous distention  HEART: S1 S2 regular Rate and Rhythm, there is  no murmur,   LUNGS: Respirations are  not laboured, Lungs are  clear to auscultation without Crepitations or Wheezing   ABDOMEN: Soft , there is no tenderness , Bowel Sounds are  Positive   LOWER LIMBS:  Pedal Edema  Bilaterally   CNS:  Alert,  Oriented x 3, Moving all the Four Limbs     Data   Recent Labs   Lab 02/09/25  0827 02/08/25 2028 02/08/25  1134 02/08/25  1012 02/07/25  0630 02/06/25 2023 02/06/25  1037 02/05/25  1358   WBC  --   --  7.7  --   --   --  5.5 9.1   HGB 8.9* 7.8* 8.0*  --   --   --   8.7* 10.0*   MCV  --   --  91  --   --   --  90 91   PLT  --   --  239  --   --   --  210 280     --   --  135 137   < > 138 134*   POTASSIUM 3.9  --   --  4.1 4.0   < > 3.4 4.6   CHLORIDE 100  --   --  97* 100   < > 99 95*   CO2 21*  --   --  22 20*   < > 23 18*   BUN 18.2  --   --  32.7* 58.9*   < > 56.5* 105.1*   CR 3.58*  --   --  4.83* 6.59*   < > 5.29* 10.28*   ANIONGAP 16*  --   --  16* 17*   < > 16* 21*   KELTON 8.4*  --   --  8.3* 8.1*   < > 8.2* 9.7   *  --   --  98 83   < > 90 109*   ALBUMIN 3.1*  --   --  2.9* 2.8*  --  3.5 3.9   PROTTOTAL  --   --   --   --   --   --   --  8.3   BILITOTAL  --   --   --   --   --   --   --  0.3   ALKPHOS  --   --   --   --   --   --   --  56   ALT  --   --   --   --   --   --   --  12   AST  --   --   --   --   --   --   --  20    < > = values in this interval not displayed.         No results found for this or any previous visit (from the past 24 hours).

## 2025-02-09 NOTE — PROGRESS NOTES
Oncology/Hematology Follow Up Note:    Assessment and Plan:  #1 AL amyloidosis  - With at least kidney involvement, may have cardiac involvement as well with elevated troponin and BNP  - She does NOT have multiple myeloma  - Started Darzalex, Cytoxan, Velcade and dexamethasone this past Monday 2/3     PLAN:  - Due for Darzalex, Cytoxan, Velcade and dex again next Monday.  Will coordinate with nephrology on dialysis schedule.  Very good chance of response (>90%), but unclear if her kidney function will get better with treatment.  - Will also get a non-urgent cardiac MRI in the outpatient setting, to evaluate for cardiac amyloidosis     #2 Anemia  - Due to ESRD and AL amyloidosis.  -Patient also reported 3 episodes of dark-colored stools since yesterday.  -There was some drop in hemoglobin from 10-8.7, Patient was seen by gastroenterology plan for EGD tomorrow     PLAN:  - WAYNE per nephrology.  -Started on Protonix added blood CBC.  -Await EGD patient reported that continues to have black-colored stools    #3 Paroxysmal atrial flutter  - Hold off anticoagulation given history of black-colored stools     Luigi Jones  [facility]  [phone]    Subjective:    Patient developed paroxysmal atrial flutter versus SVT yesterday, which resolved spontaneously.  Feels less tired today than yesterday.    2/8: Patient reports that she has had 3 episodes of dialysis since hospital admission she continues to feel quite fatigued she reported 3 episodes of dark-colored stools she denies any chest pain shortness of breath  2/9 Patient continues to have black-colored stools remains some fatigue denies any abdominal pain able to eat  Labs:  All labs reviewed    CBC  Recent Labs   Lab 02/09/25 0827 02/08/25 2028 02/08/25  1134 02/06/25  1037 02/05/25  1358   WBC  --   --  7.7 5.5 9.1   HGB 8.9* 7.8* 8.0* 8.7* 10.0*   MCV  --   --  91 90 91   PLT  --   --  239 210 280       CMP  Recent Labs   Lab 02/09/25  0827 02/08/25  1012  "02/07/25  0630 02/06/25 2023 02/06/25  1037 02/05/25  1358    135 137 136 138 134*   POTASSIUM 3.9 4.1 4.0 4.0 3.4 4.6   CHLORIDE 100 97* 100 97* 99 95*   CO2 21* 22 20* 19* 23 18*   ANIONGAP 16* 16* 17* 20* 16* 21*   * 98 83 84 90 109*   BUN 18.2 32.7* 58.9* 55.6* 56.5* 105.1*   CR 3.58* 4.83* 6.59* 6.13* 5.29* 10.28*   GFRESTIMATED 14* 9* 7* 7* 8* 4*   KELTON 8.4* 8.3* 8.1* 8.1* 8.2* 9.7   MAG  --  2.0  --  2.0  --  2.9*   PHOS 3.2 4.6* 5.6*  --  2.9 6.3*   PROTTOTAL  --   --   --   --   --  8.3   ALBUMIN 3.1* 2.9* 2.8*  --  3.5 3.9   BILITOTAL  --   --   --   --   --  0.3   ALKPHOS  --   --   --   --   --  56   AST  --   --   --   --   --  20   ALT  --   --   --   --   --  12       INRNo lab results found in last 7 days.    Blood CultureNo results for input(s): \"CULT\" in the last 168 hours.            "

## 2025-02-09 NOTE — PLAN OF CARE
"Goal Outcome Evaluation:      Plan of Care Reviewed With: patient    Overall Patient Progress: improvingOverall Patient Progress: improving    Outcome Evaluation: ativan for nausea, with relief.  continues on iv rocephin IV. up with assist of one, walker and gait belt.  Problem: Adult Inpatient Plan of Care  Goal: Plan of Care Review  Description: The Plan of Care Review/Shift note should be completed every shift.  The Outcome Evaluation is a brief statement about your assessment that the patient is improving, declining, or no change.  This information will be displayed automatically on your shift  note.  Outcome: Progressing  Flowsheets (Taken 2/8/2025 2359)  Outcome Evaluation: ativan for nausea, with relief.  continues on iv rocephin IV. up with assist of one, walker and gait belt.  Plan of Care Reviewed With: patient  Overall Patient Progress: improving  Goal: Patient-Specific Goal (Individualized)  Description: You can add care plan individualizations to a care plan. Examples of Individualization might be:  \"Parent requests to be called daily at 9am for status\", \"I have a hard time hearing out of my right ear\", or \"Do not touch me to wake me up as it startles  me\".  Outcome: Progressing  Goal: Absence of Hospital-Acquired Illness or Injury  Outcome: Progressing  Intervention: Identify and Manage Fall Risk  Recent Flowsheet Documentation  Taken 2/8/2025 2100 by Larissa Bryan, RN  Safety Promotion/Fall Prevention:   activity supervised   clutter free environment maintained   lighting adjusted   nonskid shoes/slippers when out of bed   patient and family education   room door open   room near nurse's station   room organization consistent   safety round/check completed   supervised activity  Intervention: Prevent Skin Injury  Recent Flowsheet Documentation  Taken 2/8/2025 2100 by Larissa Bryan, RN  Body Position: position changed independently  Intervention: Prevent Infection  Recent Flowsheet Documentation  Taken " 2/8/2025 2100 by Larissa Bryan RN  Infection Prevention:   hand hygiene promoted   rest/sleep promoted   single patient room provided  Goal: Optimal Comfort and Wellbeing  Outcome: Progressing  Intervention: Monitor Pain and Promote Comfort  Recent Flowsheet Documentation  Taken 2/8/2025 2100 by Larissa Bryan RN  Pain Management Interventions:   repositioned   rest  Goal: Readiness for Transition of Care  Outcome: Progressing     Problem: Nausea and Vomiting  Goal: Nausea and Vomiting Relief  Outcome: Progressing  Intervention: Prevent and Manage Nausea and Vomiting  Recent Flowsheet Documentation  Taken 2/8/2025 2100 by Larissa Bryan RN  Nausea/Vomiting Interventions: antiemetic     Problem: Skin Injury Risk Increased  Goal: Skin Health and Integrity  Outcome: Progressing  Intervention: Optimize Skin Protection  Recent Flowsheet Documentation  Taken 2/8/2025 2100 by Larissa Bryan RN  Pressure Reduction Devices: positioning supports utilized  Activity Management:   activity adjusted per tolerance   ambulated to bathroom  Head of Bed (HOB) Positioning: HOB at 30 degrees

## 2025-02-09 NOTE — CONSULTS
[facility]    Dulce Ma   [id number] [place] [address]  64 year old female    Admission Date/Time: 2/5/2025  1:41 PM    Primary Care Provider:  Alida Lamb    Requesting Physician: Ra Elmore DO    ASSESSMENT AND RECOMMENDATIONS:   64 F w amyloid and CKD who presented for CHATA on CKD. Now having melena.     Melena  Question GIB. Remote colonoscopy and recent cologuard (within last 3 months) normal. No h/o EGD. No NSAIDs. Nausea. Ddx includes PUD, AVM, esophagitis, MW tear.     - PPI  - EGD tomorrow  - NPO after MN    Approximately 40 minutes of total time was spent providing patient care including patient evaluation, reviewing documentation/test results, and .            Mika Rodriguez MD  Thank you for the opportunity to participate in the care of this patient.   Please feel free to call me with any questions or concerns.  Phone number [phone] or if after 5PM [phone].         CHIEF COMPLAINT:   melena    REASON FOR THE CONSULT:  melena    HPI: 64 F w amyloid and CKD who presented for CHATA on CKD. Now having melena.     Black tarry BM yesterday x3. None overnight though. Feels like GERD for nausea and vomiting. No hematemesis. No h/o ulcer or EGD.     REVIEW OF SYSTEMS:  10 point ROS otherwise neg.    PAST MEDICAL HISTORY:  Past Medical History:   Diagnosis Date    Anesthesia complication, initial encounter     was completely out with Midazolam 2 mg IV, Fentanyl 100 micrograms IV that was given at time of colonoscopy 7/2018     Dysplastic nevi     Glaucoma     Diagnosed in Spring 2010    Hypertension goal BP (blood pressure) < 140/90 at age 50     very strong family hx     Lumbago     stiffness in low back    Other malignant neoplasm of skin of trunk, except scrotum 2/02    Dr. Myles Lake, melanoma with negative sentinel node biopsy - no chemo-Dr. Selene Gonzales-derm    Perimenopausal     Routine gyne exam     [facility] ob[facility] -  Dr. Isabela Perez     Skin cancer, basal cell     multiple - 3 on nose, treated with interferon intralesionally x 1     Unspecified derangement, shoulder region     s/p horse-riding injury- no problem now    Unspecified musculoskeletal disorders and symptoms referable to neck     compressed vertebrae in neck- bothers pt rarely     PAST SURGICAL HISTORY:  Past Surgical History:   Procedure Laterality Date    APPENDECTOMY      aprroximately 8-10 years agp    BREAST SURGERY      Breast biopsies    COLONOSCOPY N/A 2018    Procedure: COLONOSCOPY;  COLONOSCOPY ;  Surgeon: Ren Whitehead MD;  Location: [facility]    IR CVC TUNNEL PLACEMENT > 5 YRS OF AGE  2025    ORTHOPEDIC SURGERY Right 2014    ORIF right ring finger    ZZC NONSPECIFIC PROCEDURE  3/02    malignant melanoma removed from abdomen with negative nodes-Dr. Acosta -surgeon     FAMILY HISTORY:  Family History   Problem Relation Age of Onset    Hypertension Mother     Heart Disease Mother         bypass    Neuropathy Mother     Aortic Valve Replacement Mother 83    Hypertension Father     Coronary Artery Disease Father 83         in  from [place]    Eye Disorder Maternal Grandmother         glacoma    Eye Disorder Maternal Grandfather         glacoma    Hypertension Paternal Grandmother     Colon Cancer No family hx of      SOCIAL HISTORY:  Social History     Tobacco Use    Smoking status: Never    Smokeless tobacco: Never   Substance Use Topics    Alcohol use: Yes     Comment: occ.     ALLERGIES/SENSITIVITIES:  Benzoyl peroxide, Ibuprofen, Amoxicillin, and Penicillins    MEDICATIONS:  Current Facility-Administered Medications   Medication Dose Route Frequency Provider Last Rate Last Admin    - MEDICATION INSTRUCTIONS for Dialysis Patients -   Does not apply See Admin Instructions Francisco Correa MD        acetaminophen (TYLENOL) tablet 650 mg  650 mg Oral Q4H PRN Ra Elmore DO        Or    acetaminophen (TYLENOL) Suppository 650 mg  650 mg  Rectal Q4H PRN Ra Elmore DO        alteplase (CATHFLO ACTIVASE) injection 2 mg  2 mg Intracatheter Q1H PRN Jj Leiva MD        alteplase (CATHFLO ACTIVASE) injection 2 mg  2 mg Intracatheter Q1H PRN Jj Leiva MD        amiodarone (PACERONE) tablet 400 mg  400 mg Oral or FT or NG tube BID Ralph Mead MD   400 mg at 02/08/25 2038    Followed by    [START ON 2/11/2025] amiodarone (PACERONE) tablet 200 mg  200 mg Oral or FT or NG tube BID Ralph Mead MD        Followed by    [START ON 2/13/2025] amiodarone (PACERONE) tablet 200 mg  200 mg Oral or FT or NG tube Daily Ralph Mead MD        benzocaine-menthol (CHLORASEPTIC) 6-10 MG lozenge 1 lozenge  1 lozenge Buccal Q1H PRN Ra Elmore,         bisacodyl (DULCOLAX) suppository 10 mg  10 mg Rectal Daily PRN Ra Elmore,         calcium carbonate (TUMS) chewable tablet 1,000 mg  1,000 mg Oral 4x Daily PRN Ra Elmore DO        cefTRIAXone (ROCEPHIN) 1 g vial to attach to  mL bag for ADULTS or NS 50 mL bag for PEDS  1 g Intravenous Q24H Ra Elmore, DO 0 mL/hr at 02/06/25 0700 1 g at 02/08/25 2030    guaiFENesin (ROBITUSSIN) 20 mg/mL solution 200 mg  200 mg Oral Q4H PRN Ra Elmore, DO        lidocaine (LMX4) cream   Topical Q1H PRN Ra Elmore, DO        lidocaine 1 % 0.1-1 mL  0.1-1 mL Other Q1H PRN Ra Elmore, DO        LORazepam (ATIVAN) injection 0.5 mg  0.5 mg Intravenous Q6H PRN Ra Elmore, DO   0.5 mg at 02/08/25 2035    melatonin tablet 5 mg  5 mg Oral At Bedtime PRN Ra Elmoer, DO        multivitamin RENAL (TRIPHROCAPS) capsule 1 capsule  1 capsule Oral Daily Jj Leiva MD   1 capsule at 02/08/25 0910    naloxone (NARCAN) injection 0.2 mg  0.2 mg Intravenous Q2 Min PRN Francisco Correa MD        Or    naloxone (NARCAN) injection 0.4 mg  0.4 mg Intravenous Q2 Min PRN Francisco Correa MD        Or    naloxone (NARCAN) injection 0.2  mg  0.2 mg Intramuscular Q2 Min PRN Francisco Correa MD        Or    naloxone (NARCAN) injection 0.4 mg  0.4 mg Intramuscular Q2 Min PRN Francisco Correa MD        No heparin via hemodialysis machine   Does not apply Once Jj Leiva MD        ondansetron (ZOFRAN ODT) ODT tab 4 mg  4 mg Oral Q6H PRN Ra Elmore DO        Or    ondansetron (ZOFRAN) injection 4 mg  4 mg Intravenous Q6H PRN Ra Elmore DO   4 mg at 02/07/25 1710    oxyCODONE (ROXICODONE) tablet 5 mg  5 mg Oral Q4H PRN Ra Elmore DO        oxyCODONE IR (ROXICODONE) half-tab 2.5 mg  2.5 mg Oral Q4H PRN Ra Elmore DO        pantoprazole (PROTONIX) EC tablet 40 mg  40 mg Oral BID AC Jj Leiva MD   40 mg at 02/08/25 1801    pantoprazole (PROTONIX) IV push injection 40 mg  40 mg Intravenous Daily with breakfast Luigi Jones MD   40 mg at 02/08/25 1316    prochlorperazine (COMPAZINE) injection 10 mg  10 mg Intravenous Q6H PRN Ra Elmore DO   10 mg at 02/07/25 1817    Or    prochlorperazine (COMPAZINE) tablet 10 mg  10 mg Oral Q6H PRN Ra Elmore DO        senna-docusate (SENOKOT-S/PERICOLACE) 8.6-50 MG per tablet 1 tablet  1 tablet Oral BID PRN Ra Elmore DO        Or    senna-docusate (SENOKOT-S/PERICOLACE) 8.6-50 MG per tablet 2 tablet  2 tablet Oral BID PRN Ra Elmore DO        sodium chloride (PF) 0.9% PF flush 10 mL  10 mL Intracatheter Q15 Min PRN Jj Leiva MD        sodium chloride (PF) 0.9% PF flush 10 mL  10 mL Intracatheter Q15 Min PRN Jj Leiva MD        sodium chloride (PF) 0.9% PF flush 3 mL  3 mL Intracatheter Q8H Ra Elmore DO   3 mL at 02/09/25 0018    sodium chloride (PF) 0.9% PF flush 3 mL  3 mL Intracatheter q1 min prn Ra Elmore DO        sodium chloride (PF) 0.9% PF flush 9 mL  9 mL Intracatheter During Dialysis/CRRT (from stock) Jj Leiva MD        sodium chloride (PF) 0.9% PF flush 9 mL  9 mL Intracatheter During  "Dialysis/CRRT (from stock) Jj Leiva MD        sodium chloride 0.9% BOLUS 100-150 mL  100-150 mL Intravenous Q15 Min PRN Jj Leiva MD         PHYSICAL EXAM:  BP (!) 144/70 (BP Location: Right arm, Patient Position: Semi-Bo's, Cuff Size: Adult Regular)   Pulse 95   Temp 98.6  F (37  C) (Oral)   Resp 16   Ht 1.6 m (5' 3\")   Wt 49.8 kg (109 lb 12.6 oz)   LMP 10/31/2011   SpO2 95%   BMI 19.45 kg/m    Body mass index is 19.45 kg/m .  General: NAD  Eyes: No icterus or conjunctivitis  ENT: MMM, oropharynx clear without ulcerations  Neck/Thyroid: full ROM  Pulmonary: nonlabored  Cardiovascular: reg rate  Gastrointestinal: non distended  Skin: No jaundice/petechiae/rashes  Psych: normal affect  Extrem: PPI, no c/c/e    LABORATORY DATA:  CBC:  Recent Labs   Lab Test 02/08/25 2028 02/08/25  1134   WBC  --  7.7   RBC  --  2.71*   HGB 7.8* 8.0*   HCT  --  24.6*   MCV  --  91   MCH  --  29.5   MCHC  --  32.5   RDW  --  12.6   PLT  --  239        BMP:  Recent Labs   Lab 02/08/25  1012 02/07/25  0630 02/06/25 2023    137 136   POTASSIUM 4.1 4.0 4.0   CHLORIDE 97* 100 97*   CO2 22 20* 19*   GLC 98 83 84   CR 4.83* 6.59* 6.13*   BUN 32.7* 58.9* 55.6*       INR:  Recent Labs   Lab Test 01/13/25  1355   INR 1.13       Liver and Pancreas panel:  Recent Labs   Lab 02/05/25  1358   AST 20   ALT 12   ALKPHOS 56   BILITOTAL 0.3         IMAGING:    IR CVC Tunnel Placement > 5 Yrs of Age    Result Date: 2/6/2025  [facility] LOCATION: [facility] DATE: 2/6/2025 PROCEDURE:  TUNNELED DIALYSIS CATHETER PLACEMENT INTERVENTIONAL RADIOLOGIST: Vlad Montiel MD INDICATION: 64-year-old female with history of multiple myeloma now with end-stage renal disease. Tunneled dialysis catheter placement requested. CONSENT: The risks, benefits and alternatives of the above procedures were discussed with the patient in detail. All questions were answered. Informed consent was given to proceed. " MODERATE SEDATION: Versed 1 mg IV; Fentanyl 25 mcg IV.  During the timeout, immediately prior to the administration of medications, the patient was reassessed for adequacy to receive conscious sedation. Under physician supervision, Versed and fentanyl were administered for moderate sedation. Pulse oximetry, heart rate and blood pressure were continuously monitored by an independent trained observer. The physician spent 15 minutes of face-to-face sedation time with the patient. CONTRAST: None. ANTIBIOTICS: 900 mg clindamycin IV. ADDITIONAL MEDICATIONS: None. FLUOROSCOPIC TIME: 0.4 minutes. RADIATION DOSE: Air Kerma: 1 mGy. COMPLICATIONS: No immediate complications. STERILE BARRIER TECHNIQUE: Maximum sterile barrier technique was used. Cutaneous antisepsis was performed at the operative site with application of 2% chlorhexidine and large sterile drape. Prior to the procedure, the  and assistant performed hand hygiene and wore hat, mask, sterile gown, and sterile gloves during the entire procedure. PROCEDURE:   1% lidocaine was used for local anesthesia. Using real-time ultrasound guidance, the right internal jugular vein was accessed. A permanent ultrasound image was saved, documenting patency and compressibility. A subcutaneous tunnel was created requiring a second incision. Using this access, a 14.5 Citizen of [place], 19 cm tip to cuff palindrome dialysis catheter was advanced until the tip was in the upper right atrium. The catheter was tested and found to flush and aspirate appropriately. The catheter was heparinized and secured to the skin. FINDINGS: 1. Ultrasound shows an anechoic and compressible right internal jugular vein. 2. Completion fluoroscopic image demonstrates the tunneled dialysis catheter tip lies in the upper right atrium.     IMPRESSION:  Tunneled dialysis catheter placement, ready for immediate use.      CC: [facility]onis Jessica A

## 2025-02-10 ENCOUNTER — ANESTHESIA EVENT (OUTPATIENT)
Dept: SURGERY | Facility: CLINIC | Age: 65
End: 2025-02-10
Payer: COMMERCIAL

## 2025-02-10 ENCOUNTER — ANESTHESIA (OUTPATIENT)
Dept: SURGERY | Facility: CLINIC | Age: 65
End: 2025-02-10
Payer: COMMERCIAL

## 2025-02-10 PROBLEM — E85.81 AL AMYLOIDOSIS (H): Status: ACTIVE | Noted: 2025-02-10

## 2025-02-10 LAB
ALBUMIN SERPL BCG-MCNC: 2.8 G/DL (ref 3.5–5.2)
ANION GAP SERPL CALCULATED.3IONS-SCNC: 11 MMOL/L (ref 7–15)
BUN SERPL-MCNC: 28.5 MG/DL (ref 8–23)
CALCIUM SERPL-MCNC: 8.2 MG/DL (ref 8.8–10.4)
CHLORIDE SERPL-SCNC: 101 MMOL/L (ref 98–107)
CREAT SERPL-MCNC: 5.24 MG/DL (ref 0.51–0.95)
EGFRCR SERPLBLD CKD-EPI 2021: 9 ML/MIN/1.73M2
GLUCOSE SERPL-MCNC: 108 MG/DL (ref 70–99)
HCO3 SERPL-SCNC: 24 MMOL/L (ref 22–29)
HGB BLD-MCNC: 7.6 G/DL (ref 11.7–15.7)
PHOSPHATE SERPL-MCNC: 3.4 MG/DL (ref 2.5–4.5)
POTASSIUM SERPL-SCNC: 3.8 MMOL/L (ref 3.4–5.3)
SODIUM SERPL-SCNC: 136 MMOL/L (ref 135–145)
UPPER GI ENDOSCOPY: NORMAL

## 2025-02-10 PROCEDURE — 80069 RENAL FUNCTION PANEL: CPT | Performed by: INTERNAL MEDICINE

## 2025-02-10 PROCEDURE — 360N000076 HC SURGERY LEVEL 3, PER MIN: Performed by: INTERNAL MEDICINE

## 2025-02-10 PROCEDURE — 258N000003 HC RX IP 258 OP 636

## 2025-02-10 PROCEDURE — 710N000012 HC RECOVERY PHASE 2, PER MINUTE: Performed by: INTERNAL MEDICINE

## 2025-02-10 PROCEDURE — 250N000011 HC RX IP 250 OP 636: Performed by: INTERNAL MEDICINE

## 2025-02-10 PROCEDURE — 999N000141 HC STATISTIC PRE-PROCEDURE NURSING ASSESSMENT: Performed by: INTERNAL MEDICINE

## 2025-02-10 PROCEDURE — 272N000001 HC OR GENERAL SUPPLY STERILE: Performed by: INTERNAL MEDICINE

## 2025-02-10 PROCEDURE — 120N000001 HC R&B MED SURG/OB

## 2025-02-10 PROCEDURE — 258N000003 HC RX IP 258 OP 636: Performed by: INTERNAL MEDICINE

## 2025-02-10 PROCEDURE — 250N000009 HC RX 250

## 2025-02-10 PROCEDURE — 250N000013 HC RX MED GY IP 250 OP 250 PS 637: Performed by: INTERNAL MEDICINE

## 2025-02-10 PROCEDURE — 250N000009 HC RX 250: Performed by: INTERNAL MEDICINE

## 2025-02-10 PROCEDURE — 0W3P8ZZ CONTROL BLEEDING IN GASTROINTESTINAL TRACT, VIA NATURAL OR ARTIFICIAL OPENING ENDOSCOPIC: ICD-10-PCS | Performed by: INTERNAL MEDICINE

## 2025-02-10 PROCEDURE — 99232 SBSQ HOSP IP/OBS MODERATE 35: CPT | Performed by: INTERNAL MEDICINE

## 2025-02-10 PROCEDURE — 634N000001 HC RX 634: Mod: JZ | Performed by: INTERNAL MEDICINE

## 2025-02-10 PROCEDURE — 90935 HEMODIALYSIS ONE EVALUATION: CPT | Performed by: INTERNAL MEDICINE

## 2025-02-10 PROCEDURE — 85018 HEMOGLOBIN: CPT | Performed by: INTERNAL MEDICINE

## 2025-02-10 PROCEDURE — 250N000011 HC RX IP 250 OP 636

## 2025-02-10 PROCEDURE — 36415 COLL VENOUS BLD VENIPUNCTURE: CPT | Performed by: INTERNAL MEDICINE

## 2025-02-10 PROCEDURE — 370N000017 HC ANESTHESIA TECHNICAL FEE, PER MIN: Performed by: INTERNAL MEDICINE

## 2025-02-10 RX ORDER — ALBUTEROL SULFATE 90 UG/1
1-2 INHALANT RESPIRATORY (INHALATION)
Start: 2025-02-18

## 2025-02-10 RX ORDER — LIDOCAINE 40 MG/G
CREAM TOPICAL
Status: DISCONTINUED | OUTPATIENT
Start: 2025-02-10 | End: 2025-02-10

## 2025-02-10 RX ORDER — ALBUTEROL SULFATE 0.83 MG/ML
2.5 SOLUTION RESPIRATORY (INHALATION)
OUTPATIENT
Start: 2025-02-18

## 2025-02-10 RX ORDER — ONDANSETRON 2 MG/ML
INJECTION INTRAMUSCULAR; INTRAVENOUS PRN
Status: DISCONTINUED | OUTPATIENT
Start: 2025-02-10 | End: 2025-02-10

## 2025-02-10 RX ORDER — ALBUTEROL SULFATE 0.83 MG/ML
2.5 SOLUTION RESPIRATORY (INHALATION)
OUTPATIENT
Start: 2025-03-04

## 2025-02-10 RX ORDER — EPINEPHRINE 1 MG/ML
0.3 INJECTION, SOLUTION, CONCENTRATE INTRAVENOUS EVERY 5 MIN PRN
OUTPATIENT
Start: 2025-03-04

## 2025-02-10 RX ORDER — METHYLPREDNISOLONE SODIUM SUCCINATE 40 MG/ML
40 INJECTION INTRAMUSCULAR; INTRAVENOUS
Start: 2025-02-18

## 2025-02-10 RX ORDER — ALBUTEROL SULFATE 0.83 MG/ML
2.5 SOLUTION RESPIRATORY (INHALATION)
OUTPATIENT
Start: 2025-02-25

## 2025-02-10 RX ORDER — OXYCODONE HYDROCHLORIDE 5 MG/1
5 TABLET ORAL
Status: DISCONTINUED | OUTPATIENT
Start: 2025-02-10 | End: 2025-02-10 | Stop reason: HOSPADM

## 2025-02-10 RX ORDER — LORAZEPAM 2 MG/ML
0.5 INJECTION INTRAMUSCULAR EVERY 4 HOURS PRN
OUTPATIENT
Start: 2025-03-04

## 2025-02-10 RX ORDER — LORAZEPAM 2 MG/ML
0.5 INJECTION INTRAMUSCULAR EVERY 4 HOURS PRN
OUTPATIENT
Start: 2025-02-18

## 2025-02-10 RX ORDER — LIDOCAINE HYDROCHLORIDE 20 MG/ML
INJECTION, SOLUTION INFILTRATION; PERINEURAL PRN
Status: DISCONTINUED | OUTPATIENT
Start: 2025-02-10 | End: 2025-02-10

## 2025-02-10 RX ORDER — EPINEPHRINE 1 MG/ML
0.3 INJECTION, SOLUTION, CONCENTRATE INTRAVENOUS EVERY 5 MIN PRN
OUTPATIENT
Start: 2025-02-25

## 2025-02-10 RX ORDER — ONDANSETRON 4 MG/1
4 TABLET, ORALLY DISINTEGRATING ORAL EVERY 30 MIN PRN
Status: DISCONTINUED | OUTPATIENT
Start: 2025-02-10 | End: 2025-02-10 | Stop reason: HOSPADM

## 2025-02-10 RX ORDER — ONDANSETRON 2 MG/ML
8 INJECTION INTRAMUSCULAR; INTRAVENOUS ONCE
OUTPATIENT
Start: 2025-02-25

## 2025-02-10 RX ORDER — OXYCODONE HYDROCHLORIDE 5 MG/1
10 TABLET ORAL
Status: DISCONTINUED | OUTPATIENT
Start: 2025-02-10 | End: 2025-02-10 | Stop reason: HOSPADM

## 2025-02-10 RX ORDER — PROPOFOL 10 MG/ML
INJECTION, EMULSION INTRAVENOUS CONTINUOUS PRN
Status: DISCONTINUED | OUTPATIENT
Start: 2025-02-10 | End: 2025-02-10

## 2025-02-10 RX ORDER — DIPHENHYDRAMINE HYDROCHLORIDE 50 MG/ML
25 INJECTION INTRAMUSCULAR; INTRAVENOUS
Start: 2025-03-04

## 2025-02-10 RX ORDER — SODIUM CHLORIDE 9 MG/ML
INJECTION, SOLUTION INTRAVENOUS CONTINUOUS
Status: DISCONTINUED | OUTPATIENT
Start: 2025-02-10 | End: 2025-02-10 | Stop reason: HOSPADM

## 2025-02-10 RX ORDER — SODIUM CHLORIDE, SODIUM LACTATE, POTASSIUM CHLORIDE, CALCIUM CHLORIDE 600; 310; 30; 20 MG/100ML; MG/100ML; MG/100ML; MG/100ML
INJECTION, SOLUTION INTRAVENOUS CONTINUOUS PRN
Status: DISCONTINUED | OUTPATIENT
Start: 2025-02-10 | End: 2025-02-10

## 2025-02-10 RX ORDER — PROPOFOL 10 MG/ML
INJECTION, EMULSION INTRAVENOUS PRN
Status: DISCONTINUED | OUTPATIENT
Start: 2025-02-10 | End: 2025-02-10

## 2025-02-10 RX ORDER — LIDOCAINE 40 MG/G
CREAM TOPICAL
Status: DISCONTINUED | OUTPATIENT
Start: 2025-02-10 | End: 2025-02-10 | Stop reason: HOSPADM

## 2025-02-10 RX ORDER — DIPHENHYDRAMINE HYDROCHLORIDE 50 MG/ML
50 INJECTION INTRAMUSCULAR; INTRAVENOUS
Start: 2025-03-04

## 2025-02-10 RX ORDER — DIPHENHYDRAMINE HYDROCHLORIDE 50 MG/ML
25 INJECTION INTRAMUSCULAR; INTRAVENOUS
Start: 2025-02-18

## 2025-02-10 RX ORDER — ALBUTEROL SULFATE 90 UG/1
1-2 INHALANT RESPIRATORY (INHALATION)
Start: 2025-02-25

## 2025-02-10 RX ORDER — ONDANSETRON 2 MG/ML
8 INJECTION INTRAMUSCULAR; INTRAVENOUS ONCE
OUTPATIENT
Start: 2025-03-04

## 2025-02-10 RX ORDER — NALOXONE HYDROCHLORIDE 0.4 MG/ML
0.1 INJECTION, SOLUTION INTRAMUSCULAR; INTRAVENOUS; SUBCUTANEOUS
Status: DISCONTINUED | OUTPATIENT
Start: 2025-02-10 | End: 2025-02-10 | Stop reason: HOSPADM

## 2025-02-10 RX ORDER — METHYLPREDNISOLONE SODIUM SUCCINATE 40 MG/ML
40 INJECTION INTRAMUSCULAR; INTRAVENOUS
Start: 2025-03-04

## 2025-02-10 RX ORDER — ALBUTEROL SULFATE 90 UG/1
1-2 INHALANT RESPIRATORY (INHALATION)
Start: 2025-03-04

## 2025-02-10 RX ORDER — DIPHENHYDRAMINE HYDROCHLORIDE 50 MG/ML
50 INJECTION INTRAMUSCULAR; INTRAVENOUS
Start: 2025-02-25

## 2025-02-10 RX ORDER — DIPHENHYDRAMINE HYDROCHLORIDE 50 MG/ML
50 INJECTION INTRAMUSCULAR; INTRAVENOUS
Start: 2025-02-18

## 2025-02-10 RX ORDER — MEPERIDINE HYDROCHLORIDE 25 MG/ML
25 INJECTION INTRAMUSCULAR; INTRAVENOUS; SUBCUTANEOUS
OUTPATIENT
Start: 2025-02-18

## 2025-02-10 RX ORDER — METHYLPREDNISOLONE SODIUM SUCCINATE 40 MG/ML
40 INJECTION INTRAMUSCULAR; INTRAVENOUS
Start: 2025-02-25

## 2025-02-10 RX ORDER — MEPERIDINE HYDROCHLORIDE 25 MG/ML
25 INJECTION INTRAMUSCULAR; INTRAVENOUS; SUBCUTANEOUS
OUTPATIENT
Start: 2025-03-04

## 2025-02-10 RX ORDER — DIPHENHYDRAMINE HYDROCHLORIDE 50 MG/ML
25 INJECTION INTRAMUSCULAR; INTRAVENOUS
Start: 2025-02-25

## 2025-02-10 RX ORDER — EPINEPHRINE 1 MG/ML
0.3 INJECTION, SOLUTION, CONCENTRATE INTRAVENOUS EVERY 5 MIN PRN
OUTPATIENT
Start: 2025-02-18

## 2025-02-10 RX ORDER — DEXAMETHASONE SODIUM PHOSPHATE 4 MG/ML
4 INJECTION, SOLUTION INTRA-ARTICULAR; INTRALESIONAL; INTRAMUSCULAR; INTRAVENOUS; SOFT TISSUE
Status: DISCONTINUED | OUTPATIENT
Start: 2025-02-10 | End: 2025-02-10 | Stop reason: HOSPADM

## 2025-02-10 RX ORDER — LORAZEPAM 2 MG/ML
0.5 INJECTION INTRAMUSCULAR EVERY 4 HOURS PRN
OUTPATIENT
Start: 2025-02-25

## 2025-02-10 RX ORDER — ONDANSETRON 2 MG/ML
4 INJECTION INTRAMUSCULAR; INTRAVENOUS EVERY 30 MIN PRN
Status: DISCONTINUED | OUTPATIENT
Start: 2025-02-10 | End: 2025-02-10 | Stop reason: HOSPADM

## 2025-02-10 RX ORDER — ONDANSETRON 2 MG/ML
8 INJECTION INTRAMUSCULAR; INTRAVENOUS ONCE
OUTPATIENT
Start: 2025-02-18

## 2025-02-10 RX ORDER — MEPERIDINE HYDROCHLORIDE 25 MG/ML
25 INJECTION INTRAMUSCULAR; INTRAVENOUS; SUBCUTANEOUS
OUTPATIENT
Start: 2025-02-25

## 2025-02-10 RX ADMIN — HEPARIN SODIUM 1600 UNITS: 1000 INJECTION, SOLUTION INTRAVENOUS; SUBCUTANEOUS at 10:54

## 2025-02-10 RX ADMIN — PANTOPRAZOLE SODIUM 40 MG: 40 TABLET, DELAYED RELEASE ORAL at 17:12

## 2025-02-10 RX ADMIN — PANTOPRAZOLE SODIUM 40 MG: 40 TABLET, DELAYED RELEASE ORAL at 06:37

## 2025-02-10 RX ADMIN — Medication 1 LOZENGE: at 20:22

## 2025-02-10 RX ADMIN — PROPOFOL 100 MCG/KG/MIN: 10 INJECTION, EMULSION INTRAVENOUS at 13:24

## 2025-02-10 RX ADMIN — Medication: at 10:52

## 2025-02-10 RX ADMIN — LIDOCAINE HYDROCHLORIDE 20 MG: 20 INJECTION, SOLUTION INFILTRATION; PERINEURAL at 13:24

## 2025-02-10 RX ADMIN — PROPOFOL 40 MG: 10 INJECTION, EMULSION INTRAVENOUS at 13:24

## 2025-02-10 RX ADMIN — CEFTRIAXONE 1 G: 1 INJECTION, POWDER, FOR SOLUTION INTRAMUSCULAR; INTRAVENOUS at 17:15

## 2025-02-10 RX ADMIN — AMIODARONE HYDROCHLORIDE 400 MG: 200 TABLET ORAL at 20:18

## 2025-02-10 RX ADMIN — EPOETIN ALFA-EPBX 10000 UNITS: 10000 INJECTION, SOLUTION INTRAVENOUS; SUBCUTANEOUS at 10:47

## 2025-02-10 RX ADMIN — Medication 5 MG: at 21:24

## 2025-02-10 RX ADMIN — SODIUM CHLORIDE, POTASSIUM CHLORIDE, SODIUM LACTATE AND CALCIUM CHLORIDE: 600; 310; 30; 20 INJECTION, SOLUTION INTRAVENOUS at 13:24

## 2025-02-10 RX ADMIN — ONDANSETRON 4 MG: 2 INJECTION INTRAMUSCULAR; INTRAVENOUS at 13:24

## 2025-02-10 RX ADMIN — TOPICAL ANESTHETIC 1 SPRAY: 200 SPRAY DENTAL; PERIODONTAL at 13:22

## 2025-02-10 RX ADMIN — PROPOFOL 40 MG: 10 INJECTION, EMULSION INTRAVENOUS at 13:29

## 2025-02-10 RX ADMIN — SODIUM CHLORIDE 200 ML: 9 INJECTION, SOLUTION INTRAVENOUS at 10:51

## 2025-02-10 RX ADMIN — SODIUM CHLORIDE 250 ML: 9 INJECTION, SOLUTION INTRAVENOUS at 10:51

## 2025-02-10 ASSESSMENT — ACTIVITIES OF DAILY LIVING (ADL)
ADLS_ACUITY_SCORE: 43

## 2025-02-10 NOTE — ANESTHESIA PREPROCEDURE EVALUATION
Anesthesia Pre-Procedure Evaluation    Patient: Dulce Ma   MRN: [id number] : 1960        Procedure : Procedure(s):  ESOPHAGOGASTRODUODENOSCOPY          Past Medical History:   Diagnosis Date    Anesthesia complication, initial encounter     was completely out with Midazolam 2 mg IV, Fentanyl 100 micrograms IV that was given at time of colonoscopy 2018     Dysplastic nevi     Glaucoma     Diagnosed in Spring 2010    Hypertension goal BP (blood pressure) < 140/90 at age 50     very strong family hx     Lumbago     stiffness in low back    Other malignant neoplasm of skin of trunk, except scrotum     Dr. Myles Lake, melanoma with negative sentinel node biopsy - no chemo-Dr. Selene Gonzales-derm    Perimenopausal     Routine gyne exam     [facility] ob[facility] - Dr. Isabela Perez     Skin cancer, basal cell     multiple - 3 on nose, treated with interferon intralesionally x 1     Unspecified derangement, shoulder region     s/p horse-riding injury- no problem now    Unspecified musculoskeletal disorders and symptoms referable to neck     compressed vertebrae in neck- bothers pt rarely      Past Surgical History:   Procedure Laterality Date    APPENDECTOMY      aprroximately 8-10 years agp    BREAST SURGERY      Breast biopsies    COLONOSCOPY N/A 2018    Procedure: COLONOSCOPY;  COLONOSCOPY ;  Surgeon: Ren Whitehead MD;  Location: [facility]    IR CVC TUNNEL PLACEMENT > 5 YRS OF AGE  2025    ORTHOPEDIC SURGERY Right 2014    ORIF right ring finger    ZZC NONSPECIFIC PROCEDURE  3/02    malignant melanoma removed from abdomen with negative nodes-Dr. Acosta -surgeon      Allergies   Allergen Reactions    Benzoyl Peroxide      swelling    Ibuprofen      over long duration dev. hives    Amoxicillin Rash     Face; in early adulthood    Tolerated cephalexin 2025    Penicillins Rash     Face; in early adulthood.    Tolerated cephalexin 2025      Social History     Tobacco Use    Smoking status:  "Never    Smokeless tobacco: Never   Substance Use Topics    Alcohol use: Yes     Comment: occ.      Wt Readings from Last 1 Encounters:   02/05/25 49.8 kg (109 lb 12.6 oz)        Anesthesia Evaluation            ROS/MED HX  ENT/Pulmonary:  - neg pulmonary ROS     Neurologic:  - neg neurologic ROS     Cardiovascular:     (+)  hypertension- -   -  - -                                      METS/Exercise Tolerance:     Hematologic:     (+)      anemia,          Musculoskeletal:   (+)  arthritis,             GI/Hepatic:  - neg GI/hepatic ROS     Renal/Genitourinary: Comment: Secondary to amyloidosis    (+) renal disease, type: CRI and ESRD, Pt requires dialysis,           Endo:  - neg endo ROS     Psychiatric/Substance Use:  - neg psychiatric ROS     Infectious Disease:  - neg infectious disease ROS     Malignancy:       Other:            Physical Exam    Airway        Mallampati: II   TM distance: > 3 FB   Neck ROM: full   Mouth opening: > 3 cm    Respiratory Devices and Support         Dental       (+) Modest Abnormalities - crowns, retainers, 1 or 2 missing teeth      Cardiovascular   cardiovascular exam normal          Pulmonary   pulmonary exam normal                OUTSIDE LABS:  CBC:   Lab Results   Component Value Date    WBC 7.7 02/08/2025    WBC 5.5 02/06/2025    HGB 7.6 (L) 02/10/2025    HGB 8.9 (L) 02/09/2025    HCT 24.6 (L) 02/08/2025    HCT 26.1 (L) 02/06/2025     02/08/2025     02/06/2025     BMP:   Lab Results   Component Value Date     02/10/2025     02/09/2025    POTASSIUM 3.8 02/10/2025    POTASSIUM 3.9 02/09/2025    CHLORIDE 101 02/10/2025    CHLORIDE 100 02/09/2025    CO2 24 02/10/2025    CO2 21 (L) 02/09/2025    BUN 28.5 (H) 02/10/2025    BUN 18.2 02/09/2025    CR 5.24 (H) 02/10/2025    CR 3.58 (H) 02/09/2025     (H) 02/10/2025     (H) 02/09/2025     COAGS:   Lab Results   Component Value Date    INR 1.13 01/13/2025     POC: No results found for: \"BGM\", \"HCG\", " "\"HCGS\"  HEPATIC:   Lab Results   Component Value Date    ALBUMIN 2.8 (L) 02/10/2025    PROTTOTAL 8.3 02/05/2025    ALT 12 02/05/2025    AST 20 02/05/2025    GGT 15 01/08/2025    ALKPHOS 56 02/05/2025    BILITOTAL 0.3 02/05/2025     OTHER:   Lab Results   Component Value Date    KELTON 8.2 (L) 02/10/2025    PHOS 3.4 02/10/2025    MAG 2.0 02/08/2025    LIPASE 42 01/08/2025    AMYLASE 96 01/08/2025    TSH 2.44 01/10/2025       Anesthesia Plan    ASA Status:  4       Anesthesia Type: MAC.     - Reason for MAC: straight local not clinically adequate              Consents    Anesthesia Plan(s) and associated risks, benefits, and realistic alternatives discussed. Questions answered and patient/representative(s) expressed understanding.     - Discussed:     - Discussed with:  Patient      - Extended Intubation/Ventilatory Support Discussed: No.      - Patient is DNR/DNI Status: No     Use of blood products discussed: No .     Postoperative Care    Pain management: IV analgesics, Oral pain medications, Multi-modal analgesia.   PONV prophylaxis: Ondansetron (or other 5HT-3), Dexamethasone or Solumedrol     Comments:               Broderick Aguilar MD    I have reviewed the pertinent notes and labs in the chart from the past 30 days and (re)examined the patient.  Any updates or changes from those notes are reflected in this note.    Clinically Significant Risk Factors               # Hypoalbuminemia: Lowest albumin = 2.8 g/dL at 2/10/2025  6:18 AM, will monitor as appropriate     # Hypertension: Noted on problem list             # Severe Malnutrition: based on nutrition assessment             "

## 2025-02-10 NOTE — PROGRESS NOTES
Potassium   Date Value Ref Range Status   02/10/2025 3.8 3.4 - 5.3 mmol/L Final   11/22/2021 4.0 3.4 - 5.3 mmol/L Final   05/28/2020 4.8 3.4 - 5.3 mmol/L Final     Hemoglobin   Date Value Ref Range Status   02/10/2025 7.6 (L) 11.7 - 15.7 g/dL Final   05/28/2020 12.8 11.7 - 15.7 g/dL Final     Creatinine   Date Value Ref Range Status   02/10/2025 5.24 (H) 0.51 - 0.95 mg/dL Final   05/28/2020 0.78 0.52 - 1.04 mg/dL Final     Urea Nitrogen   Date Value Ref Range Status   02/10/2025 28.5 (H) 8.0 - 23.0 mg/dL Final   11/22/2021 19 7 - 30 mg/dL Final   05/28/2020 23 7 - 30 mg/dL Final     Sodium   Date Value Ref Range Status   02/10/2025 136 135 - 145 mmol/L Final   05/28/2020 138 133 - 144 mmol/L Final     INR   Date Value Ref Range Status   01/13/2025 1.13 0.85 - 1.15 Final       DIALYSIS PROCEDURE NOTE  Hepatitis status of previous patient on machine log was checked and verified ok to use with this patients hepatitis status.  Patient dialyzed for 3 hrs. on a K3 bath with a net fluid removal of  0L.  A BFR of 300-350 ml/min was obtained via a right with lines reversed.   The treatment plan was discussed with Dr. Umanzor during the treatment.    Total heparin received during the treatment: 0 units.     Line flushed, clamped and capped with heparin 1:1000 1.6 mL (1600 units) per lumen    Meds  given: Epo 93099 units.    Complications: None      Person educated: pt. Knowledge base minimal. Barriers to learning: new to dialysis. Educated on procedure via verbal mode. Patient verbalized understanding.   ICEBOAT? Timeout performed pre-treatment  I: Patient was identified using 2 identifiers  C:  Consent Signed Yes  E: Equipment preventative maintenance is current and dialysis delivery system OK to use  B: Hepatitis B Surface Antigen: Negative; Draw Date: 2/6/2025      Hepatitis B Surface Antibody: Susceptible; Draw Date: 1/9/2025  O: Dialysis orders present and complete prior to treatment  A: Vascular access verified and  assessed prior to treatment  T: Treatment was performed at a clinically appropriate time  ?: Patient was allowed to ask questions and address concerns prior to treatment  See Adult Hemodialysis flowsheet in EPIC for further details and post assessment.  Machine water alarm in place and functioning. Transducer pods intact and checked every 15min.   Pt assisted with repositioning throughout dialysis treatment.  Pt returned via W/C.  Chlorine/Chloramine water system checked every 4 hours.  Outpatient Dialysis at [facility].      Post treatment report given to Verito COLLINS RN regarding 0L of fluid removed, last BP    Zeenat ABDUL RN

## 2025-02-10 NOTE — PLAN OF CARE
"Goal Outcome Evaluation:      Plan of Care Reviewed With: patient    Overall Patient Progress: improvingOverall Patient Progress: improving    Outcome Evaluation: Patient NPO since midnight, prn ativan given for nausea with relief. Plan for EGD today. Possible discharge.      Problem: Adult Inpatient Plan of Care  Goal: Plan of Care Review  Description: The Plan of Care Review/Shift note should be completed every shift.  The Outcome Evaluation is a brief statement about your assessment that the patient is improving, declining, or no change.  This information will be displayed automatically on your shift  note.  Outcome: Progressing  Flowsheets (Taken 2/10/2025 0413)  Outcome Evaluation: Patient NPO since midnight, prn ativan given for nausea with relief. Plan for EGD today. Possible discharge.  Plan of Care Reviewed With: patient  Overall Patient Progress: improving  Goal: Patient-Specific Goal (Individualized)  Description: You can add care plan individualizations to a care plan. Examples of Individualization might be:  \"Parent requests to be called daily at 9am for status\", \"I have a hard time hearing out of my right ear\", or \"Do not touch me to wake me up as it startles  me\".  Outcome: Progressing  Goal: Absence of Hospital-Acquired Illness or Injury  Outcome: Progressing  Intervention: Identify and Manage Fall Risk  Recent Flowsheet Documentation  Taken 2/10/2025 0042 by Denise Baldwin  Safety Promotion/Fall Prevention:   safety round/check completed   lighting adjusted   assistive device/personal items within reach  Taken 2/9/2025 2100 by Denise Baldwin  Safety Promotion/Fall Prevention:   safety round/check completed   lighting adjusted   assistive device/personal items within reach  Intervention: Prevent Skin Injury  Recent Flowsheet Documentation  Taken 2/10/2025 0042 by Denise Baldwin  Body Position: position changed independently  Taken 2/9/2025 2100 by Denise Baldwin  Body Position: position changed " independently  Goal: Optimal Comfort and Wellbeing  Outcome: Progressing  Goal: Readiness for Transition of Care  Outcome: Progressing     Problem: Nausea and Vomiting  Goal: Nausea and Vomiting Relief  Outcome: Progressing  Intervention: Prevent and Manage Nausea and Vomiting  Recent Flowsheet Documentation  Taken 2/10/2025 0042 by Denise Baldwin  Nausea/Vomiting Interventions: antiemetic  Taken 2/9/2025 2100 by Denise Baldwin  Nausea/Vomiting Interventions: antiemetic     Problem: Skin Injury Risk Increased  Goal: Skin Health and Integrity  Outcome: Progressing  Intervention: Optimize Skin Protection  Recent Flowsheet Documentation  Taken 2/10/2025 0042 by Denise Baldwin  Pressure Reduction Techniques: frequent weight shift encouraged  Pressure Reduction Devices: positioning supports utilized  Activity Management: activity adjusted per tolerance  Head of Bed (HOB) Positioning: HOB at 30 degrees  Taken 2/9/2025 2100 by Denise Baldwin  Pressure Reduction Techniques: frequent weight shift encouraged  Pressure Reduction Devices: positioning supports utilized  Activity Management: activity adjusted per tolerance  Head of Bed (HOB) Positioning: HOB at 60 degrees

## 2025-02-10 NOTE — PROGRESS NOTES
Oncology/Hematology Follow Up Note:    Assessment and Plan:  Dulce Ma is a 64 year old female patient.  #1 AL amyloidosis  - With at least kidney involvement, may have cardiac involvement as well with elevated troponin and BNP  - She does NOT have multiple myeloma  - Started Darzalex, Cytoxan, Velcade and dexamethasone last Monday 2/3     PLAN:  - Due for Darzalex, Cytoxan, Velcade and dex again today.   - We will give her Cytoxan, Velcade, and dex tomorrow.  Darzalex unfortunatley cannot be given in the hospital due to insurance reasons.  - Very good chance of response (>90%), but unclear if her kidney function will get better with treatment.  - Will also get a non-urgent cardiac MRI in the outpatient setting, to evaluate for cardiac amyloidosis     #2 Anemia  - Due to ESRD and AL amyloidosis.  - GI bleed could be contributing as well     PLAN:  - WAYNE per nephrology  - EGD planned for today  - Repeating iron studies.       #3 Paroxysmal atrial flutter     Malachi Brown M.D.  [facility]  [phone]       Subjective:    Saw patient this morning.  She reports fatigue and she thinks her energy level is better compared to Friday.  She continues to report dark stools.  Has an EGD planned today.      Labs:  All labs reviewed    CBC  Recent Labs   Lab 02/10/25  0618 02/09/25  0827 02/08/25 2028 02/08/25  1134 02/06/25  1037 02/05/25  1358   WBC  --   --   --  7.7 5.5 9.1   HGB 7.6* 8.9* 7.8* 8.0* 8.7* 10.0*   MCV  --   --   --  91 90 91   PLT  --   --   --  239 210 280       CMP  Recent Labs   Lab 02/10/25  0618 02/09/25  0827 02/08/25  1012 02/07/25  0630 02/06/25 2023 02/06/25  1037 02/05/25  1358    137 135 137 136   < > 134*   POTASSIUM 3.8 3.9 4.1 4.0 4.0   < > 4.6   CHLORIDE 101 100 97* 100 97*   < > 95*   CO2 24 21* 22 20* 19*   < > 18*   ANIONGAP 11 16* 16* 17* 20*   < > 21*   * 122* 98 83 84   < > 109*   BUN 28.5* 18.2 32.7* 58.9* 55.6*   < > 105.1*   CR 5.24* 3.58* 4.83* 6.59* 6.13*    "< > 10.28*   GFRESTIMATED 9* 14* 9* 7* 7*   < > 4*   KELTON 8.2* 8.4* 8.3* 8.1* 8.1*   < > 9.7   MAG  --   --  2.0  --  2.0  --  2.9*   PHOS 3.4 3.2 4.6* 5.6*  --    < > 6.3*   PROTTOTAL  --   --   --   --   --   --  8.3   ALBUMIN 2.8* 3.1* 2.9* 2.8*  --    < > 3.9   BILITOTAL  --   --   --   --   --   --  0.3   ALKPHOS  --   --   --   --   --   --  56   AST  --   --   --   --   --   --  20   ALT  --   --   --   --   --   --  12    < > = values in this interval not displayed.       INRNo lab results found in last 7 days.    Blood CultureNo results for input(s): \"CULT\" in the last 168 hours.      Malachi Brown MD  [facility]  2/10/2025 1:51 PM        "

## 2025-02-10 NOTE — ANESTHESIA POSTPROCEDURE EVALUATION
Patient: Dulce Ma    Procedure: Procedure(s):  ESOPHAGOGASTRODUODENOSCOPY WITH HEMOSTASIS CLIP PLACEMENT       Anesthesia Type:  MAC    Note:  Disposition: Inpatient   Postop Pain Control: Uneventful            Sign Out: Well controlled pain   PONV: No   Neuro/Psych: Uneventful            Sign Out: Acceptable/Baseline neuro status   Airway/Respiratory: Uneventful            Sign Out: Acceptable/Baseline resp. status   CV/Hemodynamics: Uneventful            Sign Out: Acceptable CV status; No obvious hypovolemia; No obvious fluid overload   Other NRE: NONE   DID A NON-ROUTINE EVENT OCCUR? No           Last vitals:  Vitals Value Taken Time   /72 02/10/25 1400   Temp 97.2  F (36.2  C) 02/10/25 1347   Pulse 82 02/10/25 1400   Resp     SpO2 92 % 02/10/25 1404   Vitals shown include unfiled device data.    Electronically Signed By: Broderick Aguilar MD  February 10, 2025  2:05 PM

## 2025-02-10 NOTE — ANESTHESIA CARE TRANSFER NOTE
Patient: Dulce Ma    Procedure: Procedure(s):  ESOPHAGOGASTRODUODENOSCOPY WITH HEMOSTASIS CLIP PLACEMENT       Diagnosis: Melena [K92.1]  Diagnosis Additional Information: No value filed.    Anesthesia Type:   MAC     Note:    Oropharynx: oropharynx clear of all foreign objects  Level of Consciousness: awake  Oxygen Supplementation: face mask  Level of Supplemental Oxygen (L/min / FiO2): 6  Independent Airway: airway patency satisfactory and stable  Dentition: dentition unchanged  Vital Signs Stable: post-procedure vital signs reviewed and stable  Report to RN Given: handoff report given  Patient transferred to: PACU    Handoff Report: Identifed the Patient, Identified the Reponsible Provider, Reviewed the pertinent medical history, Discussed the surgical course, Reviewed Intra-OP anesthesia mangement and issues during anesthesia, Set expectations for post-procedure period and Allowed opportunity for questions and acknowledgement of understanding      Vitals:  Vitals Value Taken Time   /63 02/10/25 1342   Temp     Pulse 83 02/10/25 1342   Resp     SpO2 100 % 02/10/25 1343   Vitals shown include unfiled device data.    Electronically Signed By: EUFEMIA Millard CRNA  February 10, 2025  1:44 PM

## 2025-02-10 NOTE — PROGRESS NOTES
Hospitalist Medicine Progress Note   [facility]       Dulce Ma is a 64 year old lady with multiple myeloma, ESRD, HTN, hypercholesteremia came to [facility] 2/5/2025 with nausea, vomiting after having recently started chemotherapy.  Her creatinine at admission was 10.28  sodium 134 potassium 4.6 chloride 95 bicarb 18 and glucose 109.  Hemoglobin was 10.  Patient did have tunneled dialysis catheter placement by interventional radiologist Dr. Vlad Montiel MD on 2/6/2025 to start dialysis she did have a short run of sinus tachycardia which was thought to be?  SVT as it spontaneously resolved with heart rates fluctuating between 40 and 140 cardiology was consulted at this time they do not want to treat the AV block given the ESRD  She had upper GI endoscopy 2/10/2025 which showed ectopic gastric mucosa in the upper third of the esophagus grade B reflux esophagitis without bleeding in the esophagus but a single spot with bleeding in the stomach for which clips were applied erythematous mucosa in the pylorus without any bleeding proton pump inhibitors continuation was advised       Date of Admission:  2/5/2025  Assessment & Plan     Paroxysmal atrial tachycardia  ?  SVT  History of atrial fibrillation in the past  Patient was evaluated by cardiology and started on amiodarone  Plan is to give anticoagulation later if needed    End-stage kidney disease.  Patient had dialysis  Appreciate nephrology consultation  -Dialysis diet.  Management as per nephrology  Plan [facility] to [facility]     AL amyloidosis   -Recently started chemotherapy.  Darzalex, Cytoxan, Velcade and dex again  2/10/2025 and planning to give Cytoxan, Velcade, and dex 2/11/2025  -Oncology started the patient on     Nausea and vomiting.  -Multiple episodes of vomiting the last few days since starting chemo.  -Appears dehydrated.  -Gentle IV fluids overnight  with plan for dialysis to initiate soon.  -Antiemetics as needed.     Acute E. coli urinary tract infection.  -Continue ceftriaxone 1 g IV every 24 hours.  Colitis pansensitive     Hyponatremia.  Hypochloremia.  -Mild.  Sodium 134, chloride 95.  Both of which have normalized  -Nephrology planning for dialysis initiation.  -Recheck metabolic panel intermittently.     Malnutrition.  -Registered dietitian consult.     Anemia.  -Oncology following  -Patient was found to have a single spot with bleeding in the stomach for which clips were applied along with erythematous mucosa in the pylorus as well as reflux esophagitis seen on endoscopy for which PPIs are advised            Plan:   Had dialysis today  Had upper GI endoscopy as mentioned above    Diet:      DVT Prophylaxis: Heparin SQ  Butler Catheter: Not present  Code Status: Full Code         Medically Ready for Discharge: Anticipated in 2-4 Days    Clinically Significant Risk Factors               # Hypoalbuminemia: Lowest albumin = 2.8 g/dL at 2/10/2025  6:18 AM, will monitor as appropriate     # Hypertension: Noted on problem list               # Severe Malnutrition: based on nutrition assessment                 The patient's care was discussed with the Patient and RN.    Francisco Correa MD  Hospitalist Service  [facility]    ______________________________________________________________________    Interval History     Symptoms   Patient denies any new symptoms  She had dialysis today    Review of Systems:   Weakness is still present    Data reviewed today: I reviewed all medications, new labs and imaging results over the last 24 hours.     Physical Exam   Vital Signs: Temp: 98.5  F (36.9  C) Temp src: Oral BP: 135/50 Pulse: 77   Resp: 16 SpO2: 96 % O2 Device: None (Room air)    Weight: 109 lbs 12.63 oz      GENERAL: Patient is not in acute distress  HEENT: EOM+,Conjunctiva is clear   NECK:  no Jugular Venous distention  HEART: S1 S2 regular  Rate and Rhythm, there is  no murmur,   LUNGS: Respirations are  not laboured, Lungs are  clear to auscultation without Crepitations or Wheezing   ABDOMEN: Soft , there is no tenderness , Bowel Sounds are  Positive   LOWER LIMBS:  Pedal Edema  Bilaterally   CNS:  Alert,  Oriented x 3, Moving all the Four Limbs     Data   Recent Labs   Lab 02/10/25  0618 02/09/25  0827 02/08/25 2028 02/08/25  1134 02/08/25  1012 02/06/25 2023 02/06/25  1037 02/05/25  1358   WBC  --   --   --  7.7  --   --  5.5 9.1   HGB 7.6* 8.9* 7.8* 8.0*  --   --  8.7* 10.0*   MCV  --   --   --  91  --   --  90 91   PLT  --   --   --  239  --   --  210 280    137  --   --  135   < > 138 134*   POTASSIUM 3.8 3.9  --   --  4.1   < > 3.4 4.6   CHLORIDE 101 100  --   --  97*   < > 99 95*   CO2 24 21*  --   --  22   < > 23 18*   BUN 28.5* 18.2  --   --  32.7*   < > 56.5* 105.1*   CR 5.24* 3.58*  --   --  4.83*   < > 5.29* 10.28*   ANIONGAP 11 16*  --   --  16*   < > 16* 21*   KELTON 8.2* 8.4*  --   --  8.3*   < > 8.2* 9.7   * 122*  --   --  98   < > 90 109*   ALBUMIN 2.8* 3.1*  --   --  2.9*   < > 3.5 3.9   PROTTOTAL  --   --   --   --   --   --   --  8.3   BILITOTAL  --   --   --   --   --   --   --  0.3   ALKPHOS  --   --   --   --   --   --   --  56   ALT  --   --   --   --   --   --   --  12   AST  --   --   --   --   --   --   --  20    < > = values in this interval not displayed.         No results found for this or any previous visit (from the past 24 hours).

## 2025-02-10 NOTE — PROGRESS NOTES
Assessment and Plan:     CKD-5: now on dialysis.HD run # 3 today.     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 UO yest recorded at 350 ml.     Today: 3h, R CVC with 400 BFR, no UF, K protocol, 35 HCO3, 138 Na. EPO on the run, no heparin.   Plan is to get a seat for the patient in the [facility] in [facility] MWF. [facility].     Plan to run MWF while inpatient.             Interval History:   Amyloidosis: Oncology managing.   Started Darzalex, Cytoxan, Velcade and dexamethasone this past Monday 2/3     Afib: on amiodarone.     ?GI bleed: Hbg 7.6. She is on EPO on dialysis.                  Review of Systems:   No sx on dialysis.           Medications:     Current Facility-Administered Medications   Medication Dose Route Frequency Provider Last Rate Last Admin    - MEDICATION INSTRUCTIONS for Dialysis Patients -   Does not apply See Admin Instructions Francisco Correa MD        amiodarone (PACERONE) tablet 400 mg  400 mg Oral or FT or NG tube BID Ralph Mead MD   400 mg at 02/09/25 2105    Followed by    [START ON 2/11/2025] amiodarone (PACERONE) tablet 200 mg  200 mg Oral or FT or NG tube BID Ralph Mead MD        Followed by    [START ON 2/13/2025] amiodarone (PACERONE) tablet 200 mg  200 mg Oral or FT or NG tube Daily Ralph Mead MD        cefTRIAXone (ROCEPHIN) 1 g vial to attach to  mL bag for ADULTS or NS 50 mL bag for PEDS  1 g Intravenous Q24H Ra Elmore, DO 0 mL/hr at 02/06/25 0700 1 g at 02/09/25 1832    epoetin aparna-epbx (RETACRIT) injection 10,000 Units  10,000 Units Intravenous Once Jj Leiva MD        sodium chloride 0.9% DIALYSIS Cath LOCK - RED Lumen  10 mL Intracatheter Once in dialysis/CRRT Jj Leiva MD        Followed by    heparin 1000 unit/mL DIALYSIS Cath LOCK - RED Lumen  1.3-2.6 mL Intracatheter Once in dialysis/CRRT Jj Leiva MD        sodium chloride 0.9% DIALYSIS Cath LOCK - BLUE Lumen  10 mL Intracatheter Once in dialysis/CRRT  Jj Leiva MD        Followed by    heparin 1000 unit/mL DIALYSIS Cath LOCK -BLUE Lumen  1.3-2.6 mL Intracatheter Once in dialysis/CRRT Jj Leiva MD        multivitamin RENAL (TRIPHROCAPS) capsule 1 capsule  1 capsule Oral Daily Jj Leiva MD   1 capsule at 02/09/25 0832    No heparin via hemodialysis machine   Does not apply Once Jj Leiva MD        pantoprazole (PROTONIX) EC tablet 40 mg  40 mg Oral BID AC Jj Leiva MD   40 mg at 02/10/25 0637    pantoprazole (PROTONIX) IV push injection 40 mg  40 mg Intravenous Daily with breakfast Luigi Jones MD   40 mg at 02/08/25 1316    sodium chloride (PF) 0.9% PF flush 3 mL  3 mL Intracatheter Q8H Geovanna Jimenez PA-C        sodium chloride (PF) 0.9% PF flush 3 mL  3 mL Intracatheter Q8H Ra Elmore, DO   3 mL at 02/09/25 1832    sodium chloride (PF) 0.9% PF flush 9 mL  9 mL Intracatheter During Dialysis/CRRT (from stock) Jj Leiva MD        sodium chloride (PF) 0.9% PF flush 9 mL  9 mL Intracatheter During Dialysis/CRRT (from stock) Jj Leiva MD        sodium chloride 0.9% BOLUS 200 mL  200 mL Hemodialysis Machine Once Jj Leiva MD        sodium chloride 0.9% BOLUS 250 mL  250 mL Intravenous Once in dialysis/CRRT Jj Leiva MD         Current Facility-Administered Medications   Medication Dose Route Frequency Provider Last Rate Last Admin     Current active medications and PTA medications reviewed, see medication list for details.            Physical Exam:   Vitals were reviewed  Patient Vitals for the past 24 hrs:   BP Temp Temp src Pulse Resp SpO2   02/10/25 0945 (!) 141/72 -- -- 80 -- --   02/10/25 0931 (!) 141/75 -- -- 74 -- 97 %   02/10/25 0925 -- -- -- 75 -- 98 %   02/10/25 0921 119/86 98.3  F (36.8  C) Oral 77 -- 98 %   02/10/25 0739 130/73 97.4  F (36.3  C) Oral 77 18 97 %   02/09/25 2320 (!) 140/73 98.1  F (36.7  C) Oral -- 16 96 %   02/09/25 1501 -- 98.7  F (37.1  C) Oral 95 16 --        Temp:  [97.4  F (36.3  C)-98.7  F (37.1  C)] 98.3  F (36.8  C)  Pulse:  [74-95] 80  Resp:  [16-18] 18  BP: (119-141)/(72-86) 141/72  SpO2:  [96 %-98 %] 97 %    Temperatures:  Current - Temp: 98.3  F (36.8  C); Max - Temp  Av.1  F (36.7  C)  Min: 97.4  F (36.3  C)  Max: 98.7  F (37.1  C)  Respiration range: Resp  Av.7  Min: 16  Max: 18  Pulse range: Pulse  Av.7  Min: 74  Max: 95  Blood pressure range: Systolic (24hrs), Av , Min:119 , Max:141   ; Diastolic (24hrs), Av, Min:72, Max:86    Pulse oximetry range: SpO2  Av.2 %  Min: 96 %  Max: 98 %    I/O last 3 completed shifts:  In: 10 [I.V.:10]  Out: 150 [Urine:150]      Intake/Output Summary (Last 24 hours) at 2/10/2025 0954  Last data filed at 2/10/2025 0714  Gross per 24 hour   Intake 10 ml   Output 300 ml   Net -290 ml       Alert and responsive  R CVC with no redness or tenderness       Wt Readings from Last 4 Encounters:   25 49.8 kg (109 lb 12.6 oz)   01/15/25 52.9 kg (116 lb 11.2 oz)   25 54 kg (119 lb)   25 54.1 kg (119 lb 3.2 oz)          Data:          Lab Results   Component Value Date     02/10/2025     2025     2025     2020     2019     2016    Lab Results   Component Value Date    CHLORIDE 101 02/10/2025    CHLORIDE 100 2025    CHLORIDE 97 2025    CHLORIDE 106 2021    CHLORIDE 109 2020    CHLORIDE 108 2019    CHLORIDE 105 2016    Lab Results   Component Value Date    BUN 28.5 02/10/2025    BUN 18.2 2025    BUN 32.7 2025    BUN 19 2021    BUN 23 2020    BUN 16 2019    BUN 17 2016      Lab Results   Component Value Date    POTASSIUM 3.8 02/10/2025    POTASSIUM 3.9 2025    POTASSIUM 4.1 2025    POTASSIUM 4.0 2021    POTASSIUM 4.8 2020    POTASSIUM 4.2 2019    POTASSIUM 4.6 2016    Lab Results   Component Value Date    CO2 24 02/10/2025     CO2 21 02/09/2025    CO2 22 02/08/2025    CO2 27 11/22/2021    CO2 24 05/28/2020    CO2 24 01/11/2019    CO2 27 07/22/2016    Lab Results   Component Value Date    CR 5.24 02/10/2025    CR 3.58 02/09/2025    CR 4.83 02/08/2025    CR 0.78 05/28/2020    CR 0.81 01/11/2019    CR 0.73 07/22/2016        Recent Labs   Lab Test 02/10/25  0618 02/09/25  0827 02/08/25 2028 02/08/25  1134 02/06/25  1037 02/05/25  1358   WBC  --   --   --  7.7 5.5 9.1   HGB 7.6* 8.9* 7.8* 8.0* 8.7* 10.0*   HCT  --   --   --  24.6* 26.1* 30.1*   MCV  --   --   --  91 90 91   PLT  --   --   --  239 210 280     Recent Labs   Lab Test 02/05/25  1358 01/09/25  1112 01/08/25  1244   AST 20 21 21   ALT 12 9 10   GGT  --   --  15   ALKPHOS 56 47 48   BILITOTAL 0.3 0.6 0.5       Recent Labs   Lab Test 02/08/25  1012 02/06/25 2023 02/05/25  1358   MAG 2.0 2.0 2.9*     Recent Labs   Lab Test 02/10/25  0618 02/09/25  0827 02/08/25  1012   PHOS 3.4 3.2 4.6*     Recent Labs   Lab Test 02/10/25  0618 02/09/25  0827 02/08/25  1012   KELTON 8.2* 8.4* 8.3*       Lab Results   Component Value Date    KELTON 8.2 (L) 02/10/2025     Lab Results   Component Value Date    WBC 7.7 02/08/2025    HGB 7.6 (L) 02/10/2025    HCT 24.6 (L) 02/08/2025    MCV 91 02/08/2025     02/08/2025     Lab Results   Component Value Date     02/10/2025    POTASSIUM 3.8 02/10/2025    CHLORIDE 101 02/10/2025    CO2 24 02/10/2025     (H) 02/10/2025     Lab Results   Component Value Date    BUN 28.5 (H) 02/10/2025    CR 5.24 (H) 02/10/2025     Lab Results   Component Value Date    MAG 2.0 02/08/2025     Lab Results   Component Value Date    PHOS 3.4 02/10/2025       Creatinine   Date Value Ref Range Status   02/10/2025 5.24 (H) 0.51 - 0.95 mg/dL Final   02/09/2025 3.58 (H) 0.51 - 0.95 mg/dL Final   02/08/2025 4.83 (H) 0.51 - 0.95 mg/dL Final   02/07/2025 6.59 (H) 0.51 - 0.95 mg/dL Final   02/06/2025 6.13 (H) 0.51 - 0.95 mg/dL Final   02/06/2025 5.29 (H) 0.51 - 0.95 mg/dL  Final   05/28/2020 0.78 0.52 - 1.04 mg/dL Final   01/11/2019 0.81 0.52 - 1.04 mg/dL Final   07/22/2016 0.73 0.52 - 1.04 mg/dL Final   07/20/2015 0.83 0.52 - 1.04 mg/dL Final   05/17/2013 0.79 0.52 - 1.04 mg/dL Final   04/02/2012 0.84 0.52 - 1.04 mg/dL Final       Attestation:  I have reviewed today's vital signs, notes, medications, labs and imaging.  Seen on dialysis.      Johnny Umanzor MD

## 2025-02-10 NOTE — PLAN OF CARE
"Patient A&Ox4 VSS on RA, PIV, SL. Up with SBA, intermittent nausea improving. Plan for EGD tomorrow, NPO @ midnight. HD run also planned for tomorrow. Per hem/onc note possible chemo next Monday. Having green/black stools    Goal Outcome Evaluation:           Overall Patient Progress: improvingOverall Patient Progress: improving    Outcome Evaluation: Nausea slightly improving, NPO @ midnight for EGD tomorrow. HD tomorrow,      Problem: Adult Inpatient Plan of Care  Goal: Plan of Care Review  Description: The Plan of Care Review/Shift note should be completed every shift.  The Outcome Evaluation is a brief statement about your assessment that the patient is improving, declining, or no change.  This information will be displayed automatically on your shift  note.  Outcome: Progressing  Flowsheets (Taken 2/9/2025 1851)  Outcome Evaluation: Nausea slightly improving, NPO @ midnight for EGD tomorrow. HD tomorrow,  Overall Patient Progress: improving  Goal: Patient-Specific Goal (Individualized)  Description: You can add care plan individualizations to a care plan. Examples of Individualization might be:  \"Parent requests to be called daily at 9am for status\", \"I have a hard time hearing out of my right ear\", or \"Do not touch me to wake me up as it startles  me\".  Outcome: Progressing  Goal: Absence of Hospital-Acquired Illness or Injury  Outcome: Progressing  Intervention: Identify and Manage Fall Risk  Recent Flowsheet Documentation  Taken 2/9/2025 1300 by Rosie Rockwell, RN  Safety Promotion/Fall Prevention:   safety round/check completed   activity supervised  Taken 2/9/2025 1045 by Rosie Rockwell, RN  Safety Promotion/Fall Prevention:   safety round/check completed   activity supervised  Taken 2/9/2025 0830 by Rosie Rockwell, RN  Safety Promotion/Fall Prevention:   safety round/check completed   activity supervised  Intervention: Prevent Skin Injury  Recent Flowsheet Documentation  Taken 2/9/2025 1730 by " Rosie Rockwell RN  Body Position: position changed independently  Taken 2/9/2025 1300 by Rosie Rockwell RN  Body Position: position changed independently  Taken 2/9/2025 1045 by Rosie Rockwell RN  Body Position: position changed independently  Taken 2/9/2025 0830 by Rosie Rockwell RN  Body Position: position changed independently  Intervention: Prevent and Manage VTE (Venous Thromboembolism) Risk  Recent Flowsheet Documentation  Taken 2/9/2025 0830 by Rosie Rockwell RN  VTE Prevention/Management: SCDs off (sequential compression devices)  Goal: Optimal Comfort and Wellbeing  Outcome: Progressing  Intervention: Monitor Pain and Promote Comfort  Recent Flowsheet Documentation  Taken 2/9/2025 1730 by Rosie Rockwell RN  Pain Management Interventions: cold applied  Goal: Readiness for Transition of Care  Outcome: Progressing     Problem: Nausea and Vomiting  Goal: Nausea and Vomiting Relief  Outcome: Progressing     Problem: Skin Injury Risk Increased  Goal: Skin Health and Integrity  Outcome: Progressing  Intervention: Optimize Skin Protection  Recent Flowsheet Documentation  Taken 2/9/2025 1730 by Rosie Rockwell RN  Activity Management: activity adjusted per tolerance  Head of Bed (HOB) Positioning: HOB at 45 degrees  Taken 2/9/2025 1300 by Rosie Rockwell RN  Activity Management: activity adjusted per tolerance  Head of Bed (HOB) Positioning: HOB at 45 degrees  Taken 2/9/2025 1045 by Rosie Rockwell RN  Activity Management: activity adjusted per tolerance  Head of Bed (HOB) Positioning: HOB at 45 degrees  Taken 2/9/2025 0830 by Rosie Rockwell RN  Activity Management: activity adjusted per tolerance  Head of Bed (HOB) Positioning: HOB at 45 degrees

## 2025-02-10 NOTE — PROGRESS NOTES
Care Management Discharge Note    Discharge Date: 02/07/2025       Discharge Disposition: Dialysis Services    Discharge Services: None    Discharge DME: None    Discharge Transportation: car, drives self, family or friend will provide    Private pay costs discussed: Not applicable    Does the patient's insurance plan have a 3 day qualifying hospital stay waiver?  No    PAS Confirmation Code:    Patient/family educated on Medicare website which has current facility and service quality ratings:      Education Provided on the Discharge Plan:    Persons Notified of Discharge Plans: patient, [facility]  Patient/Family in Agreement with the Plan: yes    Handoff Referral Completed: No, handoff not indicated or clinically appropriate    Additional Information:  Patient discharging today per Hospitalist.  Patient's outpatient [facility] dialysis finalized and patient was informed. First run as outpatient is Wednesday. On the AVS.        You will get your outpatient dialysis at:  [facility]  [address] [place] [address]  [phone]      Your first outpatient run will be on Wed, 2-12-25.  Please arrive at 1:45 pm  to complete paperwork.    For subsequent runs, your chair time will be every  MWF at 2:35 pm.  You should plan to arrive 15 minutes prior to your chair time.    Please bring:   Two forms of ID  Insurance cards  Your CURRENT medication list  Wear a button up shirt  Bring something quiet to do  Bring a pillow or a small blanket because the room can be chilly.     Addendum 1500  HILARIO received calll from a friend Ren Heath, who is notlisted on the patient;s contact info.  He was expressing concern that patient has 28 cats and takes care of her mother in the home. This Cm informed Ren that he is not listed as a contact so I am unable to discuss her care with him. HILARIO instructed him if he had a concern he could report it to [facility] wise she can make her own decisions and has the right to do  so.  Rachell Greco, RN, BSN, CM  Inpatient Care Coordination  [facility]  [phone]

## 2025-02-10 NOTE — PLAN OF CARE
"/47 (BP Location: Left arm)   Pulse 78   Temp 98.6  F (37  C) (Oral)   Resp 10   Ht 1.6 m (5' 3\")   Wt 49.8 kg (109 lb 12.6 oz)   LMP 10/31/2011   SpO2 96%   BMI 19.45 kg/m      Neuro: a/o x4  Cardiac: WNL  Lungs: WNL  GI: WNL  : on hemodialysis, produces some urine  Pain: denies  IV: saline locked, R chest dialysis port  Meds: ativan, amiodarone  Activity: assist x1  Misc: EGD this afternoon. Dialysis this AM- no fluid pulled off. Dialysis MWF. Chemo tomorrow. GI/nephrology/cardiology/oncology.   Plan: Transferred to 5th floor.          Problem: Adult Inpatient Plan of Care  Goal: Plan of Care Review  Description: The Plan of Care Review/Shift note should be completed every shift.  The Outcome Evaluation is a brief statement about your assessment that the patient is improving, declining, or no change.  This information will be displayed automatically on your shift  note.  Outcome: Progressing  Flowsheets (Taken 2/10/2025 1612)  Outcome Evaluation: dialysis this AM. EGD this afternoon  Plan of Care Reviewed With: patient  Overall Patient Progress: improving  Goal: Patient-Specific Goal (Individualized)  Description: You can add care plan individualizations to a care plan. Examples of Individualization might be:  \"Parent requests to be called daily at 9am for status\", \"I have a hard time hearing out of my right ear\", or \"Do not touch me to wake me up as it startles  me\".  Outcome: Progressing  Goal: Absence of Hospital-Acquired Illness or Injury  Outcome: Progressing  Intervention: Identify and Manage Fall Risk  Recent Flowsheet Documentation  Taken 2/10/2025 0910 by Verito White, RN  Safety Promotion/Fall Prevention:   activity supervised   assistive device/personal items within reach   clutter free environment maintained   nonskid shoes/slippers when out of bed   safety round/check completed  Intervention: Prevent Skin Injury  Recent Flowsheet Documentation  Taken 2/10/2025 0911 by Verito White, " RN  Body Position: position changed independently  Taken 2/10/2025 0910 by Verito White RN  Body Position: position changed independently  Intervention: Prevent Infection  Recent Flowsheet Documentation  Taken 2/10/2025 0910 by Verito White RN  Infection Prevention:   single patient room provided   hand hygiene promoted   rest/sleep promoted  Goal: Optimal Comfort and Wellbeing  Outcome: Progressing  Goal: Readiness for Transition of Care  Outcome: Progressing     Problem: Nausea and Vomiting  Goal: Nausea and Vomiting Relief  Outcome: Progressing     Problem: Skin Injury Risk Increased  Goal: Skin Health and Integrity  Outcome: Progressing  Intervention: Optimize Skin Protection  Recent Flowsheet Documentation  Taken 2/10/2025 0911 by Verito White RN  Activity Management:   activity adjusted per tolerance   ambulated to bathroom  Head of Bed (HOB) Positioning: HOB at 20-30 degrees  Taken 2/10/2025 0910 by Verito White RN  Activity Management: activity adjusted per tolerance  Head of Bed (HOB) Positioning: HOB at 20-30 degrees         Goal Outcome Evaluation:      Plan of Care Reviewed With: patient    Overall Patient Progress: improvingOverall Patient Progress: improving    Outcome Evaluation: dialysis this AM. EGD this afternoon

## 2025-02-11 ENCOUNTER — APPOINTMENT (OUTPATIENT)
Dept: OCCUPATIONAL THERAPY | Facility: CLINIC | Age: 65
End: 2025-02-11
Payer: COMMERCIAL

## 2025-02-11 LAB
ALBUMIN SERPL BCG-MCNC: 3.1 G/DL (ref 3.5–5.2)
ALP SERPL-CCNC: 44 U/L (ref 40–150)
ALT SERPL W P-5'-P-CCNC: 8 U/L (ref 0–50)
ANION GAP SERPL CALCULATED.3IONS-SCNC: 14 MMOL/L (ref 7–15)
AST SERPL W P-5'-P-CCNC: 15 U/L (ref 0–45)
BASOPHILS # BLD AUTO: 0 10E3/UL (ref 0–0.2)
BASOPHILS NFR BLD AUTO: 0 %
BILIRUB SERPL-MCNC: 0.4 MG/DL
BUN SERPL-MCNC: 22.3 MG/DL (ref 8–23)
CALCIUM SERPL-MCNC: 8.1 MG/DL (ref 8.8–10.4)
CHLORIDE SERPL-SCNC: 96 MMOL/L (ref 98–107)
CREAT SERPL-MCNC: 4.36 MG/DL (ref 0.51–0.95)
EGFRCR SERPLBLD CKD-EPI 2021: 11 ML/MIN/1.73M2
EOSINOPHIL # BLD AUTO: 0.1 10E3/UL (ref 0–0.7)
EOSINOPHIL NFR BLD AUTO: 2 %
ERYTHROCYTE [DISTWIDTH] IN BLOOD BY AUTOMATED COUNT: 12.8 % (ref 10–15)
GLUCOSE SERPL-MCNC: 148 MG/DL (ref 70–99)
HCO3 SERPL-SCNC: 24 MMOL/L (ref 22–29)
HCT VFR BLD AUTO: 24.7 % (ref 35–47)
HGB BLD-MCNC: 8.1 G/DL (ref 11.7–15.7)
IMM GRANULOCYTES # BLD: 0 10E3/UL
IMM GRANULOCYTES NFR BLD: 1 %
LYMPHOCYTES # BLD AUTO: 0.9 10E3/UL (ref 0.8–5.3)
LYMPHOCYTES NFR BLD AUTO: 14 %
MCH RBC QN AUTO: 30.1 PG (ref 26.5–33)
MCHC RBC AUTO-ENTMCNC: 32.8 G/DL (ref 31.5–36.5)
MCV RBC AUTO: 92 FL (ref 78–100)
MONOCYTES # BLD AUTO: 0.3 10E3/UL (ref 0–1.3)
MONOCYTES NFR BLD AUTO: 5 %
NEUTROPHILS # BLD AUTO: 5 10E3/UL (ref 1.6–8.3)
NEUTROPHILS NFR BLD AUTO: 79 %
NRBC # BLD AUTO: 0 10E3/UL
NRBC BLD AUTO-RTO: 0 /100
PLATELET # BLD AUTO: 258 10E3/UL (ref 150–450)
POTASSIUM SERPL-SCNC: 3.7 MMOL/L (ref 3.4–5.3)
PROT SERPL-MCNC: 6.2 G/DL (ref 6.4–8.3)
RBC # BLD AUTO: 2.69 10E6/UL (ref 3.8–5.2)
SODIUM SERPL-SCNC: 134 MMOL/L (ref 135–145)
WBC # BLD AUTO: 6.4 10E3/UL (ref 4–11)

## 2025-02-11 PROCEDURE — 36415 COLL VENOUS BLD VENIPUNCTURE: CPT | Performed by: INTERNAL MEDICINE

## 2025-02-11 PROCEDURE — 250N000011 HC RX IP 250 OP 636: Performed by: INTERNAL MEDICINE

## 2025-02-11 PROCEDURE — 250N000013 HC RX MED GY IP 250 OP 250 PS 637: Performed by: INTERNAL MEDICINE

## 2025-02-11 PROCEDURE — 120N000001 HC R&B MED SURG/OB

## 2025-02-11 PROCEDURE — 97535 SELF CARE MNGMENT TRAINING: CPT | Mod: GO | Performed by: REHABILITATION PRACTITIONER

## 2025-02-11 PROCEDURE — 85004 AUTOMATED DIFF WBC COUNT: CPT | Performed by: INTERNAL MEDICINE

## 2025-02-11 PROCEDURE — 84155 ASSAY OF PROTEIN SERUM: CPT | Performed by: INTERNAL MEDICINE

## 2025-02-11 PROCEDURE — 3E0D705 INTRODUCTION OF OTHER ANTINEOPLASTIC INTO MOUTH AND PHARYNX, VIA NATURAL OR ARTIFICIAL OPENING: ICD-10-PCS | Performed by: INTERNAL MEDICINE

## 2025-02-11 PROCEDURE — 250N000012 HC RX MED GY IP 250 OP 636 PS 637: Performed by: INTERNAL MEDICINE

## 2025-02-11 PROCEDURE — 99232 SBSQ HOSP IP/OBS MODERATE 35: CPT | Performed by: INTERNAL MEDICINE

## 2025-02-11 RX ORDER — ALBUTEROL SULFATE 90 UG/1
1-2 INHALANT RESPIRATORY (INHALATION)
Status: DISCONTINUED | OUTPATIENT
Start: 2025-02-11 | End: 2025-02-13 | Stop reason: HOSPADM

## 2025-02-11 RX ORDER — DEXAMETHASONE 4 MG/1
40 TABLET ORAL WEEKLY
Qty: 40 TABLET | Refills: 0 | Status: SHIPPED | OUTPATIENT
Start: 2025-02-11

## 2025-02-11 RX ORDER — ACYCLOVIR 200 MG/1
200 CAPSULE ORAL 2 TIMES DAILY
Status: DISCONTINUED | OUTPATIENT
Start: 2025-02-11 | End: 2025-02-13 | Stop reason: HOSPADM

## 2025-02-11 RX ORDER — MIDODRINE HYDROCHLORIDE 5 MG/1
5 TABLET ORAL ONCE
Status: COMPLETED | OUTPATIENT
Start: 2025-02-11 | End: 2025-02-11

## 2025-02-11 RX ORDER — ONDANSETRON 2 MG/ML
8 INJECTION INTRAMUSCULAR; INTRAVENOUS ONCE
Status: COMPLETED | OUTPATIENT
Start: 2025-02-11 | End: 2025-02-11

## 2025-02-11 RX ORDER — DIPHENHYDRAMINE HYDROCHLORIDE 50 MG/ML
25 INJECTION INTRAMUSCULAR; INTRAVENOUS
Status: DISCONTINUED | OUTPATIENT
Start: 2025-02-11 | End: 2025-02-13 | Stop reason: HOSPADM

## 2025-02-11 RX ORDER — MEPERIDINE HYDROCHLORIDE 25 MG/ML
25 INJECTION INTRAMUSCULAR; INTRAVENOUS; SUBCUTANEOUS
Status: DISCONTINUED | OUTPATIENT
Start: 2025-02-11 | End: 2025-02-13 | Stop reason: HOSPADM

## 2025-02-11 RX ORDER — CYCLOPHOSPHAMIDE 50 MG/1
300 CAPSULE ORAL
Qty: 24 CAPSULE | Refills: 0 | Status: SHIPPED | OUTPATIENT
Start: 2025-02-11 | End: 2025-03-05

## 2025-02-11 RX ORDER — EPINEPHRINE 1 MG/ML
0.3 INJECTION, SOLUTION, CONCENTRATE INTRAVENOUS EVERY 5 MIN PRN
Status: DISCONTINUED | OUTPATIENT
Start: 2025-02-11 | End: 2025-02-11

## 2025-02-11 RX ORDER — DEXAMETHASONE 4 MG/1
40 TABLET ORAL ONCE
Status: COMPLETED | OUTPATIENT
Start: 2025-02-11 | End: 2025-02-11

## 2025-02-11 RX ORDER — CYCLOPHOSPHAMIDE 50 MG/1
300 CAPSULE ORAL ONCE
Status: COMPLETED | OUTPATIENT
Start: 2025-02-11 | End: 2025-02-11

## 2025-02-11 RX ORDER — METHYLPREDNISOLONE SODIUM SUCCINATE 40 MG/ML
40 INJECTION INTRAMUSCULAR; INTRAVENOUS
Status: DISCONTINUED | OUTPATIENT
Start: 2025-02-11 | End: 2025-02-13 | Stop reason: HOSPADM

## 2025-02-11 RX ORDER — ONDANSETRON 2 MG/ML
4 INJECTION INTRAMUSCULAR; INTRAVENOUS EVERY 6 HOURS PRN
Status: DISCONTINUED | OUTPATIENT
Start: 2025-02-11 | End: 2025-02-11

## 2025-02-11 RX ORDER — DIPHENHYDRAMINE HYDROCHLORIDE 50 MG/ML
50 INJECTION INTRAMUSCULAR; INTRAVENOUS
Status: DISCONTINUED | OUTPATIENT
Start: 2025-02-11 | End: 2025-02-13 | Stop reason: HOSPADM

## 2025-02-11 RX ORDER — LORAZEPAM 2 MG/ML
0.5 INJECTION INTRAMUSCULAR EVERY 4 HOURS PRN
Status: DISCONTINUED | OUTPATIENT
Start: 2025-02-11 | End: 2025-02-13 | Stop reason: HOSPADM

## 2025-02-11 RX ORDER — ALBUTEROL SULFATE 0.83 MG/ML
2.5 SOLUTION RESPIRATORY (INHALATION)
Status: DISCONTINUED | OUTPATIENT
Start: 2025-02-11 | End: 2025-02-13 | Stop reason: HOSPADM

## 2025-02-11 RX ADMIN — DEXAMETHASONE 40 MG: 4 TABLET ORAL at 13:15

## 2025-02-11 RX ADMIN — PANTOPRAZOLE SODIUM 40 MG: 40 TABLET, DELAYED RELEASE ORAL at 06:49

## 2025-02-11 RX ADMIN — CYCLOPHOSPHAMIDE 300 MG: 50 CAPSULE ORAL at 13:15

## 2025-02-11 RX ADMIN — AMIODARONE HYDROCHLORIDE 200 MG: 200 TABLET ORAL at 21:07

## 2025-02-11 RX ADMIN — ACYCLOVIR 200 MG: 200 CAPSULE ORAL at 12:40

## 2025-02-11 RX ADMIN — ONDANSETRON 8 MG: 2 INJECTION INTRAMUSCULAR; INTRAVENOUS at 12:41

## 2025-02-11 RX ADMIN — Medication 1 CAPSULE: at 09:52

## 2025-02-11 RX ADMIN — CEFTRIAXONE 1 G: 1 INJECTION, POWDER, FOR SOLUTION INTRAMUSCULAR; INTRAVENOUS at 16:25

## 2025-02-11 RX ADMIN — MIDODRINE HYDROCHLORIDE 5 MG: 5 TABLET ORAL at 16:25

## 2025-02-11 RX ADMIN — BORTEZOMIB 1.9 MG: 1 INJECTION, POWDER, LYOPHILIZED, FOR SOLUTION INTRAVENOUS; SUBCUTANEOUS at 13:15

## 2025-02-11 RX ADMIN — ACYCLOVIR 200 MG: 200 CAPSULE ORAL at 21:07

## 2025-02-11 RX ADMIN — PANTOPRAZOLE SODIUM 40 MG: 40 TABLET, DELAYED RELEASE ORAL at 15:30

## 2025-02-11 ASSESSMENT — ACTIVITIES OF DAILY LIVING (ADL)
ADLS_ACUITY_SCORE: 43

## 2025-02-11 NOTE — PROGRESS NOTES
Hospitalist Medicine Progress Note   [facility]       Dulce Ma is a 64 year old lady with multiple myeloma, ESRD, HTN, hypercholesteremia came to [facility] 2/5/2025 with nausea, vomiting after having recently started chemotherapy.  Her creatinine at admission was 10.28  sodium 134 potassium 4.6 chloride 95 bicarb 18 and glucose 109.  Hemoglobin was 10.  Patient did have tunneled dialysis catheter placement by interventional radiologist Dr. Vlad Montiel MD on 2/6/2025 to start dialysis she did have a short run of sinus tachycardia which was thought to be?  SVT as it spontaneously resolved with heart rates fluctuating between 40 and 140 cardiology was consulted at this time they do not want to treat the AV block given the ESRD  She had upper GI endoscopy 2/10/2025 which showed ectopic gastric mucosa in the upper third of the esophagus grade B reflux esophagitis without bleeding in the esophagus but a single spot with bleeding in the stomach for which clips were applied erythematous mucosa in the pylorus without any bleeding proton pump inhibitors continuation was advised  Patient started receiving treatment for amyloidosis - Darzalex, Cytoxan, Velcade and dex again  2/10/2025 and planning to give Cytoxan, Velcade, and dex 2/11/2025       Date of Admission:  2/5/2025  Assessment & Plan     Paroxysmal atrial tachycardia  ?  SVT  History of atrial fibrillation in the past  Patient was evaluated by cardiology and started on amiodarone  Plan is to give anticoagulation later if needed    End-stage kidney disease.  Patient had dialysis  Appreciate nephrology consultation  -Dialysis diet.  Management as per nephrology  [facility]     AL amyloidosis   -Recently started chemotherapy.  Darzalex, Cytoxan, Velcade and dex again  2/10/2025 and planning to give Cytoxan, Velcade, and dex 2/11/2025  -Oncology started  the patient on     Nausea and vomiting.  -Multiple episodes of vomiting the last few days since starting chemo.  -Appears dehydrated.  -Gentle IV fluids overnight with plan for dialysis to initiate soon.  -Antiemetics as needed.     Acute E. coli urinary tract infection.  -Continue ceftriaxone 1 g IV every 24 hours.  Colitis pansensitive     Hyponatremia.  Hypochloremia.  -Mild.  Sodium 134, chloride 95.  Both of which have normalized  -Nephrology planning for dialysis initiation.  -Recheck metabolic panel intermittently.     Malnutrition.  -Registered dietitian consult.     Anemia.  -Oncology following  -Patient was found to have a single spot with bleeding in the stomach for which clips were applied along with erythematous mucosa in the pylorus as well as reflux esophagitis seen on endoscopy for which PPIs are advised            Plan:   She is having difficulty swallowing  Swallowing consult from speech  CBC and BMP in a.m.    Diet: Snacks/Supplements Adult: Other; Novas[employer] renal; With Meals  NPO for Medical/Clinical Reasons Except for: Meds, Ice Chips    DVT Prophylaxis: Heparin SQ  Butler Catheter: Not present  Code Status: Full Code         Medically Ready for Discharge: Anticipated in 2-4 Days    Clinically Significant Risk Factors         # Hyponatremia: Lowest Na = 134 mmol/L in last 2 days, will monitor as appropriate  # Hypochloremia: Lowest Cl = 96 mmol/L in last 2 days, will monitor as appropriate      # Hypoalbuminemia: Lowest albumin = 2.8 g/dL at 2/10/2025  6:18 AM, will monitor as appropriate     # Hypertension: Noted on problem list               # Severe Malnutrition: based on nutrition assessment                 The patient's care was discussed with the Patient and RN.    Francisco Correa MD  Hospitalist Service  [facility]    ______________________________________________________________________    Interval History     Symptoms   Patient is having difficulty  swallowing    Review of Systems:   Weakness is still present    Data reviewed today: I reviewed all medications, new labs and imaging results over the last 24 hours.     Physical Exam   Vital Signs: Temp: 98.2  F (36.8  C) Temp src: Oral BP: 90/45 Pulse: 69   Resp: 16 SpO2: 94 % O2 Device: None (Room air)    Weight: 109 lbs 12.63 oz      GENERAL: Patient is not in acute distress  HEENT: EOM+,Conjunctiva is clear   NECK:  no Jugular Venous distention  HEART: S1 S2 regular Rate and Rhythm, there is  no murmur,   LUNGS: Respirations are  not laboured, Lungs are  clear to auscultation without Crepitations or Wheezing   ABDOMEN: Soft , there is no tenderness , Bowel Sounds are  Positive   LOWER LIMBS:  Pedal Edema  Bilaterally   CNS:  Alert,  Oriented x 3, Moving all the Four Limbs     Data   Recent Labs   Lab 02/11/25  1201 02/10/25  0618 02/09/25  0827 02/08/25 2028 02/08/25  1134 02/06/25 2023 02/06/25  1037 02/05/25  1358   WBC 6.4  --   --   --  7.7  --  5.5 9.1   HGB 8.1* 7.6* 8.9*   < > 8.0*  --  8.7* 10.0*   MCV 92  --   --   --  91  --  90 91     --   --   --  239  --  210 280   * 136 137  --   --    < > 138 134*   POTASSIUM 3.7 3.8 3.9  --   --    < > 3.4 4.6   CHLORIDE 96* 101 100  --   --    < > 99 95*   CO2 24 24 21*  --   --    < > 23 18*   BUN 22.3 28.5* 18.2  --   --    < > 56.5* 105.1*   CR 4.36* 5.24* 3.58*  --   --    < > 5.29* 10.28*   ANIONGAP 14 11 16*  --   --    < > 16* 21*   KELTON 8.1* 8.2* 8.4*  --   --    < > 8.2* 9.7   * 108* 122*  --   --    < > 90 109*   ALBUMIN 3.1* 2.8* 3.1*  --   --    < > 3.5 3.9   PROTTOTAL 6.2*  --   --   --   --   --   --  8.3   BILITOTAL 0.4  --   --   --   --   --   --  0.3   ALKPHOS 44  --   --   --   --   --   --  56   ALT 8  --   --   --   --   --   --  12   AST 15  --   --   --   --   --   --  20    < > = values in this interval not displayed.         No results found for this or any previous visit (from the past 24 hours).

## 2025-02-11 NOTE — PROGRESS NOTES
02/10/25 1600   Skin   Skin WDL X;color;moisture;integrity   Skin Color pale   Skin Temperature warm   Skin Moisture dry   Skin Integrity bruised (ecchymotic);other (see comments)   Bruised (ecchymotic) location Right;Left;Hand Finger   Other (see comments) R hemodialysis port     Admission/Transfer from: PACU  2 RN skin assessment completed. Yes  Significant findings include: dry skin, bruising, R CVC dialysis port  LDA added (if applicable)? YES: Dialysis port  Requested WOC Nurse Consult from MD? Not Applicable    Antonio Yancey RN

## 2025-02-11 NOTE — PLAN OF CARE
"Goal Outcome Evaluation:      Plan of Care Reviewed With: patient    Overall Patient Progress: improving Overall Patient Progress: improving    Outcome Evaluation: up to floor around 1500    To Do:  End of Shift Summary  For vital signs and complete assessments, please see documentation flowsheets.     Pertinent assessments: Up to floor around 1500. EGD complete this afternoon. VSS on RA. Denies pain. A&Ox4, drowsy. Up SBA. SCDs on. R CVC dialysis port CDI. PIV SL. Able to urinate once. LS clear, BS audible.     Major Shift Events: Up to floor around 1500    Treatment Plan: Starting chemo meds, monitor stools, dialysis MWF, Hem/Onc, OT, discharge TBD    Bedside Nurse: Antonio Yancey RN           Problem: Adult Inpatient Plan of Care  Goal: Plan of Care Review  Description: The Plan of Care Review/Shift note should be completed every shift.  The Outcome Evaluation is a brief statement about your assessment that the patient is improving, declining, or no change.  This information will be displayed automatically on your shift  note.  Outcome: Progressing  Flowsheets (Taken 2/10/2025 1937)  Outcome Evaluation: up to floor around 1500  Plan of Care Reviewed With: patient  Overall Patient Progress: improving  Goal: Patient-Specific Goal (Individualized)  Description: You can add care plan individualizations to a care plan. Examples of Individualization might be:  \"Parent requests to be called daily at 9am for status\", \"I have a hard time hearing out of my right ear\", or \"Do not touch me to wake me up as it startles  me\".  Outcome: Progressing  Goal: Absence of Hospital-Acquired Illness or Injury  Outcome: Progressing  Intervention: Identify and Manage Fall Risk  Recent Flowsheet Documentation  Taken 2/10/2025 1600 by Antonio Yancey, RN  Safety Promotion/Fall Prevention:   activity supervised   assistive device/personal items within reach   clutter free environment maintained   nonskid shoes/slippers when out of bed   " safety round/check completed   room near nurse's station   patient and family education   increase visualization of patient   increased rounding and observation   lighting adjusted   mobility aid in reach  Intervention: Prevent Skin Injury  Recent Flowsheet Documentation  Taken 2/10/2025 1800 by Antonio Yancey RN  Body Position: position changed independently  Taken 2/10/2025 1519 by Antonio Yancey RN  Body Position: position changed independently  Intervention: Prevent and Manage VTE (Venous Thromboembolism) Risk  Recent Flowsheet Documentation  Taken 2/10/2025 1600 by Antonio Yancey RN  VTE Prevention/Management: SCDs on (sequential compression devices)  Intervention: Prevent Infection  Recent Flowsheet Documentation  Taken 2/10/2025 1600 by Antonio Yancey RN  Infection Prevention:   cohorting utilized   hand hygiene promoted   rest/sleep promoted   single patient room provided  Goal: Optimal Comfort and Wellbeing  Outcome: Progressing  Goal: Readiness for Transition of Care  Outcome: Progressing     Problem: Nausea and Vomiting  Goal: Nausea and Vomiting Relief  Outcome: Progressing     Problem: Skin Injury Risk Increased  Goal: Skin Health and Integrity  Outcome: Progressing  Intervention: Optimize Skin Protection  Recent Flowsheet Documentation  Taken 2/10/2025 1800 by Antonio Yancey RN  Activity Management: ambulated to bathroom  Head of Bed (HOB) Positioning: HOB at 20-30 degrees  Taken 2/10/2025 1519 by Antonio Yancey RN  Activity Management: activity adjusted per tolerance  Head of Bed (HOB) Positioning: HOB at 20-30 degrees

## 2025-02-11 NOTE — PROGRESS NOTES
"GASTROENTEROLOGY PROGRESS NOTE        SUBJECTIVE:  No further melena today. Reports feeling fatigued and slightly light headed. No abdominal pain, nausea, vomiting.      OBJECTIVE:  /40 (BP Location: Left arm)   Pulse 92   Temp 98.4  F (36.9  C) (Oral)   Resp 16   Ht 1.6 m (5' 3\")   Wt 49.8 kg (109 lb 12.6 oz)   LMP 10/31/2011   SpO2 94%   BMI 19.45 kg/m    Temp (24hrs), Av.3  F (36.8  C), Min:97.2  F (36.2  C), Max:99.7  F (37.6  C)    Patient Vitals for the past 72 hrs:   Weight   25 0650 49.8 kg (109 lb 12.6 oz)       Intake/Output Summary (Last 24 hours) at 2025 1008  Last data filed at 2/10/2025 1335  Gross per 24 hour   Intake 300 ml   Output 0 ml   Net 300 ml        PHYSICAL EXAM  GENERAL: Pale, no obvious distress  ABDOMEN: Non-distended. Positive bowel sounds. Soft, non-tender  SKIN: No jaundice  NEUROLOGIC: Alert and oriented  PSYCHIATRIC: Normal affect     Additional Comments:  ROS, FH, SH: See initial GI consult for details.     I have reviewed the patient's new clinical lab results:     Recent Labs   Lab Test 02/10/25  0618 02/09/25  0827 02/08/25  2028 02/08/25  1134 25  1037 25  1358 25  1806 25  1355   WBC  --   --   --  7.7 5.5 9.1  --   --    HGB 7.6* 8.9* 7.8* 8.0* 8.7* 10.0*   < >  --    MCV  --   --   --  91 90 91  --   --    PLT  --   --   --  239 210 280  --   --    INR  --   --   --   --   --   --   --  1.13    < > = values in this interval not displayed.     Recent Labs   Lab Test 02/10/25  0618 02/09/25  0827 02/08/25  1012   POTASSIUM 3.8 3.9 4.1   CHLORIDE 101 100 97*   CO2 24 21* 22   BUN 28.5* 18.2 32.7*   ANIONGAP 11 16* 16*     Recent Labs   Lab Test 02/10/25  0618 25  0827 25  1012 25  1037 25  1522 25  1358 25  1354 25  1112 25  1244 10/18/24  1249   ALBUMIN 2.8* 3.1* 2.9*   < >  --  3.9  --  4.3  4.3 4.2  --    BILITOTAL  --   --   --   --   --  0.3  --  0.6 0.5  --    ALT  --   -- "   --   --   --  12  --  9 10  --    AST  --   --   --   --   --  20  --  21 21  --    PROTEIN  --   --   --   --  50*  --  30*  --   --  Trace*   LIPASE  --   --   --   --   --   --   --   --  42  --    AMYLASE  --   --   --   --   --   --   --   --  96  --     < > = values in this interval not displayed.       IMAGING:   EGD 2/10/2025 (Dr. Cole)  Findings:       A single area of ectopic gastric mucosa was found in the upper third of        the esophagus.        LA Grade B (one or more mucosal breaks greater than 5 mm, not extending        between the tops of two mucosal folds) esophagitis with no bleeding was        found in the lower third of the esophagus.        The exam of the esophagus was otherwise normal.        A single spot with bleeding was found in the cardia. This area was not        bleeding on advancement of scope and was only seen bleeding on        withdrawal of the scope. For hemostasis, one hemostatic clip was        successfully placed. There was no bleeding at the end of the procedure.        Diffuse severely erythematous mucosa without bleeding was found at the        pylorus.        The exam of the stomach was otherwise normal.        The examined duodenum was normal.                                                                                    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                             - LA Grade B reflux esophagitis with no bleeding.                             - A single spot with bleeding in the stomach. Clip                             was placed.                             - Erythematous mucosa in the pylorus. No bleeding.                             - Normal examined duodenum.                             - No specimens collected.                             - The examination was otherwise normal.     ASSESSMENT:  64 year old female with multiple myeloma, ESRD, HTN, hypercholesteremia  who presents with nausea, vomiting after recently starting chemotherapy. Patient developed melena and GI consulted for evaluation of GIB.     EGD 2/10/25 with ectopic gastric mucosa in upper third of esophagus, LA grade B reflux with esophagitis, single spot with bleeding in the stomach, clip placed, normal examined duodenum.     Today hemoglobin stable at 7.6. No further melena or hematochezia.      PLAN:   - Monitor stools and hgb  - Continue PPI   - Antiemetics per primary team    Discussed patient findings and plan with Dr. Eastman.     Total time: 25 minutes of total time was spent providing patient care, including patient evaluation, reviewing documentation/test results, and .     Geovanna Jimenez PA-C  [facility] ([facility])

## 2025-02-11 NOTE — PLAN OF CARE
"To Do:  End of Shift Summary  For vital signs and complete assessments, please see documentation flowsheets.     Pertinent assessments: Pt A/O. VSS. Capno, on RA. Denies nausea and SOB. C/o of sore throat, lozenge given w/ relief.   Major Shift Events none  Treatment Plan: HD MWF, chemo today, symptom management and monitor stool  Bedside Nurse: Lillie Pierre RN     Problem: Adult Inpatient Plan of Care  Goal: Plan of Care Review  Description: The Plan of Care Review/Shift note should be completed every shift.  The Outcome Evaluation is a brief statement about your assessment that the patient is improving, declining, or no change.  This information will be displayed automatically on your shift  note.  Outcome: Not Progressing  Flowsheets (Taken 2/11/2025 0637)  Outcome Evaluation: chemo today  Plan of Care Reviewed With: patient  Overall Patient Progress: no change  Goal: Patient-Specific Goal (Individualized)  Description: You can add care plan individualizations to a care plan. Examples of Individualization might be:  \"Parent requests to be called daily at 9am for status\", \"I have a hard time hearing out of my right ear\", or \"Do not touch me to wake me up as it startles  me\".  Outcome: Not Progressing  Goal: Absence of Hospital-Acquired Illness or Injury  Outcome: Not Progressing  Goal: Optimal Comfort and Wellbeing  Outcome: Not Progressing  Goal: Readiness for Transition of Care  Outcome: Not Progressing     Problem: Nausea and Vomiting  Goal: Nausea and Vomiting Relief  Outcome: Not Progressing     Problem: Skin Injury Risk Increased  Goal: Skin Health and Integrity  Outcome: Not Progressing  Intervention: Plan: Nurse Driven Intervention: Moisture Management  Recent Flowsheet Documentation  Taken 2/10/2025 2000 by Lillie Pierre, RN  Moisture Interventions: Encourage regular toileting  Bathing/Skin Care: other (see comments)   Goal Outcome Evaluation:      Plan of Care Reviewed With: patient    Overall Patient " Progress: no changeOverall Patient Progress: no change    Outcome Evaluation: chemo today

## 2025-02-11 NOTE — PROGRESS NOTES
Care Management Follow Up    Length of Stay (days): 6    Expected Discharge Date: 02/07/2025     Concerns to be Addressed: discharge planning     Patient plan of care discussed at interdisciplinary rounds: Yes    Anticipated Discharge Disposition: Dialysis Services     Anticipated Discharge Services: None  Anticipated Discharge DME: None    Patient/family educated on Medicare website which has current facility and service quality ratings:    Education Provided on the Discharge Plan:    Patient/Family in Agreement with the Plan: yes    Referrals Placed by CM/SW:    Private pay costs discussed: Not applicable    Discussed  Partnership in Safe Discharge Planning  document with patient/family: No     Additional Information:  Patient receiving chemotherapy today and anticipated discharge date not clear.  Contacted [facility] [phone] to let them know that patient is still inpatient and to not expect her for her chair time tomorrow. Will need to update them if patient remains hospitalized for Friday run or will be able to make appointment.    Maggie Crockett RN BSN OCN  Care Coordinator  [facility]  [phone]

## 2025-02-11 NOTE — PROGRESS NOTES
Oncology/Hematology Follow Up Note:    Assessment and Plan:  Dulce Ma is a 64 year old female patient.  #1 AL amyloidosis  - With at least kidney involvement, may have cardiac involvement as well with elevated troponin and BNP  - She does NOT have multiple myeloma  - Started Darzalex, Cytoxan, Velcade and dexamethasone last Monday 2/3     PLAN:  - Due for Darzalex, Cytoxan, Velcade and dex again.   - We will give her Cytoxan, Velcade, and dex today.   Discussed with pharmacy and orders completed patient will get Cytoxan 300 mg 1 time and dexamethasone 40 mg one-time along with Velcade  Darzalex unfortunatley cannot be given in the hospital due to insurance reasons.  - Very good chance of response (>90%), but unclear if her kidney function will get better with treatment.  - Will also get a non-urgent cardiac MRI in the outpatient setting, to evaluate for cardiac amyloidosis     #2 Anemia  - Due to ESRD and AL amyloidosis.  - GI bleed could be contributing as well  -Patient had a EGD yesterday there was 1 area of bleeding was noted and that was clipped continue on proton pump inhibitor watch for drop in hemoglobin and stools     PLAN:  - WAYNE per nephrology  - Repeating iron studies.  May need IV iron     #3 Paroxysmal atrial flutter     Luigi Jones.  [facility]  [phone]       Subjective:    Saw patient this morning.  She reports fatigue and she thinks her energy level is better compared to Friday.  She continues to report dark stools.  Has an EGD planned today.    2/11: Had EGD yesterday 1 area of bleeding was noted and clipped patient feels fatigued but otherwise stable.     Labs:  All labs reviewed    CBC  Recent Labs   Lab 02/10/25  0618 02/09/25  0827 02/08/25  2028 02/08/25  1134 02/06/25  1037 02/05/25  1358   WBC  --   --   --  7.7 5.5 9.1   HGB 7.6* 8.9* 7.8* 8.0* 8.7* 10.0*   MCV  --   --   --  91 90 91   PLT  --   --   --  239 210 280       CMP  Recent Labs   Lab 02/10/25  0618  "02/09/25  0827 02/08/25  1012 02/07/25  0630 02/06/25 2023 02/06/25  1037 02/05/25  1358    137 135 137 136   < > 134*   POTASSIUM 3.8 3.9 4.1 4.0 4.0   < > 4.6   CHLORIDE 101 100 97* 100 97*   < > 95*   CO2 24 21* 22 20* 19*   < > 18*   ANIONGAP 11 16* 16* 17* 20*   < > 21*   * 122* 98 83 84   < > 109*   BUN 28.5* 18.2 32.7* 58.9* 55.6*   < > 105.1*   CR 5.24* 3.58* 4.83* 6.59* 6.13*   < > 10.28*   GFRESTIMATED 9* 14* 9* 7* 7*   < > 4*   KELTON 8.2* 8.4* 8.3* 8.1* 8.1*   < > 9.7   MAG  --   --  2.0  --  2.0  --  2.9*   PHOS 3.4 3.2 4.6* 5.6*  --    < > 6.3*   PROTTOTAL  --   --   --   --   --   --  8.3   ALBUMIN 2.8* 3.1* 2.9* 2.8*  --    < > 3.9   BILITOTAL  --   --   --   --   --   --  0.3   ALKPHOS  --   --   --   --   --   --  56   AST  --   --   --   --   --   --  20   ALT  --   --   --   --   --   --  12    < > = values in this interval not displayed.       INRNo lab results found in last 7 days.    Blood CultureNo results for input(s): \"CULT\" in the last 168 hours.          "

## 2025-02-11 NOTE — PROGRESS NOTES
CLINICAL NUTRITION SERVICES - REASSESSMENT NOTE     RECOMMENDATIONS FOR MDs/PROVIDERS TO ORDER:  Consider scheduling antimetics to better control nausea - MD aware  If patient continues to not tolerate oral intake, would highly recommend starting nutrition support     Malnutrition Status:    Malnutrition Diagnosis: Severe malnutrition in the context of acute illness or injury, chronic illness or injury   Malnutrition Present on Admission: Yes    Registered Dietitian Interventions:  Continue Regular diet for now as patient is not eating well. Once she starts to have adequate PO, okay to transition to Renal diet     Encourage oral intake. Okay for family/visitors to bring in food for patient to increase intake.   Continuing oral nutrition supplement order.     Documentation of oral intake appreciated       SUBJECTIVE INFORMATION  Assessed patient in room.    CURRENT NUTRITION ORDERS  Diet: Regular, Novasource Renal BID    CURRENT INTAKE/TOLERANCE  Per flowsheet documentation: no intakes are noted in the flowsheets  Per review of [facility]: Pt has been ordering 2 small meals/day during admission. Diet advanced from FLD on 2/6.   Per patient report: Intake has not really improved, still having nausea. She is drinking 2 Novasource renal supplements per day.   RD encouraged oral intake with patient to support her through dialysis. Encouraged small frequent meals and scheduling antimetics to better control nausea. Patient was agreeable. Encouraged family in the room to bring in food to help with intake.      NEW FINDINGS  Weight: stable during admission   Vitals:    02/05/25 1255 02/11/25 0650   Weight: 49.8 kg (109 lb 12.6 oz) 49.8 kg (109 lb 12.6 oz)       Skin/wounds: trace edema noted    I/Os: LBM 2/9    Nutrition-relevant labs:  BUN 28.5, Cr 5.24    Nutrition-relevant medications:  decadron, triphrocaps      MALNUTRITION  % Intake: </=50% for >/= 1 month (severe)  % Weight Loss: > 5% in 1 month (severe)  "  Subcutaneous Fat Loss: Orbital: moderate and Triceps: Mild  Muscle Loss: Clavicles (pectoralis and deltoids): Mild, Shoulders (deltoids): Mild, and Calf (gastrocnemius): Mild   Fluid Accumulation/Edema: trace edema  Malnutrition Diagnosis: Severe malnutrition in the context of acute illness or injury, chronic illness or injury   Malnutrition Present on Admission: Yes    EVALUATION OF THE PROGRESS TOWARD GOALS   Previous Goals  MD to advance diet as medically appropriate   Evaluation: Met  Patient to consume 50-75% of nutritionally adequate meal trays 4-6x daily, or the equivalent with supplements/snacks.  Evaluation: Not progressing    Previous Nutrition Diagnosis  Inadequate oral intake related to n/v as evidenced by pt report intake PTA, unintentional wt loss.   Evaluation: No change    NUTRITION DIAGNOSIS  Increased nutrient needs (protein)  related to HD as evidenced by need for oral nutrition supplements and oral intake encouragement.     INTERVENTIONS  Medical food supplement therapy  Nutrition counseling strategies    Goals  Patient to consume 50-75% of nutritionally adequate meal trays 4-6x daily, or the equivalent with supplements/snacks.      Monitoring/Evaluation      Progress toward goals will be monitored and evaluated per policy.      Devika Tyson, MS, RD, LD  Clinical Dietitian II  [facility] Message Group: \"Dietitian [Nathalie]\"  Office Phone: [phone]  Pagers: 3rd floor/ICU: [phone]  All other floors: [phone]  Weekend/holiday: [phone]    "

## 2025-02-12 ENCOUNTER — APPOINTMENT (OUTPATIENT)
Dept: SPEECH THERAPY | Facility: CLINIC | Age: 65
End: 2025-02-12
Attending: INTERNAL MEDICINE
Payer: COMMERCIAL

## 2025-02-12 ENCOUNTER — APPOINTMENT (OUTPATIENT)
Dept: OCCUPATIONAL THERAPY | Facility: CLINIC | Age: 65
End: 2025-02-12
Payer: COMMERCIAL

## 2025-02-12 LAB
ATRIAL RATE - MUSE: 92 BPM
DIASTOLIC BLOOD PRESSURE - MUSE: NORMAL MMHG
INTERPRETATION ECG - MUSE: NORMAL
P AXIS - MUSE: 63 DEGREES
PR INTERVAL - MUSE: 144 MS
QRS DURATION - MUSE: 86 MS
QT - MUSE: 356 MS
QTC - MUSE: 440 MS
R AXIS - MUSE: 70 DEGREES
SYSTOLIC BLOOD PRESSURE - MUSE: NORMAL MMHG
T AXIS - MUSE: 262 DEGREES
VENTRICULAR RATE- MUSE: 92 BPM

## 2025-02-12 PROCEDURE — 92610 EVALUATE SWALLOWING FUNCTION: CPT | Mod: GN

## 2025-02-12 PROCEDURE — 258N000003 HC RX IP 258 OP 636: Performed by: INTERNAL MEDICINE

## 2025-02-12 PROCEDURE — 99232 SBSQ HOSP IP/OBS MODERATE 35: CPT | Performed by: INTERNAL MEDICINE

## 2025-02-12 PROCEDURE — 250N000013 HC RX MED GY IP 250 OP 250 PS 637: Performed by: INTERNAL MEDICINE

## 2025-02-12 PROCEDURE — 250N000011 HC RX IP 250 OP 636: Performed by: INTERNAL MEDICINE

## 2025-02-12 PROCEDURE — 120N000001 HC R&B MED SURG/OB

## 2025-02-12 PROCEDURE — 634N000001 HC RX 634: Mod: JZ | Performed by: INTERNAL MEDICINE

## 2025-02-12 PROCEDURE — 90937 HEMODIALYSIS REPEATED EVAL: CPT

## 2025-02-12 PROCEDURE — 90935 HEMODIALYSIS ONE EVALUATION: CPT | Performed by: INTERNAL MEDICINE

## 2025-02-12 PROCEDURE — 97530 THERAPEUTIC ACTIVITIES: CPT | Mod: GO

## 2025-02-12 RX ORDER — HEPARIN SODIUM 1000 [USP'U]/ML
500 INJECTION, SOLUTION INTRAVENOUS; SUBCUTANEOUS CONTINUOUS
Status: DISCONTINUED | OUTPATIENT
Start: 2025-02-12 | End: 2025-02-13 | Stop reason: HOSPADM

## 2025-02-12 RX ADMIN — HEPARIN SODIUM 500 UNITS: 1000 INJECTION, SOLUTION INTRAVENOUS; SUBCUTANEOUS at 10:50

## 2025-02-12 RX ADMIN — HEPARIN SODIUM 1600 UNITS: 1000 INJECTION, SOLUTION INTRAVENOUS; SUBCUTANEOUS at 10:51

## 2025-02-12 RX ADMIN — SODIUM CHLORIDE 250 ML: 9 INJECTION, SOLUTION INTRAVENOUS at 10:44

## 2025-02-12 RX ADMIN — Medication 1 CAPSULE: at 12:56

## 2025-02-12 RX ADMIN — IRON SUCROSE 50 MG: 20 INJECTION, SOLUTION INTRAVENOUS at 10:54

## 2025-02-12 RX ADMIN — AMIODARONE HYDROCHLORIDE 200 MG: 200 TABLET ORAL at 12:57

## 2025-02-12 RX ADMIN — HEPARIN SODIUM 1600 UNITS: 1000 INJECTION, SOLUTION INTRAVENOUS; SUBCUTANEOUS at 10:52

## 2025-02-12 RX ADMIN — ALTEPLASE 2 MG: 2.2 INJECTION, POWDER, LYOPHILIZED, FOR SOLUTION INTRAVENOUS at 11:43

## 2025-02-12 RX ADMIN — HEPARIN SODIUM 500 UNITS/HR: 1000 INJECTION, SOLUTION INTRAVENOUS; SUBCUTANEOUS at 10:51

## 2025-02-12 RX ADMIN — PANTOPRAZOLE SODIUM 40 MG: 40 TABLET, DELAYED RELEASE ORAL at 16:39

## 2025-02-12 RX ADMIN — SODIUM CHLORIDE 200 ML: 9 INJECTION, SOLUTION INTRAVENOUS at 10:43

## 2025-02-12 RX ADMIN — AMIODARONE HYDROCHLORIDE 200 MG: 200 TABLET ORAL at 20:54

## 2025-02-12 RX ADMIN — CEFTRIAXONE 1 G: 1 INJECTION, POWDER, FOR SOLUTION INTRAMUSCULAR; INTRAVENOUS at 16:43

## 2025-02-12 RX ADMIN — PANTOPRAZOLE SODIUM 40 MG: 40 TABLET, DELAYED RELEASE ORAL at 12:57

## 2025-02-12 RX ADMIN — ACYCLOVIR 200 MG: 200 CAPSULE ORAL at 12:57

## 2025-02-12 RX ADMIN — EPOETIN ALFA-EPBX 4000 UNITS: 4000 INJECTION, SOLUTION INTRAVENOUS; SUBCUTANEOUS at 10:53

## 2025-02-12 RX ADMIN — ACYCLOVIR 200 MG: 200 CAPSULE ORAL at 20:54

## 2025-02-12 ASSESSMENT — ACTIVITIES OF DAILY LIVING (ADL)
ADLS_ACUITY_SCORE: 43

## 2025-02-12 NOTE — PROGRESS NOTES
Oncology/Hematology Follow Up Note:    Assessment and Plan:  #1 AL amyloidosis  - With at least kidney involvement, may have cardiac involvement as well with elevated troponin and BNP  - She does NOT have multiple myeloma  - Started Darzalex, Cytoxan, Velcade and dexamethasone last Monday 2/3     PLAN:  - Due for Darzalex, Cytoxan, Velcade and dex again.   - She was given cycle 1 day 8 Cytoxan, Velcade, and dex yesterday.  Darzalex not given in the hospital due to insurance issues.  - Very good chance of response (>90%), but unclear if her kidney function will get better with treatment.  - Will also get a non-urgent cardiac MRI in the outpatient setting, to evaluate for cardiac amyloidosis  - Will set up weekly outpatient treatments for the next few weeks     #2 Anemia  - Due to ESRD and AL amyloidosis.  - GI bleed could be contributing as well  -Patient had a EGD yesterday there was 1 area of bleeding was noted and that was clipped continue on proton pump inhibitor watch for drop in hemoglobin      PLAN:  - WAYNE and iron per nephrology     #3 Paroxysmal atrial flutter    Subjective:    Doing OK.  No new symptoms.      Labs:  All labs reviewed    CBC  Recent Labs   Lab 02/11/25  1201 02/10/25  0618 02/09/25  0827 02/08/25 2028 02/08/25  1134 02/06/25  1037   WBC 6.4  --   --   --  7.7 5.5   HGB 8.1* 7.6* 8.9*   < > 8.0* 8.7*   MCV 92  --   --   --  91 90     --   --   --  239 210    < > = values in this interval not displayed.       CMP  Recent Labs   Lab 02/11/25  1201 02/10/25  0618 02/09/25  0827 02/08/25  1012 02/07/25  0630 02/06/25 2023 02/06/25  1037 02/05/25  1358   * 136 137 135 137 136   < > 134*   POTASSIUM 3.7 3.8 3.9 4.1 4.0 4.0   < > 4.6   CHLORIDE 96* 101 100 97* 100 97*   < > 95*   CO2 24 24 21* 22 20* 19*   < > 18*   ANIONGAP 14 11 16* 16* 17* 20*   < > 21*   * 108* 122* 98 83 84   < > 109*   BUN 22.3 28.5* 18.2 32.7* 58.9* 55.6*   < > 105.1*   CR 4.36* 5.24* 3.58* 4.83* 6.59*  "6.13*   < > 10.28*   GFRESTIMATED 11* 9* 14* 9* 7* 7*   < > 4*   KELTON 8.1* 8.2* 8.4* 8.3* 8.1* 8.1*   < > 9.7   MAG  --   --   --  2.0  --  2.0  --  2.9*   PHOS  --  3.4 3.2 4.6* 5.6*  --    < > 6.3*   PROTTOTAL 6.2*  --   --   --   --   --   --  8.3   ALBUMIN 3.1* 2.8* 3.1* 2.9* 2.8*  --    < > 3.9   BILITOTAL 0.4  --   --   --   --   --   --  0.3   ALKPHOS 44  --   --   --   --   --   --  56   AST 15  --   --   --   --   --   --  20   ALT 8  --   --   --   --   --   --  12    < > = values in this interval not displayed.       INRNo lab results found in last 7 days.    Blood CultureNo results for input(s): \"CULT\" in the last 168 hours.      Malachi Brown MD  [facility]  2/12/2025 11:20 AM        "

## 2025-02-12 NOTE — PROGRESS NOTES
02/12/25 0900   Appointment Info   Signing Clinician's Name / Credentials (SLP) Lisset Billingsley MA CCC-SLP   General Information   Onset of Illness/Injury or Date of Surgery 02/05/25   Referring Physician Francisco Correa MD   Patient/Family Therapy Goal Statement (SLP) Pt hopes to resume eating/drinking today.   Pertinent History of Current Problem Dulce Ma is a 64 year old lady with multiple myeloma, ESRD, HTN, hypercholesteremia came to [facility] 2/5/2025 with nausea, vomiting after having recently started chemotherapy.  Her creatinine at admission was 10.28  sodium 134 potassium 4.6 chloride 95 bicarb 18 and glucose 109.  Hemoglobin was 10.  Patient did have tunneled dialysis catheter placement by interventional radiologist Dr. Vlad Montiel MD on 2/6/2025 to start dialysis she did have a short run of sinus tachycardia which was thought to be?  SVT as it spontaneously resolved with heart rates fluctuating between 40 and 140 cardiology was consulted at this time they do not want to treat the AV block given the ESRD  She had upper GI endoscopy 2/10/2025 which showed ectopic gastric mucosa in the upper third of the esophagus grade B reflux esophagitis without bleeding in the esophagus but a single spot with bleeding in the stomach for which clips were applied erythematous mucosa in the pylorus without any bleeding proton pump inhibitors continuation was advised  Patient started receiving treatment for amyloidosis - Darzalex, Cytoxan, Velcade and dex again  2/10/2025 and planning to give Cytoxan, Velcade, and dex 2/11/2025. SLP consulted for a swallow eval.   General Observations Pt reports she ate a meal yesterday without difficulty (tacos), she had some trouble with a pill but felt it was more gagging to it being chalky than dysphagia. Seen for evaluation in dialysis today. Dialysis RN approved evaluation and assisted with upright positioning.   Type of Evaluation   Type of Evaluation  "Swallow Evaluation   Oral Motor   Oral Musculature generally intact   Mucosal Quality dry   Dentition (Oral Motor)   Dentition (Oral Motor) adequate dentition;natural dentition   Facial Symmetry (Oral Motor)   Facial Symmetry (Oral Motor) WNL   Lip Function (Oral Motor)   Lip Range of Motion (Oral Motor) WNL   Tongue Function (Oral Motor)   Tongue ROM (Oral Motor) WNL   Jaw Function (Oral Motor)   Jaw Function (Oral Motor) WNL   Cough/Swallow/Gag Reflex (Oral Motor)   Soft Palate/Velum (Oral Motor) WNL   Gag Reflex (Oral Motor) not tested   Volitional Throat Clear/Cough (Oral Motor) WNL   Volitional Swallow (Oral Motor) WNL   Vocal Quality/Secretion Management (Oral Motor)   Vocal Quality (Oral Motor)   (mildly reduced, but able to achieve strong voicing to cue)   General Swallowing Observations   Past History of Dysphagia No hx of oropharyngeal complaints. EGD completed 2/5/25 with findings: \" A single area of ectopic gastric mucosa was found in the upper third of        the esophagus.        LA Grade B (one or more mucosal breaks greater than 5 mm, not extending        between the tops of two mucosal folds) esophagitis with no bleeding was        found in the lower third of the esophagus.        The exam of the esophagus was otherwise normal.        A single spot with bleeding was found in the cardia. This area was not        bleeding on advancement of scope and was only seen bleeding on        withdrawal of the scope. For hemostasis, one hemostatic clip was        successfully placed. There was no bleeding at the end of the procedure.        Diffuse severely erythematous mucosa without bleeding was found at the        pylorus.        The exam of the stomach was otherwise normal.        The examined duodenum was normal. \"   Respiratory Support room air   Current Diet/Method of Nutritional Intake (General Swallowing Observations, NIS) NPO  (pending SLP consult; pt reports she ate [employer] yesterday without " trouble)   Swallowing Evaluation Clinical swallow evaluation   Clinical Swallow Evaluation   Feeding Assistance set up only required   Clinical Swallow Evaluation Textures Trialed thin liquids;pureed;solid foods   Clinical Swallow Eval: Thin Liquid Texture Trial   Mode of Presentation, Thin Liquids spoon;straw;fed by clinician;self-fed   Volume of Liquid or Food Presented 8 oz thin H20   Oral Phase of Swallow WFL   Pharyngeal Phase of Swallow intact   Diagnostic Statement x1 throat clear, no other overt signs or symptoms of aspiration   Clinical Swallow Evaluation: Puree Solid Texture Trial   Mode of Presentation, Puree spoon;fed by clinician   Volume of Puree Presented 2 oz puree   Oral Phase, Puree WFL   Pharyngeal Phase, Puree intact   Diagnostic Statement no overt signs or symptoms of aspiration   Clinical Swallow Evaluation: Solid Food Texture Trial   Mode of Presentation self-fed   Volume Presented aj cracker   Oral Phase WFL   Oral Residue   (trace residue due to dryness; cleared with independence)   Pharyngeal Phase intact   Diagnostic Statement no overt signs or symptoms of aspiration   Esophageal Phase of Swallow   Patient reports or presents with symptoms of esophageal dysphagia   (no obvious complaints of difficulties during this visit.)   Esophageal comments See EGD report for summary.   Swallowing Recommendations   Diet Consistency Recommendations thin liquids (level 0);regular diet   Supervision Level for Intake distant supervision needed   Mode of Delivery Recommendations bolus size, small;slow rate of intake   Swallowing Maneuver Recommendations alternate food and liquid intake   Monitoring/Assistance Required (Eating/Swallowing) stop eating activities when fatigue is present;monitor for cough or change in vocal quality with intake   Recommended Feeding/Eating Techniques (Swallow Eval) maintain upright sitting position for eating   Medication Administration Recommendations, Swallowing (SLP) as  tolerated when upright and alert   Instrumental Assessment Recommendations instrumental evaluation not recommended at this time   General Therapy Interventions   Planned Therapy Interventions Dysphagia Treatment   Dysphagia treatment Instruction of safe swallow strategies   Clinical Impression   Criteria for Skilled Therapeutic Interventions Met (SLP Eval) Yes, treatment indicated   SLP Diagnosis mild oropharyngeal dysphagia   Risks & Benefits of therapy have been explained evaluation/treatment results reviewed;care plan/treatment goals reviewed;risks/benefits reviewed;current/potential barriers reviewed;participants voiced agreement with care plan;participants included;patient   Clinical Impression Comments Clinical dysphagia eval completed per MD order. The patient presents with minimal oropharyngeal dysphagia related to deconditioning and overall weakness. Recommend a regular texture diet and thin liquids with safety precautions: encourage she sit upright, take small bites/sips, pace slowly.   SLP Total Evaluation Time   Eval: oral/pharyngeal swallow function, clinical swallow Minutes (56051) 15   SLP Goals   Therapy Frequency (SLP Eval) 5 times/week   SLP Predicted Duration/Target Date for Goal Attainment 02/18/25   SLP Goals Swallow   SLP: Safely tolerate diet without signs/symptoms of aspiration Regular diet;Thin liquids;Independently   SLP Discharge Planning   SLP Plan diet tolerance, ?sign off   SLP Discharge Recommendation home   SLP Rationale for DC Rec short tx course to ensure safe diet tolerance; expect a short course of tx   SLP Brief overview of current status  Clinical dysphagia eval completed per MD order. The paRecommend a regular texture diet and thin liquids with safety precautions: encourage she sit upright, take small bites/sips, pace slowly.   SLP Time and Intention   Total Session Time (sum of timed and untimed services) 15

## 2025-02-12 NOTE — PLAN OF CARE
"Pertinent assessments: Assumed care of pt from [id number]. VSS, BP soft and Midodrine started. A&Ox4. Apical pulse irregular. Lungs CTA. Bowel sounds active in all quadrants. Minimal PO intake. PIV SL.     Major Shift Events: difficulty swallowing small pills noted, ST consulted and NPO until assessment is conducted, Velcade and Cytoxan given per Oncology protocol.    Treatment Plan: Dialysis 2/12, monitor BP, monitor for oncology side effects, monitor I/Os          Goal Outcome Evaluation:      Plan of Care Reviewed With: patient    Overall Patient Progress: no changeOverall Patient Progress: no change    Outcome Evaluation: difficulty swallowing, chemo administered      Problem: Adult Inpatient Plan of Care  Goal: Plan of Care Review  Description: The Plan of Care Review/Shift note should be completed every shift.  The Outcome Evaluation is a brief statement about your assessment that the patient is improving, declining, or no change.  This information will be displayed automatically on your shift  note.  Outcome: Progressing  Flowsheets (Taken 2/11/2025 1815)  Outcome Evaluation: difficulty swallowing, chemo administered  Plan of Care Reviewed With: patient  Overall Patient Progress: no change  Goal: Patient-Specific Goal (Individualized)  Description: You can add care plan individualizations to a care plan. Examples of Individualization might be:  \"Parent requests to be called daily at 9am for status\", \"I have a hard time hearing out of my right ear\", or \"Do not touch me to wake me up as it startles  me\".  Outcome: Progressing  Goal: Absence of Hospital-Acquired Illness or Injury  Outcome: Progressing  Intervention: Identify and Manage Fall Risk  Recent Flowsheet Documentation  Taken 2/11/2025 5419 by Kristen Herring RN  Safety Promotion/Fall Prevention:   activity supervised   assistive device/personal items within reach   nonskid shoes/slippers when out of bed   safety round/check completed  Intervention: " Prevent and Manage VTE (Venous Thromboembolism) Risk  Recent Flowsheet Documentation  Taken 2/11/2025 0930 by Kristen Herring RN  VTE Prevention/Management: SCDs off (sequential compression devices)  Intervention: Prevent Infection  Recent Flowsheet Documentation  Taken 2/11/2025 1524 by Kristen Herring, RN  Infection Prevention:   cohorting utilized   hand hygiene promoted   rest/sleep promoted   single patient room provided  Taken 2/11/2025 0930 by Kristen Herring RN  Infection Prevention:   cohorting utilized   hand hygiene promoted   rest/sleep promoted   single patient room provided  Goal: Optimal Comfort and Wellbeing  Outcome: Progressing  Goal: Readiness for Transition of Care  Outcome: Progressing

## 2025-02-12 NOTE — PROGRESS NOTES
Potassium   Date Value Ref Range Status   02/11/2025 3.7 3.4 - 5.3 mmol/L Final   11/22/2021 4.0 3.4 - 5.3 mmol/L Final   05/28/2020 4.8 3.4 - 5.3 mmol/L Final     Hemoglobin   Date Value Ref Range Status   02/11/2025 8.1 (L) 11.7 - 15.7 g/dL Final   05/28/2020 12.8 11.7 - 15.7 g/dL Final     Creatinine   Date Value Ref Range Status   02/11/2025 4.36 (H) 0.51 - 0.95 mg/dL Final   05/28/2020 0.78 0.52 - 1.04 mg/dL Final     Urea Nitrogen   Date Value Ref Range Status   02/11/2025 22.3 8.0 - 23.0 mg/dL Final   11/22/2021 19 7 - 30 mg/dL Final   05/28/2020 23 7 - 30 mg/dL Final     Sodium   Date Value Ref Range Status   02/11/2025 134 (L) 135 - 145 mmol/L Final   05/28/2020 138 133 - 144 mmol/L Final     INR   Date Value Ref Range Status   01/13/2025 1.13 0.85 - 1.15 Final       DIALYSIS PROCEDURE NOTE  Hepatitis status of previous patient on machine log was checked and verified ok to use with this patients hepatitis status.  Patient dialyzed for 3 hrs. on a K3 bath with a net fluid removal of  500 ml.  A BFR of 300 ml/min was obtained via a right tunneled CVC.  The treatment plan was discussed with Dr. Umanzor during the treatment.    Total heparin received during the treatment: 2000 units.     Line flushed, clamped and capped with heparin 1:1000 1.6 mL (1600 units) per lumen    Meds  given: Epoetin, Venofer, Alteplase (dwelled)     Dr. Umanzor made aware of increase in arterial pressure. Ordered to use alteplase after treatment, dwell for 30 minutes.      Person educated: Patient. Knowledge base Minimal. Barriers to learning: none. Educated on procedure via verbal mode. Patient verbalized understanding.   ICEBOAT? Timeout performed pre-treatment  I: Patient was identified using 2 identifiers  C:  Consent Signed Yes  E: Equipment preventative maintenance is current and dialysis delivery system OK to use  B: Hepatitis B Surface Antigen: Negative; Draw Date: 02/06/2025      Hepatitis B Surface Antibody: Susceptible; Draw  Date: 02/06/2025  O: Dialysis orders present and complete prior to treatment  A: Vascular access verified and assessed prior to treatment  T: Treatment was performed at a clinically appropriate time  ?: Patient was allowed to ask questions and address concerns prior to treatment  See Adult Hemodialysis flowsheet in EPIC for further details and post assessment.  Machine water alarm in place and functioning. Transducer pods intact and checked every 15min.   Pt assisted with repositioning throughout dialysis treatment.  Pt returned via wheelchair.  Chlorine/Chloramine water system checked every 4 hours.        Post treatment report given to YOLETTE Blackburn RN regarding 500 ml of fluid removed.         IKE Day RN

## 2025-02-12 NOTE — PROGRESS NOTES
Assessment and Plan:   CKD-5: Now on dialysis.  Today we will run her for 3 hours with a right CVC at 400 blood flow rate.  We will do 0.5 kg ultrafiltration.  3 potassium 35 bicarb 140 sodium.  EPO and Venofer on dialysis.  Low-dose heparin.    She is on midodrine for blood pressure support.              Interval History:   A-fib: On amiodarone    Question GI bleed: She is getting EPO and Venofer on dialysis.    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  She is well she is managed by oncology and is getting chemotherapy.  She is getting Velcade, Cytoxan, Decadron and Darzalex.        UTI: On Rocephin.           Review of Systems:   No symptoms from dialysis.  She has lots of questions about care of her CVC.          Medications:     Current Facility-Administered Medications   Medication Dose Route Frequency Provider Last Rate Last Admin    - MEDICATION INSTRUCTIONS for Dialysis Patients -   Does not apply See Admin Instructions Francisco Correa MD        acyclovir (ZOVIRAX) capsule 200 mg  200 mg Oral BID Luigi Jones MD   200 mg at 02/11/25 2107    amiodarone (PACERONE) tablet 200 mg  200 mg Oral or FT or NG tube BID Ralph Mead MD   200 mg at 02/11/25 2107    Followed by    [START ON 2/13/2025] amiodarone (PACERONE) tablet 200 mg  200 mg Oral or FT or NG tube Daily Ralph Mead MD        cefTRIAXone (ROCEPHIN) 1 g vial to attach to  mL bag for ADULTS or NS 50 mL bag for PEDS  1 g Intravenous Q24H Ra Elmore, DO 0 mL/hr at 02/06/25 0700 1 g at 02/11/25 1625    epoetin aparna-epbx (RETACRIT) injection 4,000 Units  4,000 Units Intravenous Once in dialysis/CRRT Johnny Umanzor MD        heparin (porcine) injection  500 Units Hemodialysis Machine OR IV Push Once in dialysis/CRRT Johnny Umanzor MD        sodium chloride 0.9% DIALYSIS Cath LOCK - RED Lumen  10 mL Intracatheter Once in dialysis/CRRT Johnny Umanzor MD        Followed by     heparin 1000 unit/mL DIALYSIS Cath LOCK - RED Lumen  1.3-2.6 mL Intracatheter Once in dialysis/CRRT Johnny Umanzor MD        sodium chloride 0.9% DIALYSIS Cath LOCK - BLUE Lumen  10 mL Intracatheter Once in dialysis/CRRT Johnny Umanzor MD        Followed by    heparin 1000 unit/mL DIALYSIS Cath LOCK -BLUE Lumen  1.3-2.6 mL Intracatheter Once in dialysis/CRRT Johnny Umanzor MD        iron sucrose (VENOFER) injection 50 mg  50 mg Intravenous Once in dialysis/CRRT Johnny Umanzor MD        multivitamin RENAL (TRIPHROCAPS) capsule 1 capsule  1 capsule Oral Daily Jj Leiva MD   1 capsule at 02/11/25 0952    pantoprazole (PROTONIX) EC tablet 40 mg  40 mg Oral BID AC Jj Leiva MD   40 mg at 02/11/25 1530    sodium chloride (PF) 0.9% PF flush 3 mL  3 mL Intracatheter Q8H Ra Elmore DO   3 mL at 02/11/25 1240    sodium chloride (PF) 0.9% PF flush 9 mL  9 mL Intracatheter During Dialysis/CRRT (from stock) Johnny Umanzor MD        sodium chloride (PF) 0.9% PF flush 9 mL  9 mL Intracatheter During Dialysis/CRRT (from stock) Johnny Umanzor MD        sodium chloride (PF) 0.9% PF flush 9 mL  9 mL Intracatheter During Dialysis/CRRT (from stock) Jj Leiva MD        sodium chloride (PF) 0.9% PF flush 9 mL  9 mL Intracatheter During Dialysis/CRRT (from stock) Jj Leiva MD        sodium chloride 0.9% BOLUS 200 mL  200 mL Hemodialysis Machine Once Johnny Umanzor MD        sodium chloride 0.9% BOLUS 250 mL  250 mL Intravenous Once in dialysis/CRRT Johnny Umanzor MD         Current Facility-Administered Medications   Medication Dose Route Frequency Provider Last Rate Last Admin    heparin 10,000 units/10 mL infusion (DIALYSIS USE)  500 Units/hr Hemodialysis Machine Continuous Johnny Umanzor MD         Current active medications and PTA medications reviewed, see medication list for details.            Physical Exam:    Vitals were reviewed  Patient Vitals for the past 24 hrs:   BP Temp Temp src Pulse Resp SpO2   25 1000 134/74 -- -- 81 -- --   25 0733 129/56 97.5  F (36.4  C) Oral 103 12 96 %   25 2248 101/43 97.5  F (36.4  C) Oral 69 12 94 %   25 1503 90/45 -- -- 69 -- --   25 1501 (!) 87/34 98.2  F (36.8  C) Oral 77 16 94 %       Temp:  [97.5  F (36.4  C)-98.2  F (36.8  C)] 97.5  F (36.4  C)  Pulse:  [] 81  Resp:  [12-16] 12  BP: ()/(34-74) 134/74  SpO2:  [94 %-96 %] 96 %    Temperatures:  Current - Temp: 97.5  F (36.4  C); Max - Temp  Av.7  F (36.5  C)  Min: 97.5  F (36.4  C)  Max: 98.2  F (36.8  C)  Respiration range: Resp  Av.3  Min: 12  Max: 16  Pulse range: Pulse  Av.8  Min: 69  Max: 103  Blood pressure range: Systolic (24hrs), Av , Min:87 , Max:134   ; Diastolic (24hrs), Av, Min:34, Max:74    Pulse oximetry range: SpO2  Av.7 %  Min: 94 %  Max: 96 %    No intake/output data recorded.    No intake or output data in the 24 hours ending 25 1011    Alert and responsive  Right CVC with clean dressing         Wt Readings from Last 4 Encounters:   25 49.8 kg (109 lb 12.6 oz)   01/15/25 52.9 kg (116 lb 11.2 oz)   25 54 kg (119 lb)   25 54.1 kg (119 lb 3.2 oz)          Data:          Lab Results   Component Value Date     2025     02/10/2025     2025     2020     2019     2016    Lab Results   Component Value Date    CHLORIDE 96 2025    CHLORIDE 101 02/10/2025    CHLORIDE 100 2025    CHLORIDE 106 2021    CHLORIDE 109 2020    CHLORIDE 108 2019    CHLORIDE 105 2016    Lab Results   Component Value Date    BUN 22.3 2025    BUN 28.5 02/10/2025    BUN 18.2 2025    BUN 19 2021    BUN 23 2020    BUN 16 2019    BUN 17 2016      Lab Results   Component Value Date    POTASSIUM 3.7 2025    POTASSIUM 3.8  02/10/2025    POTASSIUM 3.9 02/09/2025    POTASSIUM 4.0 11/22/2021    POTASSIUM 4.8 05/28/2020    POTASSIUM 4.2 01/11/2019    POTASSIUM 4.6 07/22/2016    Lab Results   Component Value Date    CO2 24 02/11/2025    CO2 24 02/10/2025    CO2 21 02/09/2025    CO2 27 11/22/2021    CO2 24 05/28/2020    CO2 24 01/11/2019    CO2 27 07/22/2016    Lab Results   Component Value Date    CR 4.36 02/11/2025    CR 5.24 02/10/2025    CR 3.58 02/09/2025    CR 0.78 05/28/2020    CR 0.81 01/11/2019    CR 0.73 07/22/2016        Recent Labs   Lab Test 02/11/25  1201 02/10/25  0618 02/09/25  0827 02/08/25 2028 02/08/25  1134 02/06/25  1037   WBC 6.4  --   --   --  7.7 5.5   HGB 8.1* 7.6* 8.9*   < > 8.0* 8.7*   HCT 24.7*  --   --   --  24.6* 26.1*   MCV 92  --   --   --  91 90     --   --   --  239 210    < > = values in this interval not displayed.     Recent Labs   Lab Test 02/11/25  1201 02/05/25  1358 01/09/25  1112 01/08/25  1244   AST 15 20 21 21   ALT 8 12 9 10   GGT  --   --   --  15   ALKPHOS 44 56 47 48   BILITOTAL 0.4 0.3 0.6 0.5       Recent Labs   Lab Test 02/08/25  1012 02/06/25 2023 02/05/25  1358   MAG 2.0 2.0 2.9*     Recent Labs   Lab Test 02/10/25  0618 02/09/25  0827 02/08/25  1012   PHOS 3.4 3.2 4.6*     Recent Labs   Lab Test 02/11/25  1201 02/10/25  0618 02/09/25  0827   KELTON 8.1* 8.2* 8.4*       Lab Results   Component Value Date    KELTON 8.1 (L) 02/11/2025     Lab Results   Component Value Date    WBC 6.4 02/11/2025    HGB 8.1 (L) 02/11/2025    HCT 24.7 (L) 02/11/2025    MCV 92 02/11/2025     02/11/2025     Lab Results   Component Value Date     (L) 02/11/2025    POTASSIUM 3.7 02/11/2025    CHLORIDE 96 (L) 02/11/2025    CO2 24 02/11/2025     (H) 02/11/2025     Lab Results   Component Value Date    BUN 22.3 02/11/2025    CR 4.36 (H) 02/11/2025     Lab Results   Component Value Date    MAG 2.0 02/08/2025     Lab Results   Component Value Date    PHOS 3.4 02/10/2025       Creatinine   Date  Value Ref Range Status   02/11/2025 4.36 (H) 0.51 - 0.95 mg/dL Final   02/10/2025 5.24 (H) 0.51 - 0.95 mg/dL Final   02/09/2025 3.58 (H) 0.51 - 0.95 mg/dL Final   02/08/2025 4.83 (H) 0.51 - 0.95 mg/dL Final   02/07/2025 6.59 (H) 0.51 - 0.95 mg/dL Final   02/06/2025 6.13 (H) 0.51 - 0.95 mg/dL Final   05/28/2020 0.78 0.52 - 1.04 mg/dL Final   01/11/2019 0.81 0.52 - 1.04 mg/dL Final   07/22/2016 0.73 0.52 - 1.04 mg/dL Final   07/20/2015 0.83 0.52 - 1.04 mg/dL Final   05/17/2013 0.79 0.52 - 1.04 mg/dL Final   04/02/2012 0.84 0.52 - 1.04 mg/dL Final       Attestation:  I have reviewed today's vital signs, notes, medications, labs and imaging.  Seen on dialysis.       Johnny Umanzor MD

## 2025-02-12 NOTE — PLAN OF CARE
"Goal Outcome Evaluation:      Plan of Care Reviewed With: patient    Overall Patient Progress: improvingOverall Patient Progress: improving    Outcome Evaluation: Able to swallow bedtime pills with less difficulty. NPO, VSS  To Do:  End of Shift Summary  For vital signs and complete assessments, please see documentation flowsheets.     Pertinent assessments: Assumed care of pt from 8295-2154. Denies pain, on RA. VSS, BP soft scheduled Midodrine given at bedtime. A&Ox4. Apical pulse regular. Lungs CTA. Bowel sounds active in all quadrants. Minimal PO intake. PIV SL. Voiding ok without issues.     Major Shift Events: NPO until speech assessment is completed.     Treatment Plan: Dialysis 2/12, monitor BP, monitor for oncology side effects, monitor I/Os    Bedside Nurse: Radha Bowen RN     Problem: Adult Inpatient Plan of Care  Goal: Plan of Care Review  Description: The Plan of Care Review/Shift note should be completed every shift.  The Outcome Evaluation is a brief statement about your assessment that the patient is improving, declining, or no change.  This information will be displayed automatically on your shift  note.  Outcome: Progressing  Flowsheets (Taken 2/12/2025 4770)  Outcome Evaluation: Able to swallow bedtime pills with less difficulty. NPO, VSS  Plan of Care Reviewed With: patient  Overall Patient Progress: improving  Goal: Patient-Specific Goal (Individualized)  Description: You can add care plan individualizations to a care plan. Examples of Individualization might be:  \"Parent requests to be called daily at 9am for status\", \"I have a hard time hearing out of my right ear\", or \"Do not touch me to wake me up as it startles  me\".  Outcome: Progressing  Goal: Absence of Hospital-Acquired Illness or Injury  Outcome: Progressing  Intervention: Identify and Manage Fall Risk  Recent Flowsheet Documentation  Taken 2/11/2025 2203 by Radha Bowen, RN  Safety Promotion/Fall Prevention:   activity " supervised   assistive device/personal items within reach   nonskid shoes/slippers when out of bed   safety round/check completed  Intervention: Prevent Skin Injury  Recent Flowsheet Documentation  Taken 2/11/2025 2207 by Radha Bowen RN  Body Position: supine, head elevated  Taken 2/11/2025 2009 by Radha Bowen RN  Body Position: supine, head elevated  Intervention: Prevent and Manage VTE (Venous Thromboembolism) Risk  Recent Flowsheet Documentation  Taken 2/11/2025 2207 by Radha Bowen RN  VTE Prevention/Management: SCDs off (sequential compression devices)  Intervention: Prevent Infection  Recent Flowsheet Documentation  Taken 2/11/2025 2207 by Radha Bowen RN  Infection Prevention:   cohorting utilized   hand hygiene promoted   rest/sleep promoted   single patient room provided  Goal: Optimal Comfort and Wellbeing  Outcome: Progressing  Goal: Readiness for Transition of Care  Outcome: Progressing     Problem: Nausea and Vomiting  Goal: Nausea and Vomiting Relief  Outcome: Progressing  Intervention: Prevent and Manage Nausea and Vomiting  Recent Flowsheet Documentation  Taken 2/11/2025 2207 by Radha Bowen RN  Oral Care: teeth brushed     Problem: Skin Injury Risk Increased  Goal: Skin Health and Integrity  Outcome: Progressing  Intervention: Plan: Nurse Driven Intervention: Moisture Management  Recent Flowsheet Documentation  Taken 2/11/2025 2207 by Radha Bowen RN  Moisture Interventions: Encourage regular toileting  Bathing/Skin Care: incontinence care  Taken 2/11/2025 2000 by Radha Bowen RN  Moisture Interventions: Encourage regular toileting  Bathing/Skin Care: incontinence care  Intervention: Optimize Skin Protection  Recent Flowsheet Documentation  Taken 2/11/2025 2207 by Radha Bowen RN  Pressure Reduction Techniques: frequent weight shift encouraged  Pressure Reduction Devices: positioning supports utilized  Activity Management: ambulated to bathroom  Head of Bed  (HOB) Positioning: HOB at 20-30 degrees  Taken 2/11/2025 2009 by Radha Bowen, RN  Activity Management: ambulated to bathroom  Head of Bed (HOB) Positioning: HOB at 20-30 degrees

## 2025-02-12 NOTE — PROGRESS NOTES
"GASTROENTEROLOGY PROGRESS NOTE        SUBJECTIVE:  Endorses difficulty swallowing large pill yesterday. This has improved and she is tolerating regular diet with no difficulty. No melena, hematochezia, abdominal pain.      OBJECTIVE:  /67   Pulse 84   Temp 97.5  F (36.4  C) (Oral)   Resp 14   Ht 1.6 m (5' 3\")   Wt 49.8 kg (109 lb 12.6 oz)   LMP 10/31/2011   SpO2 96%   BMI 19.45 kg/m    Temp (24hrs), Av.7  F (36.5  C), Min:97.5  F (36.4  C), Max:98.2  F (36.8  C)    Patient Vitals for the past 72 hrs:   Weight   25 0650 49.8 kg (109 lb 12.6 oz)     No intake or output data in the 24 hours ending 25 1043     PHYSICAL EXAM  GENERAL: Pale, no obvious distress  ABDOMEN: Non-distended. Positive bowel sounds. Soft, non-tender  SKIN: No jaundice  NEUROLOGIC: Alert and oriented  PSYCHIATRIC: Normal affect     Additional Comments:  ROS, FH, SH: See initial GI consult for details.     I have reviewed the patient's new clinical lab results:     Recent Labs   Lab Test 25  1201 02/10/25  0618 25  0827 258 25  1134 25  1037 25  1806 25  1355   WBC 6.4  --   --   --  7.7 5.5   < >  --    HGB 8.1* 7.6* 8.9*   < > 8.0* 8.7*   < >  --    MCV 92  --   --   --  91 90   < >  --      --   --   --  239 210   < >  --    INR  --   --   --   --   --   --   --  1.13    < > = values in this interval not displayed.     Recent Labs   Lab Test 25  1201 02/10/25  0618 02/09/25  0827   POTASSIUM 3.7 3.8 3.9   CHLORIDE 96* 101 100   CO2 24 24 21*   BUN 22.3 28.5* 18.2   ANIONGAP 14 11 16*     Recent Labs   Lab Test 25  1201 02/10/25  0618 25  0827 25  1037 25  1522 25  1358 25  1354 25  1112 25  1244 10/18/24  1249   ALBUMIN 3.1* 2.8* 3.1*   < >  --  3.9  --  4.3  4.3 4.2  --    BILITOTAL 0.4  --   --   --   --  0.3  --  0.6 0.5  --    ALT 8  --   --   --   --  12  --  9 10  --    AST 15  --   --   --   --  20  -- "  21 21  --    PROTEIN  --   --   --   --  50*  --  30*  --   --  Trace*   LIPASE  --   --   --   --   --   --   --   --  42  --    AMYLASE  --   --   --   --   --   --   --   --  96  --     < > = values in this interval not displayed.       IMAGING/PROCEDURES:   EGD 2/10/2025 (Dr. Cole)  Findings:       A single area of ectopic gastric mucosa was found in the upper third of        the esophagus.        LA Grade B (one or more mucosal breaks greater than 5 mm, not extending        between the tops of two mucosal folds) esophagitis with no bleeding was        found in the lower third of the esophagus.        The exam of the esophagus was otherwise normal.        A single spot with bleeding was found in the cardia. This area was not        bleeding on advancement of scope and was only seen bleeding on        withdrawal of the scope. For hemostasis, one hemostatic clip was        successfully placed. There was no bleeding at the end of the procedure.        Diffuse severely erythematous mucosa without bleeding was found at the        pylorus.        The exam of the stomach was otherwise normal.        The examined duodenum was normal.                                                                                    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                             - LA Grade B reflux esophagitis with no bleeding.                             - A single spot with bleeding in the stomach. Clip                             was placed.                             - Erythematous mucosa in the pylorus. No bleeding.                             - Normal examined duodenum.                             - No specimens collected.                             - The examination was otherwise normal.      ASSESSMENT:  64 year old female with multiple myeloma, ESRD, HTN, hypercholesteremia who presents with nausea, vomiting after recently starting  chemotherapy. Patient developed melena and GI consulted for evaluation of GIB.      EGD 2/10/25 with ectopic gastric mucosa in upper third of esophagus, LA grade B reflux with esophagitis, single spot with bleeding in the stomach, clip placed, normal examined duodenum.      Today hemoglobin stable at 8.1. No further melena or hematochezia. Endorses one instance of dysphagia yesterday with large pill. No further difficulty swallowing. Evaluated by SLP and cleared to have regular diet.      PLAN:   - Monitor stools and hgb  - Continue PPI   - Antiemetics per primary team  - GI will sign off. Please contact us with questions or concerns.     Discussed patient findings and plan with Dr. Eastman.     Total time: 25 minutes of total time was spent providing patient care, including patient evaluation, reviewing documentation/test results, and .     Geovanna Jimenez PA-C  [facility] ([facility])

## 2025-02-12 NOTE — PROGRESS NOTES
Hospitalist Medicine Progress Note   [facility]       Dulce Ma is a 64 year old lady with multiple myeloma, ESRD, HTN, hypercholesteremia came to [facility] 2/5/2025 with nausea, vomiting after having recently started chemotherapy.  Her creatinine at admission was 10.28  sodium 134 potassium 4.6 chloride 95 bicarb 18 and glucose 109.  Hemoglobin was 10.  Patient did have tunneled dialysis catheter placement by interventional radiologist Dr. Vlad Montiel MD on 2/6/2025 to start dialysis she did have a short run of sinus tachycardia which was thought to be?  SVT as it spontaneously resolved with heart rates fluctuating between 40 and 140 cardiology was consulted at this time they do not want to treat the AV block given the ESRD  She had upper GI endoscopy 2/10/2025 which showed ectopic gastric mucosa in the upper third of the esophagus grade B reflux esophagitis without bleeding in the esophagus but a single spot with bleeding in the stomach for which clips were applied erythematous mucosa in the pylorus without any bleeding proton pump inhibitors continuation was advised  Patient started receiving treatment for amyloidosis - Darzalex, Cytoxan, Velcade and dex again  2/10/2025 and planning to give Cytoxan, Velcade, and dex 2/11/2025       Date of Admission:  2/5/2025  Assessment & Plan     Paroxysmal atrial tachycardia  ?  SVT  History of atrial fibrillation in the past  Patient was evaluated by cardiology and started on amiodarone  Heart Rate is better  Plan is to give anticoagulation later if needed    End-stage kidney disease.  Patient had dialysis  Appreciate nephrology consultation  -Dialysis diet.  Management as per nephrology  Plan is to get a seat for the patient in the [facility] in [facility]     AL amyloidosis   -Recently started chemotherapy.  Darzalex, Cytoxan, Velcade and dex again  2/10/2025 and planning to give Cytoxan, Velcade, and dex  2/11/2025  -Oncology started the patient on treatment  - will discuss the plan of care before discharge      Nausea and vomiting.  -Multiple episodes of vomiting the last few days since starting chemo.  -Appears less dehydrated.  - but had dizziness when walking 2/12/2025  -Antiemetics as needed.     Acute E. coli urinary tract infection.  -Continue ceftriaxone 1 g IV every 24 hours.  Colitis pansensitive     Hyponatremia.  Hypochloremia.  -Mild.  Sodium 134, chloride 95.  Both of which have normalized  -Nephrology planning for dialysis initiation.  -Recheck metabolic panel intermittently.     Malnutrition.  -Registered dietitian consult.     Anemia.  -Oncology following  -Patient was found to have a single spot with bleeding in the stomach for which clips were applied along with erythematous mucosa in the pylorus as well as reflux esophagitis seen on endoscopy for which PPIs are advised            Plan:   Orthostatic blood pressure check  Regular diet as per Speech    Discharge in 1-2 days - will discuss with Oncology further plan of care     Diet: Snacks/Supplements Adult: Other; Novasource renal; With Meals  Combination Diet Thin Liquids (level 0); Regular Diet    DVT Prophylaxis: Heparin SQ  Butler Catheter: Not present  Code Status: Full Code         Medically Ready for Discharge: Anticipated in 2-4 Days    Clinically Significant Risk Factors         # Hyponatremia: Lowest Na = 134 mmol/L in last 2 days, will monitor as appropriate  # Hypochloremia: Lowest Cl = 96 mmol/L in last 2 days, will monitor as appropriate      # Hypoalbuminemia: Lowest albumin = 2.8 g/dL at 2/10/2025  6:18 AM, will monitor as appropriate     # Hypertension: Noted on problem list               # Severe Malnutrition: based on nutrition assessment                 The patient's care was discussed with the Patient and RN.    Francisco Correa MD  Hospitalist Service  [facility]  Hospital    ______________________________________________________________________    Interval History     Symptoms   Patient was cleared by speech to have regular diet     Review of Systems:   Had dizziness on walking     Data reviewed today: I reviewed all medications, new labs and imaging results over the last 24 hours.     Physical Exam   Vital Signs: Temp: 97.8  F (36.6  C) Temp src: Oral BP: 104/50 Pulse: 83   Resp: 16 SpO2: 100 % O2 Device: None (Room air)    Weight: 109 lbs 12.63 oz      GENERAL: Patient is not in acute distress  HEENT: EOM+,Conjunctiva is clear   NECK:  no Jugular Venous distention  HEART: S1 S2 regular Rate and Rhythm, there is  no murmur,   LUNGS: Respirations are  not laboured, Lungs are  clear to auscultation without Crepitations or Wheezing   ABDOMEN: Soft , there is no tenderness , Bowel Sounds are  Positive   LOWER LIMBS:  Pedal Edema  Bilaterally   CNS:  Alert,  Oriented x 3, Moving all the Four Limbs     Data   Recent Labs   Lab 02/11/25  1201 02/10/25  0618 02/09/25  0827 02/08/25 2028 02/08/25  1134 02/06/25 2023 02/06/25  1037   WBC 6.4  --   --   --  7.7  --  5.5   HGB 8.1* 7.6* 8.9*   < > 8.0*  --  8.7*   MCV 92  --   --   --  91  --  90     --   --   --  239  --  210   * 136 137  --   --    < > 138   POTASSIUM 3.7 3.8 3.9  --   --    < > 3.4   CHLORIDE 96* 101 100  --   --    < > 99   CO2 24 24 21*  --   --    < > 23   BUN 22.3 28.5* 18.2  --   --    < > 56.5*   CR 4.36* 5.24* 3.58*  --   --    < > 5.29*   ANIONGAP 14 11 16*  --   --    < > 16*   KELTON 8.1* 8.2* 8.4*  --   --    < > 8.2*   * 108* 122*  --   --    < > 90   ALBUMIN 3.1* 2.8* 3.1*  --   --    < > 3.5   PROTTOTAL 6.2*  --   --   --   --   --   --    BILITOTAL 0.4  --   --   --   --   --   --    ALKPHOS 44  --   --   --   --   --   --    ALT 8  --   --   --   --   --   --    AST 15  --   --   --   --   --   --     < > = values in this interval not displayed.         No results found for this or  any previous visit (from the past 24 hours).

## 2025-02-13 ENCOUNTER — APPOINTMENT (OUTPATIENT)
Dept: SPEECH THERAPY | Facility: CLINIC | Age: 65
End: 2025-02-13
Payer: COMMERCIAL

## 2025-02-13 ENCOUNTER — APPOINTMENT (OUTPATIENT)
Dept: OCCUPATIONAL THERAPY | Facility: CLINIC | Age: 65
End: 2025-02-13
Payer: COMMERCIAL

## 2025-02-13 VITALS
DIASTOLIC BLOOD PRESSURE: 55 MMHG | TEMPERATURE: 98.2 F | SYSTOLIC BLOOD PRESSURE: 108 MMHG | HEART RATE: 82 BPM | HEIGHT: 63 IN | RESPIRATION RATE: 12 BRPM | BODY MASS INDEX: 19.45 KG/M2 | OXYGEN SATURATION: 97 % | WEIGHT: 109.79 LBS

## 2025-02-13 PROCEDURE — 97530 THERAPEUTIC ACTIVITIES: CPT | Mod: GO

## 2025-02-13 PROCEDURE — 250N000011 HC RX IP 250 OP 636: Performed by: INTERNAL MEDICINE

## 2025-02-13 PROCEDURE — 250N000013 HC RX MED GY IP 250 OP 250 PS 637: Performed by: INTERNAL MEDICINE

## 2025-02-13 PROCEDURE — 92526 ORAL FUNCTION THERAPY: CPT | Mod: GN

## 2025-02-13 PROCEDURE — 99239 HOSP IP/OBS DSCHRG MGMT >30: CPT | Performed by: INTERNAL MEDICINE

## 2025-02-13 PROCEDURE — 97535 SELF CARE MNGMENT TRAINING: CPT | Mod: GO

## 2025-02-13 RX ORDER — PANTOPRAZOLE SODIUM 40 MG/1
40 TABLET, DELAYED RELEASE ORAL
Qty: 60 TABLET | Refills: 0 | Status: SHIPPED | OUTPATIENT
Start: 2025-02-13 | End: 2025-03-15

## 2025-02-13 RX ORDER — AMOXICILLIN 250 MG
1 CAPSULE ORAL 2 TIMES DAILY PRN
Qty: 30 TABLET | Refills: 0 | Status: SHIPPED | OUTPATIENT
Start: 2025-02-13

## 2025-02-13 RX ORDER — AMIODARONE HYDROCHLORIDE 200 MG/1
200 TABLET ORAL DAILY
Qty: 30 TABLET | Refills: 0 | Status: SHIPPED | OUTPATIENT
Start: 2025-02-14 | End: 2025-03-16

## 2025-02-13 RX ORDER — HEPARIN SODIUM 1000 [USP'U]/ML
500 INJECTION, SOLUTION INTRAVENOUS; SUBCUTANEOUS CONTINUOUS
OUTPATIENT
Start: 2025-02-13

## 2025-02-13 RX ORDER — ALBUMIN (HUMAN) 12.5 G/50ML
50 SOLUTION INTRAVENOUS
OUTPATIENT
Start: 2025-02-13

## 2025-02-13 RX ADMIN — ONDANSETRON 4 MG: 4 TABLET, ORALLY DISINTEGRATING ORAL at 02:33

## 2025-02-13 RX ADMIN — PANTOPRAZOLE SODIUM 40 MG: 40 TABLET, DELAYED RELEASE ORAL at 06:09

## 2025-02-13 RX ADMIN — AMIODARONE HYDROCHLORIDE 200 MG: 200 TABLET ORAL at 08:45

## 2025-02-13 RX ADMIN — ACYCLOVIR 200 MG: 200 CAPSULE ORAL at 08:45

## 2025-02-13 RX ADMIN — LORAZEPAM 0.5 MG: 2 INJECTION INTRAMUSCULAR; INTRAVENOUS at 03:12

## 2025-02-13 RX ADMIN — Medication 1 CAPSULE: at 08:45

## 2025-02-13 ASSESSMENT — ACTIVITIES OF DAILY LIVING (ADL)
ADLS_ACUITY_SCORE: 43

## 2025-02-13 NOTE — PLAN OF CARE
"To Do:  End of Shift Summary  For vital signs and complete assessments, please see documentation flowsheets.     Pertinent assessments: Pt A/O. Denies pain, nausea and SOB. No issues with swallowing. Up SBA.  Major Shift Events none  Treatment Plan: HD, symptom management and Rocephin  Bedside Nurse: Lillie Pierre RN   Problem: Adult Inpatient Plan of Care  Goal: Plan of Care Review  Description: The Plan of Care Review/Shift note should be completed every shift.  The Outcome Evaluation is a brief statement about your assessment that the patient is improving, declining, or no change.  This information will be displayed automatically on your shift  note.  Outcome: Progressing  Flowsheets (Taken 2/12/2025 2251)  Outcome Evaluation: no swallowing issues  Plan of Care Reviewed With: patient  Overall Patient Progress: improving  Goal: Patient-Specific Goal (Individualized)  Description: You can add care plan individualizations to a care plan. Examples of Individualization might be:  \"Parent requests to be called daily at 9am for status\", \"I have a hard time hearing out of my right ear\", or \"Do not touch me to wake me up as it startles  me\".  Outcome: Progressing  Goal: Absence of Hospital-Acquired Illness or Injury  Outcome: Progressing  Goal: Optimal Comfort and Wellbeing  Outcome: Progressing  Goal: Readiness for Transition of Care  Outcome: Progressing     Problem: Nausea and Vomiting  Goal: Nausea and Vomiting Relief  Outcome: Progressing     Problem: Skin Injury Risk Increased  Goal: Skin Health and Integrity  Outcome: Progressing  Intervention: Plan: Nurse Driven Intervention: Moisture Management  Recent Flowsheet Documentation  Taken 2/12/2025 2000 by Lillie Pierre, RN  Moisture Interventions: Encourage regular toileting  Bathing/Skin Care: other (see comments)   Goal Outcome Evaluation:      Plan of Care Reviewed With: patient    Overall Patient Progress: improvingOverall Patient Progress: improving    Outcome " Evaluation: no swallowing issues       Admission

## 2025-02-13 NOTE — PLAN OF CARE
"Goal Outcome Evaluation:           Overall Patient Progress: improvingOverall Patient Progress: improving    Outcome Evaluation: Able to swallow meds, tolerated diet, went to dialysis    To Do:  End of Shift Summary  For vital signs and complete assessments, please see documentation flowsheets.     Pertinent assessments: VSS. Denied pain. Oriented x4. Dialysis run completed, dressing changed by dialysis nurse, currently CDI and heparinized. Worked with OT and had a small emesis, improved on its own, no intervention needed. Skin intact. Progressed to regular diet, no issues w/ swallowing noted. Voice quiet/soft    Major Shift Events: Dialysis completed, sm emesis, started back on diet, tolerating.     Treatment Plan: monitor for oncology side effects, monitor I/Os    Bedside Nurse: Elbert Blackburn RN       Problem: Adult Inpatient Plan of Care  Goal: Plan of Care Review  Description: The Plan of Care Review/Shift note should be completed every shift.  The Outcome Evaluation is a brief statement about your assessment that the patient is improving, declining, or no change.  This information will be displayed automatically on your shift  note.  Outcome: Progressing  Flowsheets (Taken 2/12/2025 1916)  Outcome Evaluation: Able to swallow meds, tolerated diet, went to dialysis  Overall Patient Progress: improving  Goal: Patient-Specific Goal (Individualized)  Description: You can add care plan individualizations to a care plan. Examples of Individualization might be:  \"Parent requests to be called daily at 9am for status\", \"I have a hard time hearing out of my right ear\", or \"Do not touch me to wake me up as it startles  me\".  Outcome: Progressing  Goal: Absence of Hospital-Acquired Illness or Injury  Outcome: Progressing  Goal: Optimal Comfort and Wellbeing  Outcome: Progressing  Goal: Readiness for Transition of Care  Outcome: Progressing     Problem: Nausea and Vomiting  Goal: Nausea and Vomiting Relief  Outcome: " Progressing

## 2025-02-13 NOTE — DISCHARGE SUMMARY
"[facility]  Hospitalist Discharge Summary      Date of Admission:  2/5/2025  Date of Discharge:  2/13/2025  Discharging Provider: Francisco Correa MD  Discharge Service: Hospitalist Service    Discharge Diagnoses   Paroxysmal atrial tachycardia  ?  SVT   History of atrial fibrillation in the past  End-stage kidney disease   AL amyloidosis   Esophagitis with GERD  E. coli UTI  Hyponatremia  Hypochloremia   Malnutrition  Anemia      Clinically Significant Risk Factors     # Severe Malnutrition: based on nutrition assessment      Follow-ups Needed After Discharge   Follow-up Appointments       Follow-up and recommended labs and tests       Follow up with primary care provider, Alida Lamb, within 7 days for hospital follow- up.  The following labs/tests are recommended: CBC, BMP    Follow up with Oncology as planned     Follow up with [facility] Hemodialysis unit tomorrow 2/14/2025 .                Unresulted Labs Ordered in the Past 30 Days of this Admission       Date and Time Order Name Status Description    1/14/2025 12:01 PM Renal Biopsy (Reference Laboratory) In process         These results will be followed up by Alida Lamb and Nephrologist       Discharge Disposition   {Discharge to:9609811::\"Discharged to home\"}  Condition at discharge: {CONDITION:480456::\"Stable\"}    Hospital Course   {OPTIONAL -- use to select A&P section   :460480}    Consultations This Hospital Stay   CARE MANAGEMENT / SOCIAL WORK IP CONSULT  HEMATOLOGY & ONCOLOGY IP CONSULT  NEPHROLOGY IP CONSULT  CARE MANAGEMENT / SOCIAL WORK IP CONSULT  OCCUPATIONAL THERAPY ADULT IP CONSULT  NUTRITION SERVICES ADULT IP CONSULT  INTERVENTIONAL RADIOLOGY ADULT/PEDS IP CONSULT  CARE MANAGEMENT / SOCIAL WORK IP CONSULT  CARDIOLOGY IP CONSULT  GASTROENTEROLOGY IP CONSULT  SPEECH LANGUAGE PATH ADULT IP CONSULT    Code Status   Full Code    Time Spent on this Encounter   {How much time did you spend on " discharge?:51292545}       Francisco Correa MD  [place] [address] [facility]  [address] [place] [address]  Phone: [phone]  Fax: [phone]  ______________________________________________________________________    Physical Exam   Vital Signs: Temp: 98.2  F (36.8  C) Temp src: Oral BP: 108/55 Pulse: 82   Resp: 12 SpO2: 97 % O2 Device: None (Room air)    Weight: 109 lbs 12.63 oz  {Recommend personal SmartPhrase or Notewriter for exam (OPTIONAL)   :787035}       Primary Care Physician   Alida Lamb    Discharge Orders      Primary Care - Care Coordination Referral      Follow-Up with Cardiology      Home Care Referral      Reason for your hospital stay    came to [facility] 2/5/2025 with nausea, vomiting after having recently started chemotherapy.     Follow-up and recommended labs and tests     Follow up with primary care provider, Alida Lamb, within 7 days for hospital follow- up.  The following labs/tests are recommended: CBC, BMP    Follow up with Oncology as planned     Follow up with [facility] Hemodialysis unit tomorrow 2/14/2025 .     Activity    Your activity upon discharge: activity as tolerated - walk with a walker     Diet    Follow this diet upon discharge: Current Diet:Orders Placed This Encounter      Snacks/Supplements Adult: Other; Novasource renal; With Meals      Combination Diet Thin Liquids (level 0); Regular Diet     Infusion Appointment Request - Adult     Infusion Appointment Request - Adult     Infusion Appointment Request - Adult     Infusion Appointment Request - Adult     MRI Cardiac w/contrast and stress       Significant Results and Procedures   {Data for Discharge Summary:930647}    Discharge Medications   Current Discharge Medication List        START taking these medications    Details   amiodarone (PACERONE) 200 MG tablet Take 1 tablet (200 mg) by mouth or FT or NG tube daily.  Qty: 30 tablet, Refills: 0     Associated Diagnoses: SVT (supraventricular tachycardia); Paroxysmal atrial fibrillation (H)      pantoprazole (PROTONIX) 40 MG EC tablet Take 1 tablet (40 mg) by mouth 2 times daily (before meals).  Qty: 60 tablet, Refills: 0    Comments: 40 mg twice daily x 30 days then once daily  Associated Diagnoses: Gastroesophageal reflux disease with esophagitis without hemorrhage      senna-docusate (SENOKOT-S/PERICOLACE) 8.6-50 MG tablet Take 1 tablet by mouth 2 times daily as needed for constipation.  Qty: 30 tablet, Refills: 0    Associated Diagnoses: Constipation, unspecified constipation type      !! cycloPHOSphamide (CYTOXAN) 50 MG capsule Take 6 capsules (300 mg) by mouth every 7 days for 4 doses Take on days 1, 8, 15, and 22.  Qty: 24 capsule, Refills: 0    Associated Diagnoses: AL amyloidosis (H)      !! dexAMETHasone (DECADRON) 4 MG tablet Take 10 tablets (40 mg) by mouth once a week. Take 10 tablets (40 mg) by mouth on Days 1, 8, 15, and 22. Take with food.  Qty: 40 tablet, Refills: 0    Associated Diagnoses: AL amyloidosis (H)       !! - Potential duplicate medications found. Please discuss with provider.        CONTINUE these medications which have NOT CHANGED    Details   acyclovir (ZOVIRAX) 200 MG capsule Take 200 mg by mouth 2 times daily.      !! cycloPHOSphamide (CYTOXAN) 50 MG capsule Take 350 mg by mouth See Admin Instructions Take on day 1, 8, 15, and 22 of 28 day cycle      !! dexAMETHasone (DECADRON) 4 MG tablet Take 10 tablets by mouth See Admin Instructions. TAKE 40MG (10 TABLETS) ON DAYS 1, 8, 15 AND 22 OF EACH CYCLE ONE HOUR BEFORE DARATUMUMAB      dorzolamide-timolol (COSOPT) 2-0.5 % ophthalmic solution Place 1 drop into both eyes 2 times daily.      montelukast (SINGULAIR) 10 MG tablet Take 10 mg by mouth See Admin Instructions. Sunday- Tues on cycle 1 of chemotherapy and then PRN      Netarsudil-Latanoprost (ROCKLATAN) 0.02-0.005 % SOLN Place 1 drop into both eyes at bedtime.      ondansetron  (ZOFRAN ODT) 4 MG ODT tab Take 1 tablet (4 mg) by mouth every 6 hours as needed.  Qty: 20 tablet, Refills: 0    Associated Diagnoses: Nausea      prochlorperazine (COMPAZINE) 10 MG tablet Take 10 mg by mouth every 6 hours as needed for nausea or vomiting.       !! - Potential duplicate medications found. Please discuss with provider.        Allergies   Allergies   Allergen Reactions    Benzoyl Peroxide      swelling    Ibuprofen      over long duration dev. hives    Amoxicillin Rash     Face; in early adulthood    Tolerated cephalexin 01/2025    Penicillins Rash     Face; in early adulthood.    Tolerated cephalexin 01/2025      was given to proceed.    MODERATE SEDATION: Versed 1 mg IV; Fentanyl 25 mcg IV.  During the timeout, immediately prior to the administration of medications, the patient was reassessed for adequacy to receive conscious sedation. Under physician supervision, Versed and fentanyl   were administered for moderate sedation. Pulse oximetry, heart rate and blood pressure were continuously monitored by an independent trained observer. The physician spent 15 minutes of face-to-face sedation time with the patient.    CONTRAST: None.  ANTIBIOTICS: 900 mg clindamycin IV.  ADDITIONAL MEDICATIONS: None.    FLUOROSCOPIC TIME: 0.4 minutes.  RADIATION DOSE: Air Kerma: 1 mGy.    COMPLICATIONS: No immediate complications.    STERILE BARRIER TECHNIQUE: Maximum sterile barrier technique was used. Cutaneous antisepsis was performed at the operative site with application of 2% chlorhexidine and large sterile drape. Prior to the procedure, the  and assistant performed   hand hygiene and wore hat, mask, sterile gown, and sterile gloves during the entire procedure.    PROCEDURE:     1% lidocaine was used for local anesthesia. Using real-time ultrasound guidance, the right internal jugular vein was accessed. A permanent ultrasound image was saved, documenting patency and compressibility. A subcutaneous tunnel was created requiring a   second incision. Using this access, a 14.5 Tajik, 19 cm tip to cuff palindrome dialysis catheter was advanced until the tip was in the upper right atrium. The catheter was tested and found to flush and aspirate appropriately. The catheter was   heparinized and secured to the skin.    FINDINGS:  1. Ultrasound shows an anechoic and compressible right internal jugular vein.  2. Completion fluoroscopic image demonstrates the tunneled dialysis catheter tip lies in the upper right atrium.      Impression    IMPRESSION:    Tunneled dialysis catheter placement, ready for immediate use.       Discharge  Medications   Current Discharge Medication List        START taking these medications    Details   amiodarone (PACERONE) 200 MG tablet Take 1 tablet (200 mg) by mouth or FT or NG tube daily.  Qty: 30 tablet, Refills: 0    Associated Diagnoses: SVT (supraventricular tachycardia); Paroxysmal atrial fibrillation (H)      pantoprazole (PROTONIX) 40 MG EC tablet Take 1 tablet (40 mg) by mouth 2 times daily (before meals).  Qty: 60 tablet, Refills: 0    Comments: 40 mg twice daily x 30 days then once daily  Associated Diagnoses: Gastroesophageal reflux disease with esophagitis without hemorrhage      senna-docusate (SENOKOT-S/PERICOLACE) 8.6-50 MG tablet Take 1 tablet by mouth 2 times daily as needed for constipation.  Qty: 30 tablet, Refills: 0    Associated Diagnoses: Constipation, unspecified constipation type      !! cycloPHOSphamide (CYTOXAN) 50 MG capsule Take 6 capsules (300 mg) by mouth every 7 days for 4 doses Take on days 1, 8, 15, and 22.  Qty: 24 capsule, Refills: 0    Associated Diagnoses: AL amyloidosis (H)      !! dexAMETHasone (DECADRON) 4 MG tablet Take 10 tablets (40 mg) by mouth once a week. Take 10 tablets (40 mg) by mouth on Days 1, 8, 15, and 22. Take with food.  Qty: 40 tablet, Refills: 0    Associated Diagnoses: AL amyloidosis (H)       !! - Potential duplicate medications found. Please discuss with provider.        CONTINUE these medications which have NOT CHANGED    Details   acyclovir (ZOVIRAX) 200 MG capsule Take 200 mg by mouth 2 times daily.      !! cycloPHOSphamide (CYTOXAN) 50 MG capsule Take 350 mg by mouth See Admin Instructions Take on day 1, 8, 15, and 22 of 28 day cycle      !! dexAMETHasone (DECADRON) 4 MG tablet Take 10 tablets by mouth See Admin Instructions. TAKE 40MG (10 TABLETS) ON DAYS 1, 8, 15 AND 22 OF EACH CYCLE ONE HOUR BEFORE DARATUMUMAB      dorzolamide-timolol (COSOPT) 2-0.5 % ophthalmic solution Place 1 drop into both eyes 2 times daily.      montelukast (SINGULAIR) 10  MG tablet Take 10 mg by mouth See Admin Instructions. Sunday- Tues on cycle 1 of chemotherapy and then PRN      Netarsudil-Latanoprost (ROCKLATAN) 0.02-0.005 % SOLN Place 1 drop into both eyes at bedtime.      ondansetron (ZOFRAN ODT) 4 MG ODT tab Take 1 tablet (4 mg) by mouth every 6 hours as needed.  Qty: 20 tablet, Refills: 0    Associated Diagnoses: Nausea      prochlorperazine (COMPAZINE) 10 MG tablet Take 10 mg by mouth every 6 hours as needed for nausea or vomiting.       !! - Potential duplicate medications found. Please discuss with provider.        Allergies   Allergies   Allergen Reactions    Benzoyl Peroxide      swelling    Ibuprofen      over long duration dev. hives    Amoxicillin Rash     Face; in early adulthood    Tolerated cephalexin 01/2025    Penicillins Rash     Face; in early adulthood.    Tolerated cephalexin 01/2025

## 2025-02-13 NOTE — PLAN OF CARE
"End of Shift Summary  For vital signs and complete assessments, please see documentation flowsheets.     Pertinent assessments: Assumed cares at 2300. A&Ox4. Denies pain. SBA. VSS. C/o nausea no emesis, PRN zofran given and not effective. Pt requesting PRN ativan, this was effective. Dialysis catheter dressing CDI. PIV SL.     Major Shift Events: prn antiemetics     Treatment Plan: monitor side effects   Bedside Nurse: Elle Pastrana RN     Goal Outcome Evaluation:      Plan of Care Reviewed With: patient    Overall Patient Progress: no changeOverall Patient Progress: no change    Outcome Evaluation: intermittent nausea      Problem: Adult Inpatient Plan of Care  Goal: Plan of Care Review  Description: The Plan of Care Review/Shift note should be completed every shift.  The Outcome Evaluation is a brief statement about your assessment that the patient is improving, declining, or no change.  This information will be displayed automatically on your shift  note.  Outcome: Progressing  Flowsheets (Taken 2/13/2025 5326)  Outcome Evaluation: intermittent nausea  Plan of Care Reviewed With: patient  Overall Patient Progress: no change  Goal: Patient-Specific Goal (Individualized)  Description: You can add care plan individualizations to a care plan. Examples of Individualization might be:  \"Parent requests to be called daily at 9am for status\", \"I have a hard time hearing out of my right ear\", or \"Do not touch me to wake me up as it startles  me\".  Outcome: Progressing  Goal: Absence of Hospital-Acquired Illness or Injury  Outcome: Progressing  Goal: Optimal Comfort and Wellbeing  Outcome: Progressing  Goal: Readiness for Transition of Care  Outcome: Progressing     Problem: Nausea and Vomiting  Goal: Nausea and Vomiting Relief  Outcome: Progressing     Problem: Skin Injury Risk Increased  Goal: Skin Health and Integrity  Outcome: Progressing     "

## 2025-02-13 NOTE — PROGRESS NOTES
Speech Language Therapy Discharge Summary    Reason for therapy discharge:    All goals and outcomes met, no further needs identified.    Progress towards therapy goal(s). See goals on Care Plan in [facility] electronic health record for goal details.  Goals met    Therapy recommendation(s):    No further therapy is recommended.    Recommend a regular texture diet and thin liquids.

## 2025-02-13 NOTE — PLAN OF CARE
"End of Shift Summary  For vital signs and complete assessments, please see documentation flowsheets.     Pertinent assessments: pt A&ox4, VSS, on RA. Denies pain, denies nausea. Up SBA. CVC CDI.   Major Shift Events : None     Treatment Plan: discharge today.     Bedside Nurse: Heather Haley RN     Problem: Adult Inpatient Plan of Care  Goal: Plan of Care Review  Description: The Plan of Care Review/Shift note should be completed every shift.  The Outcome Evaluation is a brief statement about your assessment that the patient is improving, declining, or no change.  This information will be displayed automatically on your shift  note.  Outcome: Not Progressing  Flowsheets (Taken 2/13/2025 1440)  Outcome Evaluation: discharge today.  Plan of Care Reviewed With: patient  Overall Patient Progress: no change  Goal: Patient-Specific Goal (Individualized)  Description: You can add care plan individualizations to a care plan. Examples of Individualization might be:  \"Parent requests to be called daily at 9am for status\", \"I have a hard time hearing out of my right ear\", or \"Do not touch me to wake me up as it startles  me\".  Outcome: Not Progressing  Goal: Absence of Hospital-Acquired Illness or Injury  Outcome: Not Progressing  Intervention: Identify and Manage Fall Risk  Recent Flowsheet Documentation  Taken 2/13/2025 1107 by Heather Haley RN  Safety Promotion/Fall Prevention:   supervised activity   safety round/check completed   room organization consistent   room near nurse's station   patient and family education   nonskid shoes/slippers when out of bed   lighting adjusted   increase visualization of patient   increased rounding and observation   clutter free environment maintained  Intervention: Prevent Skin Injury  Recent Flowsheet Documentation  Taken 2/13/2025 1107 by Heather Haley RN  Body Position: position changed independently  Intervention: Prevent and Manage VTE (Venous Thromboembolism) Risk  Recent " Flowsheet Documentation  Taken 2/13/2025 1107 by Heather Haley RN  VTE Prevention/Management: SCDs off (sequential compression devices)  Intervention: Prevent Infection  Recent Flowsheet Documentation  Taken 2/13/2025 1107 by Heather Haley RN  Infection Prevention: hand hygiene promoted  Goal: Optimal Comfort and Wellbeing  Outcome: Not Progressing  Goal: Readiness for Transition of Care  Outcome: Not Progressing     Problem: Nausea and Vomiting  Goal: Nausea and Vomiting Relief  Outcome: Not Progressing     Problem: Skin Injury Risk Increased  Goal: Skin Health and Integrity  Outcome: Not Progressing  Intervention: Plan: Nurse Driven Intervention: Moisture Management  Recent Flowsheet Documentation  Taken 2/13/2025 1107 by Heather Haley RN  Moisture Interventions: Encourage regular toileting  Intervention: Optimize Skin Protection  Recent Flowsheet Documentation  Taken 2/13/2025 1107 by Heather Haley RN  Head of Bed (HOB) Positioning: HOB at 20-30 degrees   Goal Outcome Evaluation:      Plan of Care Reviewed With: patient    Overall Patient Progress: no changeOverall Patient Progress: no change    Outcome Evaluation: discharge today.

## 2025-02-14 ENCOUNTER — TELEPHONE (OUTPATIENT)
Dept: CARDIOLOGY | Facility: CLINIC | Age: 65
End: 2025-02-14
Payer: COMMERCIAL

## 2025-02-14 NOTE — PLAN OF CARE
Occupational Therapy Discharge Summary    Reason for therapy discharge:    Discharged to home with home therapy.    Progress towards therapy goal(s). See goals on Care Plan in [facility] electronic health record for goal details.  Goals partially met.  Barriers to achieving goals:   discharge from facility.    Therapy recommendation(s):    Continued therapy is recommended.  Rationale/Recommendations:  Pt moving well, limited general weakness and fatigue. Anticipate she will progress to home with assist for heavy home chores, and may benefit from HHOT/PT to maximize safety/independence with adls/mobility.

## 2025-02-14 NOTE — TELEPHONE ENCOUNTER
Patient was admitted to [facility] on 2/5/25 after presenting to the to emergency room with profuse nausea and vomiting after recently starting chemotherapy. Found to have worsening of end-stage kidney disease. Cardiology consulted for paroxysmal atrial flutter versus SVT in the setting of end-stage renal disease requiring initiation of hemodialysis. Started on oral Amiodarone. Anticoagulation too high risk at this point, may be readdressed in the future if recurrent atrial fibrillation despite the Amiodarone.     PMH: paroxysmal SVT versus atypical flutter in the past on stress echocardiogram, multiple myeloma, end-stage kidney disease, hypertension, hyperlipidemia.    Pt was started on Amiodarone and Protonix at time of discharge.    Pt needs to be scheduled for a cMRI and cardiology TAYO OV as ordered.    Writer attempted to call pt for a cardiology post discharge phone call, but no answer and no VM. Will try back at a later date. EDWARDO De Guzman RN.

## 2025-02-18 ENCOUNTER — TRANSFERRED RECORDS (OUTPATIENT)
Dept: HEALTH INFORMATION MANAGEMENT | Facility: CLINIC | Age: 65
End: 2025-02-18
Payer: COMMERCIAL

## 2025-02-18 LAB — SCANNED LAB RESULT: NORMAL

## 2025-02-28 ENCOUNTER — TELEPHONE (OUTPATIENT)
Dept: FAMILY MEDICINE | Facility: CLINIC | Age: 65
End: 2025-02-28
Payer: COMMERCIAL

## 2025-02-28 NOTE — TELEPHONE ENCOUNTER
Home Health Care      Reason for call:      [facility] - Tracking #[id number] - Cert Period: 02/17/25 - 04/17/25 Unable to sched appt with client since the initial visit on 1/16/25    Orders are needed for this patient.  above    Pt Provider: Dr. Lamb    Phone Number Homecare Nurse can be reached at: Fax       Could we send this information to you in "[employer]hart or would you prefer to receive a phone call?:   NA    Put in provider in box    Babita K

## 2025-03-02 NOTE — TELEPHONE ENCOUNTER
completed/ signed on 2/27/2025- at [facility] coordinator's file box.  ---Alida Lamb MD

## 2025-03-04 NOTE — TELEPHONE ENCOUNTER
Faxed signed form to [facility] #[phone]    Tracking #[id number]    Copied into HIMS / filed in [facility]

## 2025-03-10 DIAGNOSIS — N18.6 ESRD ON HEMODIALYSIS (H): ICD-10-CM

## 2025-03-10 DIAGNOSIS — Z99.2 ESRD ON HEMODIALYSIS (H): ICD-10-CM

## 2025-03-10 DIAGNOSIS — T82.898A PROBLEM WITH DIALYSIS ACCESS, INITIAL ENCOUNTER: Primary | ICD-10-CM

## 2025-03-11 DIAGNOSIS — K21.00 GASTROESOPHAGEAL REFLUX DISEASE WITH ESOPHAGITIS WITHOUT HEMORRHAGE: ICD-10-CM

## 2025-03-11 DIAGNOSIS — I47.10 SVT (SUPRAVENTRICULAR TACHYCARDIA): ICD-10-CM

## 2025-03-11 DIAGNOSIS — I48.0 PAROXYSMAL ATRIAL FIBRILLATION (H): ICD-10-CM

## 2025-03-11 RX ORDER — PANTOPRAZOLE SODIUM 40 MG/1
40 TABLET, DELAYED RELEASE ORAL
Qty: 60 TABLET | Refills: 0 | Status: SHIPPED | OUTPATIENT
Start: 2025-03-11

## 2025-03-11 RX ORDER — AMIODARONE HYDROCHLORIDE 200 MG/1
200 TABLET ORAL DAILY
Qty: 30 TABLET | Refills: 0 | Status: SHIPPED | OUTPATIENT
Start: 2025-03-11

## 2025-03-11 NOTE — TELEPHONE ENCOUNTER
Final attempt at trying to contact pt.    Pt states she has not had any further racing HR, any SOB or chest pain since discharge.    Is scheduled for a cMRI through the [address]. Encouraged pt to make sure she also has a cardiology OV scheduled as well as recommended.     Pt asking about medication refills. Encouraged pt to call her PMD for refills.    Pt verbalized understanding and appreciation of call. No further questions. EDWARDO De Guzman RN.

## 2025-03-13 ENCOUNTER — APPOINTMENT (OUTPATIENT)
Dept: INTERVENTIONAL RADIOLOGY/VASCULAR | Facility: CLINIC | Age: 65
End: 2025-03-13
Attending: PHYSICIAN ASSISTANT
Payer: COMMERCIAL

## 2025-03-13 ENCOUNTER — HOSPITAL ENCOUNTER (OUTPATIENT)
Facility: CLINIC | Age: 65
Discharge: HOME | End: 2025-03-13
Attending: INTERNAL MEDICINE | Admitting: RADIOLOGY
Payer: COMMERCIAL

## 2025-03-13 VITALS
SYSTOLIC BLOOD PRESSURE: 145 MMHG | HEART RATE: 67 BPM | TEMPERATURE: 97 F | RESPIRATION RATE: 14 BRPM | OXYGEN SATURATION: 99 % | DIASTOLIC BLOOD PRESSURE: 85 MMHG

## 2025-03-13 DIAGNOSIS — N18.6 ESRD ON HEMODIALYSIS (H): ICD-10-CM

## 2025-03-13 DIAGNOSIS — Z99.2 ESRD ON HEMODIALYSIS (H): ICD-10-CM

## 2025-03-13 DIAGNOSIS — T82.898A PROBLEM WITH DIALYSIS ACCESS, INITIAL ENCOUNTER: ICD-10-CM

## 2025-03-13 PROCEDURE — 99152 MOD SED SAME PHYS/QHP 5/>YRS: CPT

## 2025-03-13 PROCEDURE — 250N000009 HC RX 250: Performed by: NURSE PRACTITIONER

## 2025-03-13 PROCEDURE — C1752 CATH,HEMODIALYSIS,SHORT-TERM: HCPCS

## 2025-03-13 PROCEDURE — 250N000011 HC RX IP 250 OP 636: Performed by: RADIOLOGY

## 2025-03-13 PROCEDURE — C1769 GUIDE WIRE: HCPCS

## 2025-03-13 PROCEDURE — 999N000127 HC STATISTIC PERIPHERAL IV START W US GUIDANCE

## 2025-03-13 PROCEDURE — 36581 REPLACE TUNNELED CV CATH: CPT

## 2025-03-13 PROCEDURE — 250N000011 HC RX IP 250 OP 636: Performed by: NURSE PRACTITIONER

## 2025-03-13 RX ORDER — NALOXONE HYDROCHLORIDE 0.4 MG/ML
0.2 INJECTION, SOLUTION INTRAMUSCULAR; INTRAVENOUS; SUBCUTANEOUS
Status: DISCONTINUED | OUTPATIENT
Start: 2025-03-13 | End: 2025-03-13 | Stop reason: HOSPADM

## 2025-03-13 RX ORDER — ACETAMINOPHEN 325 MG/1
650 TABLET ORAL
OUTPATIENT
Start: 2025-03-13

## 2025-03-13 RX ORDER — FENTANYL CITRATE 50 UG/ML
25-50 INJECTION, SOLUTION INTRAMUSCULAR; INTRAVENOUS EVERY 5 MIN PRN
Status: DISCONTINUED | OUTPATIENT
Start: 2025-03-13 | End: 2025-03-13 | Stop reason: HOSPADM

## 2025-03-13 RX ORDER — CEFAZOLIN SODIUM 2 G/50ML
2 SOLUTION INTRAVENOUS
Status: COMPLETED | OUTPATIENT
Start: 2025-03-13 | End: 2025-03-13

## 2025-03-13 RX ORDER — FLUMAZENIL 0.1 MG/ML
0.2 INJECTION, SOLUTION INTRAVENOUS
Status: DISCONTINUED | OUTPATIENT
Start: 2025-03-13 | End: 2025-03-13 | Stop reason: HOSPADM

## 2025-03-13 RX ORDER — NALOXONE HYDROCHLORIDE 0.4 MG/ML
0.4 INJECTION, SOLUTION INTRAMUSCULAR; INTRAVENOUS; SUBCUTANEOUS
Status: DISCONTINUED | OUTPATIENT
Start: 2025-03-13 | End: 2025-03-13 | Stop reason: HOSPADM

## 2025-03-13 RX ORDER — HEPARIN SODIUM 1000 [USP'U]/ML
1-5 INJECTION, SOLUTION INTRAVENOUS; SUBCUTANEOUS ONCE
Status: COMPLETED | OUTPATIENT
Start: 2025-03-13 | End: 2025-03-13

## 2025-03-13 RX ORDER — CLINDAMYCIN PHOSPHATE 900 MG/50ML
900 INJECTION, SOLUTION INTRAVENOUS
Status: DISCONTINUED | OUTPATIENT
Start: 2025-03-13 | End: 2025-03-13

## 2025-03-13 RX ADMIN — CEFAZOLIN SODIUM 2 G: 2 SOLUTION INTRAVENOUS at 08:43

## 2025-03-13 RX ADMIN — FENTANYL CITRATE 25 MCG: 50 INJECTION, SOLUTION INTRAMUSCULAR; INTRAVENOUS at 08:55

## 2025-03-13 RX ADMIN — MIDAZOLAM 1 MG: 1 INJECTION INTRAMUSCULAR; INTRAVENOUS at 08:54

## 2025-03-13 RX ADMIN — LIDOCAINE HYDROCHLORIDE 10 ML: 10 INJECTION, SOLUTION EPIDURAL; INFILTRATION; INTRACAUDAL; PERINEURAL at 09:02

## 2025-03-13 RX ADMIN — HEPARIN SODIUM 4000 UNITS: 1000 INJECTION INTRAVENOUS; SUBCUTANEOUS at 09:03

## 2025-03-13 ASSESSMENT — ACTIVITIES OF DAILY LIVING (ADL)
ADLS_ACUITY_SCORE: 59
ADLS_ACUITY_SCORE: 59

## 2025-03-13 NOTE — PRE-PROCEDURE
GENERAL PRE-PROCEDURE:   Procedure:  Tunnel cvc revision  Date/Time:  3/13/2025 8:50 AM    Verbal consent obtained?: Yes    Risks and benefits: Risks, benefits and alternatives were discussed    Consent given by:  Patient  Patient states understanding of procedure being performed: Yes    Patient's understanding of procedure matches consent: Yes    Procedure consent matches procedure scheduled: Yes    Appropriately NPO:  Yes  ASA Class:  2  Mallampati  :  Grade 2- soft palate, base of uvula, tonsillar pillars, and portion of posterior pharyngeal wall visible  Lungs:  Lungs clear with good breath sounds bilaterally  Heart:  Normal heart sounds and rate  History & Physical reviewed:  History and physical reviewed and no updates needed  Statement of review:  I have reviewed the lab findings, diagnostic data, medications, and the plan for sedation

## 2025-03-13 NOTE — DISCHARGE INSTRUCTIONS
Caring for Your Tunneled Dialysis Catheter  ____________________________________________    Patient Name: Dulce Ma  Today's Date: March 13, 2025    The radiologist who performed your procedure was:  Nat    When you get home:  No driving or drinking alcohol until tomorrow. You may still have side effects from the medicine you received today. (You may feel drowsy, unsteady or forgetful.)  You should have an adult with you for your first six hours at home.  You may go back to your regular diet today.   If you take aspirin or Plavix, you may begin taking it again tomorrow. You may restart all other medicines today. Use pain medicine as directed.  Avoid heavy lifting or the excess use of your shoulder for three days.  Keep the neck bandage clean and dry for three days. Do not shower unless it is covered with plastic.  After three days you may use a Band-Aid on your neck. Keep using Band-Aids until the wound has healed.    What is a tunneled dialysis catheter?   This is a special tube (catheter) that is threaded (tunneled) under your skin and then placed in a vein near your collarbone. Some people have it for a short time, others have it for their entire lives.    We will use this tube to access your bloodstream for dialysis. After each treatment, your dialysis team will flush the tube with a drug called heparin. This will keep the tube from clotting. They will then change your bandage.    What does it look like?   The catheter is a Y-shaped tube. The two ends, called ports, are each covered with a cap. A clamp near each port helps prevent infection, bleeding and air in the vein.   The exit site (where the tube comes out of your skin) stays covered with a clear bandage.    The entrance site, or tunneling site, is higher up on the neck. It is covered with small pieces of tape called Steri-Strips.     How should I care for the catheter?  Prevent injury and infection:  Infection may occur if germs enter your  bloodstream around the exit or entrance sites. To prevent an infection:  Do not swim while you have the catheter.   You may shower if you first cover the bandage with plastic. You may take a bath if the water stays below your waist. (Sponge bathe your upper body to keep the bandage from getting wet.)  Take your temperature by mouth daily. If you have a fever over 100 F (37.8 C), call your doctor.  Check the skin around the tube each day for redness, swelling, drainage or tenderness. These are all signs of infection.  You will need to avoid heavy activity. Avoid contact sports.    If you have oozing or bleeding from the catheter site  Put direct pressure on the wound for 5 to 10 minutes with a gauze pad.  If you still have bleeding after 10 minutes, call your doctor.  If you are bleeding a lot and can't control it with direct pressure, call 911.    Check your catheter each day  Make sure the clamps are closed over both ends of the tubing. Check that the caps are tight.  If a cap is loose or comes off, you may have bleeding from the tube, or air could get into the bloodstream. To prevent this, make sure the clamp on the catheter is closed. Wrap the end of the tube in a clean gauze and call your doctor or dialysis unit at once.  Your catheter is not likely to fall out. It is sewn into your skin with stitches. (Stitches are removed or will fall out in a couple weeks.) Also, a small cuff under the skin helps to hold the catheter in place. If the catheter does fall out, put firm pressure on the exit site with a clean gauze. Then, call your doctor or dialysis unit at once.     When should I call my doctor or dialysis unit?  Call right away if:  Your temperature under the tongue is over 100 F (37.8 C).  The skin around the tube feels tender or you see drainage.  You have trouble breathing.  You have unusual chest or shoulder pain.   A cap comes off.  The catheter falls out.    [facility]  Department:  [facility] at [phone]      If you are deaf or hard of hearing, please let us know. We provide many free services including sign language interpreters, oral interpreters, TTYs, telephone amplifiers, note takers and written materials

## 2025-03-13 NOTE — SEDATION DOCUMENTATION
Post Procedure Summary:  Prior to the start of the procedure and with procedural staff participation, I verbally confirmed the patient s identity using two indicators, relevant allergies, that the procedure was appropriate and matched the consent or emergent situation, and that the correct equipment/implants were available. Immediately prior to starting the procedure I conducted the Time Out with the procedural staff and re-confirmed the patient s name, procedure, and site/side. (The Joint Commission universal protocol was followed.)  Yes                                        Sedatives: Fentanyl and Midazolam (Versed)     Vital signs, airway and pulse oximetry were monitored and remained stable throughout the procedure and sedation was maintained until the procedure was complete.  The patient was monitored by staff until sedation discharge criteria were met.     Patient tolerance: Patient tolerated the procedure well with no immediate complications.     Time of sedation in minutes: 14 Minutes minutes from beginning to end of physician one to one monitoring.    Patient denies pain, nausea and shortness of breath.  Transferred to cath/IR holding room via cart accompanied by RN.  Report provided to CAITLIN Jo.

## 2025-03-13 NOTE — IR NOTE
Procedure: Tunnel Cath Exchange    1 mg Versed  25 mcg Fentanyl  2 g Ancef  14 min sedation    10 ml Lidocaine    0.9 min fluoro  3 mGy

## 2025-03-18 ENCOUNTER — MEDICAL CORRESPONDENCE (OUTPATIENT)
Dept: HEALTH INFORMATION MANAGEMENT | Facility: CLINIC | Age: 65
End: 2025-03-18
Payer: COMMERCIAL

## 2025-03-18 ENCOUNTER — TELEPHONE (OUTPATIENT)
Dept: FAMILY MEDICINE | Facility: CLINIC | Age: 65
End: 2025-03-18
Payer: COMMERCIAL

## 2025-03-18 NOTE — TELEPHONE ENCOUNTER
Home Health Care      Reason for call:      [facility] - Tracking #[id number] - Cert Period: 02/17/25 - 04/17/25 - PATIENT CARE ORDER - VERBAL - THERE IS A FORM    Orders are needed for this patient.  ABOVE    Pt Provider: LYNDA    Phone Number Homecare Nurse can be reached at:  [phone]      Could we send this information to you in [employer] or would you prefer to receive a phone call?:   NA    PUT IN PROVIDERS IN BOX WITH MED REC AND LETTER    MARY FROST

## 2025-03-18 NOTE — TELEPHONE ENCOUNTER
Home Health Care        Reason for call:      [facility] - Tracking #[id number] - cert Period 02/17/25 - 04/17/25 - discharged from  services 03/06/25    Orders are needed for this patient.  above    Pt Provider: Dr. Lamb    Phone Number Homecare Nurse can be reached at: Fax       Could we send this information to you in [employer] or would you prefer to receive a phone call?:   na    Put in providers in box    Babita K

## 2025-03-20 NOTE — TELEPHONE ENCOUNTER
Faxed signed forms to [facility] #[phone]    Tracking #[id number]    Copied into HIMS / filed in [place]

## 2025-03-20 NOTE — TELEPHONE ENCOUNTER
Faxed signed forms to [facility] #[phone]    Tracking #[id number]    Copied into HIMS / filed in [facility]

## 2025-03-25 ENCOUNTER — TELEPHONE (OUTPATIENT)
Dept: FAMILY MEDICINE | Facility: CLINIC | Age: 65
End: 2025-03-25
Payer: COMMERCIAL

## 2025-03-25 NOTE — TELEPHONE ENCOUNTER
Home Health Care      Reason for call:      [facility] - Tracking #[id number] - cert period: 02/17/25 - 04/17/25 - Multiple Myeloma not having achieved remission    Orders are needed for this patient.  above    Pt Provider: Dr. Lamb    Phone Number Homecare Nurse can be reached at: fax       Could we send this information to you in [employer] or would you prefer to receive a phone call?:   na    Put in providers in box    Babita K

## 2025-03-27 ENCOUNTER — OFFICE VISIT (OUTPATIENT)
Dept: FAMILY MEDICINE | Facility: CLINIC | Age: 65
End: 2025-03-27
Payer: COMMERCIAL

## 2025-03-27 VITALS
WEIGHT: 105 LBS | DIASTOLIC BLOOD PRESSURE: 92 MMHG | OXYGEN SATURATION: 99 % | TEMPERATURE: 97.4 F | BODY MASS INDEX: 18.61 KG/M2 | HEIGHT: 63 IN | SYSTOLIC BLOOD PRESSURE: 140 MMHG | RESPIRATION RATE: 14 BRPM | HEART RATE: 80 BPM

## 2025-03-27 DIAGNOSIS — T45.1X5A IMMUNOSUPPRESSED DUE TO CHEMOTHERAPY: ICD-10-CM

## 2025-03-27 DIAGNOSIS — Z99.2: Primary | ICD-10-CM

## 2025-03-27 DIAGNOSIS — Z23 NEED FOR VACCINATION AGAINST STREPTOCOCCUS PNEUMONIAE: ICD-10-CM

## 2025-03-27 DIAGNOSIS — C90.00 MULTIPLE MYELOMA NOT HAVING ACHIEVED REMISSION (H): ICD-10-CM

## 2025-03-27 DIAGNOSIS — Z79.899 IMMUNOSUPPRESSED DUE TO CHEMOTHERAPY: ICD-10-CM

## 2025-03-27 DIAGNOSIS — K21.00 GASTROESOPHAGEAL REFLUX DISEASE WITH ESOPHAGITIS WITHOUT HEMORRHAGE: ICD-10-CM

## 2025-03-27 DIAGNOSIS — N18.6: Primary | ICD-10-CM

## 2025-03-27 DIAGNOSIS — M12.369 PALINDROMIC RHEUMATISM INVOLVING PATELLA, UNSPECIFIED LATERALITY: Primary | ICD-10-CM

## 2025-03-27 DIAGNOSIS — Z29.11 NEED FOR RSV IMMUNIZATION: ICD-10-CM

## 2025-03-27 DIAGNOSIS — D84.821 IMMUNOSUPPRESSED DUE TO CHEMOTHERAPY: ICD-10-CM

## 2025-03-27 DIAGNOSIS — N18.6 END-STAGE RENAL DISEASE ON HEMODIALYSIS (H): ICD-10-CM

## 2025-03-27 DIAGNOSIS — N17.8: Primary | ICD-10-CM

## 2025-03-27 DIAGNOSIS — I10 ESSENTIAL HYPERTENSION WITH GOAL BLOOD PRESSURE LESS THAN 140/90: ICD-10-CM

## 2025-03-27 DIAGNOSIS — Z99.2 END-STAGE RENAL DISEASE ON HEMODIALYSIS (H): ICD-10-CM

## 2025-03-27 DIAGNOSIS — E85.81 AL AMYLOIDOSIS (H): ICD-10-CM

## 2025-03-27 PROBLEM — Z79.69 IMMUNOSUPPRESSED DUE TO CHEMOTHERAPY: Status: ACTIVE | Noted: 2025-03-27

## 2025-03-27 PROCEDURE — G0180 MD CERTIFICATION HHA PATIENT: HCPCS | Performed by: FAMILY MEDICINE

## 2025-03-27 RX ORDER — PANTOPRAZOLE SODIUM 40 MG/1
40 TABLET, DELAYED RELEASE ORAL 2 TIMES DAILY
Qty: 60 TABLET | Refills: 5 | Status: SHIPPED | OUTPATIENT
Start: 2025-03-27

## 2025-03-27 NOTE — PATIENT INSTRUCTIONS
[facility]  [address] [place] [id number]  Office: [phone]   Fax:    [phone]       Please check with your oncologist on whether you should have updated covid-19 vaccine and/or Hepatitis B vaccination.     Please recheck for an MA only appointment for TdaP booster - Adacel - and any of the above if ok'd by oncology.     From my perspective re: vitamins -  For your daily multivitamin - take 2 Crunchy Flintstones vitamins daily.   Not gummy ones.  Don't need the ones with extra iron since you are getting the iron infusions with your dialysis.     Essential hypertension with goal blood pressure less than 140/90 - bp's 150/84 left, 150/90 right 3/27/2025 - ? restart 12.5mg metoprolol succinate at bedtime? - please ask at Dialysis tomorrow 3/28/2025.

## 2025-03-27 NOTE — TELEPHONE ENCOUNTER
completed/ signed - at [facility] coordinator's file box.  ---Alida Lamb MD

## 2025-03-27 NOTE — TELEPHONE ENCOUNTER
Faxed signed forms to [facility] #[phone]    Cert period: 02/17/25 - 04/17/25    Copied into HIMS / filed in [place]

## 2025-03-31 ENCOUNTER — TRANSFERRED RECORDS (OUTPATIENT)
Dept: HEALTH INFORMATION MANAGEMENT | Facility: CLINIC | Age: 65
End: 2025-03-31
Payer: COMMERCIAL

## 2025-04-05 DIAGNOSIS — I47.10 SVT (SUPRAVENTRICULAR TACHYCARDIA): ICD-10-CM

## 2025-04-05 DIAGNOSIS — I48.0 PAROXYSMAL ATRIAL FIBRILLATION (H): ICD-10-CM

## 2025-04-07 RX ORDER — AMIODARONE HYDROCHLORIDE 200 MG/1
TABLET ORAL
Qty: 30 TABLET | Refills: 0 | Status: SHIPPED | OUTPATIENT
Start: 2025-04-07

## 2025-05-02 ENCOUNTER — HOSPITAL ENCOUNTER (INPATIENT)
Facility: CLINIC | Age: 65
LOS: 5 days | Discharge: HOME | End: 2025-05-07
Attending: EMERGENCY MEDICINE | Admitting: HOSPITALIST
Payer: COMMERCIAL

## 2025-05-02 ENCOUNTER — APPOINTMENT (OUTPATIENT)
Dept: CT IMAGING | Facility: CLINIC | Age: 65
End: 2025-05-02
Attending: EMERGENCY MEDICINE
Payer: COMMERCIAL

## 2025-05-02 DIAGNOSIS — N08: ICD-10-CM

## 2025-05-02 DIAGNOSIS — E85.4: ICD-10-CM

## 2025-05-02 DIAGNOSIS — A41.9 SEPSIS, DUE TO UNSPECIFIED ORGANISM, UNSPECIFIED WHETHER ACUTE ORGAN DYSFUNCTION PRESENT (H): ICD-10-CM

## 2025-05-02 DIAGNOSIS — N12 PYELONEPHRITIS: ICD-10-CM

## 2025-05-02 DIAGNOSIS — D72.819 LEUKOPENIA, UNSPECIFIED TYPE: ICD-10-CM

## 2025-05-02 LAB
ALBUMIN SERPL BCG-MCNC: 4.1 G/DL (ref 3.5–5.2)
ALBUMIN UR-MCNC: 50 MG/DL
ALP SERPL-CCNC: 91 U/L (ref 40–150)
ALT SERPL W P-5'-P-CCNC: 14 U/L (ref 0–50)
ANION GAP SERPL CALCULATED.3IONS-SCNC: 16 MMOL/L (ref 7–15)
APPEARANCE UR: ABNORMAL
AST SERPL W P-5'-P-CCNC: 17 U/L (ref 0–45)
BACTERIA #/AREA URNS HPF: ABNORMAL /HPF
BASOPHILS # BLD AUTO: 0 10E3/UL (ref 0–0.2)
BASOPHILS NFR BLD AUTO: 1 %
BILIRUB DIRECT SERPL-MCNC: 0.32 MG/DL (ref 0–0.3)
BILIRUB SERPL-MCNC: 1.1 MG/DL
BILIRUB UR QL STRIP: NEGATIVE
BUN SERPL-MCNC: 30 MG/DL (ref 8–23)
CALCIUM SERPL-MCNC: 9.3 MG/DL (ref 8.8–10.4)
CHLORIDE SERPL-SCNC: 100 MMOL/L (ref 98–107)
COLOR UR AUTO: ABNORMAL
CREAT SERPL-MCNC: 4.59 MG/DL (ref 0.51–0.95)
EGFRCR SERPLBLD CKD-EPI 2021: 10 ML/MIN/1.73M2
EOSINOPHIL # BLD AUTO: 0 10E3/UL (ref 0–0.7)
EOSINOPHIL NFR BLD AUTO: 0 %
ERYTHROCYTE [DISTWIDTH] IN BLOOD BY AUTOMATED COUNT: 16.5 % (ref 10–15)
FLUAV RNA SPEC QL NAA+PROBE: NEGATIVE
FLUBV RNA RESP QL NAA+PROBE: NEGATIVE
GLUCOSE SERPL-MCNC: 121 MG/DL (ref 70–99)
GLUCOSE UR STRIP-MCNC: NEGATIVE MG/DL
HCO3 SERPL-SCNC: 23 MMOL/L (ref 22–29)
HCT VFR BLD AUTO: 36.3 % (ref 35–47)
HGB BLD-MCNC: 11.5 G/DL (ref 11.7–15.7)
HGB UR QL STRIP: ABNORMAL
HOLD SPECIMEN: NORMAL
HOLD SPECIMEN: NORMAL
HYALINE CASTS: 1 /LPF
IMM GRANULOCYTES # BLD: 0 10E3/UL
IMM GRANULOCYTES NFR BLD: 1 %
KETONES UR STRIP-MCNC: NEGATIVE MG/DL
LACTATE SERPL-SCNC: 1.1 MMOL/L (ref 0.7–2)
LACTATE SERPL-SCNC: 3 MMOL/L (ref 0.7–2)
LEUKOCYTE ESTERASE UR QL STRIP: ABNORMAL
LIPASE SERPL-CCNC: 23 U/L (ref 13–60)
LYMPHOCYTES # BLD AUTO: 0.2 10E3/UL (ref 0.8–5.3)
LYMPHOCYTES NFR BLD AUTO: 9 %
MCH RBC QN AUTO: 32.5 PG (ref 26.5–33)
MCHC RBC AUTO-ENTMCNC: 31.7 G/DL (ref 31.5–36.5)
MCV RBC AUTO: 103 FL (ref 78–100)
MONOCYTES # BLD AUTO: 0 10E3/UL (ref 0–1.3)
MONOCYTES NFR BLD AUTO: 1 %
NEUTROPHILS # BLD AUTO: 1.6 10E3/UL (ref 1.6–8.3)
NEUTROPHILS NFR BLD AUTO: 90 %
NITRATE UR QL: NEGATIVE
NRBC # BLD AUTO: 0 10E3/UL
NRBC BLD AUTO-RTO: 0 /100
PH UR STRIP: 7.5 [PH] (ref 5–7)
PLATELET # BLD AUTO: 150 10E3/UL (ref 150–450)
POTASSIUM SERPL-SCNC: 4.1 MMOL/L (ref 3.4–5.3)
PROCALCITONIN SERPL IA-MCNC: 0.28 NG/ML
PROT SERPL-MCNC: 6.9 G/DL (ref 6.4–8.3)
RBC # BLD AUTO: 3.54 10E6/UL (ref 3.8–5.2)
RBC URINE: 4 /HPF
RSV RNA SPEC NAA+PROBE: NEGATIVE
SARS-COV-2 RNA RESP QL NAA+PROBE: NEGATIVE
SODIUM SERPL-SCNC: 139 MMOL/L (ref 135–145)
SP GR UR STRIP: 1.01 (ref 1–1.03)
SQUAMOUS EPITHELIAL: 1 /HPF
UROBILINOGEN UR STRIP-MCNC: NORMAL MG/DL
WBC # BLD AUTO: 1.8 10E3/UL (ref 4–11)
WBC URINE: 66 /HPF

## 2025-05-02 PROCEDURE — 250N000013 HC RX MED GY IP 250 OP 250 PS 637: Performed by: HOSPITALIST

## 2025-05-02 PROCEDURE — 120N000001 HC R&B MED SURG/OB

## 2025-05-02 PROCEDURE — 81001 URINALYSIS AUTO W/SCOPE: CPT | Performed by: EMERGENCY MEDICINE

## 2025-05-02 PROCEDURE — 87077 CULTURE AEROBIC IDENTIFY: CPT | Performed by: EMERGENCY MEDICINE

## 2025-05-02 PROCEDURE — 258N000003 HC RX IP 258 OP 636: Performed by: EMERGENCY MEDICINE

## 2025-05-02 PROCEDURE — 87186 SC STD MICRODIL/AGAR DIL: CPT | Performed by: EMERGENCY MEDICINE

## 2025-05-02 PROCEDURE — 82248 BILIRUBIN DIRECT: CPT | Performed by: EMERGENCY MEDICINE

## 2025-05-02 PROCEDURE — 99223 1ST HOSP IP/OBS HIGH 75: CPT | Performed by: HOSPITALIST

## 2025-05-02 PROCEDURE — 99285 EMERGENCY DEPT VISIT HI MDM: CPT | Mod: 25

## 2025-05-02 PROCEDURE — 36415 COLL VENOUS BLD VENIPUNCTURE: CPT | Performed by: EMERGENCY MEDICINE

## 2025-05-02 PROCEDURE — 83605 ASSAY OF LACTIC ACID: CPT | Performed by: EMERGENCY MEDICINE

## 2025-05-02 PROCEDURE — 250N000011 HC RX IP 250 OP 636: Performed by: EMERGENCY MEDICINE

## 2025-05-02 PROCEDURE — 83690 ASSAY OF LIPASE: CPT | Performed by: EMERGENCY MEDICINE

## 2025-05-02 PROCEDURE — 96365 THER/PROPH/DIAG IV INF INIT: CPT

## 2025-05-02 PROCEDURE — 96376 TX/PRO/DX INJ SAME DRUG ADON: CPT

## 2025-05-02 PROCEDURE — 87154 CUL TYP ID BLD PTHGN 6+ TRGT: CPT | Performed by: EMERGENCY MEDICINE

## 2025-05-02 PROCEDURE — 96375 TX/PRO/DX INJ NEW DRUG ADDON: CPT

## 2025-05-02 PROCEDURE — 87637 SARSCOV2&INF A&B&RSV AMP PRB: CPT | Performed by: EMERGENCY MEDICINE

## 2025-05-02 PROCEDURE — 84145 PROCALCITONIN (PCT): CPT | Performed by: HOSPITALIST

## 2025-05-02 PROCEDURE — 85004 AUTOMATED DIFF WBC COUNT: CPT | Performed by: EMERGENCY MEDICINE

## 2025-05-02 PROCEDURE — 96361 HYDRATE IV INFUSION ADD-ON: CPT

## 2025-05-02 PROCEDURE — 74176 CT ABD & PELVIS W/O CONTRAST: CPT

## 2025-05-02 RX ORDER — AMOXICILLIN 250 MG
2 CAPSULE ORAL 2 TIMES DAILY PRN
Status: DISCONTINUED | OUTPATIENT
Start: 2025-05-02 | End: 2025-05-07 | Stop reason: HOSPADM

## 2025-05-02 RX ORDER — PANTOPRAZOLE SODIUM 40 MG/1
40 TABLET, DELAYED RELEASE ORAL 2 TIMES DAILY PRN
Status: DISCONTINUED | OUTPATIENT
Start: 2025-05-02 | End: 2025-05-03

## 2025-05-02 RX ORDER — ONDANSETRON 2 MG/ML
4 INJECTION INTRAMUSCULAR; INTRAVENOUS EVERY 6 HOURS PRN
Status: DISCONTINUED | OUTPATIENT
Start: 2025-05-02 | End: 2025-05-07 | Stop reason: HOSPADM

## 2025-05-02 RX ORDER — AMOXICILLIN 250 MG
1 CAPSULE ORAL 2 TIMES DAILY PRN
Status: DISCONTINUED | OUTPATIENT
Start: 2025-05-02 | End: 2025-05-07 | Stop reason: HOSPADM

## 2025-05-02 RX ORDER — HYDROMORPHONE HYDROCHLORIDE 1 MG/ML
0.5 INJECTION, SOLUTION INTRAMUSCULAR; INTRAVENOUS; SUBCUTANEOUS ONCE
Status: COMPLETED | OUTPATIENT
Start: 2025-05-02 | End: 2025-05-02

## 2025-05-02 RX ORDER — ACETAMINOPHEN 325 MG/1
650 TABLET ORAL ONCE
Status: COMPLETED | OUTPATIENT
Start: 2025-05-02 | End: 2025-05-02

## 2025-05-02 RX ORDER — ONDANSETRON 4 MG/1
4 TABLET, ORALLY DISINTEGRATING ORAL EVERY 6 HOURS PRN
Status: DISCONTINUED | OUTPATIENT
Start: 2025-05-02 | End: 2025-05-07 | Stop reason: HOSPADM

## 2025-05-02 RX ORDER — PROCHLORPERAZINE MALEATE 5 MG/1
10 TABLET ORAL EVERY 6 HOURS PRN
Status: DISCONTINUED | OUTPATIENT
Start: 2025-05-02 | End: 2025-05-07 | Stop reason: HOSPADM

## 2025-05-02 RX ORDER — AMIODARONE HYDROCHLORIDE 200 MG/1
200 TABLET ORAL DAILY
Status: DISCONTINUED | OUTPATIENT
Start: 2025-05-03 | End: 2025-05-07 | Stop reason: HOSPADM

## 2025-05-02 RX ORDER — LIDOCAINE 40 MG/G
CREAM TOPICAL
Status: DISCONTINUED | OUTPATIENT
Start: 2025-05-02 | End: 2025-05-07 | Stop reason: HOSPADM

## 2025-05-02 RX ORDER — DORZOLAMIDE HYDROCHLORIDE AND TIMOLOL MALEATE 20; 5 MG/ML; MG/ML
1 SOLUTION/ DROPS OPHTHALMIC 2 TIMES DAILY
Status: DISCONTINUED | OUTPATIENT
Start: 2025-05-02 | End: 2025-05-07 | Stop reason: HOSPADM

## 2025-05-02 RX ORDER — CEFTRIAXONE 2 G/1
2 INJECTION, POWDER, FOR SOLUTION INTRAMUSCULAR; INTRAVENOUS EVERY 24 HOURS
Status: DISCONTINUED | OUTPATIENT
Start: 2025-05-03 | End: 2025-05-07 | Stop reason: HOSPADM

## 2025-05-02 RX ORDER — ACYCLOVIR 200 MG/1
200 CAPSULE ORAL 2 TIMES DAILY
Status: DISCONTINUED | OUTPATIENT
Start: 2025-05-02 | End: 2025-05-07 | Stop reason: HOSPADM

## 2025-05-02 RX ORDER — ACETAMINOPHEN 325 MG/1
975 TABLET ORAL EVERY 6 HOURS PRN
Status: DISCONTINUED | OUTPATIENT
Start: 2025-05-02 | End: 2025-05-07 | Stop reason: HOSPADM

## 2025-05-02 RX ORDER — ONDANSETRON 2 MG/ML
4 INJECTION INTRAMUSCULAR; INTRAVENOUS ONCE
Status: COMPLETED | OUTPATIENT
Start: 2025-05-02 | End: 2025-05-02

## 2025-05-02 RX ORDER — CALCIUM CARBONATE 500 MG/1
1000 TABLET, CHEWABLE ORAL 4 TIMES DAILY PRN
Status: DISCONTINUED | OUTPATIENT
Start: 2025-05-02 | End: 2025-05-07 | Stop reason: HOSPADM

## 2025-05-02 RX ORDER — CEFTRIAXONE 1 G/1
1 INJECTION, POWDER, FOR SOLUTION INTRAMUSCULAR; INTRAVENOUS ONCE
Status: COMPLETED | OUTPATIENT
Start: 2025-05-02 | End: 2025-05-02

## 2025-05-02 RX ORDER — HYDROMORPHONE HYDROCHLORIDE 1 MG/ML
0.3 INJECTION, SOLUTION INTRAMUSCULAR; INTRAVENOUS; SUBCUTANEOUS EVERY 30 MIN PRN
Status: DISCONTINUED | OUTPATIENT
Start: 2025-05-02 | End: 2025-05-03

## 2025-05-02 RX ORDER — AMOXICILLIN 250 MG
1 CAPSULE ORAL 2 TIMES DAILY PRN
Status: DISCONTINUED | OUTPATIENT
Start: 2025-05-02 | End: 2025-05-03

## 2025-05-02 RX ADMIN — HYDROMORPHONE HYDROCHLORIDE 0.3 MG: 1 INJECTION, SOLUTION INTRAMUSCULAR; INTRAVENOUS; SUBCUTANEOUS at 18:47

## 2025-05-02 RX ADMIN — DORZOLAMIDE HYDROCHLORIDE AND TIMOLOL MALEATE 1 DROP: 20; 5 SOLUTION OPHTHALMIC at 21:32

## 2025-05-02 RX ADMIN — HYDROMORPHONE HYDROCHLORIDE 0.3 MG: 1 INJECTION, SOLUTION INTRAMUSCULAR; INTRAVENOUS; SUBCUTANEOUS at 21:27

## 2025-05-02 RX ADMIN — HYDROMORPHONE HYDROCHLORIDE 0.3 MG: 1 INJECTION, SOLUTION INTRAMUSCULAR; INTRAVENOUS; SUBCUTANEOUS at 16:10

## 2025-05-02 RX ADMIN — SODIUM CHLORIDE, SODIUM LACTATE, POTASSIUM CHLORIDE, AND CALCIUM CHLORIDE 1000 ML: .6; .31; .03; .02 INJECTION, SOLUTION INTRAVENOUS at 12:26

## 2025-05-02 RX ADMIN — ONDANSETRON 4 MG: 2 INJECTION, SOLUTION INTRAMUSCULAR; INTRAVENOUS at 13:53

## 2025-05-02 RX ADMIN — CEFTRIAXONE 1 G: 1 INJECTION, POWDER, FOR SOLUTION INTRAMUSCULAR; INTRAVENOUS at 16:37

## 2025-05-02 RX ADMIN — ACYCLOVIR 200 MG: 200 CAPSULE ORAL at 21:30

## 2025-05-02 RX ADMIN — SODIUM CHLORIDE 1000 ML: 0.9 INJECTION, SOLUTION INTRAVENOUS at 12:47

## 2025-05-02 RX ADMIN — HYDROMORPHONE HYDROCHLORIDE 0.5 MG: 1 INJECTION, SOLUTION INTRAMUSCULAR; INTRAVENOUS; SUBCUTANEOUS at 12:44

## 2025-05-02 ASSESSMENT — ACTIVITIES OF DAILY LIVING (ADL)
ADLS_ACUITY_SCORE: 59

## 2025-05-02 NOTE — ED NOTES
[facility]  ED Nurse Handoff Report    ED Chief complaint: Back Pain  . ED Diagnosis:   Final diagnoses:   Pyelonephritis   Leukopenia, unspecified type   Amyloid A nephropathy (H)   Sepsis, due to unspecified organism, unspecified whether acute organ dysfunction present (H)       Allergies:   Allergies   Allergen Reactions    Amoxicillin Rash     Face; in early adulthood    Tolerated cephalexin 01/2025    Benzoyl Peroxide Swelling     swelling    Ibuprofen Hives     over long duration dev. hives    Penicillins Rash     Face; in early adulthood.    Tolerated cephalexin 01/2025    Face; in early adulthood      Face; in early adulthood  Tolerated cephalexin 01/2025      Face; in early adulthood.  Tolerated cephalexin 01/2025       Code Status: Full Code    Activity level - Baseline/Home:  independent.  Activity Level - Current:   standby.   Lift room needed: No.   Bariatric: No   Needed: No   Isolation: No.   Infection: Not Applicable.     Respiratory status: Nasal Cannula    Vital Signs (within 30 minutes):   Vitals:    05/02/25 1632 05/02/25 1647 05/02/25 1702 05/02/25 1715   BP: 122/62 116/65 114/60 129/69   Pulse: 88 87 89 85   Resp:       Temp:       TempSrc:       SpO2: 92% 92% 93% 95%       Cardiac Rhythm:  ,      Pain level:    Patient confused: No.   Patient Falls Risk: bed/chair alarm on, nonskid shoes/slippers when out of bed, arm band in place, patient and family education, and assistive device/personal items within reach.   Elimination Status: Has voided     Patient Report - Initial Complaint: Back Pain.   Focused Assessment:   History of AL amyloidosis, end-stage renal disease on dialysis, sees [place] [facility]ology and currently receiving chemotherapy for AL amyloidosis presenting with back pain onset yesterday evening in the lower back described as achy and crampy progressively worse today associated with chills as well as nausea and nonbilious nonbloody emesis x 2.  No  black or bloody stools.  No dysuria.  She does still make urine and urinates about every 4 hours.  Slight cough.  No known sick contacts.  No discrete abdominal pain.  No chest pain or shortness of breath.      Abnormal Results:   Labs Ordered and Resulted from Time of ED Arrival to Time of ED Departure   BASIC METABOLIC PANEL - Abnormal       Result Value    Sodium 139      Potassium 4.1      Chloride 100      Carbon Dioxide (CO2) 23      Anion Gap 16 (*)     Urea Nitrogen 30.0 (*)     Creatinine 4.59 (*)     GFR Estimate 10 (*)     Calcium 9.3      Glucose 121 (*)    HEPATIC FUNCTION PANEL - Abnormal    Protein Total 6.9      Albumin 4.1      Bilirubin Total 1.1      Alkaline Phosphatase 91      AST 17      ALT 14      Bilirubin Direct 0.32 (*)    LACTIC ACID WHOLE BLOOD WITH 1X REPEAT IN 2 HR WHEN >2 - Abnormal    Lactic Acid, Initial 3.0 (*)    ROUTINE UA WITH MICROSCOPIC REFLEX TO CULTURE - Abnormal    Color Urine Light Yellow      Appearance Urine Slightly Cloudy (*)     Glucose Urine Negative      Bilirubin Urine Negative      Ketones Urine Negative      Specific Gravity Urine 1.008      Blood Urine Trace (*)     pH Urine 7.5 (*)     Protein Albumin Urine 50 (*)     Urobilinogen Urine Normal      Nitrite Urine Negative      Leukocyte Esterase Urine Moderate (*)     Bacteria Urine Few (*)     RBC Urine 4 (*)     WBC Urine 66 (*)     Squamous Epithelials Urine 1      Hyaline Casts Urine 1     CBC WITH PLATELETS AND DIFFERENTIAL - Abnormal    WBC Count 1.8 (*)     RBC Count 3.54 (*)     Hemoglobin 11.5 (*)     Hematocrit 36.3       (*)     MCH 32.5      MCHC 31.7      RDW 16.5 (*)     Platelet Count 150      % Neutrophils 90      % Lymphocytes 9      % Monocytes 1      % Eosinophils 0      % Basophils 1      % Immature Granulocytes 1      NRBCs per 100 WBC 0      Absolute Neutrophils 1.6      Absolute Lymphocytes 0.2 (*)     Absolute Monocytes 0.0      Absolute Eosinophils 0.0      Absolute Basophils 0.0       Absolute Immature Granulocytes 0.0      Absolute NRBCs 0.0     LIPASE - Normal    Lipase 23     INFLUENZA A/B, RSV AND SARS-COV2 PCR - Normal    Influenza A PCR Negative      Influenza B PCR Negative      RSV PCR Negative      SARS CoV2 PCR Negative     LACTIC ACID WHOLE BLOOD - Normal    Lactic Acid 1.1     PROCALCITONIN   BLOOD CULTURE   BLOOD CULTURE   URINE CULTURE        CT Abdomen Pelvis w/o Contrast   Final Result   IMPRESSION:    1.  Bilateral perinephric fat stranding/edema, greater on the right. No ureteral calculus or hydronephrosis. Findings could may be related to anasarca versus underlying pyelonephritis. Correlation with urinalysis suggested.   2.  Small bilateral pleural effusions, generalized subcutaneous edema and trace peritoneal fluid may be related to anasarca/fluid overload.   3.  Layering density within the gallbladder lumen suggests layering sludge +/- stones no cholelithiasis nor bile duct dilatation.             Treatments provided: IVF, Pain control  Family Comments: N/A  OBS brochure/video discussed/provided to patient:  N/A  ED Medications:   Medications   HYDROmorphone (PF) (DILAUDID) injection 0.5 mg (0.5 mg Intravenous $Given 5/2/25 1244)     Followed by   HYDROmorphone (PF) (DILAUDID) injection 0.3 mg (0.3 mg Intravenous $Given 5/2/25 1610)   acetaminophen (TYLENOL) tablet 650 mg (650 mg Oral Not Given 5/2/25 1200)   lactated ringers BOLUS 1,000 mL (0 mLs Intravenous Stopped 5/2/25 1455)   ondansetron (ZOFRAN) injection 4 mg (4 mg Intravenous $Given 5/2/25 1353)   sodium chloride 0.9% BOLUS 1,000 mL (0 mLs Intravenous Stopped 5/2/25 1456)   cefTRIAXone (ROCEPHIN) 1 g vial to attach to  mL bag for ADULTS or NS 50 mL bag for PEDS (1 g Intravenous $New Bag 5/2/25 1637)       Drips infusing:  No  For the majority of the shift this patient was [place].   Interventions performed were N/A.    Sepsis treatment initiated: No    Cares/treatment/interventions/medications to be completed  following ED care: See Orders    ED Nurse Name: Candida Ramon RN  6:00 PM     RECEIVING UNIT ED HANDOFF REVIEW    Above ED Nurse Handoff Report was reviewed: Yes  Reviewed by: Mer Rusihng RN on May 2, 2025 at 6:32 PM   I Timo called the ED to inform them the note was read: Yes

## 2025-05-02 NOTE — ED PROVIDER NOTES
Emergency Department Note      History of Present Illness     Chief Complaint   Back Pain      HPI   Dulce Ma is a 64 year old female     History of AL amyloidosis, end-stage renal disease on dialysis, sees [place] [facility] and currently receiving chemotherapy for AL amyloidosis presenting with back pain onset yesterday evening in the lower back described as achy and crampy progressively worse today associated with chills as well as nausea and nonbilious nonbloody emesis x 2.  No black or bloody stools.  No dysuria.  She does still make urine and urinates about every 4 hours.  Slight cough.  No known sick contacts.  No discrete abdominal pain.  No chest pain or shortness of breath.    Independent Historian       Review of External Notes   See ED course    Past Medical History     Medical History and Problem List   Past Medical History:   Diagnosis Date    Anesthesia complication, initial encounter     Dysplastic nevi     Glaucoma     Hypertension goal BP (blood pressure) < 140/90 at age 50     Lumbago     Other malignant neoplasm of skin of trunk, except scrotum 2/02    Perimenopausal     Routine gyne exam     Skin cancer, basal cell     Unspecified derangement, shoulder region     Unspecified musculoskeletal disorders and symptoms referable to neck        Medications   No current outpatient medications on file.      Surgical History   Past Surgical History:   Procedure Laterality Date    APPENDECTOMY  2008    aprroximately 8-10 years agp    BREAST SURGERY      Breast biopsies    COLONOSCOPY N/A 7/27/2018    Procedure: COLONOSCOPY;  COLONOSCOPY ;  Surgeon: Ren Whitehead MD;  Location:  GI    ESOPHAGOSCOPY, GASTROSCOPY, DUODENOSCOPY (EGD), COMBINED N/A 2/10/2025    Procedure: Esophagogastroduodenoscopy with hemostasis clip placement;  Surgeon: Rashard Cole MD;  Location: [facility] OR    IR CVC TUNNEL PLACEMENT > 5 YRS OF AGE  2/6/2025    IR CVC TUNNEL REVISION RIGHT  3/13/2025    ORTHOPEDIC  "SURGERY Right 2014    ORIF right ring finger    ZZC NONSPECIFIC PROCEDURE  3/02    malignant melanoma removed from abdomen with negative nodes-Dr. Acosta -surgeon       Physical Exam     Patient Vitals for the past 24 hrs:   BP Temp Temp src Pulse Resp SpO2 Height Weight   05/02/25 1858 (!) 142/57 99  F (37.2  C) Oral 83 -- 93 % 1.6 m (5' 3\") 48.5 kg (106 lb 14.8 oz)   05/02/25 1715 129/69 -- -- 85 -- 95 % -- --   05/02/25 1702 114/60 -- -- 89 -- 93 % -- --   05/02/25 1647 116/65 -- -- 87 -- 92 % -- --   05/02/25 1632 122/62 -- -- 88 -- 92 % -- --   05/02/25 1617 119/68 -- -- 85 -- (!) 91 % -- --   05/02/25 1558 130/82 -- -- 91 -- 92 % -- --   05/02/25 1545 134/68 -- -- 83 -- 92 % -- --   05/02/25 1538 131/78 -- -- 89 -- -- -- --   05/02/25 1509 139/75 -- -- -- -- 96 % -- --   05/02/25 1454 137/66 -- -- -- -- 96 % -- --   05/02/25 1439 139/75 -- -- -- -- 96 % -- --   05/02/25 1427 (!) 141/67 -- -- -- -- 96 % -- --   05/02/25 1412 (!) 153/79 -- -- -- -- 97 % -- --   05/02/25 1357 (!) 144/77 -- -- -- -- 96 % -- --   05/02/25 1342 (!) 156/76 -- -- -- -- 97 % -- --   05/02/25 1327 (!) 155/76 -- -- -- -- -- -- --   05/02/25 1257 (!) 147/73 -- -- -- -- 95 % -- --   05/02/25 1242 (!) 156/82 -- -- -- -- 95 % -- --   05/02/25 1227 (!) 176/79 -- -- -- -- 94 % -- --   05/02/25 1134 (!) 157/104 97.9  F (36.6  C) Oral 92 18 98 % -- --       HEENT: Mucous membranes moist, posterior pharynx no significant erythema exudates or hypertrophy  CV: ppi, regular, peripheral pulses are symmetric in upper extremities  Abdomen: No significant localizing tenderness palpation.  No rigidity or distention  Resp: speaking in full sentences without any resp distress, lungs clear to auscultation bilaterally  Skin: warm dry well perfused  Neuro: Alert, no gross motor or sensory deficits,  gait stable  Extremity: No midline T or L-spine tenderness.          Diagnostics     Lab Results   Labs Ordered and Resulted from Time of ED Arrival to Time of ED " Departure   BASIC METABOLIC PANEL - Abnormal       Result Value    Sodium 139      Potassium 4.1      Chloride 100      Carbon Dioxide (CO2) 23      Anion Gap 16 (*)     Urea Nitrogen 30.0 (*)     Creatinine 4.59 (*)     GFR Estimate 10 (*)     Calcium 9.3      Glucose 121 (*)    HEPATIC FUNCTION PANEL - Abnormal    Protein Total 6.9      Albumin 4.1      Bilirubin Total 1.1      Alkaline Phosphatase 91      AST 17      ALT 14      Bilirubin Direct 0.32 (*)    LACTIC ACID WHOLE BLOOD WITH 1X REPEAT IN 2 HR WHEN >2 - Abnormal    Lactic Acid, Initial 3.0 (*)    ROUTINE UA WITH MICROSCOPIC REFLEX TO CULTURE - Abnormal    Color Urine Light Yellow      Appearance Urine Slightly Cloudy (*)     Glucose Urine Negative      Bilirubin Urine Negative      Ketones Urine Negative      Specific Gravity Urine 1.008      Blood Urine Trace (*)     pH Urine 7.5 (*)     Protein Albumin Urine 50 (*)     Urobilinogen Urine Normal      Nitrite Urine Negative      Leukocyte Esterase Urine Moderate (*)     Bacteria Urine Few (*)     RBC Urine 4 (*)     WBC Urine 66 (*)     Squamous Epithelials Urine 1      Hyaline Casts Urine 1     CBC WITH PLATELETS AND DIFFERENTIAL - Abnormal    WBC Count 1.8 (*)     RBC Count 3.54 (*)     Hemoglobin 11.5 (*)     Hematocrit 36.3       (*)     MCH 32.5      MCHC 31.7      RDW 16.5 (*)     Platelet Count 150      % Neutrophils 90      % Lymphocytes 9      % Monocytes 1      % Eosinophils 0      % Basophils 1      % Immature Granulocytes 1      NRBCs per 100 WBC 0      Absolute Neutrophils 1.6      Absolute Lymphocytes 0.2 (*)     Absolute Monocytes 0.0      Absolute Eosinophils 0.0      Absolute Basophils 0.0      Absolute Immature Granulocytes 0.0      Absolute NRBCs 0.0     LIPASE - Normal    Lipase 23     INFLUENZA A/B, RSV AND SARS-COV2 PCR - Normal    Influenza A PCR Negative      Influenza B PCR Negative      RSV PCR Negative      SARS CoV2 PCR Negative     LACTIC ACID WHOLE BLOOD - Normal     Lactic Acid 1.1     PROCALCITONIN - Normal    Procalcitonin 0.28     BLOOD CULTURE   BLOOD CULTURE   URINE CULTURE       Imaging   CT Abdomen Pelvis w/o Contrast   Final Result   IMPRESSION:    1.  Bilateral perinephric fat stranding/edema, greater on the right. No ureteral calculus or hydronephrosis. Findings could may be related to anasarca versus underlying pyelonephritis. Correlation with urinalysis suggested.   2.  Small bilateral pleural effusions, generalized subcutaneous edema and trace peritoneal fluid may be related to anasarca/fluid overload.   3.  Layering density within the gallbladder lumen suggests layering sludge +/- stones no cholelithiasis nor bile duct dilatation.             EKG       Independent Interpretation       ED Course      Medications Administered   Medications   HYDROmorphone (PF) (DILAUDID) injection 0.5 mg (0.5 mg Intravenous $Given 5/2/25 1244)     Followed by   HYDROmorphone (PF) (DILAUDID) injection 0.3 mg (0.3 mg Intravenous $Given 5/2/25 1847)   acetaminophen (TYLENOL) tablet 650 mg (650 mg Oral Not Given 5/2/25 1200)   lactated ringers BOLUS 1,000 mL (0 mLs Intravenous Stopped 5/2/25 1455)   ondansetron (ZOFRAN) injection 4 mg (4 mg Intravenous $Given 5/2/25 1353)   sodium chloride 0.9% BOLUS 1,000 mL (0 mLs Intravenous Stopped 5/2/25 1456)   cefTRIAXone (ROCEPHIN) 1 g vial to attach to  mL bag for ADULTS or NS 50 mL bag for PEDS (0 g Intravenous Stopped 5/2/25 1847)       Procedures   Procedures     Discussion of Management   admitting hospitalist      ED Course   ED Course as of 05/02/25 1903   Fri May 02, 2025   1140 I reviewed [facility] note from March 31, 2025.  Seen at that time for AL amyloidosis involving the kidney, also noted at that time elevated BNP and troponin concerning for myocardial involvement.  Currently on dialysis, [facility] Monday/Wednesday/Friday.  Chronic anemia.  Esophagitis.  The week of this visit she received cycle number 3-day #1 of  Darzalex Faspro Velcade cytotoxin and dexamethasone.  On acyclovir prophylaxis.  On RN step for chronic anemia     1151 I reviewed [facility] note dated 4/28/2025: Again notes the summary of AL amyloidosis involving the kidneys current chemotherapy including Darzalex Velcade cytotaxin and dexamethasone   1303 Creatinine(!): 4.59  Near baseline      1303 WBC(!): 1.8  New from 2 months ago        Additional Documentation      Medical Decision Making / Diagnosis     CMS Diagnoses:   The patient has signs of sepsis   Sepsis ED evaluation   The patient has signs of sepsis as evidenced by:  1. Presence of 2 SIRS criteria, suspected infection, AND  2. Organ dysfunction: Lactic Acidosis with value >2.0 due to sepsis    Sepsis Care Initiation: Starting at  403 PM on 05/02/25, until specified. Prior to this documentation, sepsis, severe sepsis, or septic shock was NOT thought to be a significant cause of illness. This order represents the first time infection was seriously considered to be affecting the patient.    Lactic Acid Results:  Recent Labs   Lab Test 05/02/25  1509 05/02/25  1210   LACT 1.1 3.0*       3 Hour Bundle 6 Hour Bundle (Reassessment)   Blood Cultures before IV Antibiotics: Yes  Antibiotics given: see below  Prehospital fluid volume (mL):                     Total fluids given (ED +Pre-hospital):  The patient responded to a lesser volume of IV fluids. The initial volume ordered was 1000 mL.    Repeat Lactic Acid Level: Ordered by reflex for 2 hours after initial lactic acid collection.  Vasopressors: MAP>65 after initial IVF bolus, will continue to monitor fluid status and vital signs.  Repeat perfusion exam: I attest to having performed a repeat sepsis exam and assessment of perfusion at 7:04 PM .   BMI Readings from Last 1 Encounters:   05/02/25 18.94 kg/m        Anti-infectives (From admission through now)      Start     Dose/Rate Route Frequency Ordered Stop    05/02/25 1620  cefTRIAXone  (ROCEPHIN) 1 g vial to attach to  mL bag for ADULTS or NS 50 mL bag for PEDS         1 g  over 15-30 Minutes Intravenous ONCE 05/02/25 1616 05/02/25 1847                  MIPS   None       MDM   Dulce Ma is a 64 year old female     History of AL amyloid end-stage renal disease presenting with low back pain, rigors/chills.  On chemotherapy last administration a few days ago.  Abdominal exam benign.  No strong component of respiratory tract infectious symptoms, no skin or soft tissue findings suggesting a source of infection.  CT scan of the abdomen done shows no acute surgical process or obstructive uropathy.  Urinalysis with pyuria suggestive of UTI.  No preceding falls or trauma to suggest a concerning spinal column injury/fracture/concern.  No lower extremity motor or sensory deficits.  Leukopenia without absolute neutropenia.  Initial lactate elevated this improved with time and fluids here in the ED.  Mindful fluid administration given she is a dialysis patient.  Creatinine near baseline.  Recommending admission to the hospital service for symptom control, continued antibiotics until improving and able to transition home safely.    Disposition   The patient was discharged.     Diagnosis     ICD-10-CM    1. Pyelonephritis  N12       2. 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 Sepsis, due to unspecified organism, unspecified whether acute organ dysfunction present (H)  A41.9            Discharge Medications   Current Discharge Medication List            MD Jose Hernandez, Elmo Concepcion MD  05/02/25 2482

## 2025-05-02 NOTE — ED TRIAGE NOTES
Patient states she woke with back pain, patient also reports 2 episodes of vomiting.  Patient states while she was driving here she developed the chills.  Patient was suppose to have dialysis today but did not go.      Triage Assessment (Adult)       Row Name 05/02/25 1132          Triage Assessment    Airway WDL WDL        Respiratory WDL    Respiratory WDL WDL        Skin Circulation/Temperature WDL    Skin Circulation/Temperature WDL WDL        Cardiac WDL    Cardiac WDL WDL        Peripheral/Neurovascular WDL    Peripheral Neurovascular WDL WDL        Cognitive/Neuro/Behavioral WDL    Cognitive/Neuro/Behavioral WDL WDL

## 2025-05-03 ENCOUNTER — APPOINTMENT (OUTPATIENT)
Dept: OCCUPATIONAL THERAPY | Facility: CLINIC | Age: 65
End: 2025-05-03
Attending: HOSPITALIST
Payer: COMMERCIAL

## 2025-05-03 LAB
ACB COMPLEX DNA BLD POS QL NAA+NON-PROBE: NOT DETECTED
ALBUMIN SERPL BCG-MCNC: 2.9 G/DL (ref 3.5–5.2)
ALP SERPL-CCNC: 87 U/L (ref 40–150)
ALT SERPL W P-5'-P-CCNC: 114 U/L (ref 0–50)
ANION GAP SERPL CALCULATED.3IONS-SCNC: 15 MMOL/L (ref 7–15)
AST SERPL W P-5'-P-CCNC: 158 U/L (ref 0–45)
B FRAGILIS DNA BLD POS QL NAA+NON-PROBE: NOT DETECTED
BASE EXCESS BLDV CALC-SCNC: -5.5 MMOL/L (ref -3–3)
BASOPHILS # BLD AUTO: 0.1 10E3/UL (ref 0–0.2)
BASOPHILS NFR BLD AUTO: 0 %
BILIRUB SERPL-MCNC: 0.9 MG/DL
BLACTX-M ISLT/SPM QL: NOT DETECTED
BLAIMP ISLT/SPM QL: NOT DETECTED
BLAKPC ISLT/SPM QL: NOT DETECTED
BLAOXA-48-LIKE ISLT/SPM QL: NOT DETECTED
BLAVIM ISLT/SPM QL: NOT DETECTED
BUN SERPL-MCNC: 35.2 MG/DL (ref 8–23)
C ALBICANS DNA BLD POS QL NAA+NON-PROBE: NOT DETECTED
C AURIS DNA BLD POS QL NAA+NON-PROBE: NOT DETECTED
C GATTII+NEOFOR DNA BLD POS QL NAA+N-PRB: NOT DETECTED
C GLABRATA DNA BLD POS QL NAA+NON-PROBE: NOT DETECTED
C KRUSEI DNA BLD POS QL NAA+NON-PROBE: NOT DETECTED
C PARAP DNA BLD POS QL NAA+NON-PROBE: NOT DETECTED
C TROPICLS DNA BLD POS QL NAA+NON-PROBE: NOT DETECTED
CALCIUM SERPL-MCNC: 7.4 MG/DL (ref 8.8–10.4)
CHLORIDE SERPL-SCNC: 104 MMOL/L (ref 98–107)
COLISTIN RES MCR-1 ISLT/SPM QL: NOT DETECTED
CREAT SERPL-MCNC: 4.97 MG/DL (ref 0.51–0.95)
E CLOAC COMP DNA BLD POS NAA+NON-PROBE: NOT DETECTED
E COLI DNA BLD POS QL NAA+NON-PROBE: DETECTED
E FAECALIS DNA BLD POS QL NAA+NON-PROBE: NOT DETECTED
E FAECIUM DNA BLD POS QL NAA+NON-PROBE: NOT DETECTED
EGFRCR SERPLBLD CKD-EPI 2021: 9 ML/MIN/1.73M2
EOSINOPHIL # BLD AUTO: 0.2 10E3/UL (ref 0–0.7)
EOSINOPHIL NFR BLD AUTO: 1 %
ERYTHROCYTE [DISTWIDTH] IN BLOOD BY AUTOMATED COUNT: 16.9 % (ref 10–15)
GLUCOSE BLDC GLUCOMTR-MCNC: 100 MG/DL (ref 70–99)
GLUCOSE BLDC GLUCOMTR-MCNC: 88 MG/DL (ref 70–99)
GLUCOSE BLDC GLUCOMTR-MCNC: 88 MG/DL (ref 70–99)
GLUCOSE BLDC GLUCOMTR-MCNC: 96 MG/DL (ref 70–99)
GLUCOSE BLDC GLUCOMTR-MCNC: 98 MG/DL (ref 70–99)
GLUCOSE SERPL-MCNC: 99 MG/DL (ref 70–99)
GP B STREP DNA BLD POS QL NAA+NON-PROBE: NOT DETECTED
HAEM INFLU DNA BLD POS QL NAA+NON-PROBE: NOT DETECTED
HCO3 BLDV-SCNC: 21 MMOL/L (ref 21–28)
HCO3 SERPL-SCNC: 19 MMOL/L (ref 22–29)
HCT VFR BLD AUTO: 30.8 % (ref 35–47)
HGB BLD-MCNC: 9.8 G/DL (ref 11.7–15.7)
IMM GRANULOCYTES # BLD: 0.6 10E3/UL
IMM GRANULOCYTES NFR BLD: 3 %
K OXYTOCA DNA BLD POS QL NAA+NON-PROBE: NOT DETECTED
KLEBSIELLA SP DNA BLD POS QL NAA+NON-PRB: NOT DETECTED
KLEBSIELLA SP DNA BLD POS QL NAA+NON-PRB: NOT DETECTED
L MONOCYTOG DNA BLD POS QL NAA+NON-PROBE: NOT DETECTED
LACTATE SERPL-SCNC: 2.5 MMOL/L (ref 0.7–2)
LACTATE SERPL-SCNC: 3.6 MMOL/L (ref 0.7–2)
LYMPHOCYTES # BLD AUTO: 0.4 10E3/UL (ref 0.8–5.3)
LYMPHOCYTES NFR BLD AUTO: 2 %
MCH RBC QN AUTO: 32.6 PG (ref 26.5–33)
MCHC RBC AUTO-ENTMCNC: 31.8 G/DL (ref 31.5–36.5)
MCV RBC AUTO: 102 FL (ref 78–100)
MONOCYTES # BLD AUTO: 0.4 10E3/UL (ref 0–1.3)
MONOCYTES NFR BLD AUTO: 2 %
N MEN DNA BLD POS QL NAA+NON-PROBE: NOT DETECTED
NDM: NOT DETECTED
NEUTROPHILS # BLD AUTO: 17.8 10E3/UL (ref 1.6–8.3)
NEUTROPHILS NFR BLD AUTO: 92 %
NRBC # BLD AUTO: 0.1 10E3/UL
NRBC BLD AUTO-RTO: 0 /100
O2/TOTAL GAS SETTING VFR VENT: 6 %
OXYHGB MFR BLDV: 73 % (ref 70–75)
P AERUGINOSA DNA BLD POS NAA+NON-PROBE: NOT DETECTED
PCO2 BLDV: 42 MM HG (ref 40–50)
PH BLDV: 7.3 [PH] (ref 7.32–7.43)
PLATELET # BLD AUTO: 110 10E3/UL (ref 150–450)
PO2 BLDV: 47 MM HG (ref 25–47)
POTASSIUM SERPL-SCNC: 4.6 MMOL/L (ref 3.4–5.3)
PROT SERPL-MCNC: 5 G/DL (ref 6.4–8.3)
PROTEUS SP DNA BLD POS QL NAA+NON-PROBE: NOT DETECTED
RBC # BLD AUTO: 3.01 10E6/UL (ref 3.8–5.2)
S AUREUS DNA BLD POS QL NAA+NON-PROBE: NOT DETECTED
S AUREUS+CONS DNA BLD POS NAA+NON-PROBE: NOT DETECTED
S EPIDERMIDIS DNA BLD POS QL NAA+NON-PRB: NOT DETECTED
S LUGDUNENSIS DNA BLD POS QL NAA+NON-PRB: NOT DETECTED
S MALTOPHILIA DNA BLD POS QL NAA+NON-PRB: NOT DETECTED
S MARCESCENS DNA BLD POS NAA+NON-PROBE: NOT DETECTED
S PNEUM DNA BLD POS QL NAA+NON-PROBE: NOT DETECTED
S PYO DNA BLD POS QL NAA+NON-PROBE: NOT DETECTED
SALMONELLA DNA BLD POS QL NAA+NON-PROBE: NOT DETECTED
SAO2 % BLDV: 73.8 % (ref 70–75)
SODIUM SERPL-SCNC: 138 MMOL/L (ref 135–145)
STREPTOCOCCUS DNA BLD POS NAA+NON-PROBE: NOT DETECTED
WBC # BLD AUTO: 19.4 10E3/UL (ref 4–11)

## 2025-05-03 PROCEDURE — 250N000011 HC RX IP 250 OP 636: Performed by: HOSPITALIST

## 2025-05-03 PROCEDURE — 250N000009 HC RX 250: Performed by: HOSPITALIST

## 2025-05-03 PROCEDURE — 200N000001 HC R&B ICU

## 2025-05-03 PROCEDURE — 250N000013 HC RX MED GY IP 250 OP 250 PS 637: Performed by: INTERNAL MEDICINE

## 2025-05-03 PROCEDURE — 84155 ASSAY OF PROTEIN SERUM: CPT | Performed by: INTERNAL MEDICINE

## 2025-05-03 PROCEDURE — 85025 COMPLETE CBC W/AUTO DIFF WBC: CPT | Performed by: INTERNAL MEDICINE

## 2025-05-03 PROCEDURE — 250N000013 HC RX MED GY IP 250 OP 250 PS 637: Performed by: HOSPITALIST

## 2025-05-03 PROCEDURE — 97165 OT EVAL LOW COMPLEX 30 MIN: CPT | Mod: GO | Performed by: OCCUPATIONAL THERAPIST

## 2025-05-03 PROCEDURE — 250N000011 HC RX IP 250 OP 636: Performed by: SURGERY

## 2025-05-03 PROCEDURE — 97530 THERAPEUTIC ACTIVITIES: CPT | Mod: GO | Performed by: OCCUPATIONAL THERAPIST

## 2025-05-03 PROCEDURE — 36415 COLL VENOUS BLD VENIPUNCTURE: CPT | Performed by: INTERNAL MEDICINE

## 2025-05-03 PROCEDURE — 250N000011 HC RX IP 250 OP 636: Performed by: INTERNAL MEDICINE

## 2025-05-03 PROCEDURE — 82805 BLOOD GASES W/O2 SATURATION: CPT | Performed by: INTERNAL MEDICINE

## 2025-05-03 PROCEDURE — 258N000003 HC RX IP 258 OP 636: Performed by: INTERNAL MEDICINE

## 2025-05-03 PROCEDURE — 99233 SBSQ HOSP IP/OBS HIGH 50: CPT | Performed by: INTERNAL MEDICINE

## 2025-05-03 PROCEDURE — 87040 BLOOD CULTURE FOR BACTERIA: CPT | Performed by: INTERNAL MEDICINE

## 2025-05-03 PROCEDURE — 99254 IP/OBS CNSLTJ NEW/EST MOD 60: CPT | Performed by: INTERNAL MEDICINE

## 2025-05-03 PROCEDURE — 3E033XZ INTRODUCTION OF VASOPRESSOR INTO PERIPHERAL VEIN, PERCUTANEOUS APPROACH: ICD-10-PCS | Performed by: INTERNAL MEDICINE

## 2025-05-03 PROCEDURE — 83605 ASSAY OF LACTIC ACID: CPT | Performed by: INTERNAL MEDICINE

## 2025-05-03 RX ORDER — ATROPINE SULFATE 0.1 MG/ML
1 INJECTION INTRAVENOUS ONCE
Status: COMPLETED | OUTPATIENT
Start: 2025-05-03 | End: 2025-05-03

## 2025-05-03 RX ORDER — HEPARIN SODIUM 5000 [USP'U]/.5ML
5000 INJECTION, SOLUTION INTRAVENOUS; SUBCUTANEOUS EVERY 8 HOURS
Status: DISCONTINUED | OUTPATIENT
Start: 2025-05-03 | End: 2025-05-04

## 2025-05-03 RX ORDER — SODIUM BICARBONATE 650 MG/1
650 TABLET ORAL 2 TIMES DAILY
Status: DISCONTINUED | OUTPATIENT
Start: 2025-05-03 | End: 2025-05-03

## 2025-05-03 RX ORDER — HYDROMORPHONE HCL IN WATER/PF 6 MG/30 ML
0.4 PATIENT CONTROLLED ANALGESIA SYRINGE INTRAVENOUS
Status: DISCONTINUED | OUTPATIENT
Start: 2025-05-03 | End: 2025-05-07 | Stop reason: HOSPADM

## 2025-05-03 RX ORDER — SODIUM BICARBONATE 650 MG/1
650 TABLET ORAL 2 TIMES DAILY
Status: DISCONTINUED | OUTPATIENT
Start: 2025-05-03 | End: 2025-05-06

## 2025-05-03 RX ORDER — PANTOPRAZOLE SODIUM 40 MG/1
40 TABLET, DELAYED RELEASE ORAL
Status: DISCONTINUED | OUTPATIENT
Start: 2025-05-03 | End: 2025-05-07 | Stop reason: HOSPADM

## 2025-05-03 RX ORDER — NOREPINEPHRINE BITARTRATE 0.02 MG/ML
INJECTION, SOLUTION INTRAVENOUS
Status: DISCONTINUED
Start: 2025-05-03 | End: 2025-05-03 | Stop reason: HOSPADM

## 2025-05-03 RX ORDER — HYDROCORTISONE SODIUM SUCCINATE 100 MG/2ML
100 INJECTION INTRAMUSCULAR; INTRAVENOUS ONCE
Status: COMPLETED | OUTPATIENT
Start: 2025-05-03 | End: 2025-05-03

## 2025-05-03 RX ORDER — NICOTINE POLACRILEX 4 MG
15-30 LOZENGE BUCCAL
Status: DISCONTINUED | OUTPATIENT
Start: 2025-05-03 | End: 2025-05-07 | Stop reason: HOSPADM

## 2025-05-03 RX ORDER — NALOXONE HYDROCHLORIDE 0.4 MG/ML
0.2 INJECTION, SOLUTION INTRAMUSCULAR; INTRAVENOUS; SUBCUTANEOUS
Status: DISCONTINUED | OUTPATIENT
Start: 2025-05-03 | End: 2025-05-07 | Stop reason: HOSPADM

## 2025-05-03 RX ORDER — NALOXONE HYDROCHLORIDE 0.4 MG/ML
0.4 INJECTION, SOLUTION INTRAMUSCULAR; INTRAVENOUS; SUBCUTANEOUS
Status: DISCONTINUED | OUTPATIENT
Start: 2025-05-03 | End: 2025-05-07 | Stop reason: HOSPADM

## 2025-05-03 RX ORDER — DEXTROSE MONOHYDRATE 25 G/50ML
25-50 INJECTION, SOLUTION INTRAVENOUS
Status: DISCONTINUED | OUTPATIENT
Start: 2025-05-03 | End: 2025-05-07 | Stop reason: HOSPADM

## 2025-05-03 RX ORDER — EPINEPHRINE 0.1 MG/ML
0.1 INJECTION INTRAVENOUS ONCE
Status: COMPLETED | OUTPATIENT
Start: 2025-05-03 | End: 2025-05-03

## 2025-05-03 RX ORDER — ROPIVACAINE IN 0.9% SOD CHL/PF 0.1 %
.01-.2 PLASTIC BAG, INJECTION (ML) EPIDURAL CONTINUOUS
Status: DISCONTINUED | OUTPATIENT
Start: 2025-05-03 | End: 2025-05-04

## 2025-05-03 RX ORDER — HYDROMORPHONE HCL IN WATER/PF 6 MG/30 ML
0.2 PATIENT CONTROLLED ANALGESIA SYRINGE INTRAVENOUS
Status: DISCONTINUED | OUTPATIENT
Start: 2025-05-03 | End: 2025-05-07 | Stop reason: HOSPADM

## 2025-05-03 RX ADMIN — CEFTRIAXONE 2 G: 2 INJECTION, POWDER, FOR SOLUTION INTRAMUSCULAR; INTRAVENOUS at 04:18

## 2025-05-03 RX ADMIN — DORZOLAMIDE HYDROCHLORIDE AND TIMOLOL MALEATE 1 DROP: 20; 5 SOLUTION OPHTHALMIC at 21:32

## 2025-05-03 RX ADMIN — ONDANSETRON 4 MG: 4 TABLET, ORALLY DISINTEGRATING ORAL at 20:46

## 2025-05-03 RX ADMIN — ACETAMINOPHEN 975 MG: 325 TABLET, FILM COATED ORAL at 08:13

## 2025-05-03 RX ADMIN — PANTOPRAZOLE SODIUM 40 MG: 40 TABLET, DELAYED RELEASE ORAL at 11:56

## 2025-05-03 RX ADMIN — ACETAMINOPHEN 975 MG: 325 TABLET, FILM COATED ORAL at 00:48

## 2025-05-03 RX ADMIN — Medication 0.03 MCG/KG/MIN: at 04:23

## 2025-05-03 RX ADMIN — ACYCLOVIR 200 MG: 200 CAPSULE ORAL at 08:15

## 2025-05-03 RX ADMIN — ATROPINE SULFATE 0.5 MG: 0.1 INJECTION INTRAVENOUS at 04:27

## 2025-05-03 RX ADMIN — AMIODARONE HYDROCHLORIDE 200 MG: 200 TABLET ORAL at 11:55

## 2025-05-03 RX ADMIN — SODIUM BICARBONATE 650 MG: 650 TABLET ORAL at 11:59

## 2025-05-03 RX ADMIN — SODIUM CHLORIDE 1000 ML: 0.9 INJECTION, SOLUTION INTRAVENOUS at 03:46

## 2025-05-03 RX ADMIN — HYDROCORTISONE SODIUM SUCCINATE 100 MG: 100 INJECTION, POWDER, FOR SOLUTION INTRAMUSCULAR; INTRAVENOUS at 04:36

## 2025-05-03 RX ADMIN — HEPARIN SODIUM 5000 UNITS: 5000 INJECTION, SOLUTION INTRAVENOUS; SUBCUTANEOUS at 11:56

## 2025-05-03 RX ADMIN — EPINEPHRINE 0.1 MG: 0.1 INJECTION INTRAVENOUS at 04:28

## 2025-05-03 RX ADMIN — SODIUM CHLORIDE 500 ML: 9 INJECTION, SOLUTION INTRAVENOUS at 05:03

## 2025-05-03 RX ADMIN — ACYCLOVIR 200 MG: 200 CAPSULE ORAL at 21:32

## 2025-05-03 RX ADMIN — HEPARIN SODIUM 5000 UNITS: 5000 INJECTION, SOLUTION INTRAVENOUS; SUBCUTANEOUS at 21:32

## 2025-05-03 RX ADMIN — DORZOLAMIDE HYDROCHLORIDE AND TIMOLOL MALEATE 1 DROP: 20; 5 SOLUTION OPHTHALMIC at 08:16

## 2025-05-03 RX ADMIN — PANTOPRAZOLE SODIUM 40 MG: 40 TABLET, DELAYED RELEASE ORAL at 16:50

## 2025-05-03 RX ADMIN — NOREPINEPHRINE BITARTRATE 0.03 MCG/KG/MIN: 0.02 INJECTION, SOLUTION INTRAVENOUS at 04:23

## 2025-05-03 RX ADMIN — SODIUM BICARBONATE 650 MG: 650 TABLET ORAL at 21:32

## 2025-05-03 ASSESSMENT — ACTIVITIES OF DAILY LIVING (ADL)
ADLS_ACUITY_SCORE: 69
ADLS_ACUITY_SCORE: 59
ADLS_ACUITY_SCORE: 69
ADLS_ACUITY_SCORE: 59
ADLS_ACUITY_SCORE: 69
ADLS_ACUITY_SCORE: 59
ADLS_ACUITY_SCORE: 69
ADLS_ACUITY_SCORE: 66
ADLS_ACUITY_SCORE: 69
DEPENDENT_IADLS:: INDEPENDENT
ADLS_ACUITY_SCORE: 69
ADLS_ACUITY_SCORE: 66

## 2025-05-03 NOTE — PROGRESS NOTES
Blood culture positive for GNR.  Continue IV ceftriaxone.  Here with pyelonephritis and now bacteremia.  Ordered acetaminophen for fever.

## 2025-05-03 NOTE — PROGRESS NOTES
05/03/25 1002   Appointment Info   Signing Clinician's Name / Credentials (OT) Howard Vines, Jhon, OTR/L   Rehab Comments (OT) Initial evaluation   Living Environment   People in Home parent(s)  (lives with mother)   Current Living Arrangements house  (rambler with basement.  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 She has ESRD on HD and Amyloidosis. She is on teatment with Dr Ramsey ([facility]).  She presented to the emergency department complaining of new onset of back pain, predominantly right-sided, chills and emesis episode en route to the hospital.  She has abnormal UA, positive costovertebral angle tenderness on the right and CT of the abdomen that shows stranding of the fat around the right kidney      Patient's hospital course was complicated by hypotension and bradycardia the evening of admission.  She received a dose of atropine and a dose of IV epinephrine.  Bradycardia resolved.  She is currently  on norepinephrine for blood pressure support which is being weaned.  Sepsis with bacteremia and polynephritis, leukocytopenia and lymphocytopenia.  Pt is going from her home in [facility] to [place] 3x a week for dialysis and has 1x a week cancer treatments, also in [place].   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  Missed her dialysis yesterday.  Reports that neighbors are checking in on her mother and managing the horses   Existing Precautions/Restrictions fall   General Observations and Info pt in bed, willing to participate but appearing very weak and fatigued   Cognitive Status Examination   Orientation Status orientation to person, place and time   Cognitive Status Comments no issues noted at this time   Visual Perception   Visual Impairment/Limitations WFL;corrective lenses for distance   Pain Assessment   Patient Currently in Pain Yes, see Vital Sign flowsheet   Range of Motion Comprehensive   General Range of Motion bilateral upper extremity ROM WFL   Comment, General Range of Motion pt has some limitations from pain and her additional wires.  Complaining of left upper arm pain from the IV and tube placement   Strength Comprehensive (MMT)   General Manual Muscle Testing (MMT) Assessment upper extremity strength deficits identified   Comment, General Manual Muscle Testing (MMT) Assessment general weakness and debility, partially affected by pain   Coordination   Upper Extremity Coordination No deficits were identified   Bed Mobility   Bed Mobility other (see comments)   Supine-Sit [place] (Bed Mobility) moderate assist (50% patient effort);verbal cues  (moderate assist to support back to achieve a long sit in bed.  Pt able to hold it for 3-5 seconds before wanting to lay back down.)   Comment (Bed Mobility) Mobility limited during session secondary to  pt's pain and additional lines.  Nursing notes indicate pt had some movement in bed with log rolling, Mod A.   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   Planned Therapy Interventions (OT) ADL retraining;strengthening;transfer training;progressive activity/exercise   Clinical Decision Making Complexity (OT) problem focused assessment/low complexity   Risk & Benefits of therapy have been explained evaluation/treatment results reviewed;care plan/treatment goals reviewed;patient   Clinical Impression Comments pt also has her mother with her at home.  States she is independent with basic ADLS but still may need some checking-in/supervision.  Pt not specific about this   OT Total Evaluation Time   OT Eval, Low Complexity Minutes (33506) 15   OT Goals   Therapy Frequency (OT) Daily   OT Predicted Duration/Target Date for Goal Attainment 05/10/25   OT Goals Hygiene/Grooming;Upper Body Dressing;Lower Body Dressing;Toilet Transfer/Toileting   OT: Hygiene/Grooming modified independent;using adaptive equipment   OT: Upper Body Dressing Modified independent;including set-up/clothing retrieval   OT: Lower Body Dressing Modified independent;including set-up/clothing retrieval   OT: Toilet Transfer/Toileting Modified independent;cleaning and garment management   Interventions   Interventions Quick Adds Self-Care/Home Management;Therapeutic Activity   Therapeutic Activities   Therapeutic Activity Minutes (35121) 10    Symptoms noted during/after treatment fatigue;increased pain   Treatment Detail/Skilled Intervention OT: Tried grooming tasks in bed with setup.  Pt having trouble bending left UE secondary to the IV near her left elbow joint, used her right hand instead.  However right had is weaker although not having any IVs in it.  Pt set up with a washcloth, managed to wash her face with her right hand.  Declined further grooming tasks, did try lifting legs in bed and trying shoulder ROM.  Below full range with shoulders, left worse than right.  Able to lift both legs off of the bed, no complaints of pain doing this.  Rates pain at a 3or 4/10 but acting like it is much worse than this.   OT Discharge Planning   OT Plan OT: OOB activity as tolerated for dressing, grooming, toilet transfers.  May need to explore use of AE for bending for ADLS secondary to pain and fatigue   OT Discharge Recommendation (DC Rec) home with assist;home with home care occupational therapy;Transitional Care Facility   OT Rationale for DC Rec Pt currently is significantly below her stated baseline with pain and limited mobility.  Recently admitted yesterday and had issues overnight with hypotension and bradycardia, likely negatively influencing her ability to perform in session.  Anticipate medical condition would change, however if pt stays at this level of performance would likely need a TCU.  If pt can improve may still need assistance at home as she has many chores in taking care of her farm and needs to get herself to dialysis and her cancer treatment.  Also has a full flight of stairs down to her bedroom.  Will monitor changes and provide updated recommendations as time goes on.   OT Brief overview of current status Goals of therapy will be to address safe mobility and ADLS and make recommendations for discharge to the next level of care.  Pt and RN will continue to follow all fall risk precautions as documented by RN staff while hospitalized.    OT Total Distance Amb During Session (feet) 0   Total Session Time   Timed Code Treatment Minutes 10   Total Session Time (sum of timed and untimed services) 25

## 2025-05-03 NOTE — PROGRESS NOTES
Admission/Transfer from: ED  2 RN skin assessment completed. Yes  Significant findings include: Bruise to R side face and L shin  LDA added (if applicable)? Not Applicable  Requested WOC Nurse Consult from MD? Not Applicable    R chest dialysis catheter.

## 2025-05-03 NOTE — CONSULTS
Care Management Initial Consult    General Information  Assessment completed with: Patient, VM-chart review,    Type of CM/SW Visit: Initial Assessment    Primary Care Provider verified and updated as needed: Yes   Readmission within the last 30 days: no previous admission in last 30 days      Reason for Consult: discharge planning  Advance Care Planning: Advance Care Planning Reviewed: no concerns identified          Communication Assessment  Patient's communication style:               Cognitive  Cognitive/Neuro/Behavioral: WDL  Level of Consciousness: lethargic  Arousal Level: opens eyes spontaneously  Orientation: oriented x 4  Mood/Behavior: flat affect     Speech: clear    Living Environment:   People in home: parent(s)     Current living Arrangements: house      Able to return to prior arrangements: yes       Family/Social Support:  Care provided by: self  Provides care for: no one  Marital Status: Single  Support system: Parent(s), Friend          Description of Support System: Supportive, Involved    Support Assessment: Adequate family and caregiver support    Current Resources:   Patient receiving home care services: No        Community Resources: None  Equipment currently used at home: none  Supplies currently used at home: None    Employment/Financial:  Employment Status:          Financial Concerns: none   Referral to Financial Worker: No       Does the patient's insurance plan have a 3 day qualifying hospital stay waiver?  No    Lifestyle & Psychosocial Needs:  Social Drivers of Health     Food Insecurity: Low Risk  (2/6/2025)    Food Insecurity     Within the past 12 months, did you worry that your food would run out before you got money to buy more?: No     Within the past 12 months, did the food you bought just not last and you didn t have money to get more?: No   Depression: Not at risk (1/8/2025)    PHQ-2     PHQ-2 Score: 2   Housing Stability: Low Risk  (2/6/2025)    Housing Stability     Do you  have housing? : Yes     Are you worried about losing your housing?: No   Tobacco Use: Low Risk  (3/27/2025)    Patient History     Smoking Tobacco Use: Never     Smokeless Tobacco Use: Never     Passive Exposure: Never   Financial Resource Strain: Low Risk  (2/6/2025)    Financial Resource Strain     Within the past 12 months, have you or your family members you live with been unable to get utilities (heat, electricity) when it was really needed?: No   Alcohol Use: Not on file   Transportation Needs: Low Risk  (2/6/2025)    Transportation Needs     Within the past 12 months, has lack of transportation kept you from medical appointments, getting your medicines, non-medical meetings or appointments, work, or from getting things that you need?: No   Physical Activity: Insufficiently Active (10/17/2024)    Exercise Vital Sign     Days of Exercise per Week: 4 days     Minutes of Exercise per Session: 30 min   Interpersonal Safety: Low Risk  (2/10/2025)    Interpersonal Safety     Do you feel physically and emotionally safe where you currently live?: Yes     Within the past 12 months, have you been hit, slapped, kicked or otherwise physically hurt by someone?: No     Within the past 12 months, have you been humiliated or emotionally abused in other ways by your partner or ex-partner?: No   Stress: Stress Concern Present (10/17/2024)    [employer] [place] [employer] - Occupational Stress Questionnaire     Feeling of Stress : To some extent   Social Connections: Unknown (10/17/2024)    Social Connection and Isolation Panel [NHANES]     Frequency of Communication with Friends and Family: Not on file     Frequency of Social Gatherings with Friends and Family: Once a week     Attends Alevism Services: Not on file     Active Member of Clubs or Organizations: Not on file     Attends Club or Organization Meetings: Not on file     Marital Status: Not on file   Health Literacy: Not on file       Functional  Status:  Prior to admission patient needed assistance:   Dependent ADLs:: Independent  Dependent IADLs:: Independent  Assesssment of Functional Status:  (unable to assess)    Mental Health Status:  Mental Health Status: No Current Concerns       Chemical Dependency Status:  Chemical Dependency Status: No Current Concerns             Values/Beliefs:  Spiritual, Cultural Beliefs, Hindu Practices, Values that affect care: no               Discussed  Partnership in Safe Discharge Planning  document with patient/family: No    Additional Information:  SW/CM consulted for elevated URR. SW met with pt for assessment.    Pt lives with mother. She's independent with I/ADLs and has no assistive devices.  She has a hobby farm, animals she attends to.    She has HD at [facility] M/W[facility]F at 11:00 am.     Next Steps: SW/CM will monitor for discharge needs.      THIAGO Hammond, PAULINO  Emergency Department/Inpatient Float  Care Coordination  [facility]  ED phone: [phone]      PAULINO HAMMOND

## 2025-05-03 NOTE — PROGRESS NOTES
[facility]  Hospitalist Progress Note  Saul Fuentes MD 05/03/2025    Reason for Stay (Diagnosis): sepsis due to ecoli bacterema and pyelopephritis         Assessment and Plan:      Summary of Stay: Dulce Ma is a 64 year old female admitted on 5/2/2025. She has ESRD on HD and Amyloidosis. She is on teatment with Dr Ramsey ([facility]).  She presented to the emergency department complaining of new onset of back pain, predominantly right-sided, chills and emesis episode en route to the hospital.  She has abnormal UA, positive costovertebral angle tenderness on the right and CT of the abdomen that shows stranding of the fat around the right kidney     Patient's hospital course was complicated by hypotension and bradycardia the evening of admission.  She received a dose of atropine and a dose of IV epinephrine.  Bradycardia resolved.  She is currently on norepinephrine for blood pressure support which is being weaned.    Since admission blood cultures returned positive for E. Coli    Patient remains on IV Rocephin for treatment of sepsis due to E. coli bacteremia and pyelonephritis    Plans today:  -Wean norepinephrine  -Continue Rocephin  -Monitor blood and urine cultures.  Blood cultures positive for E. coli.  Await sensitivities.  Of note, she has had previous urine cultures growing E. coli that has been pansensitive    Addendum: Nephrology requesting infection disease consult to see if tunneled dialysis catheter line can be remain in place in the setting of E. coli bacteremia.  Will place formal ID consult.  Will repeat blood cultures today to ensure bacteremia clearing with antibiotic therapy     Sepsis due to E. coli bacteremia and right-sided pyelonephritis  -CT abdomen on admission with findings consistent with right-sided pyelonephritis  -Blood cultures positive for E. coli.  Await sensitivities  -Urinalysis abnormal, urine culture pending  -Hypotensive requiring vasopressor  -Continue IV  "Rocephin      End-stage renal disease on hemodialysis.  - Nephrology consult requested  -Normally dialyzes 3 times a week  -Her nephrologist is Dr. Leticia MONTAGUE Amyloidosis.    -Follows with Dr. Ramsey of oncology  -Treated with once a week doses of Cytoxan and dexamethasone     Leukocytopenia with normal low ANC and lymphocytopenia on admit labs.  -Likely related to Cytoxan  - resolved and now has leukocytosis related to sepsis       History of PAF.  -On amiodarone.  -Does not appear to be on an anticoagulant       Diet: Combination Diet Clear LiquidCLD  DVT Prophylaxis: Pneumatic Compression Devices  Butler Catheter: Not present  Lines: PRESENT             Cardiac Monitoring: None  Code Status: Full CodeFULL CODE   Discharge Dispo: Home anticipated  Medically Ready for Discharge: Anticipated in 2-4 Days after resolution of sepsis, normalization of blood pressure, and improvement in symptoms           Interval History (Subjective):      Patient feels achy.  Complaining of right-sided back pain.                  Physical Exam:      Last Vital Signs:  /64   Pulse 80   Temp 97.5  F (36.4  C) (Axillary)   Resp 25   Ht 1.6 m (5' 3\")   Wt 48.5 kg (106 lb 14.8 oz)   LMP 10/31/2011   SpO2 100%   BMI 18.94 kg/m        Intake/Output Summary (Last 24 hours) at 5/3/2025 0823  Last data filed at 5/3/2025 0600  Gross per 24 hour   Intake 29 ml   Output --   Net 29 ml       Constitutional: Awake, interactive, appears tired     Respiratory: Clear to auscultation bilaterally, no crackles or wheezing   Cardiovascular: Regular rate and rhythm, normal S1 and S2, and no murmur noted   Abdomen: Normal bowel sounds, soft, non-distended, non-tender   Skin: No rashes, no cyanosis, dry to touch   Neuro: Alert and oriented x3, no focal weakness, numbness, or memory loss   Extremities: No edema, normal range of motion   Other(s):        All other systems: Negative          Medications:      All current medications were reviewed " with changes reflected in problem list.         Data:      All new lab and imaging data was reviewed.   Labs:       Lab Results   Component Value Date     05/03/2025     05/02/2025     02/11/2025     05/28/2020     01/11/2019     07/22/2016    Lab Results   Component Value Date    CHLORIDE 104 05/03/2025    CHLORIDE 100 05/02/2025    CHLORIDE 96 02/11/2025    CHLORIDE 106 11/22/2021    CHLORIDE 109 05/28/2020    CHLORIDE 108 01/11/2019    CHLORIDE 105 07/22/2016    Lab Results   Component Value Date    BUN 35.2 05/03/2025    BUN 30.0 05/02/2025    BUN 22.3 02/11/2025    BUN 19 11/22/2021    BUN 23 05/28/2020    BUN 16 01/11/2019    BUN 17 07/22/2016      Lab Results   Component Value Date    POTASSIUM 4.6 05/03/2025    POTASSIUM 4.1 05/02/2025    POTASSIUM 3.7 02/11/2025    POTASSIUM 4.0 11/22/2021    POTASSIUM 4.8 05/28/2020    POTASSIUM 4.2 01/11/2019    POTASSIUM 4.6 07/22/2016    Lab Results   Component Value Date    CO2 19 05/03/2025    CO2 23 05/02/2025    CO2 24 02/11/2025    CO2 27 11/22/2021    CO2 24 05/28/2020    CO2 24 01/11/2019    CO2 27 07/22/2016    Lab Results   Component Value Date    CR 4.97 05/03/2025    CR 4.59 05/02/2025    CR 4.36 02/11/2025    CR 0.78 05/28/2020    CR 0.81 01/11/2019    CR 0.73 07/22/2016        Recent Labs   Lab 05/03/25  0606   WBC 19.4*   HGB 9.8*   HCT 30.8*   *   *      Imaging:   Recent Results (from the past 24 hours)   CT Abdomen Pelvis w/o Contrast    Narrative    EXAM: CT ABDOMEN PELVIS W/O CONTRAST  LOCATION: [facility]  DATE: 5/2/2025    INDICATION: Back pain chills, nausea and vomiting. History of multiple myeloma, amyloidosis and end-stage renal disease, on hemodialysis.  COMPARISON: PET CT 01/30/2025  TECHNIQUE: CT scan of the abdomen and pelvis was performed without IV contrast. Multiplanar reformats were obtained. Dose reduction techniques were used.  CONTRAST: None.    FINDINGS:   LOWER  CHEST: Right IJ hemodialysis catheter tip in high right and. This small bilateral pleural effusions with bibasilar compressive atelectasis is new.    HEPATOBILIARY: Stable hepatic cysts. Layering density within the gallbladder lumen could represent sludge +/- tiny gallstones. No wall thickening nor bile duct dilatation.    PANCREAS: Normal.    SPLEEN: Normal.    ADRENAL GLANDS: Normal.    KIDNEYS/BLADDER: Bilateral perinephric fat stranding/edema, right greater than left. No ureteral calculus nor hydronephrosis. The bladder is nondistended.    BOWEL: Diverticulosis of the colon. No acute inflammatory change. No obstruction. Trace amount of peritoneal fluid in the right abdomen.    LYMPH NODES: No lymphadenopathy.    VASCULATURE: No aortoiliac aneurysm    PELVIC ORGANS: Enlarged myomatous uterus. No focal fluid collection.    MUSCULOSKELETAL: Generalized subcutaneous edema suggesting anasarca.      Impression    IMPRESSION:   1.  Bilateral perinephric fat stranding/edema, greater on the right. No ureteral calculus or hydronephrosis. Findings could may be related to anasarca versus underlying pyelonephritis. Correlation with urinalysis suggested.  2.  Small bilateral pleural effusions, generalized subcutaneous edema and trace peritoneal fluid may be related to anasarca/fluid overload.  3.  Layering density within the gallbladder lumen suggests layering sludge +/- stones no cholelithiasis nor bile duct dilatation.

## 2025-05-03 NOTE — CONSULTS
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     Dulce was started on hemodialysis in early February.   \"I was feeling great on Thursday,\" she says.   She woke up with flank pain.   She developed fever and rigor on her way to dialysis.   She went directly to the ER.     She says she did not have any UTI symptoms    Vitals were stable in the ER.   CT showed bilateral perinephritic stranding consistent with pyelonephritis.   She was started on Rocephin.     Last HD tx was on Wednesday.  She continues to have good urine output.   Denies SOB. Not needing supplemental O2.     This is her second TDC since starting dialysis in February.     I reviewed notes from Dr. Garcia (ER) and Dr. Thomas (IM).   I reviewed her outpatient HD record from [facility].      ROS:  A complete review of systems was performed and is negative except as noted above.    PMH:    Past Medical History:   Diagnosis Date    Anesthesia complication, initial encounter     was completely out with Midazolam 2 mg IV, Fentanyl 100 micrograms IV that was given at time of colonoscopy 7/2018     Dysplastic nevi     Glaucoma     Diagnosed in Spring 2010    Hypertension goal BP (blood pressure) < 140/90 at age 50     very strong family hx     Lumbago     stiffness in low back    Other malignant neoplasm of skin of trunk, except scrotum 2/02    Dr. Myles Lake, melanoma with negative sentinel node biopsy - no chemo-Dr. Selene Gonzales-derm    Perimenopausal     Routine gyne exam     [facility] ob/gyn - Dr. Isabela Perez     Skin cancer, basal cell     multiple - 3 on nose, treated with interferon intralesionally x 1     Unspecified derangement, shoulder region     s/p horse-riding injury- no problem now    Unspecified musculoskeletal disorders and symptoms referable to neck  "    compressed vertebrae in neck- bothers pt rarely       PSH:    Past Surgical History:   Procedure Laterality Date    APPENDECTOMY  2008    aprroximately 8-10 years agp    BREAST SURGERY      Breast biopsies    COLONOSCOPY N/A 7/27/2018    Procedure: COLONOSCOPY;  COLONOSCOPY ;  Surgeon: Ren Whitehead MD;  Location: [facility] GI    ESOPHAGOSCOPY, GASTROSCOPY, DUODENOSCOPY (EGD), COMBINED N/A 2/10/2025    Procedure: Esophagogastroduodenoscopy with hemostasis clip placement;  Surgeon: Rashard Cole MD;  Location: [facility] OR    IR CVC TUNNEL PLACEMENT > 5 YRS OF AGE  2/6/2025    IR CVC TUNNEL REVISION RIGHT  3/13/2025    ORTHOPEDIC SURGERY Right 2014    ORIF right ring finger    ZZC NONSPECIFIC PROCEDURE  3/02    malignant melanoma removed from abdomen with negative nodes-Dr. Acosta -surgeon       MEDICATIONS:    Current Facility-Administered Medications   Medication Dose Route Frequency Provider Last Rate Last Admin    acyclovir (ZOVIRAX) capsule 200 mg  200 mg Oral BID Alexis Thomas MD   200 mg at 05/03/25 0815    amiodarone (PACERONE) tablet 200 mg  200 mg Oral Daily Alexis Thomas MD        cefTRIAXone (ROCEPHIN) 2 g vial to attach to  ml bag for ADULTS or NS 50 ml bag for PEDS  2 g Intravenous Q24H Alexis Thomas MD   2 g at 05/03/25 0418    dorzolamide-timolol (COSOPT) ophthalmic solution 1 drop  1 drop Both Eyes BID Alexis Thomas MD   1 drop at 05/03/25 0816    Netarsudil-Latanoprost 0.02-0.005 % SOLN 1 drop  1 drop Both Eyes At Bedtime Alexis Thomas MD        sodium chloride (PF) 0.9% PF flush 3 mL  3 mL Intracatheter Q8H [facility] Alexis Thomas MD   3 mL at 05/03/25 0558       ALLERGIES:    Allergies as of 05/02/2025 - Reviewed 05/02/2025   Allergen Reaction Noted    Amoxicillin Rash 05/07/2003    Benzoyl peroxide Swelling 05/07/2003    Ibuprofen Hives 05/07/2003    Penicillins Rash 05/07/2003       FH:    Family History   Problem Relation Age of Onset    Hypertension Mother     Heart  Disease Mother         bypass    Neuropathy Mother     Aortic Valve Replacement Mother 83    Hypertension Father     Coronary Artery Disease Father 83         in  from MI    Eye Disorder Maternal Grandmother         glacoma    Eye Disorder Maternal Grandfather         glacoma    Hypertension Paternal Grandmother     Colon Cancer No family hx of        SH:    Social History     Socioeconomic History    Marital status: Single     Spouse name: Not on file    Number of children: 0    Years of education: 16    Highest education level: Not on file   Occupational History    Occupation: Technology      Employer: [employer]     Comment: used to [employer], still rides [employer]    Tobacco Use    Smoking status: Never     Passive exposure: Never    Smokeless tobacco: Never   Vaping Use    Vaping status: Never Used   Substance and Sexual Activity    Alcohol use: Yes     Comment: occ.    Drug use: No     Comment: no herbal meds either     Sexual activity: Yes     Partners: Male     Comment: postmenopausal - last true menses 10/2011    Other Topics Concern    Parent/sibling w/ CABG, MI or angioplasty before 65F 55M? Yes     Comment: mother bypass before 65     Service No    Blood Transfusions No    Caffeine Concern Yes     Comment: coffee 2 cups /day     Occupational Exposure Not Asked    Hobby Hazards Not Asked    Sleep Concern Not Asked    Stress Concern Not Asked    Weight Concern Not Asked    Special Diet Not Asked    Back Care Not Asked    Exercise Not Asked    Bike Helmet Yes     Comment: strongly encouraged to wear helmet , while riding horses, too     Seat Belt Yes     Comment: always     Self-Exams Yes     Comment: SBE encouraged monthly    Social History Narrative    Anabel Zhong is pt's mom.  - she had pt when she was 19 yrs old.      Social Drivers of Health     Financial Resource Strain: Low Risk  (2025)    Financial Resource Strain     Within the past 12 months, have you or  your family members you live with been unable to get utilities (heat, electricity) when it was really needed?: No   Food Insecurity: Low Risk  (2/6/2025)    Food Insecurity     Within the past 12 months, did you worry that your food would run out before you got money to buy more?: No     Within the past 12 months, did the food you bought just not last and you didn t have money to get more?: No   Transportation Needs: Low Risk  (2/6/2025)    Transportation Needs     Within the past 12 months, has lack of transportation kept you from medical appointments, getting your medicines, non-medical meetings or appointments, work, or from getting things that you need?: No   Physical Activity: Insufficiently Active (10/17/2024)    Exercise Vital Sign     Days of Exercise per Week: 4 days     Minutes of Exercise per Session: 30 min   Stress: Stress Concern Present (10/17/2024)    [employer] [place] [employer] - Occupational Stress Questionnaire     Feeling of Stress : To some extent   Social Connections: Unknown (10/17/2024)    Social Connection and Isolation Panel [NHANES]     Frequency of Communication with Friends and Family: Not on file     Frequency of Social Gatherings with Friends and Family: Once a week     Attends Sikh Services: Not on file     Active Member of Clubs or Organizations: Not on file     Attends Club or Organization Meetings: Not on file     Marital Status: Not on file   Interpersonal Safety: Low Risk  (2/10/2025)    Interpersonal Safety     Do you feel physically and emotionally safe where you currently live?: Yes     Within the past 12 months, have you been hit, slapped, kicked or otherwise physically hurt by someone?: No     Within the past 12 months, have you been humiliated or emotionally abused in other ways by your partner or ex-partner?: No   Housing Stability: Low Risk  (2/6/2025)    Housing Stability     Do you have housing? : Yes     Are you worried about losing your housing?: No  "      PHYSICAL EXAM:    /70   Pulse 70   Temp 96.9  F (36.1  C) (Temporal)   Resp 12   Ht 1.6 m (5' 3\")   Wt 48.5 kg (106 lb 14.8 oz)   LMP 10/31/2011   SpO2 97%   BMI 18.94 kg/m    GENERAL: NAD  HEENT:  Normocephalic. No gross abnormalities.  Pupils equal.  MMM.    CV: RRR,   RESP: Clear bilaterally with good efforts. No wheezes  GI: Abdomen soft. ND  MUSCULOSKELETAL: extremities nl - no gross deformities noted. No edema  SKIN: no suspicious lesions or rashes, dry to touch  NEURO:  Awake, alert and conversing normally  PSYCH: She is very disappointed  LYMPH: No palpable ant/post cervical    LABS:      CBC RESULTS:     Recent Labs   Lab 05/03/25  0606 05/02/25  1210   WBC 19.4* 1.8*   RBC 3.01* 3.54*   HGB 9.8* 11.5*   HCT 30.8* 36.3   * 150       BMP RESULTS:  Recent Labs   Lab 05/03/25  0816 05/03/25  0606 05/03/25  0449 05/03/25  0326 05/02/25  1210   NA  --  138  --   --  139   POTASSIUM  --  4.6  --   --  4.1   CHLORIDE  --  104  --   --  100   CO2  --  19*  --   --  23   BUN  --  35.2*  --   --  30.0*   CR  --  4.97*  --   --  4.59*   GLC 88 99 96 88 121*   KELTON  --  7.4*  --   --  9.3       INRNo lab results found in last 7 days.     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     # Dialysis dependent renal failure: ESRD?    -started HD in February   -renal biopsy in January: AL amyloidosis, some ATN and mild IFTA   -3 hrs   -EDW 49.5 kg (typically no UF)   -Dr. Kelly at [facility]    # Access: [place] TDC. Some tenderness but no other evidence of exit or tunnel site infection.    # CRB: Blood culture is growing GNB from dialysis vs ascending urosepsis.     # Anemia: Mircera 120 mcg q2 weeks (due). Hgb is at target.     # MM: She is getting chemo therapy with her oncologist.     # FEN: No peripheral edema. Acidosis.    Plan:  # I discussed the case with ID. Will leave the dialysis line for now " as GNR bacteremia could be from urosepsis.   # Repeat blood culutres  # Please consult ID  # Strict I/O    I discussed the case with Dr. Fuentes and with ID MD.       Julio Cesar Hurtado MD  [facility] - Nephrology  Office Phone: [phone]  Pager: [phone]

## 2025-05-03 NOTE — PHARMACY-ADMISSION MEDICATION HISTORY
Pharmacist Admission Medication History    Admission medication history is complete. The information provided in this note is only as accurate as the sources available at the time of the update.    Information Source(s): Patient and CareEverywhere/SureScripts via in-person    Pertinent Information: none    Changes made to PTA medication list:  Added: None  Deleted: None  Changed: protonix to prn    Allergies reviewed with patient and updates made in EHR: yes    Medication History Completed By: Ralph Velasquez SUZANNA 5/2/2025 8:09 PM    PTA Med List   Medication Sig Last Dose/Taking    acyclovir (ZOVIRAX) 200 MG capsule Take 200 mg by mouth 2 times daily. 5/2/2025 Morning    amiodarone (PACERONE) 200 MG tablet TAKE ONE TABLET BY MOUTH OR BY FEEDING TUBE OR BY NASOGASTRIC TUBE DAILY 5/2/2025 Morning    cycloPHOSphamide (CYTOXAN) 50 MG capsule Take 6 capsules (300 mg) by mouth every 7 days for 4 doses Take on days 1, 8, 15, and 22. (Patient taking differently: Take 400 mg by mouth every 7 days Take on days 1, 8, 15, and 22.) 4/29/2025    dexAMETHasone (DECADRON) 4 MG tablet Take 10 tablets (40 mg) by mouth once a week. Take 10 tablets (40 mg) by mouth on Days 1, 8, 15, and 22. Take with food. 4/29/2025    dorzolamide-timolol (COSOPT) 2-0.5 % ophthalmic solution Place 1 drop into both eyes 2 times daily. 5/2/2025 Morning    Netarsudil-Latanoprost (ROCKLATAN) 0.02-0.005 % SOLN Place 1 drop into both eyes at bedtime. 5/2/2025 Morning    ondansetron (ZOFRAN ODT) 4 MG ODT tab Take 1 tablet (4 mg) by mouth every 6 hours as needed. Taking As Needed    pantoprazole (PROTONIX) 40 MG EC tablet Take 1 tablet (40 mg) by mouth 2 times daily. (Patient taking differently: Take 40 mg by mouth 2 times daily as needed.) Taking Differently    prochlorperazine (COMPAZINE) 10 MG tablet Take 10 mg by mouth every 6 hours as needed for nausea or vomiting. Taking As Needed    senna-docusate (SENOKOT-S/PERICOLACE) 8.6-50 MG tablet Take 1 tablet  by mouth 2 times daily as needed for constipation. Taking As Needed

## 2025-05-03 NOTE — H&P
[facility]    History and Physical - Hospitalist Service       Date of Admission:  5/2/2025    Assessment & Plan   Dulce Ma is a 64 year old female admitted on 5/2/2025. She has ESRD on HD and Amyloidosis. She is on teatment with Dr Ramsey ([facility]).  She presented to the emergency department complaining of new onset of back pain, predominantly right-sided, chills and emesis episode en route to the hospital.  She has abnormal UA, positive costovertebral angle tenderness on the right and CT of the abdomen that shows stranding of the fat around the right kidney (less in the left)    Acute pyelonephritis right-sided.    Early sepsis.   Clinical presentation, physical exam, UA and CT of the abdomen compatible with.  She had Rocephin 1 g administered in the emergency department without reactions despite of being listed as allergy.  In the past she has culture positive for E. coli.    Admit to inpatient   CLD if tolerates  Tylenol for fever and chills.  Zofran and Compazine for nausea or vomiting.  Rocephin 2 g IV daily.  Resume home medication except weekly   oncology treatment.    End-stage renal disease on hemodialysis.  Nephrology consult requested    AL Amyloidosis.    Currently ongoing treatment with  Oncology.    Leukocytopenia with normal low ANC and lymphocytopenia.  I am consulting Oncology     History of PAF.  Continue amiodarone.    History of glaucoma.  Resume home medication.      .  Diet: Combination Diet Clear LiquidCLD  DVT Prophylaxis: Pneumatic Compression Devices  Butler Catheter: Not present  Lines: PRESENT             Cardiac Monitoring: None  Code Status: Full CodeFULL CODE     Clinically Significant Risk Factors Present on Admission                   # Hypertension: Noted on problem list           Disposition Plan     Medically Ready for Discharge: Anticipated in 2-4 Days           Alexis Thomas MD  Hospitalist Service  [facility]  Securely  message with Timo (more info)  Text page via [facility] Paging/Directory     ______________________________________________________________________    Chief Complaint   Back pain, chills     History is obtained from the patient, electronic health record, and emergency department physician    History of Present Illness   Dulce Ma is a 64 year old female who has end-stage renal disease on hemodialysis, and she is being treated by oncology because of AL amyloidosis.  She came to the emergency department complaining of new onset of back pain especially right-sided, accompanied by chills and 1 emesis episode in her route to the hospital.  She has history of urinary tract infection, does not remember when it was the last time.  In the emergency department her UA suspicious for infection, CT of the abdomen shows fat stranding around the right kidney more than the left, she has positive costovertebral angle tenderness.  She has been treated with Rocephin IV in the emergency department.  Dr. Garcia is requesting admission for further treatment and follow-up.  I have discussed the case with him.  Her lab workup is significant for white count 1.8, absolute neutrophil count 1.6, absolute lymphocyte count 0.2, hemoglobin 11.5, .  UA shows moderate amount of leukocyte esterase and leukocyturia.      BUN three 0.0, creatinine 4.59, glomerular filtration rate 10 and anion gap of 16.  Initial lactic acid 3.0, corrected after hydration in the emergency department.  Procalcitonin 0.28.6    Past Medical History    Past Medical History:   Diagnosis Date    Anesthesia complication, initial encounter     was completely out with Midazolam 2 mg IV, Fentanyl 100 micrograms IV that was given at time of colonoscopy 7/2018     Dysplastic nevi     Glaucoma     Diagnosed in Spring 2010    Hypertension goal BP (blood pressure) < 140/90 at age 50     very strong family hx     Lumbago     stiffness in low back    Other malignant neoplasm  of skin of trunk, except scrotum 2/02    Dr. Myles Lake, melanoma with negative sentinel node biopsy - no chemo-Dr. Selene Gonzales-derm    Perimenopausal     Routine gyne exam     [facility] ob/gyn - Dr. Isabela Perez     Skin cancer, basal cell     multiple - 3 on nose, treated with interferon intralesionally x 1     Unspecified derangement, shoulder region     s/p horse-riding injury- no problem now    Unspecified musculoskeletal disorders and symptoms referable to neck     compressed vertebrae in neck- bothers pt rarely       Past Surgical History   Past Surgical History:   Procedure Laterality Date    APPENDECTOMY  2008    aprroximately 8-10 years agp    BREAST SURGERY      Breast biopsies    COLONOSCOPY N/A 7/27/2018    Procedure: COLONOSCOPY;  COLONOSCOPY ;  Surgeon: Ren Whitehead MD;  Location: [facility] GI    ESOPHAGOSCOPY, GASTROSCOPY, DUODENOSCOPY (EGD), COMBINED N/A 2/10/2025    Procedure: Esophagogastroduodenoscopy with hemostasis clip placement;  Surgeon: Rashard Cole MD;  Location: [facility] OR    IR CVC TUNNEL PLACEMENT > 5 YRS OF AGE  2/6/2025    IR CVC TUNNEL REVISION RIGHT  3/13/2025    ORTHOPEDIC SURGERY Right 2014    ORIF right ring finger    ZZC NONSPECIFIC PROCEDURE  3/02    malignant melanoma removed from abdomen with negative nodes-Dr. Acosta -surgeon       Prior to Admission Medications   Prior to Admission Medications   Prescriptions Last Dose Informant Patient Reported? Taking?   Netarsudil-Latanoprost (ROCKLATAN) 0.02-0.005 % SOLN   Yes No   Sig: Place 1 drop into both eyes at bedtime.   acyclovir (ZOVIRAX) 200 MG capsule   Yes No   Sig: Take 200 mg by mouth 2 times daily.   amiodarone (PACERONE) 200 MG tablet   No No   Sig: TAKE ONE TABLET BY MOUTH OR BY FEEDING TUBE OR BY NASOGASTRIC TUBE DAILY   cycloPHOSphamide (CYTOXAN) 50 MG capsule   No No   Sig: Take 6 capsules (300 mg) by mouth every 7 days for 4 doses Take on days 1, 8, 15, and 22.   dexAMETHasone (DECADRON) 4 MG tablet   No  No   Sig: Take 10 tablets (40 mg) by mouth once a week. Take 10 tablets (40 mg) by mouth on Days 1, 8, 15, and 22. Take with food.   dorzolamide-timolol (COSOPT) 2-0.5 % ophthalmic solution   Yes No   Sig: Place 1 drop into both eyes 2 times daily.   ondansetron (ZOFRAN ODT) 4 MG ODT tab   No No   Sig: Take 1 tablet (4 mg) by mouth every 6 hours as needed.   pantoprazole (PROTONIX) 40 MG EC tablet   No No   Sig: Take 1 tablet (40 mg) by mouth 2 times daily.   prochlorperazine (COMPAZINE) 10 MG tablet   Yes No   Sig: Take 10 mg by mouth every 6 hours as needed for nausea or vomiting.   senna-docusate (SENOKOT-S/PERICOLACE) 8.6-50 MG tablet   No No   Sig: Take 1 tablet by mouth 2 times daily as needed for constipation.      Facility-Administered Medications: None        Review of Systems    The 10 point Review of Systems is negative other than noted in the HPI or here.       Physical Exam   Vital Signs: Temp: 99  F (37.2  C) Temp src: Oral BP: (!) 142/57 Pulse: 83   Resp: 20 SpO2: 93 % O2 Device: Nasal cannula Oxygen Delivery: 1 LPM  Weight: 106 lbs 14.77 oz    GEN:  Alert, oriented x 3, appears comfortable, NAD.  HEENT:  Normocephalic/atraumatic, no scleral icterus, no nasal discharge, mouth moist.  CV:  Regular rate and rhythm, no murmur or JVD.  S1 + S2 noted, no S3 or S4.  LUNGS:  Clear to auscultation bilaterally without rales/rhonchi/wheezing/retractions.  Symmetric chest rise on inhalation noted.  ABD:  Active bowel sounds, soft, non-tender/non-distended.  No rebound/guarding/rigidity.  EXT:  No edema or cyanosis.  No joint synovitis noted.  SKIN:  Dry to touch, no exanthems noted in the visualized areas.         Medical Decision Making       75 MINUTES SPENT BY ME on the date of service doing chart review, history, exam, documentation & further activities per the note.      Data     I have personally reviewed the following data over the past 24 hrs:    1.8 (L)  \   11.5 (L)   / 150     139 100 30.0 (H) /  121  (H)   4.1 23 4.59 (H) \     ALT: 14 AST: 17 AP: 91 TBILI: 1.1   ALB: 4.1 TOT PROTEIN: 6.9 LIPASE: 23     Procal: 0.28 CRP: N/A Lactic Acid: 1.1         Imaging results reviewed over the past 24 hrs:   Recent Results (from the past 24 hours)   CT Abdomen Pelvis w/o Contrast    Narrative    EXAM: CT ABDOMEN PELVIS W/O CONTRAST  LOCATION: [facility]  DATE: 5/2/2025    INDICATION: Back pain chills, nausea and vomiting. History of multiple myeloma, amyloidosis and end-stage renal disease, on hemodialysis.  COMPARISON: PET CT 01/30/2025  TECHNIQUE: CT scan of the abdomen and pelvis was performed without IV contrast. Multiplanar reformats were obtained. Dose reduction techniques were used.  CONTRAST: None.    FINDINGS:   LOWER CHEST: Right IJ hemodialysis catheter tip in high right and. This small bilateral pleural effusions with bibasilar compressive atelectasis is new.    HEPATOBILIARY: Stable hepatic cysts. Layering density within the gallbladder lumen could represent sludge +/- tiny gallstones. No wall thickening nor bile duct dilatation.    PANCREAS: Normal.    SPLEEN: Normal.    ADRENAL GLANDS: Normal.    KIDNEYS/BLADDER: Bilateral perinephric fat stranding/edema, right greater than left. No ureteral calculus nor hydronephrosis. The bladder is nondistended.    BOWEL: Diverticulosis of the colon. No acute inflammatory change. No obstruction. Trace amount of peritoneal fluid in the right abdomen.    LYMPH NODES: No lymphadenopathy.    VASCULATURE: No aortoiliac aneurysm    PELVIC ORGANS: Enlarged myomatous uterus. No focal fluid collection.    MUSCULOSKELETAL: Generalized subcutaneous edema suggesting anasarca.      Impression    IMPRESSION:   1.  Bilateral perinephric fat stranding/edema, greater on the right. No ureteral calculus or hydronephrosis. Findings could may be related to anasarca versus underlying pyelonephritis. Correlation with urinalysis suggested.  2.  Small bilateral pleural  effusions, generalized subcutaneous edema and trace peritoneal fluid may be related to anasarca/fluid overload.  3.  Layering density within the gallbladder lumen suggests layering sludge +/- stones no cholelithiasis nor bile duct dilatation.

## 2025-05-03 NOTE — PLAN OF CARE
"  Problem: Adult Inpatient Plan of Care  Goal: Plan of Care Review  Description: The Plan of Care Review/Shift note should be completed every shift.  The Outcome Evaluation is a brief statement about your assessment that the patient is improving, declining, or no change.  This information will be displayed automatically on your shiftnote.  Outcome: Progressing  Flowsheets (Taken 5/3/2025 1522)  Outcome Evaluation: VSS.  Off levophed.  SR.  On room air.  Pain to right flank radiating to abdominal area improved with tylenol.  Very weak and achy.  Drinkng chicken broth only today.  Plan of Care Reviewed With: patient  Overall Patient Progress: improving  Goal: Patient-Specific Goal (Individualized)  Description: You can add care plan individualizations to a care plan. Examples of Individualization might be:  \"Parent requests to be called daily at 9am for status\", \"I have a hard time hearing out of my right ear\", or \"Do not touch me to wake me up as it startlesme\".  Outcome: Progressing  Goal: Absence of Hospital-Acquired Illness or Injury  Outcome: Progressing  Intervention: Identify and Manage Fall Risk  Recent Flowsheet Documentation  Taken 5/3/2025 1200 by Ruby Stafford RN  Safety Promotion/Fall Prevention:   activity supervised   safety round/check completed   room organization consistent   patient and family education   nonskid shoes/slippers when out of bed   increased rounding and observation   clutter free environment maintained  Taken 5/3/2025 0800 by Ruby Stafford, RN  Safety Promotion/Fall Prevention:   activity supervised   safety round/check completed   room organization consistent   patient and family education   nonskid shoes/slippers when out of bed   increased rounding and observation   clutter free environment maintained  Intervention: Prevent Skin Injury  Recent Flowsheet Documentation  Taken 5/3/2025 1200 by Ruby tSafford, RN  Body Position:   position changed independently   weight " shifting  Taken 5/3/2025 1000 by Ruby Stafford RN  Body Position:   position changed independently   weight shifting  Taken 5/3/2025 0800 by Ruby Stafford RN  Body Position:   position changed independently   weight shifting  Goal: Optimal Comfort and Wellbeing  Outcome: Progressing  Intervention: Monitor Pain and Promote Comfort  Recent Flowsheet Documentation  Taken 5/3/2025 0813 by Ruby Stafford RN  Pain Management Interventions: medication (see MAR)  Intervention: Provide Person-Centered Care  Recent Flowsheet Documentation  Taken 5/3/2025 1200 by Ruby Stafford RN  Trust Relationship/Rapport:   care explained   choices provided   questions answered   questions encouraged   reassurance provided   thoughts/feelings acknowledged  Taken 5/3/2025 0800 by Ruby Stafford RN  Trust Relationship/Rapport:   care explained   choices provided   questions answered   questions encouraged   reassurance provided   thoughts/feelings acknowledged  Goal: Readiness for Transition of Care  Outcome: Progressing     Problem: Sepsis/Septic Shock  Goal: Optimal Coping  Outcome: Progressing  Intervention: Optimize Psychosocial Adjustment to Illness  Recent Flowsheet Documentation  Taken 5/3/2025 1200 by Ruby Stafford RN  Supportive Measures: active listening utilized  Taken 5/3/2025 0800 by Ruby Stafford RN  Supportive Measures: active listening utilized  Goal: Absence of Bleeding  Outcome: Progressing  Intervention: Monitor and Manage Bleeding  Recent Flowsheet Documentation  Taken 5/3/2025 1200 by Ruby Stafford RN  Bleeding Precautions:   blood pressure closely monitored   monitored for signs of bleeding  Taken 5/3/2025 0800 by Ruby Stafford RN  Bleeding Precautions:   blood pressure closely monitored   monitored for signs of bleeding  Goal: Blood Glucose Level Within Targeted Range  Outcome: Progressing  Intervention: Optimize Glycemic Control  Recent Flowsheet Documentation  Taken 5/3/2025 1200 by Nydia  Ruby COYLE RN  Hyperglycemia Management: blood glucose monitored  Hypoglycemia Management: blood glucose monitored  Taken 5/3/2025 0800 by Ruby Stafford RN  Hyperglycemia Management: blood glucose monitored  Hypoglycemia Management: blood glucose monitored  Goal: Absence of Infection Signs and Symptoms  Outcome: Progressing  Intervention: Initiate Sepsis Management  Recent Flowsheet Documentation  Taken 5/3/2025 1200 by Ruby Stafford RN  Infection Management: aseptic technique maintained  Isolation Precautions: special precautions maintained  Taken 5/3/2025 0800 by Ruby Stafford RN  Infection Management: aseptic technique maintained  Isolation Precautions: special precautions maintained  Intervention: Promote Stabilization  Recent Flowsheet Documentation  Taken 5/3/2025 1200 by Ruby Stafford RN  Fluid/Electrolyte Management: (MONITORED) other (see comments)  Taken 5/3/2025 0800 by Ruby Stafford RN  Fluid/Electrolyte Management: (MONITORED) other (see comments)  Intervention: Promote Recovery  Recent Flowsheet Documentation  Taken 5/3/2025 1200 by Ruby Stafford RN  Airway/Ventilation Management: position adjusted  Activity Management:   activity adjusted per tolerance   activity encouraged  Taken 5/3/2025 0800 by Ruby Stafford RN  Airway/Ventilation Management: position adjusted  Activity Management:   activity adjusted per tolerance   activity encouraged  Goal: Optimal Nutrition Intake  Outcome: Progressing     Problem: Infection  Goal: Absence of Infection Signs and Symptoms  Outcome: Progressing  Intervention: Prevent or Manage Infection  Recent Flowsheet Documentation  Taken 5/3/2025 1200 by Ruby Stafford RN  Infection Management: aseptic technique maintained  Isolation Precautions: special precautions maintained  Taken 5/3/2025 0800 by Ruby Stafford RN  Infection Management: aseptic technique maintained  Isolation Precautions: special precautions maintained     Problem: Skin Injury Risk  Increased  Goal: Skin Health and Integrity  Outcome: Progressing  Intervention: Optimize Skin Protection  Recent Flowsheet Documentation  Taken 5/3/2025 1200 by Ruby Stafford, RN  Activity Management:   activity adjusted per tolerance   activity encouraged  Head of Bed (HOB) Positioning: HOB at 30 degrees  Taken 5/3/2025 0800 by Ruby Stafford, RN  Activity Management:   activity adjusted per tolerance   activity encouraged  Head of Bed (HOB) Positioning: HOB at 30 degrees   Goal Outcome Evaluation:      Plan of Care Reviewed With: patient    Overall Patient Progress: improvingOverall Patient Progress: improving    Outcome Evaluation: VSS.  Off levophed.  SR.  On room air.  Pain to right flank radiating to abdominal area improved with tylenol.  Very weak and achy.  Drinkng chicken broth only today.

## 2025-05-03 NOTE — CONSULTS
Consultation    Dulce Ma MRN# [id number]   YOB: 1960 Age: 64 year old   Date of Admission: 5/2/2025  Requesting physician:   Reason for consult:            Assessment and Plan:   Ms. Ma is a pleasant 64-year-old female diagnosed with  AL Amyloidosis involving the kidneys, followed by Dr. Brown   -Biopsy proven kidney involvement, on HD- 3 times a week  -Elevated BNP and troponin, therefore worrisome for cardiac involvement.  Echo and Cardiac MRI negative  -Baseline labs: kappa light chain 79, ratio 63, IgG 1834, M spike 1.4, creat 7.8, troponin 134,   -(2/3/2025-present)  Darzalex, Velcade, Cytoxan and Dexamethasone (40 Mg weekly)  Last cycle 4/29.  Dose of Cytoxan increased to  response plateau to current treatment  Labs at admission Labs-WBC 1.8, hemoglobin 11.5, platelets 150.  ANC 1.6    -5/2-admitted during her second episode of pyelonephritis, E. coli bacteremia  - Sepsis-transient bradycardia, hypotension, responded well to epinephrine, atropine, now off of pressors    PLAN:  Continue current antibiotics  serial blood cultures  .  ID consult requested and pending  May need removal of her central line.  Followed by nephrology service.  Patient on hemodialysis 3 times a week  Continue outpatient follow-up with Dr. Kidd  Monitor CBC.  Transfuse for hemoglobin 7 or less.  Platelets 10,000 or less.  White count elevated 19.4 today, appropriate response to infection.  Patient also did receive dexamethasone 40 Mg orally on 4/29.  Ongoing risk for infection due to immunosuppression      Plan discussed with bedside RN.  Please feel free to contact with any further questions or concerns  We will continue to follow the patient along with you    Nanci Tucker MD  [facility]  [phone] (office);              Chief Complaint:   Flank pain  Ecoli bacteremia  Pyelonephritis  H/O AL Amyloidosis - followed by Dr Brown              History of Present Illness:   This patient is a 64  "year old female with h/o . AL Amyloidosis involving the kidneys  -Biopsy proven kidney involvement, on HD- 3 times a week  -Elevated BNP and troponin, therefore worrisome for cardiac involvement.  Echo and Cardiac MRI negative  -Baseline labs: kappa light chain 79, ratio 63, IgG 1834, M spike 1.4, creat 7.8, troponin 134,   -(2/3/2025-present)  Darzalex, Velcade, Cytoxan and Dexamethasone.   Last cycle 4/29  - admitted 5/2 after presenting with flank pain.  - Vitals in the ER initially stable-later with hypotension and bradycardia.  Received dose of atropine and IV epinephrine.  Bradycardia resolved.  Then started on norepinephrine for blood pressure support.  Labs-WBC 1.8, hemoglobin 11.5, platelets 150.  ANC 1.6.  CT-bilateral perinephric stranding consistent with pyelonephritis.  -Blood culture positive for E. coli 2 out of 2.  - Started on IV Rocephin for treatment of sepsis due to E. coli bacteremia and pyelonephritis.    This morning-WBC 19.4, hemoglobin 9.8, platelets 110.  ANC 17.8.    5/3-patient evaluated b m still in the ICU, but off of pressors.e.  Overall feeling slightly better since admission.  Flank pain is less.  Patient tells me that she had onset of flank pain yesterday morning, but was able to get through her day and while on her way to dialysis, developed vomiting, chills, which brought her to the ER.  As noted above, she is on chemotherapy, last dose given 4/29 where the Cytoxan dose was increased (needed to take 2 extra tablets) along with her other chemo drugs.  She also takes dexamethasone 40 mg weekly.  Remains on hemodialysis 3 times a week.             Physical Exam:   Vitals were reviewed  Blood pressure 115/70, pulse 70, temperature 96.9  F (36.1  C), temperature source Temporal, resp. rate 12, height 1.6 m (5' 3\"), weight 48.5 kg (106 lb 14.8 oz), last menstrual period 10/31/2011, SpO2 97%, not currently breastfeeding.  Temperatures:  Current - Temp: 96.9  F (36.1  C); Max - " Temp  Av.6  F (37  C)  Min: 96.9  F (36.1  C)  Max: 101.7  F (38.7  C)  Respiration range: Resp  Av.7  Min: 8  Max: 37  Pulse range: Pulse  Av.6  Min: 39  Max: 96  Blood pressure range: Systolic (24hrs), Av , Min:55 , Max:192   ; Diastolic (24hrs), Av, Min:31, Max:108    Pulse oximetry range: SpO2  Av.7 %  Min: 74 %  Max: 100 %    Intake/Output Summary (Last 24 hours) at 5/3/2025 0942  Last data filed at 5/3/2025 0810  Gross per 24 hour   Intake 318.43 ml   Output --   Net 318.43 ml       GENERAL: In mild distress secondary   SKIN: No rashes or jaundice.  HEENT: Normocephalic, atraumatic. Eyes anicteric. Oropharynx is clear.  HEART: Regular rate and rhythm with no murmurs.  LUNGS: No shortness of breath with conversation  ABDOMEN: Soft, nontender, nondistended with no palpable hepatosplenomegaly.  EXTREMITIES: No clubbing, cyanosis, or edema.  MENTAL: Alert and oriented to person, place, and time.  NEURO: ax ox 3            Past Medical History:   I have reviewed this patient's past medical history  Past Medical History:   Diagnosis Date    Anesthesia complication, initial encounter     was completely out with Midazolam 2 mg IV, Fentanyl 100 micrograms IV that was given at time of colonoscopy 2018     Dysplastic nevi     Glaucoma     Diagnosed in Spring 2010    Hypertension goal BP (blood pressure) < 140/90 at age 50     very strong family hx     Lumbago     stiffness in low back    Other malignant neoplasm of skin of trunk, except scrotum     Dr. Myles Lake, melanoma with negative sentinel node biopsy - no chemo-Dr. Selene Gonzales-derm    Perimenopausal     Routine gyne exam     [facility] ob[facility] - Dr. Isabela Perez     Skin cancer, basal cell     multiple - 3 on nose, treated with interferon intralesionally x 1     Unspecified derangement, shoulder region     s/p horse-riding injury- no problem now    Unspecified musculoskeletal disorders and symptoms referable to neck      compressed vertebrae in neck- bothers pt rarely             Past Surgical History:   I have reviewed this patient's past surgical history  Past Surgical History:   Procedure Laterality Date    APPENDECTOMY  2008    aprroximately 8-10 years agp    BREAST SURGERY      Breast biopsies    COLONOSCOPY N/A 2018    Procedure: COLONOSCOPY;  COLONOSCOPY ;  Surgeon: Ren Whitehead MD;  Location: [facility]    ESOPHAGOSCOPY, GASTROSCOPY, DUODENOSCOPY (EGD), COMBINED N/A 2/10/2025    Procedure: Esophagogastroduodenoscopy with hemostasis clip placement;  Surgeon: Rashard Cole MD;  Location: [facility] OR    IR CVC TUNNEL PLACEMENT > 5 YRS OF AGE  2025    IR CVC TUNNEL REVISION RIGHT  3/13/2025    ORTHOPEDIC SURGERY Right     ORIF right ring finger    ZZC NONSPECIFIC PROCEDURE  3/02    malignant melanoma removed from abdomen with negative nodes-Dr. Acosta -surgeon               Social History:   I have reviewed this patient's social history  Social History     Tobacco Use    Smoking status: Never     Passive exposure: Never    Smokeless tobacco: Never   Substance Use Topics    Alcohol use: Yes     Comment: occ.             Family History:   I have reviewed this patient's family history  Family History   Problem Relation Age of Onset    Hypertension Mother     Heart Disease Mother         bypass    Neuropathy Mother     Aortic Valve Replacement Mother 83    Hypertension Father     Coronary Artery Disease Father 83         in  from MI    Eye Disorder Maternal Grandmother         glacoma    Eye Disorder Maternal Grandfather         glacoma    Hypertension Paternal Grandmother     Colon Cancer No family hx of              Allergies:     Allergies   Allergen Reactions    Amoxicillin Rash     Face; in early adulthood    Tolerated cephalexin 2025    Benzoyl Peroxide Swelling     swelling    Ibuprofen Hives     over long duration dev. hives    Penicillins Rash     Face; in early adulthood.    Tolerated  cephalexin 01/2025    Face; in early adulthood      Face; in early adulthood  Tolerated cephalexin 01/2025      Face; in early adulthood.  Tolerated cephalexin 01/2025             Medications:   I have reviewed this patient's current medications  Medications Prior to Admission   Medication Sig Dispense Refill Last Dose/Taking    acyclovir (ZOVIRAX) 200 MG capsule Take 200 mg by mouth 2 times daily.   5/2/2025 Morning    amiodarone (PACERONE) 200 MG tablet TAKE ONE TABLET BY MOUTH OR BY FEEDING TUBE OR BY NASOGASTRIC TUBE DAILY 30 tablet 0 5/2/2025 Morning    dexAMETHasone (DECADRON) 4 MG tablet Take 10 tablets (40 mg) by mouth once a week. Take 10 tablets (40 mg) by mouth on Days 1, 8, 15, and 22. Take with food. 40 tablet 0 4/29/2025    dorzolamide-timolol (COSOPT) 2-0.5 % ophthalmic solution Place 1 drop into both eyes 2 times daily.   5/2/2025 Morning    Netarsudil-Latanoprost (ROCKLATAN) 0.02-0.005 % SOLN Place 1 drop into both eyes at bedtime.   5/2/2025 Morning    ondansetron (ZOFRAN ODT) 4 MG ODT tab Take 1 tablet (4 mg) by mouth every 6 hours as needed. 20 tablet 0 Taking As Needed    pantoprazole (PROTONIX) 40 MG EC tablet Take 1 tablet (40 mg) by mouth 2 times daily. (Patient taking differently: Take 40 mg by mouth 2 times daily as needed.) 60 tablet 5 Taking Differently    prochlorperazine (COMPAZINE) 10 MG tablet Take 10 mg by mouth every 6 hours as needed for nausea or vomiting.   Taking As Needed    senna-docusate (SENOKOT-S/PERICOLACE) 8.6-50 MG tablet Take 1 tablet by mouth 2 times daily as needed for constipation. 30 tablet 0 Taking As Needed    cycloPHOSphamide (CYTOXAN) 50 MG capsule Take 6 capsules (300 mg) by mouth every 7 days for 4 doses Take on days 1, 8, 15, and 22. (Patient taking differently: Take 400 mg by mouth every 7 days Take on days 1, 8, 15, and 22.) 24 capsule 0      Current Facility-Administered Medications   Medication Dose Route Frequency Provider Last Rate Last Admin     acetaminophen (TYLENOL) tablet 975 mg  975 mg Oral Q6H PRN Zan Brown MD   975 mg at 05/03/25 0813    acyclovir (ZOVIRAX) capsule 200 mg  200 mg Oral BID Alexis Thomas MD   200 mg at 05/03/25 0815    amiodarone (PACERONE) tablet 200 mg  200 mg Oral Daily Alexis Thomas MD        calcium carbonate (TUMS) chewable tablet 1,000 mg  1,000 mg Oral 4x Daily PRN Alexis Thomas MD        cefTRIAXone (ROCEPHIN) 2 g vial to attach to  ml bag for ADULTS or NS 50 ml bag for PEDS  2 g Intravenous Q24H Alexis Thomas MD   2 g at 05/03/25 0418    glucose gel 15-30 g  15-30 g Oral Q15 Min PRN Zan Brown MD        Or    dextrose 50 % injection 25-50 mL  25-50 mL Intravenous Q15 Min PRN Zan Brown MD        Or    glucagon injection 1 mg  1 mg Subcutaneous Q15 Min PRN Zan Brown MD        dorzolamide-timolol (COSOPT) ophthalmic solution 1 drop  1 drop Both Eyes BID Alexis Thomas MD   1 drop at 05/03/25 0816    HYDROmorphone (DILAUDID) injection 0.2 mg  0.2 mg Intravenous Q3H PRN Lillie Reina MD        HYDROmorphone (DILAUDID) injection 0.4 mg  0.4 mg Intravenous Q3H PRN Lillie Reina MD        lidocaine (LMX4) cream   Topical Q1H PRN Alexis Thomas MD        lidocaine 1 % 0.1-1 mL  0.1-1 mL Other Q1H PRN Alexis Thomas MD        naloxone (NARCAN) injection 0.2 mg  0.2 mg Intravenous Q2 Min PRN Alexis Thomas MD        Or    naloxone (NARCAN) injection 0.4 mg  0.4 mg Intravenous Q2 Min PRN Alexis Thomas MD        Or    naloxone (NARCAN) injection 0.2 mg  0.2 mg Intramuscular Q2 Min PRN Alexis Thomas MD        Or    naloxone (NARCAN) injection 0.4 mg  0.4 mg Intramuscular Q2 Min PRN Alexis Thomas MD        Netarsudil-Latanoprost 0.02-0.005 % SOLN 1 drop  1 drop Both Eyes At Bedtime Alexis Thomas MD        norepinephrine (LEVOPHED) 4 mg in  mL PERIPHERAL infusion  0.01-0.2 mcg/kg/min Intravenous Continuous Kevin, Zan Mares MD    Stopped at 05/03/25 0830    ondansetron (ZOFRAN ODT) ODT tab 4 mg  4 mg Oral Q6H PRN Alexis Thomas MD        Or    ondansetron (ZOFRAN) injection 4 mg  4 mg Intravenous Q6H PRN Alexis Thomas MD        pantoprazole (PROTONIX) EC tablet 40 mg  40 mg Oral BID PRN Alexis Thomas MD        prochlorperazine (COMPAZINE) injection 10 mg  10 mg Intravenous Q6H PRN Alexis Thomas MD        Or    prochlorperazine (COMPAZINE) tablet 10 mg  10 mg Oral Q6H PRN Alexis Thomas MD        senna-docusate (SENOKOT-S/PERICOLACE) 8.6-50 MG per tablet 1 tablet  1 tablet Oral BID PRN Alexis Thomas MD        senna-docusate (SENOKOT-S/PERICOLACE) 8.6-50 MG per tablet 1 tablet  1 tablet Oral BID PRN Alexis Thomas MD        Or    senna-docusate (SENOKOT-S/PERICOLACE) 8.6-50 MG per tablet 2 tablet  2 tablet Oral BID Alexis Pastor MD        sodium chloride (PF) 0.9% PF flush 3 mL  3 mL Intracatheter Q8H KEYON Alexis Thomas MD   3 mL at 05/03/25 0558    sodium chloride (PF) 0.9% PF flush 3 mL  3 mL Intracatheter q1 min prn Alexis Thomas MD        vasopressin 0.2 units/mL in NS (PITRESSIN) standard conc infusion  2.4 Units/hr Intravenous Continuous Ren Prieto MD                 Review of Systems:     The 14 point Review of Systems is negative other than noted in the HPI.            Data:   Data   Results for orders placed or performed during the hospital encounter of 05/02/25 (from the past 24 hours)   CBC with platelets differential    Narrative    The following orders were created for panel order CBC with platelets differential.  Procedure                               Abnormality         Status                     ---------                               -----------         ------                     CBC with platelets and ...[[id number]]  Abnormal            Final result                 Please view results for these tests on the individual orders.   Basic metabolic panel   Result Value Ref Range     Sodium 139 135 - 145 mmol/L    Potassium 4.1 3.4 - 5.3 mmol/L    Chloride 100 98 - 107 mmol/L    Carbon Dioxide (CO2) 23 22 - 29 mmol/L    Anion Gap 16 (H) 7 - 15 mmol/L    Urea Nitrogen 30.0 (H) 8.0 - 23.0 mg/dL    Creatinine 4.59 (H) 0.51 - 0.95 mg/dL    GFR Estimate 10 (L) >60 mL/min/1.73m2    Calcium 9.3 8.8 - 10.4 mg/dL    Glucose 121 (H) 70 - 99 mg/dL   Hepatic function panel   Result Value Ref Range    Protein Total 6.9 6.4 - 8.3 g/dL    Albumin 4.1 3.5 - 5.2 g/dL    Bilirubin Total 1.1 <=1.2 mg/dL    Alkaline Phosphatase 91 40 - 150 U/L    AST 17 0 - 45 U/L    ALT 14 0 - 50 U/L    Bilirubin Direct 0.32 (H) 0.00 - 0.30 mg/dL   Lipase   Result Value Ref Range    Lipase 23 13 - 60 U/L   Lactic acid whole blood with 1x repeat in 2 hr when >2   Result Value Ref Range    Lactic Acid, Initial 3.0 (H) 0.7 - 2.0 mmol/L   Blood Culture Arm, Left    Specimen: Arm, Left; Blood   Result Value Ref Range    Culture Positive on the 1st day of incubation (A)     Culture Gram negative bacilli (AA)    CBC with platelets and differential   Result Value Ref Range    WBC Count 1.8 (L) 4.0 - 11.0 10e3/uL    RBC Count 3.54 (L) 3.80 - 5.20 10e6/uL    Hemoglobin 11.5 (L) 11.7 - 15.7 g/dL    Hematocrit 36.3 35.0 - 47.0 %     (H) 78 - 100 fL    MCH 32.5 26.5 - 33.0 pg    MCHC 31.7 31.5 - 36.5 g/dL    RDW 16.5 (H) 10.0 - 15.0 %    Platelet Count 150 150 - 450 10e3/uL    % Neutrophils 90 %    % Lymphocytes 9 %    % Monocytes 1 %    % Eosinophils 0 %    % Basophils 1 %    % Immature Granulocytes 1 %    NRBCs per 100 WBC 0 <1 /100    Absolute Neutrophils 1.6 1.6 - 8.3 10e3/uL    Absolute Lymphocytes 0.2 (L) 0.8 - 5.3 10e3/uL    Absolute Monocytes 0.0 0.0 - 1.3 10e3/uL    Absolute Eosinophils 0.0 0.0 - 0.7 10e3/uL    Absolute Basophils 0.0 0.0 - 0.2 10e3/uL    Absolute Immature Granulocytes 0.0 <=0.4 10e3/uL    Absolute NRBCs 0.0 10e3/uL   Blood Culture ID Panel, PCR    Specimen: Arm, Left; Blood   Result Value Ref Range     Enterococcus faecalis Not Detected Not Detected    Enterococcus faecium Not Detected Not Detected    Listeria monocytogenes Not Detected Not Detected    Staphylococcus species Not Detected Not Detected    Staphylococcus aureus Not Detected Not Detected    Staphylococcus epidermidis Not Detected Not Detected    Staphylococcus lugdunensis Not Detected Not Detected    Streptococcus species Not Detected Not Detected    Streptococcus agalactiae Not Detected Not Detected    Streptococcus pneumoniae Not Detected Not Detected    Streptococcus pyogenes Not Detected Not Detected    A. baumannii complex Not Detected Not Detected    Bacteroides fragilis Not Detected Not Detected    Enterobacter cloacae complex Not Detected Not Detected    Escherichia coli Detected (A) Not Detected    Klebsiella aerogenes Not Detected Not Detected    Klebsiella oxytoca Not Detected Not Detected    Klebsiella pneumoniae group Not Detected Not Detected    Proteus species Not Detected Not Detected    Salmonella species Not Detected Not Detected    Serratia marcescens Not Detected Not Detected    Haemophilus influenzae Not Detected Not Detected    Neisseria meningitidis Not Detected Not Detected    Pseudomonas aeruginosa Not Detected Not Detected    Stenotrophomonas maltophilia Not Detected Not Detected    CTX-M Not Detected Not Detected, NA    IMP Not Detected Not Detected, NA    KPC Not Detected Not Detected, NA    mcr-1 Not Detected Not Detected, NA    NDM Not Detected Not Detected, NA    OXA-48 like Not Detected Not Detected, NA    VIM Not Detected Not Detected, NA    Candida albicans Not Detected Not Detected    Candida auris Not Detected Not Detected    Candida glabrata Not Detected Not Detected    Candida krusei Not Detected Not Detected    Candida parapsilosis Not Detected Not Detected    Candida tropicalis Not Detected Not Detected    Cryptococcus neoformans/gattii Not Detected Not Detected    Narrative    Assay performed using the  FDA-cleared Vocollect Blood Culture Identification 2 (BCID2) Panel, a multiplexed nucleic acid test for the detection and identification of multiple bacterial and yeast nucleic acids and select genetic determinants associated with antimicrobial resistance.    A negative BCID2 result does not exclude the possibility of bloodstream infection. Positive results do not rule out co-infection with organisms not included in the BioFire BCID2 Panel. Results are intended to aid in the diagnosis of illness and are meant to be used in conjunction with other clinical findings.  This test has been verified and performed by the [facility] at [facility]. This laboratory is certified under the Clinical Laboratory Improvement Amendments of 1988 (CLIA-88) as qualified to perform high complexity clinical laboratory testing.     Influenza A/B, RSV and SARS-CoV2 PCR (COVID-19) Nasopharyngeal    Specimen: Nasopharyngeal; Swab   Result Value Ref Range    Influenza A PCR Negative Negative    Influenza B PCR Negative Negative    RSV PCR Negative Negative    SARS CoV2 PCR Negative Negative    Narrative    Testing was performed using the Xpert Xpress CoV2/Flu/RSV Assay on the [employer] GeneXpert Instrument. This test should be ordered for the detection of SARS-CoV2, influenza, and RSV viruses in individuals with signs and symptoms of respiratory tract infection. This test is for in vitro diagnostic use under the [place] FDA for laboratories certified under CLIA to perform high or moderate complexity testing. This test has been [place] FDA cleared. A negative result does not rule out the presence of PCR inhibitors in the specimen or target RNA in concentration below the limit of detection for the assay. If only one viral target is positive but coinfection with multiple targets is suspected, the sample should be re-tested with another FDA cleared, approved, or authorized test, if coninfection would change clinical  management. This test was validated by the [facility]. These laboratories are certified under the Clinical Laboratory Improvement Amendments of 1988 (CLIA-88) as qualified to perfom high complexity laboratory testing.   Extra Tube (Baggs Draw)    Narrative    The following orders were created for panel order Extra Tube (Baggs Draw).  Procedure                               Abnormality         Status                     ---------                               -----------         ------                     Extra Blue Top Tube[[id number]]                             Final result               Extra Red Top Tube[[id number]]                              Final result                 Please view results for these tests on the individual orders.   Extra Blue Top Tube   Result Value Ref Range    Hold Specimen JIC    Extra Red Top Tube   Result Value Ref Range    Hold Specimen JIC    Procalcitonin   Result Value Ref Range    Procalcitonin 0.28 <0.50 ng/mL   Blood Culture Line, venous    Specimen: Line, venous; Blood   Result Value Ref Range    Culture Positive on the 1st day of incubation (A)     Culture Gram negative bacilli (AA)    CT Abdomen Pelvis w/o Contrast    Narrative    EXAM: CT ABDOMEN PELVIS W/O CONTRAST  LOCATION: [facility]  DATE: 5/2/2025    INDICATION: Back pain chills, nausea and vomiting. History of multiple myeloma, amyloidosis and end-stage renal disease, on hemodialysis.  COMPARISON: PET CT 01/30/2025  TECHNIQUE: CT scan of the abdomen and pelvis was performed without IV contrast. Multiplanar reformats were obtained. Dose reduction techniques were used.  CONTRAST: None.    FINDINGS:   LOWER CHEST: Right IJ hemodialysis catheter tip in high right and. This small bilateral pleural effusions with bibasilar compressive atelectasis is new.    HEPATOBILIARY: Stable hepatic cysts. Layering density within the gallbladder lumen could represent sludge +/- tiny  gallstones. No wall thickening nor bile duct dilatation.    PANCREAS: Normal.    SPLEEN: Normal.    ADRENAL GLANDS: Normal.    KIDNEYS/BLADDER: Bilateral perinephric fat stranding/edema, right greater than left. No ureteral calculus nor hydronephrosis. The bladder is nondistended.    BOWEL: Diverticulosis of the colon. No acute inflammatory change. No obstruction. Trace amount of peritoneal fluid in the right abdomen.    LYMPH NODES: No lymphadenopathy.    VASCULATURE: No aortoiliac aneurysm    PELVIC ORGANS: Enlarged myomatous uterus. No focal fluid collection.    MUSCULOSKELETAL: Generalized subcutaneous edema suggesting anasarca.      Impression    IMPRESSION:   1.  Bilateral perinephric fat stranding/edema, greater on the right. No ureteral calculus or hydronephrosis. Findings could may be related to anasarca versus underlying pyelonephritis. Correlation with urinalysis suggested.  2.  Small bilateral pleural effusions, generalized subcutaneous edema and trace peritoneal fluid may be related to anasarca/fluid overload.  3.  Layering density within the gallbladder lumen suggests layering sludge +/- stones no cholelithiasis nor bile duct dilatation.     Lactic acid whole blood   Result Value Ref Range    Lactic Acid 1.1 0.7 - 2.0 mmol/L   UA with Microscopic reflex to Culture    Specimen: Urine, Clean Catch   Result Value Ref Range    Color Urine Light Yellow Colorless, Straw, Light Yellow, Yellow    Appearance Urine Slightly Cloudy (A) Clear    Glucose Urine Negative Negative mg/dL    Bilirubin Urine Negative Negative    Ketones Urine Negative Negative mg/dL    Specific Gravity Urine 1.008 1.003 - 1.035    Blood Urine Trace (A) Negative    pH Urine 7.5 (H) 5.0 - 7.0    Protein Albumin Urine 50 (A) Negative mg/dL    Urobilinogen Urine Normal Normal mg/dL    Nitrite Urine Negative Negative    Leukocyte Esterase Urine Moderate (A) Negative    Bacteria Urine Few (A) None Seen /HPF    RBC Urine 4 (H) <=2 /HPF    WBC  Urine 66 (H) <=5 /HPF    Squamous Epithelials Urine 1 <=1 /HPF    Hyaline Casts Urine 1 <=2 /LPF    Narrative    Urine Culture ordered based on laboratory criteria   Glucose by meter   Result Value Ref Range    GLUCOSE BY METER POCT 88 70 - 99 mg/dL   Lactic acid whole blood   Result Value Ref Range    Lactic Acid 3.6 (H) 0.7 - 2.0 mmol/L   Glucose by meter   Result Value Ref Range    GLUCOSE BY METER POCT 96 70 - 99 mg/dL   Comprehensive metabolic panel   Result Value Ref Range    Sodium 138 135 - 145 mmol/L    Potassium 4.6 3.4 - 5.3 mmol/L    Carbon Dioxide (CO2) 19 (L) 22 - 29 mmol/L    Anion Gap 15 7 - 15 mmol/L    Urea Nitrogen 35.2 (H) 8.0 - 23.0 mg/dL    Creatinine 4.97 (H) 0.51 - 0.95 mg/dL    GFR Estimate 9 (L) >60 mL/min/1.73m2    Calcium 7.4 (L) 8.8 - 10.4 mg/dL    Chloride 104 98 - 107 mmol/L    Glucose 99 70 - 99 mg/dL    Alkaline Phosphatase 87 40 - 150 U/L     (H) 0 - 45 U/L     (H) 0 - 50 U/L    Protein Total 5.0 (L) 6.4 - 8.3 g/dL    Albumin 2.9 (L) 3.5 - 5.2 g/dL    Bilirubin Total 0.9 <=1.2 mg/dL   CBC with platelets differential    Narrative    The following orders were created for panel order CBC with platelets differential.  Procedure                               Abnormality         Status                     ---------                               -----------         ------                     CBC with platelets and ...[[id number]]  Abnormal            Final result               RBC and Platelet Morpho...[[id number]]                                                   Please view results for these tests on the individual orders.   Blood gas venous   Result Value Ref Range    pH Venous 7.30 (L) 7.32 - 7.43    pCO2 Venous 42 40 - 50 mm Hg    pO2 Venous 47 25 - 47 mm Hg    Bicarbonate Venous 21 21 - 28 mmol/L    Base Excess/Deficit Venous -5.5 (L) -3.0 - 3.0 mmol/L    FIO2 6     Oxyhemoglobin Venous 73 70 - 75 %    O2 Sat, Venous 73.8 70.0 - 75.0 %    Narrative    In healthy  individuals, oxyhemoglobin (O2Hb) and oxygen saturation (SO2) are approximately equal. In the presence of dyshemoglobins, oxyhemoglobin can be considerably lower than oxygen saturation.   Lactic acid whole blood   Result Value Ref Range    Lactic Acid 2.5 (H) 0.7 - 2.0 mmol/L   CBC with platelets and differential   Result Value Ref Range    WBC Count 19.4 (H) 4.0 - 11.0 10e3/uL    RBC Count 3.01 (L) 3.80 - 5.20 10e6/uL    Hemoglobin 9.8 (L) 11.7 - 15.7 g/dL    Hematocrit 30.8 (L) 35.0 - 47.0 %     (H) 78 - 100 fL    MCH 32.6 26.5 - 33.0 pg    MCHC 31.8 31.5 - 36.5 g/dL    RDW 16.9 (H) 10.0 - 15.0 %    Platelet Count 110 (L) 150 - 450 10e3/uL    % Neutrophils 92 %    % Lymphocytes 2 %    % Monocytes 2 %    % Eosinophils 1 %    % Basophils 0 %    % Immature Granulocytes 3 %    NRBCs per 100 WBC 0 <1 /100    Absolute Neutrophils 17.8 (H) 1.6 - 8.3 10e3/uL    Absolute Lymphocytes 0.4 (L) 0.8 - 5.3 10e3/uL    Absolute Monocytes 0.4 0.0 - 1.3 10e3/uL    Absolute Eosinophils 0.2 0.0 - 0.7 10e3/uL    Absolute Basophils 0.1 0.0 - 0.2 10e3/uL    Absolute Immature Granulocytes 0.6 (H) <=0.4 10e3/uL    Absolute NRBCs 0.1 10e3/uL   Glucose by meter   Result Value Ref Range    GLUCOSE BY METER POCT 88 70 - 99 mg/dL     TT 60 mins, > 50 % face-to-face with the patient.  Additional time chart review, reviewed outpatient records, documentation.  Plan discussed with bedside RN and the patient

## 2025-05-03 NOTE — PROGRESS NOTES
Crosscover paged regarding new hypotension.  Patient is diaphoretic.  BP 70/34.  HR 69.  Admitted earlier in the evening for pyelonephritis and both sets blood cultures immediately positive for E. coli.  She was not previously hypotensive in the ER.  Evaluated patient immediately at the bedside.  She appears fatigue, but is alert and oriented.  Denies light headedness or shortness of breath.  1L NS bolus ordered and is now infusing.  Second peripheral IV placed.  She is ESRD on HD, but she says she still makes a lot of urine.  Had received 2 L in fluid previously and is on Ceftriaxone.  On exam she was alert and oriented.  She was warm peripherally and had a palpable radial pulse.  Roughly 400 mL the bolus had infused and we rechecked her blood pressure that actually decreased to 65/40.  RRT was called and patient was immediately moved to the ICU where peripheral norepinephrine was already waiting for her on arrival.  Stat labs ordered, but not collected yet.    Immediately after move to the ICU she developed bradycardia down to 30 bpm.  She appeared to lose P waves and QRS was widening so possibly had a ventricular escape rhythm.  Mental status rapidly deteriorated and she started to appear mottled and cool peripherally.  Atropine was immediately given and the peripheral norepinephrine had just been started and was rapidly titrated up.  Intensivist recommended giving 0.1 mg IV epinephrine from the code cart which was given.  Patient's heart rate rapidly improved and was back in NSR with rates up to the 80s.  Mental status much improved and mottling disappeared.  She never lost pulses or consciousness.  Blood pressure now improved well above 100 systolic and we are titrating peripheral norepinephrine.  -Obtaining stat CBC, BMP, lactic acid, VBG  -Will reassess further IV fluid bolus needs  -Continue peripheral norepinephrine  -If needing high dose peripheral norepinephrine will see if anesthesia or an ER provider is  able to place central line access although with her rapidly positive blood cultures ideally avoiding central line access unless needed.  She does have a right IJ tunneled dialysis catheter in place so a femoral CVC would be the best option if neccessary   -Patient confirms she takes 40 mg dexamethasone weekly on Tuesdays as a part of her amyloidosis chemotherapy regimen so she may have acute adrenal crisis in the setting of sepsis so I ordered 100 mg IV hydrocortisone that was just given  -mental status much improved and BP appears to have stabilized so holding off on any plans for intubation at this time  -patient advised me not to wake up her mother at this time to update her on change in condition    Addendum:  Lactic acid 3.6.  -give additional 500ml NS bolus and recheck in 2 hours  -continue peripheral norepinephrine

## 2025-05-03 NOTE — PLAN OF CARE
"ICU End of Shift Summary.  For vital signs and complete assessments, please see documentation flowsheets.      Pertinent assessments: Pt oriented x4, lethargic. Denies pain. Sinus rhythm on tele. On 6L Oxymask, denies SOB. Bowels active, no BM this shift. Bladder scan 29.     Major Shift Events: Pt given Atropine and Epi amp for symptomatic bradycardia and low BP. BP goal maintained with Levophed.     Plan (Upcoming Events): TBD.  Discharge/Transfer Needs: N/A       Problem: Adult Inpatient Plan of Care  Goal: Plan of Care Review  Description: The Plan of Care Review/Shift note should be completed every shift.  The Outcome Evaluation is a brief statement about your assessment that the patient is improving, declining, or no change.  This information will be displayed automatically on your shiftnote.  Outcome: Not Progressing  Goal: Patient-Specific Goal (Individualized)  Description: You can add care plan individualizations to a care plan. Examples of Individualization might be:  \"Parent requests to be called daily at 9am for status\", \"I have a hard time hearing out of my right ear\", or \"Do not touch me to wake me up as it startlesme\".  Outcome: Not Progressing  Goal: Absence of Hospital-Acquired Illness or Injury  Outcome: Not Progressing  Intervention: Prevent Skin Injury  Recent Flowsheet Documentation  Taken 5/3/2025 0500 by Magda Levin, RN  Body Position:   turned   log-rolled   supine, head elevated  Goal: Optimal Comfort and Wellbeing  Outcome: Not Progressing  Intervention: Provide Person-Centered Care  Recent Flowsheet Documentation  Taken 5/3/2025 0500 by Magda Levin RN  Trust Relationship/Rapport:   care explained   reassurance provided  Goal: Readiness for Transition of Care  Outcome: Not Progressing   Goal Outcome Evaluation:                        "

## 2025-05-03 NOTE — CONSULTS
[facility]  INFECTIOUS DISEASES CONSULTATION  Dulce Ma 5/3/2025    History  63 yo woman with ESRD on HD but does make some urine, AL amyloidosis   Presented to hospital yesterday 5/2/25 with new back pain, R side with chills and vomiting x 1  In the ED, her UA had pyuria c/w infection  CT abd showed stranding around the R kidney  WBC 1.8  Started on iv ceftriaxone   BC from admission are growing E coli    ROS: 10 point ROS neg other than the symptoms noted above in the HPI.    Past Medical History:   Diagnosis Date    Anesthesia complication, initial encounter     was completely out with Midazolam 2 mg IV, Fentanyl 100 micrograms IV that was given at time of colonoscopy 7/2018     Dysplastic nevi     Glaucoma     Diagnosed in Spring 2010    Hypertension goal BP (blood pressure) < 140/90 at age 50     very strong family hx     Lumbago     stiffness in low back    Other malignant neoplasm of skin of trunk, except scrotum 2/02    Dr. Myles Lake, melanoma with negative sentinel node biopsy - no chemo-Dr. Selene Gonzales-derm    Perimenopausal     Routine gyne exam     [facility] ob/gyn - Dr. Isabela Perez     Skin cancer, basal cell     multiple - 3 on nose, treated with interferon intralesionally x 1     Unspecified derangement, shoulder region     s/p horse-riding injury- no problem now    Unspecified musculoskeletal disorders and symptoms referable to neck     compressed vertebrae in neck- bothers pt rarely     Past Surgical History:   Procedure Laterality Date    APPENDECTOMY  2008    aprroximately 8-10 years agp    BREAST SURGERY      Breast biopsies    COLONOSCOPY N/A 7/27/2018    Procedure: COLONOSCOPY;  COLONOSCOPY ;  Surgeon: Rne Whitehead MD;  Location:  GI    ESOPHAGOSCOPY, GASTROSCOPY, DUODENOSCOPY (EGD), COMBINED N/A 2/10/2025    Procedure: Esophagogastroduodenoscopy with hemostasis clip placement;  Surgeon: Rashard Cole MD;  Location:  OR    IR CVC  TUNNEL PLACEMENT > 5 YRS OF AGE  2025    IR CVC TUNNEL REVISION RIGHT  3/13/2025    ORTHOPEDIC SURGERY Right     ORIF right ring finger    ZZC NONSPECIFIC PROCEDURE  3/02    malignant melanoma removed from abdomen with negative nodes-Dr. Acosta -surgeon     Social History     Social History Narrative    Anabel Zhong is pt's mom.  - she had pt when she was 19 yrs old.      Family History   Problem Relation Age of Onset    Hypertension Mother     Heart Disease Mother         bypass    Neuropathy Mother     Aortic Valve Replacement Mother 83    Hypertension Father     Coronary Artery Disease Father 83         in  from [place]    Eye Disorder Maternal Grandmother         glacoma    Eye Disorder Maternal Grandfather         glacoma    Hypertension Paternal Grandmother     Colon Cancer No family hx of           Allergies   Allergen Reactions    Amoxicillin Rash     Face; in early adulthood    Tolerated cephalexin 2025    Benzoyl Peroxide Swelling     swelling    Ibuprofen Hives     over long duration dev. hives    Penicillins Rash     Face; in early adulthood.    Tolerated cephalexin 2025    Face; in early adulthood      Face; in early adulthood  Tolerated cephalexin 2025      Face; in early adulthood.  Tolerated cephalexin 2025     Current Facility-Administered Medications   Medication Dose Route Frequency Provider Last Rate Last Admin    acetaminophen (TYLENOL) tablet 975 mg  975 mg Oral Q6H PRN Zan Brown MD   975 mg at 25 0813    acyclovir (ZOVIRAX) capsule 200 mg  200 mg Oral BID Alexis Thomas MD   200 mg at 25 0815    amiodarone (PACERONE) tablet 200 mg  200 mg Oral Daily Alexis Thomas MD   200 mg at 25 1155    calcium carbonate (TUMS) chewable tablet 1,000 mg  1,000 mg Oral 4x Daily PRN Alexis Thomas MD        cefTRIAXone (ROCEPHIN) 2 g vial to attach to  ml bag for ADULTS or NS 50 ml bag for PEDS  2 g Intravenous Q24H Alexis Thomas MD    2 g at 05/03/25 0418    glucose gel 15-30 g  15-30 g Oral Q15 Min PRN Zan Brown MD        Or    dextrose 50 % injection 25-50 mL  25-50 mL Intravenous Q15 Min PRN Zan Brown MD        Or    glucagon injection 1 mg  1 mg Subcutaneous Q15 Min PRN Zan Brown MD        dorzolamide-timolol (COSOPT) ophthalmic solution 1 drop  1 drop Both Eyes BID Alexis Thomas MD   1 drop at 05/03/25 0816    heparin ANTICOAGULANT injection 5,000 Units  5,000 Units Subcutaneous Q8H Saul Fuentes MD   5,000 Units at 05/03/25 1156    HYDROmorphone (DILAUDID) injection 0.2 mg  0.2 mg Intravenous Q3H PRN Lillie Reina MD        HYDROmorphone (DILAUDID) injection 0.4 mg  0.4 mg Intravenous Q3H PRN Lillie Reina MD        lidocaine (LMX4) cream   Topical Q1H PRN Alexis Thomas MD        lidocaine 1 % 0.1-1 mL  0.1-1 mL Other Q1H PRN Alexis Thomas MD        naloxone (NARCAN) injection 0.2 mg  0.2 mg Intravenous Q2 Min PRN Alexis Thomas MD        Or    naloxone (NARCAN) injection 0.4 mg  0.4 mg Intravenous Q2 Min PRN Alexis Thomsa MD        Or    naloxone (NARCAN) injection 0.2 mg  0.2 mg Intramuscular Q2 Min PRN Alexis Thomas MD        Or    naloxone (NARCAN) injection 0.4 mg  0.4 mg Intramuscular Q2 Min PRN Alexis Thomas MD        Netarsudil-Latanoprost 0.02-0.005 % SOLN 1 drop  1 drop Both Eyes At Bedtime Alexis Thomas MD        norepinephrine (LEVOPHED) 4 mg in  mL PERIPHERAL infusion  0.01-0.2 mcg/kg/min Intravenous Continuous Zan Brown MD   Stopped at 05/03/25 0830    ondansetron (ZOFRAN ODT) ODT tab 4 mg  4 mg Oral Q6H PRN Alexis Thomas MD        Or    ondansetron (ZOFRAN) injection 4 mg  4 mg Intravenous Q6H PRN Alexis Thomas MD        pantoprazole (PROTONIX) EC tablet 40 mg  40 mg Oral BID AC Saul Fuentes MD   40 mg at 05/03/25 1156    prochlorperazine (COMPAZINE) injection 10 mg  10 mg Intravenous Q6H PRN Alexis Thomas  "MD        Or    prochlorperazine (COMPAZINE) tablet 10 mg  10 mg Oral Q6H PRN Alexis Thomas MD        senna-docusate (SENOKOT-S/PERICOLACE) 8.6-50 MG per tablet 1 tablet  1 tablet Oral BID PRN Alexis Thomas MD        Or    senna-docusate (SENOKOT-S/PERICOLACE) 8.6-50 MG per tablet 2 tablet  2 tablet Oral BID PRAlexis Franco MD        sodium bicarbonate tablet 650 mg  650 mg Oral BID Julio Cesar Hurtado MD   650 mg at 05/03/25 1159    sodium chloride (PF) 0.9% PF flush 3 mL  3 mL Intracatheter Q8H [facility] Alexis Thomas MD   3 mL at 05/03/25 0558    sodium chloride (PF) 0.9% PF flush 3 mL  3 mL Intracatheter q1 min prn Alexis Thomas MD        vasopressin 0.2 units/mL in NS (PITRESSIN) standard conc infusion  2.4 Units/hr Intravenous Continuous Ren Prieto MD               Physical Examination  /67   Pulse 71   Temp 97.1  F (36.2  C) (Temporal)   Resp 20   Ht 1.6 m (5' 3\")   Wt 48.5 kg (106 lb 14.8 oz)   LMP 10/31/2011   SpO2 93%   BMI 18.94 kg/m    HEENT:, scleral anicteric , no scleral hemorrhages,   Op clear  Neck supple, no EDD   R sided neck and upper chest with permacath in place, no erythema, NT  CV: RRR no m/r/g  Lungs CTA bilat  Abd soft, NT, ND  BacK: +  RCVAT  Ext; no c/c/e, no splinter hemorrhages or nodes    LABORATORY DATA  Lab Results   Component Value Date    WBC 19.4 05/03/2025    WBC 6.9 05/28/2020     Lab Results   Component Value Date    RBC 3.01 05/03/2025    RBC 4.30 05/28/2020     Lab Results   Component Value Date    HGB 9.8 05/03/2025    HGB 12.8 05/28/2020     Lab Results   Component Value Date    HCT 30.8 05/03/2025    HCT 40.1 05/28/2020     No components found for: \"MCT\"  Lab Results   Component Value Date     05/03/2025    MCV 93 05/28/2020     Lab Results   Component Value Date    MCH 32.6 05/03/2025    MCH 29.8 05/28/2020     Lab Results   Component Value Date    MCHC 31.8 05/03/2025    MCHC 31.9 05/28/2020     Lab Results   Component Value " Date    RDW 16.9 05/03/2025    RDW 13.2 05/28/2020     Lab Results   Component Value Date     05/03/2025     05/28/2020     Last Comprehensive Metabolic Panel:  Sodium   Date Value Ref Range Status   05/03/2025 138 135 - 145 mmol/L Final   05/28/2020 138 133 - 144 mmol/L Final     Potassium   Date Value Ref Range Status   05/03/2025 4.6 3.4 - 5.3 mmol/L Final   11/22/2021 4.0 3.4 - 5.3 mmol/L Final   05/28/2020 4.8 3.4 - 5.3 mmol/L Final     Chloride   Date Value Ref Range Status   05/03/2025 104 98 - 107 mmol/L Final   11/22/2021 106 94 - 109 mmol/L Final   05/28/2020 109 94 - 109 mmol/L Final     Carbon Dioxide   Date Value Ref Range Status   05/28/2020 24 20 - 32 mmol/L Final     Carbon Dioxide (CO2)   Date Value Ref Range Status   05/03/2025 19 (L) 22 - 29 mmol/L Final   11/22/2021 27 20 - 32 mmol/L Final     Anion Gap   Date Value Ref Range Status   05/03/2025 15 7 - 15 mmol/L Final   11/22/2021 4 3 - 14 mmol/L Final   05/28/2020 5 3 - 14 mmol/L Final     Glucose   Date Value Ref Range Status   11/22/2021 83 70 - 99 mg/dL Final   05/28/2020 90 70 - 99 mg/dL Final     GLUCOSE BY METER POCT   Date Value Ref Range Status   05/03/2025 100 (H) 70 - 99 mg/dL Final     Urea Nitrogen   Date Value Ref Range Status   05/03/2025 35.2 (H) 8.0 - 23.0 mg/dL Final   11/22/2021 19 7 - 30 mg/dL Final   05/28/2020 23 7 - 30 mg/dL Final     Creatinine   Date Value Ref Range Status   05/03/2025 4.97 (H) 0.51 - 0.95 mg/dL Final   05/28/2020 0.78 0.52 - 1.04 mg/dL Final     GFR Estimate   Date Value Ref Range Status   05/03/2025 9 (L) >60 mL/min/1.73m2 Final     Comment:     eGFR calculated using 2021 CKD-EPI equation.   05/28/2020 82 >60 mL/min/[1.73_m2] Final     Comment:     Non  GFR Calc  Starting 12/18/2018, serum creatinine based estimated GFR (eGFR) will be   calculated using the Chronic Kidney Disease Epidemiology Collaboration   (CKD-EPI) equation.       Calcium   Date Value Ref Range Status    05/03/2025 7.4 (L) 8.8 - 10.4 mg/dL Final   05/28/2020 8.7 8.5 - 10.1 mg/dL Final     Bilirubin Total   Date Value Ref Range Status   05/03/2025 0.9 <=1.2 mg/dL Final   05/28/2020 0.7 0.2 - 1.3 mg/dL Final     Alkaline Phosphatase   Date Value Ref Range Status   05/03/2025 87 40 - 150 U/L Final   05/28/2020 72 40 - 150 U/L Final     ALT   Date Value Ref Range Status   05/03/2025 114 (H) 0 - 50 U/L Final   05/28/2020 15 0 - 50 U/L Final     AST   Date Value Ref Range Status   05/03/2025 158 (H) 0 - 45 U/L Final   05/28/2020 13 0 - 45 U/L Final       Radiology  EXAM: CT ABDOMEN PELVIS W/O CONTRAST  LOCATION: [facility]  DATE: 5/2/2025     INDICATION: Back pain chills, nausea and vomiting. History of multiple myeloma, amyloidosis and end-stage renal disease, on hemodialysis.  COMPARISON: PET CT 01/30/2025  TECHNIQUE: CT scan of the abdomen and pelvis was performed without IV contrast. Multiplanar reformats were obtained. Dose reduction techniques were used.  CONTRAST: None.     FINDINGS:   LOWER CHEST: Right IJ hemodialysis catheter tip in high right and. This small bilateral pleural effusions with bibasilar compressive atelectasis is new.     HEPATOBILIARY: Stable hepatic cysts. Layering density within the gallbladder lumen could represent sludge +/- tiny gallstones. No wall thickening nor bile duct dilatation.     PANCREAS: Normal.     SPLEEN: Normal.     ADRENAL GLANDS: Normal.     KIDNEYS/BLADDER: Bilateral perinephric fat stranding/edema, right greater than left. No ureteral calculus nor hydronephrosis. The bladder is nondistended.     BOWEL: Diverticulosis of the colon. No acute inflammatory change. No obstruction. Trace amount of peritoneal fluid in the right abdomen.     LYMPH NODES: No lymphadenopathy.     VASCULATURE: No aortoiliac aneurysm     PELVIC ORGANS: Enlarged myomatous uterus. No focal fluid collection.     MUSCULOSKELETAL: Generalized subcutaneous edema suggesting  anasarca.                                                                      IMPRESSION:   1.  Bilateral perinephric fat stranding/edema, greater on the right. No ureteral calculus or hydronephrosis. Findings could may be related to anasarca versus underlying pyelonephritis. Correlation with urinalysis suggested.  2.  Small bilateral pleural effusions, generalized subcutaneous edema and trace peritoneal fluid may be related to anasarca/fluid overload.  3.  Layering density within the gallbladder lumen suggests layering sludge +/- stones no cholelithiasis nor bile duct dilatation.  MICROBIOLOGY  05/03/2025 407853305/03/2025 1325  Blood Culture Hand, Right [[id number]]   Blood from Hand, Right   In process  Component Value   No component results      05/03/2025 [id number]/03/2025 1318  Blood Culture Arm, Left [[id number]]   Blood from Arm, Left   In process  Component Value   No component results      05/02/2025 [id number]/03/2025 1229  Urine Culture [[id number]]   (Abnormal)   Urine, Clean Catch   Preliminary result  Component Value   Culture >100,000 CFU/mL Escherichia coli Abnormal  P      05/02/2025 [id number]/03/2025 1133  Blood Culture Line, venous [[id number]]   (Abnormal)   Blood from Line, venous   Preliminary result  Component Value   Culture Positive on the 1st day of incubation Abnormal  P    Escherichia coli Panic  P    2 of 2 bottles  Susceptibilities done on previous cultures      05/02/2025 [id number]/02/2025 1328  Influenza A/B, RSV and SARS-CoV2 PCR (COVID-19) Nasopharyngeal [[id number]]    Swab from Nasopharyngeal   Final result  Component Value   Influenza A PCR Negative   Influenza B PCR Negative   RSV PCR Negative   SARS CoV2 PCR Negative   NEGATIVE: SARS-CoV-2 (COVID-19) RNA not detected, presumed negative.   05/02/2025 [id number]/03/2025 1132  Blood Culture Arm, Left [[id number]]   (Abnormal)   Blood from Arm, Left   Preliminary result  Component Value   Culture Positive on the 1st day of incubation  Abnormal  P    Escherichia coli Panic  P    2 of 2 bottles      05/02/2025 [id number]/03/2025 0056  Blood Culture ID Panel, PCR [[id number]]    (Abnormal)   Blood from Arm, Left   Final result  Component Value   Enterococcus faecalis Not Detected   Enterococcus faecium Not Detected   Listeria monocytogenes Not Detected   Staphylococcus species Not Detected   Staphylococcus aureus Not Detected   Staphylococcus epidermidis Not Detected   Staphylococcus lugdunensis Not Detected   Streptococcus species Not Detected   Streptococcus agalactiae Not Detected   Streptococcus pneumoniae Not Detected   Streptococcus pyogenes Not Detected   A. baumannii complex Not Detected   Bacteroides fragilis Not Detected   Enterobacter cloacae complex Not Detected   Escherichia coli Detected Abnormal    Escherichia coli detected by Hycrete BCID2 assay. Final identification and antimicrobial susceptibility testing will be verified by standard methods. The BCID2 assay will not distinguish E. coli from Shigella species. Specimens containing Shigella species or E. coli will be reported as E. coli detected.   Klebsiella aerogenes Not Detected   Klebsiella oxytoca Not Detected   Klebsiella pneumoniae group Not Detected   Proteus species Not Detected   Salmonella species Not Detected   Serratia marcescens Not Detected   Haemophilus influenzae Not Detected   Neisseria meningitidis Not Detected   Pseudomonas aeruginosa Not Detected   Stenotrophomonas maltophilia Not Detected   CTX-M Not Detected   IMP Not Detected   KPC Not Detected   mcr-1 Not Detected   NDM Not Detected   OXA-48 like Not Detected   VIM Not Detected   Candida albicans Not Detected   Candida auris Not Detected   Candida glabrata Not Detected   Geraldine krusei Not Detected   Candida parapsilosis Not Detected   Candida tropicalis Not Detected   Cryptococcus neoformans/gattii Not Detected       IMPRESSION AND PLAN  65 yo woman   ESRD on HD but makes urine  Now with E coli bacteremia and  R sided pyelonephritis    E coli bacteremia\sepsis  R sided pyelonephritis  ESRD on HD with chest permacath    She has both clinical and radiographic evidence of pyelonephritis involving the R kidney and as a good source for  her E coli bacteremia  The concern is seeding the permcath but this is not a given and I think it is reasonable to treat the infection with the hope that the catheter is not infected, while looking for evidence of this.    Recommendations  Continue the iv ceftriaxone for now  Follow up blood cx  More bc if any fever or chills or other signs/symptoms of ongoing infection  If ongoing + BC , will need to remove the catheter    Thank you very much for this consultation      Rashard Diaz MD

## 2025-05-03 NOTE — CONSULTS
LUIS DANIEL consulted for discharge planning. See LUIS DANIEL note, SW following.    Alida Lopez, THIAGO, [facility]  Emergency Department/Inpatient Float  Care Coordination  [facility]  ED phone: [phone]

## 2025-05-03 NOTE — PLAN OF CARE
Goal Outcome Evaluation:      Plan of Care Reviewed With: patient    Overall Patient Progress: improvingOverall Patient Progress: improving    Outcome Evaluation: took over care of pt from [id number]. pt is a/o, quiet speaking. vss. pain in back- med offered- refused but did repositon pillows. Appetite is poor. Advanced diet to renal. Neph, ID and IR consulted to determine plan about bactermia and her lines.  On IV rocephin.

## 2025-05-03 NOTE — PROGRESS NOTES
Cross Cover    Called for BCx + E coli, on IV ceftriaxone (initially rec'd 1 gm, but upped to 2 grams, next dose 4 am)  Ordered prn hydromorphone for pain

## 2025-05-04 ENCOUNTER — APPOINTMENT (OUTPATIENT)
Dept: CARDIOLOGY | Facility: CLINIC | Age: 65
End: 2025-05-04
Attending: INTERNAL MEDICINE
Payer: COMMERCIAL

## 2025-05-04 ENCOUNTER — APPOINTMENT (OUTPATIENT)
Dept: OCCUPATIONAL THERAPY | Facility: CLINIC | Age: 65
End: 2025-05-04
Payer: COMMERCIAL

## 2025-05-04 ENCOUNTER — APPOINTMENT (OUTPATIENT)
Dept: PHYSICAL THERAPY | Facility: CLINIC | Age: 65
End: 2025-05-04
Attending: HOSPITALIST
Payer: COMMERCIAL

## 2025-05-04 LAB
ANION GAP SERPL CALCULATED.3IONS-SCNC: 15 MMOL/L (ref 7–15)
ANION GAP SERPL CALCULATED.3IONS-SCNC: 16 MMOL/L (ref 7–15)
BACTERIA BLD CULT: ABNORMAL
BACTERIA BLD CULT: ABNORMAL
BACTERIA UR CULT: ABNORMAL
BITE CELLS BLD QL SMEAR: SLIGHT
BUN SERPL-MCNC: 48.7 MG/DL (ref 8–23)
BUN SERPL-MCNC: 53.4 MG/DL (ref 8–23)
CALCIUM SERPL-MCNC: 7.9 MG/DL (ref 8.8–10.4)
CALCIUM SERPL-MCNC: 8 MG/DL (ref 8.8–10.4)
CHLORIDE SERPL-SCNC: 103 MMOL/L (ref 98–107)
CHLORIDE SERPL-SCNC: 98 MMOL/L (ref 98–107)
CREAT SERPL-MCNC: 6.32 MG/DL (ref 0.51–0.95)
CREAT SERPL-MCNC: 6.33 MG/DL (ref 0.51–0.95)
CRP SERPL-MCNC: 218 MG/L
EGFRCR SERPLBLD CKD-EPI 2021: 7 ML/MIN/1.73M2
EGFRCR SERPLBLD CKD-EPI 2021: 7 ML/MIN/1.73M2
ERYTHROCYTE [DISTWIDTH] IN BLOOD BY AUTOMATED COUNT: 16.9 % (ref 10–15)
ERYTHROCYTE [DISTWIDTH] IN BLOOD BY AUTOMATED COUNT: 17 % (ref 10–15)
FRAGMENTS BLD QL SMEAR: ABNORMAL
GLUCOSE SERPL-MCNC: 156 MG/DL (ref 70–99)
GLUCOSE SERPL-MCNC: 85 MG/DL (ref 70–99)
HCO3 SERPL-SCNC: 19 MMOL/L (ref 22–29)
HCO3 SERPL-SCNC: 21 MMOL/L (ref 22–29)
HCT VFR BLD AUTO: 29.1 % (ref 35–47)
HCT VFR BLD AUTO: 29.4 % (ref 35–47)
HGB BLD-MCNC: 9.6 G/DL (ref 11.7–15.7)
HGB BLD-MCNC: 9.6 G/DL (ref 11.7–15.7)
LVEF ECHO: NORMAL
MCH RBC QN AUTO: 32.8 PG (ref 26.5–33)
MCH RBC QN AUTO: 33 PG (ref 26.5–33)
MCHC RBC AUTO-ENTMCNC: 32.7 G/DL (ref 31.5–36.5)
MCHC RBC AUTO-ENTMCNC: 33 G/DL (ref 31.5–36.5)
MCV RBC AUTO: 100 FL (ref 78–100)
MCV RBC AUTO: 100 FL (ref 78–100)
PLAT MORPH BLD: ABNORMAL
PLATELET # BLD AUTO: 114 10E3/UL (ref 150–450)
PLATELET # BLD AUTO: 120 10E3/UL (ref 150–450)
POTASSIUM SERPL-SCNC: 4.5 MMOL/L (ref 3.4–5.3)
POTASSIUM SERPL-SCNC: 5.3 MMOL/L (ref 3.4–5.3)
RBC # BLD AUTO: 2.91 10E6/UL (ref 3.8–5.2)
RBC # BLD AUTO: 2.93 10E6/UL (ref 3.8–5.2)
RBC MORPH BLD: ABNORMAL
SODIUM SERPL-SCNC: 134 MMOL/L (ref 135–145)
SODIUM SERPL-SCNC: 138 MMOL/L (ref 135–145)
UFH PPP CHRO-ACNC: 0.16 IU/ML
WBC # BLD AUTO: 11.7 10E3/UL (ref 4–11)
WBC # BLD AUTO: 9.8 10E3/UL (ref 4–11)

## 2025-05-04 PROCEDURE — 250N000013 HC RX MED GY IP 250 OP 250 PS 637: Performed by: INTERNAL MEDICINE

## 2025-05-04 PROCEDURE — 99232 SBSQ HOSP IP/OBS MODERATE 35: CPT | Performed by: INTERNAL MEDICINE

## 2025-05-04 PROCEDURE — 250N000011 HC RX IP 250 OP 636: Mod: JZ | Performed by: INTERNAL MEDICINE

## 2025-05-04 PROCEDURE — 93321 DOPPLER ECHO F-UP/LMTD STD: CPT | Mod: 26 | Performed by: INTERNAL MEDICINE

## 2025-05-04 PROCEDURE — 97116 GAIT TRAINING THERAPY: CPT | Mod: GP

## 2025-05-04 PROCEDURE — 93306 TTE W/DOPPLER COMPLETE: CPT | Mod: 26 | Performed by: INTERNAL MEDICINE

## 2025-05-04 PROCEDURE — 250N000011 HC RX IP 250 OP 636: Performed by: HOSPITALIST

## 2025-05-04 PROCEDURE — 86140 C-REACTIVE PROTEIN: CPT | Performed by: INTERNAL MEDICINE

## 2025-05-04 PROCEDURE — 93325 DOPPLER ECHO COLOR FLOW MAPG: CPT

## 2025-05-04 PROCEDURE — 250N000009 HC RX 250: Performed by: INTERNAL MEDICINE

## 2025-05-04 PROCEDURE — 250N000011 HC RX IP 250 OP 636: Performed by: INTERNAL MEDICINE

## 2025-05-04 PROCEDURE — 258N000001 HC RX 258: Performed by: INTERNAL MEDICINE

## 2025-05-04 PROCEDURE — 97535 SELF CARE MNGMENT TRAINING: CPT | Mod: GO | Performed by: OCCUPATIONAL THERAPIST

## 2025-05-04 PROCEDURE — 999N000127 HC STATISTIC PERIPHERAL IV START W US GUIDANCE

## 2025-05-04 PROCEDURE — 97530 THERAPEUTIC ACTIVITIES: CPT | Mod: GP

## 2025-05-04 PROCEDURE — 85048 AUTOMATED LEUKOCYTE COUNT: CPT | Performed by: INTERNAL MEDICINE

## 2025-05-04 PROCEDURE — 93306 TTE W/DOPPLER COMPLETE: CPT

## 2025-05-04 PROCEDURE — 36415 COLL VENOUS BLD VENIPUNCTURE: CPT | Performed by: INTERNAL MEDICINE

## 2025-05-04 PROCEDURE — 258N000003 HC RX IP 258 OP 636: Performed by: INTERNAL MEDICINE

## 2025-05-04 PROCEDURE — 120N000004 HC R&B MS OVERFLOW

## 2025-05-04 PROCEDURE — 93308 TTE F-UP OR LMTD: CPT | Mod: 26 | Performed by: INTERNAL MEDICINE

## 2025-05-04 PROCEDURE — 80048 BASIC METABOLIC PNL TOTAL CA: CPT | Performed by: INTERNAL MEDICINE

## 2025-05-04 PROCEDURE — 93325 DOPPLER ECHO COLOR FLOW MAPG: CPT | Mod: 26 | Performed by: INTERNAL MEDICINE

## 2025-05-04 PROCEDURE — 97161 PT EVAL LOW COMPLEX 20 MIN: CPT | Mod: GP

## 2025-05-04 PROCEDURE — 85520 HEPARIN ASSAY: CPT | Performed by: INTERNAL MEDICINE

## 2025-05-04 PROCEDURE — 250N000013 HC RX MED GY IP 250 OP 250 PS 637: Performed by: HOSPITALIST

## 2025-05-04 RX ORDER — HEPARIN SODIUM 10000 [USP'U]/100ML
0-5000 INJECTION, SOLUTION INTRAVENOUS CONTINUOUS
Status: DISCONTINUED | OUTPATIENT
Start: 2025-05-04 | End: 2025-05-07

## 2025-05-04 RX ORDER — LATANOPROST 50 UG/ML
1 SOLUTION/ DROPS OPHTHALMIC AT BEDTIME
Status: DISCONTINUED | OUTPATIENT
Start: 2025-05-04 | End: 2025-05-07 | Stop reason: HOSPADM

## 2025-05-04 RX ORDER — POLYETHYLENE GLYCOL 3350 17 G/17G
17 POWDER, FOR SOLUTION ORAL 2 TIMES DAILY PRN
Status: DISCONTINUED | OUTPATIENT
Start: 2025-05-04 | End: 2025-05-07 | Stop reason: HOSPADM

## 2025-05-04 RX ORDER — MEPERIDINE HYDROCHLORIDE 25 MG/ML
25 INJECTION INTRAMUSCULAR; INTRAVENOUS; SUBCUTANEOUS
Status: COMPLETED | OUTPATIENT
Start: 2025-05-04 | End: 2025-05-04

## 2025-05-04 RX ORDER — ACYCLOVIR 200 MG/1
30 CAPSULE ORAL ONCE
Status: COMPLETED | OUTPATIENT
Start: 2025-05-04 | End: 2025-05-04

## 2025-05-04 RX ADMIN — POLYETHYLENE GLYCOL 3350 17 G: 17 POWDER, FOR SOLUTION ORAL at 20:01

## 2025-05-04 RX ADMIN — PANTOPRAZOLE SODIUM 40 MG: 40 TABLET, DELAYED RELEASE ORAL at 17:45

## 2025-05-04 RX ADMIN — SODIUM ZIRCONIUM CYCLOSILICATE 10 G: 10 POWDER, FOR SUSPENSION ORAL at 09:02

## 2025-05-04 RX ADMIN — ONDANSETRON 4 MG: 4 TABLET, ORALLY DISINTEGRATING ORAL at 07:49

## 2025-05-04 RX ADMIN — HEPARIN SODIUM 1150 UNITS/HR: 10000 INJECTION, SOLUTION INTRAVENOUS at 19:33

## 2025-05-04 RX ADMIN — PANTOPRAZOLE SODIUM 40 MG: 40 TABLET, DELAYED RELEASE ORAL at 08:02

## 2025-05-04 RX ADMIN — HYDROMORPHONE HYDROCHLORIDE 0.2 MG: 0.2 INJECTION, SOLUTION INTRAMUSCULAR; INTRAVENOUS; SUBCUTANEOUS at 02:27

## 2025-05-04 RX ADMIN — ACYCLOVIR 200 MG: 200 CAPSULE ORAL at 08:02

## 2025-05-04 RX ADMIN — DORZOLAMIDE HYDROCHLORIDE AND TIMOLOL MALEATE 1 DROP: 20; 5 SOLUTION OPHTHALMIC at 21:47

## 2025-05-04 RX ADMIN — ACYCLOVIR 200 MG: 200 CAPSULE ORAL at 21:42

## 2025-05-04 RX ADMIN — SODIUM BICARBONATE 650 MG: 650 TABLET ORAL at 21:42

## 2025-05-04 RX ADMIN — MEPERIDINE HYDROCHLORIDE 25 MG: 25 INJECTION INTRAMUSCULAR; INTRAVENOUS; SUBCUTANEOUS at 06:02

## 2025-05-04 RX ADMIN — DORZOLAMIDE HYDROCHLORIDE AND TIMOLOL MALEATE 1 DROP: 20; 5 SOLUTION OPHTHALMIC at 08:09

## 2025-05-04 RX ADMIN — CEFTRIAXONE 2 G: 2 INJECTION, POWDER, FOR SOLUTION INTRAMUSCULAR; INTRAVENOUS at 04:01

## 2025-05-04 RX ADMIN — HEPARIN SODIUM 850 UNITS/HR: 10000 INJECTION, SOLUTION INTRAVENOUS at 12:07

## 2025-05-04 RX ADMIN — ONDANSETRON 4 MG: 2 INJECTION, SOLUTION INTRAMUSCULAR; INTRAVENOUS at 02:13

## 2025-05-04 RX ADMIN — CALCIUM CARBONATE (ANTACID) CHEW TAB 500 MG 1000 MG: 500 CHEW TAB at 02:39

## 2025-05-04 RX ADMIN — SODIUM BICARBONATE: 84 INJECTION, SOLUTION INTRAVENOUS at 09:45

## 2025-05-04 RX ADMIN — AMIODARONE HYDROCHLORIDE 200 MG: 200 TABLET ORAL at 08:02

## 2025-05-04 RX ADMIN — SODIUM BICARBONATE 650 MG: 650 TABLET ORAL at 08:02

## 2025-05-04 RX ADMIN — SODIUM CHLORIDE 30 ML: 9 INJECTION INTRAMUSCULAR; INTRAVENOUS; SUBCUTANEOUS at 14:23

## 2025-05-04 RX ADMIN — HEPARIN SODIUM 5000 UNITS: 5000 INJECTION, SOLUTION INTRAVENOUS; SUBCUTANEOUS at 04:01

## 2025-05-04 ASSESSMENT — ACTIVITIES OF DAILY LIVING (ADL)
ADLS_ACUITY_SCORE: 66
ADLS_ACUITY_SCORE: 66
ADLS_ACUITY_SCORE: 51
ADLS_ACUITY_SCORE: 66
ADLS_ACUITY_SCORE: 51
ADLS_ACUITY_SCORE: 66
ADLS_ACUITY_SCORE: 51
ADLS_ACUITY_SCORE: 66
ADLS_ACUITY_SCORE: 43
ADLS_ACUITY_SCORE: 51
ADLS_ACUITY_SCORE: 51
ADLS_ACUITY_SCORE: 66
ADLS_ACUITY_SCORE: 51
ADLS_ACUITY_SCORE: 66
ADLS_ACUITY_SCORE: 51
ADLS_ACUITY_SCORE: 51
ADLS_ACUITY_SCORE: 42
ADLS_ACUITY_SCORE: 74
ADLS_ACUITY_SCORE: 66
ADLS_ACUITY_SCORE: 51

## 2025-05-04 NOTE — PROGRESS NOTES
[facility]  Hospitalist Progress Note  Saul Fuentes MD 05/04/2025    Reason for Stay (Diagnosis): sepsis with ecoli         Assessment and Plan:      Summary of Stay: Dulce Ma is a 64 year old female admitted on 5/2/2025. She has ESRD on HD and Amyloidosis. She is on teatment with Dr Ramsey ([facility]).  She presented to the emergency department complaining of new onset of back pain, predominantly right-sided, chills and emesis episode en route to the hospital.  She has abnormal UA, positive costovertebral angle tenderness on the right and CT of the abdomen that shows stranding of the fat around the right kidney      Patient's hospital course was complicated by hypotension and bradycardia the evening of admission.  She received a dose of atropine and a dose of IV epinephrine.  Bradycardia resolved.  She is currently on norepinephrine for blood pressure support which is being weaned.     Since admission blood and urine cultures returned positive for sensitive E. Coli.      Patient remains on IV Rocephin for treatment of sepsis due to E. coli bacteremia and pyelonephritis    Given her tunneled dialysis catheter, infection disease was consulted to see if her line needed to be removed.  Plan is to repeat blood cultures.  If they return positive indicating ongoing bacteremia will need to consider line removal     Plans today:  -Continue Rocephin  -Nephrology has ordered a dose of Lokelma today for treatment of mild hyperkalemia  -Nephrology has also ordered D5 with bicarbonate IV infusion today  -Patient can transfer to medical floor with telemetry monitoring today  - repeat BMP at 1800 today    Addendum:  TTE today shows possible R atrial thrombus; suspect may be thrombus on dialysis catheter.  Will start IV heparin infusion.  Obtain limited TTE to r/o PFO.  If decision is made to remove catheter, would like to ensure no PFO if thrombus dislodged     Sepsis due to E. coli bacteremia and  "right-sided pyelonephritis  -CT abdomen on admission with findings consistent with right-sided pyelonephritis  -Blood and urine cultures positive for pansensitive E. coli  -Vasopressor weaned off 5/3  -Continue IV Rocephin  -Infection disease consulted to address the question of whether the patient's tunneled dialysis catheter needs to be removed in the setting of bacteremia.  Repeat blood cultures have been obtained.  If the patient follow-up blood cultures remain positive (indicating ongoing bacteremia) ID would consider line removal        End-stage renal disease on hemodialysis.  - Nephrology consult requested  -Normally dialyzes 3 times a week  -Her nephrologist is Dr. Kelly     AL Amyloidosis.    -Follows with Dr. Ramsey of oncology  -Treated with once a week doses of Cytoxan and dexamethasone     Leukocytopenia with normal low ANC and lymphocytopenia on admit labs.  -Likely related to Cytoxan  - resolved and now has leukocytosis related to sepsis        History of PAF.  -On amiodarone.  -Does not appear to be on an anticoagulant       DVT Prophylaxis: heparin subcutaneous  GI ppx:  PPI  Butler Catheter: Not present  Lines: PRESENT      Cardiac Monitoring: None  Code Status: Full CodeFULL CODE   Discharge Dispo: Home anticipated  Medically Ready for Discharge: Anticipated in 2-4 Days after resolution of sepsis, normalization of blood pressure, and improvement in symptoms             Interval History (Subjective):      Feels slightly better.  Still achy.  Hemodynamically stable overnight off vasopressors.  No fever.  Not hypoxic.                  Physical Exam:      Last Vital Signs:  /70   Pulse 83   Temp 99  F (37.2  C) (Oral)   Resp 21   Ht 1.6 m (5' 3\")   Wt 48.5 kg (106 lb 14.8 oz)   LMP 10/31/2011   SpO2 94%   BMI 18.94 kg/m        Intake/Output Summary (Last 24 hours) at 5/4/2025 0911  Last data filed at 5/4/2025 0754  Gross per 24 hour   Intake 1110 ml   Output 0 ml   Net 1110 ml "       Constitutional: Awake, alert, cooperative, no apparent distress     Respiratory: Clear to auscultation bilaterally, no crackles or wheezing   Cardiovascular: Regular rate and rhythm, normal S1 and S2, and no murmur noted   Abdomen: Normal bowel sounds, soft, non-distended, non-tender   Skin: No rashes, no cyanosis, dry to touch   Neuro: Alert and oriented x3, no weakness, numbness, memory loss   Extremities: No edema, normal range of motion   Other(s):        All other systems: Negative          Medications:      All current medications were reviewed with changes reflected in problem list.         Data:      All new lab and imaging data was reviewed.   Labs:       Lab Results   Component Value Date     05/04/2025     05/03/2025     05/02/2025     05/28/2020     01/11/2019     07/22/2016    Lab Results   Component Value Date    CHLORIDE 103 05/04/2025    CHLORIDE 104 05/03/2025    CHLORIDE 100 05/02/2025    CHLORIDE 106 11/22/2021    CHLORIDE 109 05/28/2020    CHLORIDE 108 01/11/2019    CHLORIDE 105 07/22/2016    Lab Results   Component Value Date    BUN 48.7 05/04/2025    BUN 35.2 05/03/2025    BUN 30.0 05/02/2025    BUN 19 11/22/2021    BUN 23 05/28/2020    BUN 16 01/11/2019    BUN 17 07/22/2016      Lab Results   Component Value Date    POTASSIUM 5.3 05/04/2025    POTASSIUM 4.6 05/03/2025    POTASSIUM 4.1 05/02/2025    POTASSIUM 4.0 11/22/2021    POTASSIUM 4.8 05/28/2020    POTASSIUM 4.2 01/11/2019    POTASSIUM 4.6 07/22/2016    Lab Results   Component Value Date    CO2 19 05/04/2025    CO2 19 05/03/2025    CO2 23 05/02/2025    CO2 27 11/22/2021    CO2 24 05/28/2020    CO2 24 01/11/2019    CO2 27 07/22/2016    Lab Results   Component Value Date    CR 6.33 05/04/2025    CR 4.97 05/03/2025    CR 4.59 05/02/2025    CR 0.78 05/28/2020    CR 0.81 01/11/2019    CR 0.73 07/22/2016        Recent Labs   Lab 05/04/25  0533   WBC 9.8   HGB 9.6*   HCT 29.4*      *       Imaging:   No results found for this or any previous visit (from the past 24 hours).

## 2025-05-04 NOTE — PROGRESS NOTES
Cross Cover    Called for new AF with CVR in setting of rigors.  Here with pyelo complicated by septic shock     Stat EKG, ordered echo   Prn meperidine for rigors, and give asa now for impending fever

## 2025-05-04 NOTE — PROGRESS NOTES
Progress Note     Primary Oncologist/Hematologist:            Assessment and Plan:   Ms. Ma is a pleasant 64-year-old female diagnosed with  AL Amyloidosis involving the kidneys, followed by Dr. Brown   -Biopsy proven kidney involvement, on HD- 3 times a week  -Elevated BNP and troponin, therefore worrisome for cardiac involvement.  Echo and Cardiac MRI negative  -Baseline labs: kappa light chain 79, ratio 63, IgG 1834, M spike 1.4, creat 7.8, troponin 134,   -(2/3/2025-present)  Darzalex, Velcade, Cytoxan and Dexamethasone (40 Mg weekly)  Last cycle 4/29.  Dose of Cytoxan increased to  response plateau to current treatment  Labs at admission Labs-WBC 1.8, hemoglobin 11.5, platelets 150.  ANC 1.6     -5/2-admitted with  second episode of pyelonephritis, E. coli bacteremia  - Sepsis-transient bradycardia, hypotension, responded well to epinephrine, atropine, now off of pressors    5/4- 2 D echo- The visual ejection fraction is 60-65%. moderate concentric left ventricular hypertrophy. left ventricle is normal in structure, function and size. No regional wall motion abnormalities noted. The right ventricle is normal in structure, function and size.  Large left pleural effusion There is no pericardial effusion.   There is an irregular mass in Right atrium measuring 1.7 X 1.2 cm in size. Has  independant motion representing vegetation or thrombus. Possible attachment to  catheter.     PLAN:  Continue current antibiotics  serial blood cultures, 5/3- neg   .  ID following  May need removal of her central line. Reviewed 2 d echo- as above- on iv heparin now.  Limited TTE ordered to rule out PFO.  Followed by nephrology service.  Patient on hemodialysis 3 times a week  Continue outpatient follow-up with Dr. Brown  Monitor CBC.  Transfuse for hemoglobin 7 or less.  Platelets 10,000 or less.  White count normal today 9.8-   Patient also did receive dexamethasone 40 Mg orally on 4/29.  Ongoing risk for infection  due to immunosuppression      Please call with any additional questions or concerns  Nanci Tucker MD  [facility]  [phone] (office),       Interval History:   Feeling better today compared to yesterday.  Sitting up in the chair.  Flank pain is less.                Review of Systems:     The 14 point Review of Systems is negative other than noted in the HPI             Medications:   Scheduled Medications  Current Facility-Administered Medications   Medication Dose Route Frequency Provider Last Rate Last Admin    acyclovir (ZOVIRAX) capsule 200 mg  200 mg Oral BID Alexis Thomas MD   200 mg at 05/04/25 0802    amiodarone (PACERONE) tablet 200 mg  200 mg Oral Daily Alexis Thomas MD   200 mg at 05/04/25 0802    cefTRIAXone (ROCEPHIN) 2 g vial to attach to  ml bag for ADULTS or NS 50 ml bag for PEDS  2 g Intravenous Q24H Alexis Thomas MD   2 g at 05/04/25 0401    dorzolamide-timolol (COSOPT) ophthalmic solution 1 drop  1 drop Both Eyes BID Alexis Thomas MD   1 drop at 05/04/25 0809    heparin ANTICOAGULANT Loading dose for HIGH INTENSITY TREATMENT * Give BEFORE starting heparin infusion  80 Units/kg Intravenous Once Saul Fuentes MD        latanoprost (XALATAN) 0.005 % ophthalmic solution 1 drop  1 drop Both Eyes At Bedtime Saul Fuentes MD        pantoprazole (PROTONIX) EC tablet 40 mg  40 mg Oral BID AC Saul Fuentes MD   40 mg at 05/04/25 0802    sodium bicarbonate tablet 650 mg  650 mg Oral BID Julio Cesar Hurtado MD   650 mg at 05/04/25 0802    sodium chloride (PF) 0.9% PF flush 3 mL  3 mL Intracatheter Q8H [facility] Alexis Thomas MD   3 mL at 05/04/25 0809     PRN Medications  Current Facility-Administered Medications   Medication Dose Route Frequency Provider Last Rate Last Admin    acetaminophen (TYLENOL) tablet 975 mg  975 mg Oral Q6H PRN Zan Brown MD   975 mg at 05/03/25 0813    calcium carbonate (TUMS) chewable tablet 1,000 mg  1,000 mg Oral 4x  Daily PRN Alexis Thomas MD   1,000 mg at 05/04/25 0239    glucose gel 15-30 g  15-30 g Oral Q15 Min PRN Zan Brown MD        Or    dextrose 50 % injection 25-50 mL  25-50 mL Intravenous Q15 Min PRN Zan Brown MD        Or    glucagon injection 1 mg  1 mg Subcutaneous Q15 Min PRN Zan Brown MD        HYDROmorphone (DILAUDID) injection 0.2 mg  0.2 mg Intravenous Q3H PRN Lillie Reina MD   0.2 mg at 05/04/25 0227    HYDROmorphone (DILAUDID) injection 0.4 mg  0.4 mg Intravenous Q3H PRN Lillie Reina MD        lidocaine (LMX4) cream   Topical Q1H PRN Alexis Thomas MD        lidocaine 1 % 0.1-1 mL  0.1-1 mL Other Q1H PRN Alexis Thomas MD        naloxone (NARCAN) injection 0.2 mg  0.2 mg Intravenous Q2 Min PRN Alexis Thomas MD        Or    naloxone (NARCAN) injection 0.4 mg  0.4 mg Intravenous Q2 Min PRN Alexis Thomas MD        Or    naloxone (NARCAN) injection 0.2 mg  0.2 mg Intramuscular Q2 Min PRN Alexis Thomas MD        Or    naloxone (NARCAN) injection 0.4 mg  0.4 mg Intramuscular Q2 Min PRN Alexis Thomas MD        ondansetron (ZOFRAN ODT) ODT tab 4 mg  4 mg Oral Q6H PRN Alexis Thomas MD   4 mg at 05/04/25 0749    Or    ondansetron (ZOFRAN) injection 4 mg  4 mg Intravenous Q6H PRN Alexis Thomas MD   4 mg at 05/04/25 0213    prochlorperazine (COMPAZINE) injection 10 mg  10 mg Intravenous Q6H PRN Alexis Thomas MD        Or    prochlorperazine (COMPAZINE) tablet 10 mg  10 mg Oral Q6H PRN Alexis Thomas MD        senna-docusate (SENOKOT-S/PERICOLACE) 8.6-50 MG per tablet 1 tablet  1 tablet Oral BID PRN Alexis Thomas MD        Or    senna-docusate (SENOKOT-S/PERICOLACE) 8.6-50 MG per tablet 2 tablet  2 tablet Oral BID PRN Alexis Thomas MD        sodium chloride (PF) 0.9% PF flush 3 mL  3 mL Intracatheter q1 min prn Alexis Thomas MD                      Physical Exam:   Vitals were reviewed  Blood pressure 109/70, pulse 83,  "temperature 99  F (37.2  C), temperature source Oral, resp. rate 21, height 1.6 m (5' 3\"), weight 48.5 kg (106 lb 14.8 oz), last menstrual period 10/31/2011, SpO2 94%, not currently breastfeeding.  Wt Readings from Last 4 Encounters:   05/02/25 48.5 kg (106 lb 14.8 oz)   03/27/25 47.6 kg (105 lb)   02/11/25 49.8 kg (109 lb 12.6 oz)   01/15/25 52.9 kg (116 lb 11.2 oz)       I/O last 3 completed shifts:  In: 1401.26 [P.O.:1260; I.V.:141.26]  Out: 0       Constitutional: Awake, alert, cooperative, no apparent distress     HEENT AT/NC, KANE, no oral lesions      Neck      Lungs: Clear to auscultation bilaterally, no crackles or wheezing   Cardiovascular: Regular rate and rhythm, normal S1 and S2, and no murmur noted   Abdomen: Normal bowel sounds, soft, non-distended, non-tender   Skin: No rashes, no cyanosis, no edema   Neuro No focal deficits              Data:   All laboratory data and imaging studies reviewed.    CMP  Recent Labs   Lab 05/04/25  0533 05/03/25  1937 05/03/25  1157 05/03/25  0816 05/03/25  0606 05/03/25  0326 05/02/25  1210     --   --   --  138  --  139   POTASSIUM 5.3  --   --   --  4.6  --  4.1   CHLORIDE 103  --   --   --  104  --  100   CO2 19*  --   --   --  19*  --  23   ANIONGAP 16*  --   --   --  15  --  16*   GLC 85 98 100* 88 99   < > 121*   BUN 48.7*  --   --   --  35.2*  --  30.0*   CR 6.33*  --   --   --  4.97*  --  4.59*   GFRESTIMATED 7*  --   --   --  9*  --  10*   KELTON 8.0*  --   --   --  7.4*  --  9.3   PROTTOTAL  --   --   --   --  5.0*  --  6.9   ALBUMIN  --   --   --   --  2.9*  --  4.1   BILITOTAL  --   --   --   --  0.9  --  1.1   ALKPHOS  --   --   --   --  87  --  91   AST  --   --   --   --  158*  --  17   ALT  --   --   --   --  114*  --  14    < > = values in this interval not displayed.     CBC  Recent Labs   Lab 05/04/25  0533 05/03/25  0606 05/02/25  1210   WBC 9.8 19.4* 1.8*   RBC 2.93* 3.01* 3.54*   HGB 9.6* 9.8* 11.5*   HCT 29.4* 30.8* 36.3    102* 103* "   MCH 32.8 32.6 32.5   MCHC 32.7 31.8 31.7   RDW 16.9* 16.9* 16.5*   * 110* 150     INRNo lab results found in last 7 days.  Data   Results for orders placed or performed during the hospital encounter of 05/02/25 (from the past 24 hours)   Glucose by meter   Result Value Ref Range    GLUCOSE BY METER POCT 100 (H) 70 - 99 mg/dL   Blood Culture Arm, Left    Specimen: Arm, Left; Blood   Result Value Ref Range    Culture No growth after 12 hours    Blood Culture Hand, Right    Specimen: Hand, Right; Blood   Result Value Ref Range    Culture No growth after 12 hours    Glucose by meter   Result Value Ref Range    GLUCOSE BY METER POCT 98 70 - 99 mg/dL   CBC with platelets   Result Value Ref Range    WBC Count 9.8 4.0 - 11.0 10e3/uL    RBC Count 2.93 (L) 3.80 - 5.20 10e6/uL    Hemoglobin 9.6 (L) 11.7 - 15.7 g/dL    Hematocrit 29.4 (L) 35.0 - 47.0 %     78 - 100 fL    MCH 32.8 26.5 - 33.0 pg    MCHC 32.7 31.5 - 36.5 g/dL    RDW 16.9 (H) 10.0 - 15.0 %    Platelet Count 120 (L) 150 - 450 10e3/uL   Basic metabolic panel   Result Value Ref Range    Sodium 138 135 - 145 mmol/L    Potassium 5.3 3.4 - 5.3 mmol/L    Chloride 103 98 - 107 mmol/L    Carbon Dioxide (CO2) 19 (L) 22 - 29 mmol/L    Anion Gap 16 (H) 7 - 15 mmol/L    Urea Nitrogen 48.7 (H) 8.0 - 23.0 mg/dL    Creatinine 6.33 (H) 0.51 - 0.95 mg/dL    GFR Estimate 7 (L) >60 mL/min/1.73m2    Calcium 8.0 (L) 8.8 - 10.4 mg/dL    Glucose 85 70 - 99 mg/dL   RBC and Platelet Morphology   Result Value Ref Range    RBC Morphology Confirmed RBC Indices     Platelet Assessment  Automated Count Confirmed. Platelet morphology is normal.     Automated Count Confirmed. Platelet morphology is normal.    Bite Cells Slight (A) None Seen    RBC Fragments Moderate (A) None Seen   EKG 12-lead, tracing only   Result Value Ref Range    Systolic Blood Pressure  mmHg    Diastolic Blood Pressure  mmHg    Ventricular Rate 85 BPM    Atrial Rate 85 BPM    WA Interval 142 ms    QRS  Duration 92 ms     ms    QTc 476 ms    P Axis 41 degrees    R AXIS -9 degrees    T Axis 84 degrees    Interpretation ECG       Sinus rhythm  Nonspecific ST and T wave abnormality  Abnormal ECG  When compared with ECG of 2025 20:19,  Questionable change in QRS axis  T wave inversion no longer evident in Inferior leads  Nonspecific T wave abnormality has replaced inverted T waves in Lateral leads     Echocardiogram Complete   Result Value Ref Range    LVEF  60-65%     [facility]    [id number]  [id number]  [id number]  [id number]^BETTY^SHANE^ANKITA     [facility]  Echocardiography Laboratory  [address] [place] [address]     Name: MARV SEGOVIA  MRN: [id number]  : 1960  Study Date: 2025 08:01 AM  Age: 65 yrs  Gender: Female  Patient Location: [facility]  Reason For Study: Atrial Fibrillation  Ordering Physician: SHANE HERNÁNDEZ  Performed By: Mack Paul RDCS     BSA: 1.5 m2  Height: 63 in  Weight: 106 lb  HR: 79  BP: 172/79 mmHg  ______________________________________________________________________________  Procedure  Echocardiogram with two-dimensional, color and spectral Doppler.  ______________________________________________________________________________  Interpretation Summary     Reviewed chart. Patient with Catheter in RA. [place] Cardiac MRI in 3/25/25  was Negative for Cardiac Amylodosis     The visual ejection fraction is 60-65%.  There is moderate concentric left ventricular hypertrophy.  The left ventricle is normal in structure, function and size.  No regional wall motion abnormalities noted.  The right ventricle is normal in structure, function and size.  Large left pleural effusion  There is no pericardial effusion.     There is an irregular mass in Right atrium measuring 1.7 X 1.2 cm in size. Has  independant motion representing vegetation or thrombus. Possible attachment  to  catheter.  ______________________________________________________________________________  Left Ventricle  The left ventricle is normal in structure, function and size. There is  moderate concentric left ventricular hypertrophy. The visual ejection fraction  is 60-65%. No regional wall motion abnormalities noted.     Right Ventricle  The right ventricle is normal in structure, function and size.     Atria  The left atrium is mildly dilated. Right atrial size is normal.     Mitral Valve  There is trace mitral regurgitation.     Tricuspid Valve  No tricuspid regurgitation.     Aortic Valve  No aortic regurgitation is present.     Pulmonic Valve  The pulmonic valve is not well visualized.     Vessels  The aortic root is normal size. Normal size ascending aorta. The inferior vena  cava is normal.     Pericardium  There is no pericardial effusion. Large left pleural effusion.     ______________________________________________________________________________  MMode/2D Measurements & Calculations  IVSd: 1.6 cm  LVIDd: 3.1 cm  LVIDs: 2.0 cm  LVPWd: 1.4 cm  IVC diam: 1.8 cm  FS: 34.8 %     LV mass(C)d: 165.1 grams  LV mass(C)dI: 111.8 grams/m2  Ao root diam: 2.9 cm  LA dimension: 3.7 cm  asc Aorta Diam: 2.9 cm  LA/Ao: 1.3  Ao root diam index Ht(cm/m): 1.8  Ao root diam index BSA (cm/m2): 2.0  Asc Ao diam index BSA (cm/m2): 2.0  Asc Ao diam index Ht(cm/m): 1.8  LA Volume (BP): 57.3 ml  LA Volume Index (BP): 38.7 ml/m2     RV Base: 3.4 cm  RWT: 0.94  TAPSE: 1.3 cm     Doppler Measurements & Calculations  MV E max julio c: 81.3 cm/sec  MV A max julio c: 57.6 cm/sec  MV E/A: 1.4  MV dec slope: 513.3 cm/sec2  MV dec time: 0.16 sec  TR max julio c: 266.6 cm/sec  TR max P.4 mmHg  E/E' av.3  Lateral E/e': 12.7  Medial E/e': 15.9  RV S Julio C: 10.4 cm/sec     ______________________________________________________________________________  Report approved by: CAPO Vergara on 2025 09:43 AM             TT 30 mins, > 50 %  face-to-face with the patient.  Additional time chart prep, documentation.

## 2025-05-04 NOTE — PROGRESS NOTES
05/04/25 9686   Appointment Info   Signing Clinician's Name / Credentials (PT) Jonathan Odonnell, PT, DPT   Living Environment   People in Home parent(s)  (lives with mother)   Current Living Arrangements house   Home Accessibility stairs to enter home;stairs within home   Number of Stairs, Main Entrance 5   Stair Railings, Main Entrance railings on both sides of stairs   Number of Stairs, Within Home, Primary greater than 10 stairs   Stair Railings, Within Home, Primary railings on both sides of stairs   Transportation Anticipated family or friend will provide   Living Environment Comments Patient lives with spouse in house, has 5 steps to enter with handrails. Has full flight of steps to basement where her bedroom is located with bilateral handrails.   Self-Care   Usual Activity Tolerance good   Current Activity Tolerance fair   Equipment Currently Used at Home none   Fall history within last six months no   Activity/Exercise/Self-Care Comment Patient IND with mobility and cares at baseline, no assistive device. Has hobby farm, and takes care of horses. Assists her mother with med management, getting back into bed sometimes.   General Information   Onset of Illness/Injury or Date of Surgery 05/02/25   Referring Physician Alexis Thomas MD   Patient/Family Therapy Goals Statement (PT) Patient would like to go home   Pertinent History of Current Problem (include personal factors and/or comorbidities that impact the POC) 64 year old female admitted on 5/2/2025. She has ESRD on HD and Amyloidosis. She is on teatment with Dr Ramsey ([facility]).  She presented to the emergency department complaining of new onset of back pain, predominantly right-sided, chills and emesis episode en route to the hospital.  She has abnormal UA, positive costovertebral angle tenderness on the right and CT of the abdomen that shows stranding of the fat around the right kidney   Existing Precautions/Restrictions fall   Cognition   Affect/Mental  Status (Cognition) [facility]   Orientation Status (Cognition) oriented x 4   Follows Commands (Cognition) [facility]   Pain Assessment   Patient Currently in Pain No   Strength (Manual Muscle Testing)   Strength (Manual Muscle Testing) Deficits observed during functional mobility   Bed Mobility   Comment, (Bed Mobility) sit to supine with SBA   Transfers   Comment, (Transfers) sit<>stand with CGA and FWW   Gait/Stairs (Locomotion)   Comment, (Gait/Stairs) ambulation with FWW and CGA   Balance   Balance Comments requires BUE support on FWW   Sensory Examination   Sensory Perception patient reports no sensory changes   Clinical Impression   Criteria for Skilled Therapeutic Intervention Yes, treatment indicated   PT Diagnosis (PT) impaired mobility   Influenced by the following impairments weakness, reduced activity tolerance, impaired balance   Functional limitations due to impairments impaired functional mobility, impaired gait, transfers, bed mobility, stair navigation   Clinical Presentation (PT Evaluation Complexity) stable   Clinical Presentation Rationale clinical judgment   Clinical Decision Making (Complexity) low complexity   Planned Therapy Interventions (PT) balance training;bed mobility training;gait training;neuromuscular re-education;patient/family education;stair training;strengthening;transfer training;progressive activity/exercise   Risk & Benefits of therapy have been explained evaluation/treatment results reviewed;care plan/treatment goals reviewed;risks/benefits reviewed;current/potential barriers reviewed;participants voiced agreement with care plan;participants included;patient   PT Total Evaluation Time   PT Eval, Low Complexity Minutes (74895) 5   Physical Therapy Goals   PT Frequency Daily   PT Predicted Duration/Target Date for Goal Attainment 05/14/25   PT Goals Bed Mobility;Transfers;Gait;Stairs   PT: Bed Mobility Independent;Supine to/from sit   PT: Transfers Independent;Sit to/from stand   PT: Gait  Independent;150 feet   PT: Stairs Modified independent;Greater than 10 stairs;Rail on both sides   Interventions   Interventions Quick Adds Gait Training;Therapeutic Activity   Therapeutic Activity   Therapeutic Activities: dynamic activities to improve functional performance Minutes (78874) 23   Symptoms Noted During/After Treatment Fatigue  (stomach pain)   Treatment Detail/Skilled Intervention Patient seated in recliner at beginning of session, agreeable to participate in PT. VSS throughout. Time during session for line management and room set-up. Patient scooting hips to edge of recliner with supervision. Patient performing sit<>stands throughout session with FWW and initial CGA, progressed to SBA with cues for hand placement. Patient requesting use of commode, RN arriving with commode and patient seated for extended period of time. Assisted with silvia-cares, unable to have BM. Additional bout of ambulation in room. Seated rest breaks in between bouts of ambulation. Patient denies dizziness, lightheadedness, and nausea. Patient requesting return to supine. Performing sit to supine with SBA. Cues for scooting hips laterally to position in middle of bed. Patient supine in bed at end of session with call light next to her and all needs within reach. RN present at end of session.   Gait Training   Gait Training Minutes (43438) 11   Symptoms Noted During/After Treatment (Gait Training) fatigue  (stomach pain)   Treatment Detail/Skilled Intervention Patient completed two bouts of ambulation in room with FWW and CGA. Demonstrating decreased step length and gait velocity, but stable throughout. Patient   Distance in Feet 20', 75'   PT Discharge Planning   PT Plan progress gait distance, initiate stair training as able, repeated sit<>stands, standing balance   PT Discharge Recommendation (DC Rec) home with assist;home with home care physical therapy   PT Rationale for DC Rec Patient below baseline functional mobility.  Presents with weakness, impaired balance, and reduced activity tolerance resulting in the need for CGA and FWW to safely mobilize. Patient lives with her mother in house, has 5 steps to enter and 12 steps to basement where her bedroom is located. Patient prefers to discharge home. If patient able to progress to mod I while in hospital, anticipate that she could discharge home with initial assist for IADL's from family. Recommend HH PT for improvement of strength, activity tolerance, and independence with functional mobility. If patient unable to progress to mod I, may need TCU to address functional mobility deficits.   PT Brief overview of current status CGA for mobility with walker   PT Total Distance Amb During Session (feet) 20   Physical Therapy Time and Intention   Timed Code Treatment Minutes 34   Total Session Time (sum of timed and untimed services) 39

## 2025-05-04 NOTE — PROGRESS NOTES
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      # Dialysis dependent renal failure: ESRD?                -started HD in February               -renal biopsy in January: AL amyloidosis, some ATN and mild IFTA               -3 hrs               -EDW 49.5 kg (typically no UF)               -Dr. Kelly at [facility]     # Access: RIJ TDC. Some tenderness but no other evidence of exit or tunnel site infection.     # CRB: Blood culture is growing GNB from dialysis vs ascending urosepsis.      # Anemia: Mircera 120 mcg q2 weeks (due). Hgb is at target.      # MM: She is getting chemo therapy with her oncologist.      # FEN: No peripheral edema. Acidosis.    # pAfib: on amiodarone    # TTE with 1.7 mass in the right atrium, thrombus vs infected veg.    -started heparin     Plan:  # No urgent indication for dialysis today. Likely that CVC will need to be removed whether whether the 1.7 cm mass is a thrombus or infected vegetation if bubble study is negative. Repeat blood culture on 5/3 wo growth. Urine culture is positive for E coli so this is likely urosepsis from ascending UTI.   # Lokelma to prevent hyperkalemia  # One liter of sodium bicarbonate  # low K diet  # Strict I/O  # No plan for dialysis Monday until now until plan for right atrium mass is finalized     I discussed the case with Dr. Fuentes and the ICU team in person.           Interval History:     Afebrile.   VSS.   She is sitting in the chair wo needing supplemental O2.  Urinated this morning.   TTE showed 1.7 cm mass in the right atrium, thrombus vs vegetation.   Bubble study pending to evaluate for intra0-cardiac shunt before CVC could be removed.   She is started on heparin.  History of pAfib on amiodarone.           Medications and Allergies:     Current Facility-Administered Medications   Medication Dose Route Frequency  "Provider Last Rate Last Admin    acyclovir (ZOVIRAX) capsule 200 mg  200 mg Oral BID Alexis Thomas MD   200 mg at 05/04/25 0802    amiodarone (PACERONE) tablet 200 mg  200 mg Oral Daily Alexis Thomas MD   200 mg at 05/04/25 0802    cefTRIAXone (ROCEPHIN) 2 g vial to attach to  ml bag for ADULTS or NS 50 ml bag for PEDS  2 g Intravenous Q24H Alexis Thomas MD   2 g at 05/04/25 0401    dorzolamide-timolol (COSOPT) ophthalmic solution 1 drop  1 drop Both Eyes BID Alexis Thomas MD   1 drop at 05/04/25 0809    heparin ANTICOAGULANT Loading dose for HIGH INTENSITY TREATMENT * Give BEFORE starting heparin infusion  80 Units/kg Intravenous Once Saul Fuentes MD        latanoprost (XALATAN) 0.005 % ophthalmic solution 1 drop  1 drop Both Eyes At Bedtime Saul Fuentes MD        pantoprazole (PROTONIX) EC tablet 40 mg  40 mg Oral BID AC Saul Fuentes MD   40 mg at 05/04/25 0802    sodium bicarbonate tablet 650 mg  650 mg Oral BID Julio Cesar Hurtado MD   650 mg at 05/04/25 0802    sodium chloride (PF) 0.9% PF flush 3 mL  3 mL Intracatheter Q8H Counts include [facility] beds at the [facility] Alexis Thomas MD   3 mL at 05/04/25 0809        Allergies   Allergen Reactions    Amoxicillin Rash     Face; in early adulthood    Tolerated cephalexin 01/2025    Benzoyl Peroxide Swelling     swelling    Ibuprofen Hives     over long duration dev. hives    Penicillins Rash     Face; in early adulthood.    Tolerated cephalexin 01/2025    Face; in early adulthood      Face; in early adulthood  Tolerated cephalexin 01/2025      Face; in early adulthood.  Tolerated cephalexin 01/2025            Physical Exam:   Vitals were reviewed   , Blood pressure 109/70, pulse 83, temperature 99  F (37.2  C), temperature source Oral, resp. rate 21, height 1.6 m (5' 3\"), weight 48.5 kg (106 lb 14.8 oz), last menstrual period 10/31/2011, SpO2 94%, not currently breastfeeding.    Wt Readings from Last 3 Encounters:   05/02/25 48.5 kg (106 lb 14.8 oz) "   03/27/25 47.6 kg (105 lb)   02/11/25 49.8 kg (109 lb 12.6 oz)       Intake/Output Summary (Last 24 hours) at 5/4/2025 1122  Last data filed at 5/4/2025 0754  Gross per 24 hour   Intake 1110 ml   Output 0 ml   Net 1110 ml       GENERAL APPEARANCE: pleasant, NAD, alert  HEENT:  Eyes/ears/nose/neck grossly normal. Left periorbital edema  RESP: lungs cta b c good efforts, no crackles, rhonchi or wheezes  CV: RRR  ABDOMEN: soft, NT  EXTREMITIES/SKIN: no rashes/lesions on observed skin; no edema  NEUR/PSYCH: Awake, alert and conversing normally         Data:     CBC RESULTS:     Recent Labs   Lab 05/04/25  0533 05/03/25  0606 05/02/25  1210   WBC 9.8 19.4* 1.8*   RBC 2.93* 3.01* 3.54*   HGB 9.6* 9.8* 11.5*   HCT 29.4* 30.8* 36.3   * 110* 150       Basic Metabolic Panel:  Recent Labs   Lab 05/04/25  0533 05/03/25  1937 05/03/25  1157 05/03/25  0816 05/03/25  0606 05/03/25  0449 05/03/25  0326 05/02/25  1210     --   --   --  138  --   --  139   POTASSIUM 5.3  --   --   --  4.6  --   --  4.1   CHLORIDE 103  --   --   --  104  --   --  100   CO2 19*  --   --   --  19*  --   --  23   BUN 48.7*  --   --   --  35.2*  --   --  30.0*   CR 6.33*  --   --   --  4.97*  --   --  4.59*   GLC 85 98 100* 88 99 96   < > 121*   KELTON 8.0*  --   --   --  7.4*  --   --  9.3    < > = values in this interval not displayed.       INRNo lab results found in last 7 days.   Attestation:   I have reviewed today's relevant vital signs, notes, medications, labs and imaging.    Julio Cesar Hurtado MD  [facility] - Nephrology  Office phone :[phone]  Pager: [phone]

## 2025-05-04 NOTE — PROGRESS NOTES
"[facility]  INFECTIOUS DISEASES PROGRESS NOTE  Dulce GIBBONS Anil - Date of initial ID consultation 5/3/2025    Today 5/4/2025  Feeling a little better  Still with significant add / flank pain  ++ gas  Her subsequent BC remain neg    History 5/3/25   65 yo woman with ESRD on HD but does make some urine, AL amyloidosis   Presented to hospital yesterday 5/2/25 with new back pain, R side with chills and vomiting x 1  In the ED, her UA had pyuria c/w infection  CT abd showed stranding around the R kidney  WBC 1.8  Started on iv ceftriaxone   BC from admission are growing E coli    Physical Examination  BP 95/54   Pulse 76   Temp 98  F (36.7  C) (Oral)   Resp 15   Ht 1.6 m (5' 3\")   Wt 52.8 kg (116 lb 4.8 oz)   LMP 10/31/2011   SpO2 96%   BMI 20.60 kg/m    HEENT:, scleral anicteric , no scleral hemorrhages,   Op clear  Neck supple, no EDD   R sided neck and upper chest with permacath in place, no erythema, NT  CV: RRR   Lungs CTA bilat  Abd soft, mild tender R side abd, ND  BacK: +  RCVAT  Ext; no c/c/e, no splinter hemorrhages or nodes    Current Facility-Administered Medications   Medication Dose Route Frequency Provider Last Rate Last Admin    acetaminophen (TYLENOL) tablet 975 mg  975 mg Oral Q6H PRN Zan Brown MD   975 mg at 05/03/25 0813    acyclovir (ZOVIRAX) capsule 200 mg  200 mg Oral BID Alexis Thomas MD   200 mg at 05/04/25 0802    amiodarone (PACERONE) tablet 200 mg  200 mg Oral Daily Alexis Thomas MD   200 mg at 05/04/25 0802    calcium carbonate (TUMS) chewable tablet 1,000 mg  1,000 mg Oral 4x Daily PRN Alexis Thomas MD   1,000 mg at 05/04/25 0239    cefTRIAXone (ROCEPHIN) 2 g vial to attach to  ml bag for ADULTS or NS 50 ml bag for PEDS  2 g Intravenous Q24H Alexis Thomas MD   2 g at 05/04/25 0401    D5W 1,000 mL with sodium bicarbonate 125 mEq/L infusion   Intravenous Continuous Julio Cesar Hurtado  mL/hr at 05/04/25 1800 Rate Verify at " 05/04/25 1800    glucose gel 15-30 g  15-30 g Oral Q15 Min PRN Zan Brown MD        Or    dextrose 50 % injection 25-50 mL  25-50 mL Intravenous Q15 Min PRN Zan Brown MD        Or    glucagon injection 1 mg  1 mg Subcutaneous Q15 Min PRN Zan Brown MD        dorzolamide-timolol (COSOPT) ophthalmic solution 1 drop  1 drop Both Eyes BID Alexis Thomas MD   1 drop at 05/04/25 0809    heparin 25,000 units in 0.45% NaCl 250 mL ANTICOAGULANT infusion  0-5,000 Units/hr Intravenous Continuous Saul Fuentes MD 8.5 mL/hr at 05/04/25 1800 850 Units/hr at 05/04/25 1800    HYDROmorphone (DILAUDID) injection 0.2 mg  0.2 mg Intravenous Q3H PRN Lillie Reina MD   0.2 mg at 05/04/25 0227    HYDROmorphone (DILAUDID) injection 0.4 mg  0.4 mg Intravenous Q3H PRN Lillie Reina MD        latanoprost (XALATAN) 0.005 % ophthalmic solution 1 drop  1 drop Both Eyes At Bedtime Saul Fuentes MD        lidocaine (LMX4) cream   Topical Q1H PRN Alexis Thomas MD        lidocaine 1 % 0.1-1 mL  0.1-1 mL Other Q1H PRN Alexis Thomas MD        naloxone (NARCAN) injection 0.2 mg  0.2 mg Intravenous Q2 Min PRN Alexis Thomas MD        Or    naloxone (NARCAN) injection 0.4 mg  0.4 mg Intravenous Q2 Min PRN Alexis Thomas MD        Or    naloxone (NARCAN) injection 0.2 mg  0.2 mg Intramuscular Q2 Min PRN Alexis Thomas MD        Or    naloxone (NARCAN) injection 0.4 mg  0.4 mg Intramuscular Q2 Min PRN Alexis Thomas MD        ondansetron (ZOFRAN ODT) ODT tab 4 mg  4 mg Oral Q6H PRN Alexis Thomas MD   4 mg at 05/04/25 0749    Or    ondansetron (ZOFRAN) injection 4 mg  4 mg Intravenous Q6H PRN Alexis Thomas MD   4 mg at 05/04/25 0213    pantoprazole (PROTONIX) EC tablet 40 mg  40 mg Oral BID AC Saul Fuentes MD   40 mg at 05/04/25 1745    prochlorperazine (COMPAZINE) injection 10 mg  10 mg Intravenous Q6H PRN Alexis Thomas MD        Or    prochlorperazine  "(COMPAZINE) tablet 10 mg  10 mg Oral Q6H PRAlexis Jarrell MD        senna-docusate (SENOKOT-S/PERICOLACE) 8.6-50 MG per tablet 1 tablet  1 tablet Oral BID PRN Alexis Thomas MD        Or    senna-docusate (SENOKOT-S/PERICOLACE) 8.6-50 MG per tablet 2 tablet  2 tablet Oral BID Alexis Pastor MD        sodium bicarbonate tablet 650 mg  650 mg Oral BID Julio Cesar Hurtado MD   650 mg at 05/04/25 0802    sodium chloride (PF) 0.9% PF flush 3 mL  3 mL Intracatheter Q8H [facility] Alexis Thomas MD   3 mL at 05/04/25 1745    sodium chloride (PF) 0.9% PF flush 3 mL  3 mL Intracatheter q1 min prAlexis Jarrell MD             LABORATORY DATA  WBC   Date Value Ref Range Status   05/28/2020 6.9 4.0 - 11.0 10e9/L Final     WBC Count   Date Value Ref Range Status   05/04/2025 11.7 (H) 4.0 - 11.0 10e3/uL Final       Lab Results   Component Value Date    WBC 19.4 05/03/2025    WBC 6.9 05/28/2020     Lab Results   Component Value Date    RBC 3.01 05/03/2025    RBC 4.30 05/28/2020     Lab Results   Component Value Date    HGB 9.8 05/03/2025    HGB 12.8 05/28/2020     Lab Results   Component Value Date    HCT 30.8 05/03/2025    HCT 40.1 05/28/2020     No components found for: \"MCT\"  Lab Results   Component Value Date     05/03/2025    MCV 93 05/28/2020     Lab Results   Component Value Date    MCH 32.6 05/03/2025    MCH 29.8 05/28/2020     Lab Results   Component Value Date    MCHC 31.8 05/03/2025    MCHC 31.9 05/28/2020     Lab Results   Component Value Date    RDW 16.9 05/03/2025    RDW 13.2 05/28/2020     Lab Results   Component Value Date     05/03/2025     05/28/2020     Last Comprehensive Metabolic Panel:  Sodium   Date Value Ref Range Status   05/04/2025 138 135 - 145 mmol/L Final   05/28/2020 138 133 - 144 mmol/L Final     Potassium   Date Value Ref Range Status   05/04/2025 5.3 3.4 - 5.3 mmol/L Final   11/22/2021 4.0 3.4 - 5.3 mmol/L Final   05/28/2020 4.8 3.4 - 5.3 mmol/L Final     Chloride "   Date Value Ref Range Status   05/04/2025 103 98 - 107 mmol/L Final   11/22/2021 106 94 - 109 mmol/L Final   05/28/2020 109 94 - 109 mmol/L Final     Carbon Dioxide   Date Value Ref Range Status   05/28/2020 24 20 - 32 mmol/L Final     Carbon Dioxide (CO2)   Date Value Ref Range Status   05/04/2025 19 (L) 22 - 29 mmol/L Final   11/22/2021 27 20 - 32 mmol/L Final     Anion Gap   Date Value Ref Range Status   05/04/2025 16 (H) 7 - 15 mmol/L Final   11/22/2021 4 3 - 14 mmol/L Final   05/28/2020 5 3 - 14 mmol/L Final     Glucose   Date Value Ref Range Status   05/04/2025 85 70 - 99 mg/dL Final   11/22/2021 83 70 - 99 mg/dL Final   05/28/2020 90 70 - 99 mg/dL Final     GLUCOSE BY METER POCT   Date Value Ref Range Status   05/03/2025 98 70 - 99 mg/dL Final     Urea Nitrogen   Date Value Ref Range Status   05/04/2025 48.7 (H) 8.0 - 23.0 mg/dL Final   11/22/2021 19 7 - 30 mg/dL Final   05/28/2020 23 7 - 30 mg/dL Final     Creatinine   Date Value Ref Range Status   05/04/2025 6.33 (H) 0.51 - 0.95 mg/dL Final   05/28/2020 0.78 0.52 - 1.04 mg/dL Final     GFR Estimate   Date Value Ref Range Status   05/04/2025 7 (L) >60 mL/min/1.73m2 Final     Comment:     eGFR calculated using 2021 CKD-EPI equation.   05/28/2020 82 >60 mL/min/[1.73_m2] Final     Comment:     Non  GFR Calc  Starting 12/18/2018, serum creatinine based estimated GFR (eGFR) will be   calculated using the Chronic Kidney Disease Epidemiology Collaboration   (CKD-EPI) equation.       Calcium   Date Value Ref Range Status   05/04/2025 8.0 (L) 8.8 - 10.4 mg/dL Final   05/28/2020 8.7 8.5 - 10.1 mg/dL Final     Bilirubin Total   Date Value Ref Range Status   05/03/2025 0.9 <=1.2 mg/dL Final   05/28/2020 0.7 0.2 - 1.3 mg/dL Final     Alkaline Phosphatase   Date Value Ref Range Status   05/03/2025 87 40 - 150 U/L Final   05/28/2020 72 40 - 150 U/L Final     ALT   Date Value Ref Range Status   05/03/2025 114 (H) 0 - 50 U/L Final   05/28/2020 15 0 - 50 U/L  Final     AST   Date Value Ref Range Status   05/03/2025 158 (H) 0 - 45 U/L Final   05/28/2020 13 0 - 45 U/L Final       Radiology  EXAM: CT ABDOMEN PELVIS W/O CONTRAST  LOCATION: [facility]  DATE: 5/2/2025     INDICATION: Back pain chills, nausea and vomiting. History of multiple myeloma, amyloidosis and end-stage renal disease, on hemodialysis.  COMPARISON: PET CT 01/30/2025  TECHNIQUE: CT scan of the abdomen and pelvis was performed without IV contrast. Multiplanar reformats were obtained. Dose reduction techniques were used.  CONTRAST: None.     FINDINGS:   LOWER CHEST: Right IJ hemodialysis catheter tip in high right and. This small bilateral pleural effusions with bibasilar compressive atelectasis is new.     HEPATOBILIARY: Stable hepatic cysts. Layering density within the gallbladder lumen could represent sludge +/- tiny gallstones. No wall thickening nor bile duct dilatation.     PANCREAS: Normal.     SPLEEN: Normal.     ADRENAL GLANDS: Normal.     KIDNEYS/BLADDER: Bilateral perinephric fat stranding/edema, right greater than left. No ureteral calculus nor hydronephrosis. The bladder is nondistended.     BOWEL: Diverticulosis of the colon. No acute inflammatory change. No obstruction. Trace amount of peritoneal fluid in the right abdomen.     LYMPH NODES: No lymphadenopathy.     VASCULATURE: No aortoiliac aneurysm     PELVIC ORGANS: Enlarged myomatous uterus. No focal fluid collection.     MUSCULOSKELETAL: Generalized subcutaneous edema suggesting anasarca.                                                                      IMPRESSION:   1.  Bilateral perinephric fat stranding/edema, greater on the right. No ureteral calculus or hydronephrosis. Findings could may be related to anasarca versus underlying pyelonephritis. Correlation with urinalysis suggested.  2.  Small bilateral pleural effusions, generalized subcutaneous edema and trace peritoneal fluid may be related to  anasarca/fluid overload.  3.  Layering density within the gallbladder lumen suggests layering sludge +/- stones no cholelithiasis nor bile duct dilatation.  MICROBIOLOGY  05/03/2025 [id number]/03/2025 1325  Blood Culture Hand, Right [[id number]]   Blood from Hand, Right   In process  Component Value   No component results      05/03/2025 [id number]/03/2025 1318  Blood Culture Arm, Left [[id number]]   Blood from Arm, Left   In process  Component Value   No component results      05/02/2025 [id number]/03/2025 1229  Urine Culture [[id number]]   (Abnormal)   Urine, Clean Catch   Preliminary result  Component Value   Culture >100,000 CFU/mL Escherichia coli Abnormal  P      05/02/2025 8081115/03/2025 1133  Blood Culture Line, venous [[id number]]   (Abnormal)   Blood from Line, venous   Preliminary result  Component Value   Culture Positive on the 1st day of incubation Abnormal  P    Escherichia coli Panic  P    2 of 2 bottles  Susceptibilities done on previous cultures      05/02/2025 [id number]/02/2025 1328  Influenza A/B, RSV and SARS-CoV2 PCR (COVID-19) Nasopharyngeal [[id number]]    Swab from Nasopharyngeal   Final result  Component Value   Influenza A PCR Negative   Influenza B PCR Negative   RSV PCR Negative   SARS CoV2 PCR Negative   NEGATIVE: SARS-CoV-2 (COVID-19) RNA not detected, presumed negative.   05/02/2025 [id number]/03/2025 1132  Blood Culture Arm, Left [[id number]]   (Abnormal)   Blood from Arm, Left   Preliminary result  Component Value   Culture Positive on the 1st day of incubation Abnormal  P    Escherichia coli Panic  P    2 of 2 bottles      05/02/2025 [id number]/03/2025 0056  Blood Culture ID Panel, PCR [[id number]]    (Abnormal)   Blood from Arm, Left   Final result  Component Value   Enterococcus faecalis Not Detected   Enterococcus faecium Not Detected   Listeria monocytogenes Not Detected   Staphylococcus species Not Detected   Staphylococcus aureus Not Detected   Staphylococcus epidermidis Not Detected    Staphylococcus lugdunensis Not Detected   Streptococcus species Not Detected   Streptococcus agalactiae Not Detected   Streptococcus pneumoniae Not Detected   Streptococcus pyogenes Not Detected   A. baumannii complex Not Detected   Bacteroides fragilis Not Detected   Enterobacter cloacae complex Not Detected   Escherichia coli Detected Abnormal    Escherichia coli detected by [employer] BCID2 assay. Final identification and antimicrobial susceptibility testing will be verified by standard methods. The BCID2 assay will not distinguish E. coli from Shigella species. Specimens containing Shigella species or E. coli will be reported as E. coli detected.   Klebsiella aerogenes Not Detected   Klebsiella oxytoca Not Detected   Klebsiella pneumoniae group Not Detected   Proteus species Not Detected   Salmonella species Not Detected   Serratia marcescens Not Detected   Haemophilus influenzae Not Detected   Neisseria meningitidis Not Detected   Pseudomonas aeruginosa Not Detected   Stenotrophomonas maltophilia Not Detected   CTX-M Not Detected   IMP Not Detected   KPC Not Detected   mcr-1 Not Detected   NDM Not Detected   OXA-48 like Not Detected   VIM Not Detected   Candida albicans Not Detected   Candida auris Not Detected   Candida glabrata Not Detected   Geraldine krusei Not Detected   Candida parapsilosis Not Detected   Candida tropicalis Not Detected   Cryptococcus neoformans/gattii Not Detected       IMPRESSION AND PLAN  65 yo woman   ESRD on HD but makes urine  Now with E coli bacteremia and R sided pyelonephritis    E coli bacteremia\sepsis  R sided pyelonephritis  ESRD on HD with chest permacath    She has both clinical and radiographic evidence of pyelonephritis involving the R kidney and as a good source for  her E coli bacteremia  The concern is seeding the permcath but this is not a given and I think it is reasonable to treat the infection with the hope that the catheter is not infected, while looking for evidence  of this.  Continue the iv ceftriaxone for now  Follow up blood cx  More bc if any fever or chills or other signs/symptoms of ongoing infection  If ongoing + BC , will need to remove the catheter    Today 5/4/2025  Clinically improved  Feeling better and WBC down  Subsequent bc neg so far  May be able to keep her permacath    Recommendations  Continue the iv ceftriaxone   Follow BC, wbc, temp and crp    Rashard Diaz MD

## 2025-05-04 NOTE — PLAN OF CARE
"Goal Outcome Evaluation:      Plan of Care Reviewed With: patient    Overall Patient Progress: improvingOverall Patient Progress: improving    Outcome Evaluation: pt up in chair this shift, walked with PT/OT, denies pain    Shift : 0700 - 1900    Vitals: see flow sheet    Orientation: x4, very pleasant   Pain: denies pain, very sensitive   Tele: SR  Activity: ambulated in room with GB and walker with PT  Resp: LS clear, stable on RA  Diet: very little appetite, drank very little   How to take meds: whole with fluids   GI: no BM, but flatulence   : on external cath, no urine. Did use bedside commode during walk with PT, only 20ml output. Pt is on HD.   Skin: Bruising on left eyelid, right leg.   Lines: PIV x3, patent   Other: possible thrombus on HD cath, see notes. Echo done, and bubble study done, see notes   Plan: decisions due for HD and use of HD cath or to pull it.  Cont with plan of care       Problem: Adult Inpatient Plan of Care  Goal: Plan of Care Review  Description: The Plan of Care Review/Shift note should be completed every shift.  The Outcome Evaluation is a brief statement about your assessment that the patient is improving, declining, or no change.  This information will be displayed automatically on your shiftnote.  Outcome: Progressing  Flowsheets (Taken 5/4/2025 1844)  Outcome Evaluation: pt up in chair this shift, walked with PT/OT, denies pain  Plan of Care Reviewed With: patient  Overall Patient Progress: improving  Goal: Patient-Specific Goal (Individualized)  Description: You can add care plan individualizations to a care plan. Examples of Individualization might be:  \"Parent requests to be called daily at 9am for status\", \"I have a hard time hearing out of my right ear\", or \"Do not touch me to wake me up as it startlesme\".  Outcome: Progressing  Goal: Absence of Hospital-Acquired Illness or Injury  Outcome: Progressing  Intervention: Identify and Manage Fall Risk  Recent Flowsheet " Documentation  Taken 5/4/2025 1800 by Ramona Brooks RN  Safety Promotion/Fall Prevention:   safety round/check completed   nonskid shoes/slippers when out of bed   clutter free environment maintained  Taken 5/4/2025 1400 by Ramona Brooks RN  Safety Promotion/Fall Prevention:   safety round/check completed   nonskid shoes/slippers when out of bed   clutter free environment maintained  Intervention: Prevent Skin Injury  Recent Flowsheet Documentation  Taken 5/4/2025 1800 by Ramona Brooks RN  Body Position: position changed independently  Taken 5/4/2025 1400 by Ramona Brooks RN  Body Position: position changed independently  Taken 5/4/2025 1215 by Ramona Brooks RN  Body Position: weight shifting  Intervention: Prevent and Manage VTE (Venous Thromboembolism) Risk  Recent Flowsheet Documentation  Taken 5/4/2025 1400 by Ramona Brooks RN  VTE Prevention/Management: (on heparin gtt) SCDs off (sequential compression devices)  Goal: Optimal Comfort and Wellbeing  Outcome: Progressing  Intervention: Provide Person-Centered Care  Recent Flowsheet Documentation  Taken 5/4/2025 1400 by Ramona Brooks RN  Trust Relationship/Rapport:   care explained   empathic listening provided   reassurance provided  Goal: Readiness for Transition of Care  Outcome: Progressing     Problem: Sepsis/Septic Shock  Goal: Optimal Coping  Outcome: Progressing  Intervention: Optimize Psychosocial Adjustment to Illness  Recent Flowsheet Documentation  Taken 5/4/2025 1400 by Ramona Brooks RN  Supportive Measures:   active listening utilized   positive reinforcement provided   self-care encouraged  Goal: Absence of Bleeding  Outcome: Progressing  Intervention: Monitor and Manage Bleeding  Recent Flowsheet Documentation  Taken 5/4/2025 1400 by Ramona Brooks RN  Bleeding Precautions:   blood pressure closely monitored   monitored for signs of bleeding  Goal: Blood Glucose Level Within Targeted Range  Outcome: Progressing  Goal:  Absence of Infection Signs and Symptoms  Outcome: Progressing  Intervention: Initiate Sepsis Management  Recent Flowsheet Documentation  Taken 5/4/2025 1400 by Ramona Brooks RN  Infection Management: aseptic technique maintained  Intervention: Promote Stabilization  Recent Flowsheet Documentation  Taken 5/4/2025 1400 by Ramona Brooks RN  Fever Reduction/Comfort Measures:   lightweight clothing   lightweight bedding  Lung Protection Measures: fluid excess minimized  Fluid/Electrolyte Management: fluids adjusted  Intervention: Promote Recovery  Recent Flowsheet Documentation  Taken 5/4/2025 1400 by Ramona Brooks RN  Airway/Ventilation Management: airway patency maintained  Activity Management:   activity adjusted per tolerance   up in chair  Taken 5/4/2025 0916 by Ramona Brooks RN  Activity Management: activity adjusted per tolerance  Goal: Optimal Nutrition Intake  Outcome: Progressing     Problem: Infection  Goal: Absence of Infection Signs and Symptoms  Outcome: Progressing  Intervention: Prevent or Manage Infection  Recent Flowsheet Documentation  Taken 5/4/2025 1400 by Ramona Brooks RN  Fever Reduction/Comfort Measures:   lightweight clothing   lightweight bedding  Infection Management: aseptic technique maintained     Problem: Skin Injury Risk Increased  Goal: Skin Health and Integrity  Outcome: Progressing  Intervention: Plan: Nurse Driven Intervention: Moisture Management  Recent Flowsheet Documentation  Taken 5/4/2025 1400 by Ramona Brooks RN  Moisture Interventions:   Urinary collection device   Incontinence pad   No brief in bed  Intervention: Plan: Nurse Driven Intervention: Friction and Shear  Recent Flowsheet Documentation  Taken 5/4/2025 1400 by Ramona Brooks RN  Friction/Shear Interventions:   HOB 30 degrees or less   Repositioning device (TAP system, etc.)  Intervention: Optimize Skin Protection  Recent Flowsheet Documentation  Taken 5/4/2025 1800 by Ramona Brooks RN  Head of  Bed (HOB) Positioning: HOB at 20-30 degrees  Taken 5/4/2025 1400 by Ramona Brooks RN  Activity Management:   activity adjusted per tolerance   up in chair  Taken 5/4/2025 0916 by Ramona Brooks RN  Activity Management: activity adjusted per tolerance  Intervention: Promote and Optimize Oral Intake  Recent Flowsheet Documentation  Taken 5/4/2025 1400 by Ramona Brooks RN  Oral Nutrition Promotion: rest periods promoted     Problem: Delirium  Goal: Optimal Coping  Outcome: Progressing  Intervention: Optimize Psychosocial Adjustment to Delirium  Recent Flowsheet Documentation  Taken 5/4/2025 1400 by Ramona Brooks RN  Supportive Measures:   active listening utilized   positive reinforcement provided   self-care encouraged  Goal: Improved Behavioral Control  Outcome: Progressing  Intervention: Minimize Safety Risk  Recent Flowsheet Documentation  Taken 5/4/2025 1400 by Ramona Brooks RN  Enhanced Safety Measures: room near unit station  Trust Relationship/Rapport:   care explained   empathic listening provided   reassurance provided  Goal: Improved Attention and Thought Clarity  Outcome: Progressing  Intervention: Maximize Cognitive Function  Recent Flowsheet Documentation  Taken 5/4/2025 1400 by Ramona Brooks RN  Sensory Stimulation Regulation: auditory stimulation minimized  Reorientation Measures: clock in view  Goal: Improved Sleep  Outcome: Progressing

## 2025-05-04 NOTE — PLAN OF CARE
"ICU End of Shift Summary.  For vital signs and complete assessments, please see documentation flowsheets.      Pertinent assessments: Pt oriented x4. VSS. Denies SOB. Lungs clear. Bowels active, reports abd tenderness, no BM this shift. External cath in place, bladder scanned for 50mL.    Major Shift Events: Abdominal pain, nausea and dry heaves, zofran administeredx2 and dilaudid for pain, pt reported relief of symptoms.     Patient had shakes and chills this morning, went into A.fib with rates in 80s, hospitalist paged. Demerol administered and EKG done. Shakes resolved and pt back to NSR.       Plan (Upcoming Events): Continue current plan.   Discharge/Transfer Needs: tbd       Problem: Adult Inpatient Plan of Care  Goal: Plan of Care Review  Description: The Plan of Care Review/Shift note should be completed every shift.  The Outcome Evaluation is a brief statement about your assessment that the patient is improving, declining, or no change.  This information will be displayed automatically on your shiftnote.  Outcome: Progressing  Goal: Patient-Specific Goal (Individualized)  Description: You can add care plan individualizations to a care plan. Examples of Individualization might be:  \"Parent requests to be called daily at 9am for status\", \"I have a hard time hearing out of my right ear\", or \"Do not touch me to wake me up as it startlesme\".  Outcome: Progressing  Goal: Absence of Hospital-Acquired Illness or Injury  Outcome: Progressing  Intervention: Identify and Manage Fall Risk  Recent Flowsheet Documentation  Taken 5/4/2025 0400 by Magda Levin, RN  Safety Promotion/Fall Prevention:   activity supervised   assistive device/personal items within reach   clutter free environment maintained   lighting adjusted   safety round/check completed  Taken 5/4/2025 0030 by Magda Levin, RN  Safety Promotion/Fall Prevention:   activity supervised   assistive device/personal items within reach   clutter " free environment maintained   lighting adjusted   safety round/check completed  Taken 5/3/2025 2030 by Magda Levin RN  Safety Promotion/Fall Prevention:   activity supervised   assistive device/personal items within reach   clutter free environment maintained   lighting adjusted   safety round/check completed  Intervention: Prevent Skin Injury  Recent Flowsheet Documentation  Taken 5/4/2025 0400 by Magda Levin RN  Body Position:   position maintained   refuses positioning  Skin Protection:   adhesive use limited   transparent dressing maintained   tubing/devices free from skin contact  Taken 5/4/2025 0227 by Magda Levin RN  Body Position:   sitting up in bed   position changed independently  Taken 5/4/2025 0031 by Magda Levin RN  Body Position:   left   turned   side-lying  Taken 5/4/2025 0030 by Magda Levin RN  Skin Protection:   adhesive use limited   transparent dressing maintained   tubing/devices free from skin contact  Taken 5/3/2025 2030 by Magda Levin RN  Body Position:   position changed independently   weight shifting   sitting up in bed  Skin Protection:   adhesive use limited   transparent dressing maintained   tubing/devices free from skin contact  Intervention: Prevent and Manage VTE (Venous Thromboembolism) Risk  Recent Flowsheet Documentation  Taken 5/4/2025 0400 by Magda Levin RN  VTE Prevention/Management: (heparin subQ) other (see comments)  Taken 5/3/2025 2030 by Magda Levin RN  VTE Prevention/Management: (hep subq) SCDs off (sequential compression devices)  Goal: Optimal Comfort and Wellbeing  Outcome: Progressing  Intervention: Monitor Pain and Promote Comfort  Recent Flowsheet Documentation  Taken 5/4/2025 0259 by Magda Levin RN  Pain Management Interventions:   heat applied   quiet environment facilitated  Taken 5/4/2025 0230 by Magda Levin RN  Pain Management Interventions:   medication (see  MAR)   essential oils   repositioned  Taken 5/4/2025 0227 by Magda Levin, RN  Pain Management Interventions: medication (see MAR)  Intervention: Provide Person-Centered Care  Recent Flowsheet Documentation  Taken 5/4/2025 0400 by Magda Levin, RN  Trust Relationship/Rapport:   care explained   choices provided   emotional support provided   empathic listening provided   reassurance provided   thoughts/feelings acknowledged  Taken 5/4/2025 0030 by Magda Levin, RN  Trust Relationship/Rapport:   care explained   choices provided   emotional support provided   empathic listening provided   reassurance provided   thoughts/feelings acknowledged  Taken 5/3/2025 2030 by Magda Levin, RN  Trust Relationship/Rapport:   care explained   choices provided   emotional support provided   empathic listening provided   reassurance provided   thoughts/feelings acknowledged  Goal: Readiness for Transition of Care  Outcome: Progressing   Goal Outcome Evaluation:

## 2025-05-05 ENCOUNTER — APPOINTMENT (OUTPATIENT)
Dept: OCCUPATIONAL THERAPY | Facility: CLINIC | Age: 65
End: 2025-05-05
Payer: COMMERCIAL

## 2025-05-05 LAB
ANION GAP SERPL CALCULATED.3IONS-SCNC: 15 MMOL/L (ref 7–15)
ATRIAL RATE - MUSE: 85 BPM
BACTERIA BLD CULT: ABNORMAL
BACTERIA BLD CULT: ABNORMAL
BUN SERPL-MCNC: 59.9 MG/DL (ref 8–23)
CALCIUM SERPL-MCNC: 7.6 MG/DL (ref 8.8–10.4)
CHLORIDE SERPL-SCNC: 97 MMOL/L (ref 98–107)
CREAT SERPL-MCNC: 6.26 MG/DL (ref 0.51–0.95)
DIASTOLIC BLOOD PRESSURE - MUSE: NORMAL MMHG
EGFRCR SERPLBLD CKD-EPI 2021: 7 ML/MIN/1.73M2
ERYTHROCYTE [DISTWIDTH] IN BLOOD BY AUTOMATED COUNT: 16.8 % (ref 10–15)
GLUCOSE SERPL-MCNC: 102 MG/DL (ref 70–99)
HBV SURFACE AB SERPL IA-ACNC: <3.5 M[IU]/ML
HBV SURFACE AB SERPL IA-ACNC: NONREACTIVE M[IU]/ML
HBV SURFACE AG SERPL QL IA: NONREACTIVE
HCO3 SERPL-SCNC: 22 MMOL/L (ref 22–29)
HCT VFR BLD AUTO: 25.2 % (ref 35–47)
HGB BLD-MCNC: 8.3 G/DL (ref 11.7–15.7)
INTERPRETATION ECG - MUSE: NORMAL
MCH RBC QN AUTO: 32.7 PG (ref 26.5–33)
MCHC RBC AUTO-ENTMCNC: 32.9 G/DL (ref 31.5–36.5)
MCV RBC AUTO: 99 FL (ref 78–100)
P AXIS - MUSE: 41 DEGREES
PLATELET # BLD AUTO: 95 10E3/UL (ref 150–450)
POTASSIUM SERPL-SCNC: 4 MMOL/L (ref 3.4–5.3)
PR INTERVAL - MUSE: 142 MS
QRS DURATION - MUSE: 92 MS
QT - MUSE: 400 MS
QTC - MUSE: 476 MS
R AXIS - MUSE: -9 DEGREES
RBC # BLD AUTO: 2.54 10E6/UL (ref 3.8–5.2)
SODIUM SERPL-SCNC: 134 MMOL/L (ref 135–145)
SYSTOLIC BLOOD PRESSURE - MUSE: NORMAL MMHG
T AXIS - MUSE: 84 DEGREES
UFH PPP CHRO-ACNC: 0.11 IU/ML
UFH PPP CHRO-ACNC: 0.26 IU/ML
UFH PPP CHRO-ACNC: 0.31 IU/ML
UFH PPP CHRO-ACNC: 0.38 IU/ML
VENTRICULAR RATE- MUSE: 85 BPM
WBC # BLD AUTO: 8.6 10E3/UL (ref 4–11)

## 2025-05-05 PROCEDURE — 250N000013 HC RX MED GY IP 250 OP 250 PS 637: Performed by: HOSPITALIST

## 2025-05-05 PROCEDURE — 36415 COLL VENOUS BLD VENIPUNCTURE: CPT | Performed by: HOSPITALIST

## 2025-05-05 PROCEDURE — 87340 HEPATITIS B SURFACE AG IA: CPT | Performed by: INTERNAL MEDICINE

## 2025-05-05 PROCEDURE — 250N000013 HC RX MED GY IP 250 OP 250 PS 637: Performed by: INTERNAL MEDICINE

## 2025-05-05 PROCEDURE — 99232 SBSQ HOSP IP/OBS MODERATE 35: CPT | Performed by: INTERNAL MEDICINE

## 2025-05-05 PROCEDURE — 85520 HEPARIN ASSAY: CPT | Performed by: INTERNAL MEDICINE

## 2025-05-05 PROCEDURE — 120N000004 HC R&B MS OVERFLOW

## 2025-05-05 PROCEDURE — 85520 HEPARIN ASSAY: CPT | Performed by: HOSPITALIST

## 2025-05-05 PROCEDURE — 99232 SBSQ HOSP IP/OBS MODERATE 35: CPT | Performed by: SPECIALIST

## 2025-05-05 PROCEDURE — 99233 SBSQ HOSP IP/OBS HIGH 50: CPT | Performed by: INTERNAL MEDICINE

## 2025-05-05 PROCEDURE — 97535 SELF CARE MNGMENT TRAINING: CPT | Mod: GO | Performed by: OCCUPATIONAL THERAPIST

## 2025-05-05 PROCEDURE — 36415 COLL VENOUS BLD VENIPUNCTURE: CPT | Performed by: INTERNAL MEDICINE

## 2025-05-05 PROCEDURE — 85027 COMPLETE CBC AUTOMATED: CPT | Performed by: INTERNAL MEDICINE

## 2025-05-05 PROCEDURE — 250N000011 HC RX IP 250 OP 636: Performed by: INTERNAL MEDICINE

## 2025-05-05 PROCEDURE — 86706 HEP B SURFACE ANTIBODY: CPT | Performed by: INTERNAL MEDICINE

## 2025-05-05 PROCEDURE — 250N000011 HC RX IP 250 OP 636: Performed by: HOSPITALIST

## 2025-05-05 PROCEDURE — 80048 BASIC METABOLIC PNL TOTAL CA: CPT | Performed by: INTERNAL MEDICINE

## 2025-05-05 RX ORDER — B COMPLEX C NO.10/FOLIC ACID 900MCG/5ML
5 LIQUID (ML) ORAL DAILY
Status: DISCONTINUED | OUTPATIENT
Start: 2025-05-05 | End: 2025-05-07 | Stop reason: HOSPADM

## 2025-05-05 RX ADMIN — CEFTRIAXONE 2 G: 2 INJECTION, POWDER, FOR SOLUTION INTRAMUSCULAR; INTRAVENOUS at 04:23

## 2025-05-05 RX ADMIN — PANTOPRAZOLE SODIUM 40 MG: 40 TABLET, DELAYED RELEASE ORAL at 15:48

## 2025-05-05 RX ADMIN — DORZOLAMIDE HYDROCHLORIDE AND TIMOLOL MALEATE 1 DROP: 20; 5 SOLUTION OPHTHALMIC at 20:52

## 2025-05-05 RX ADMIN — HEPARIN SODIUM 1600 UNITS/HR: 10000 INJECTION, SOLUTION INTRAVENOUS at 19:04

## 2025-05-05 RX ADMIN — SODIUM BICARBONATE 650 MG: 650 TABLET ORAL at 08:42

## 2025-05-05 RX ADMIN — DORZOLAMIDE HYDROCHLORIDE AND TIMOLOL MALEATE 1 DROP: 20; 5 SOLUTION OPHTHALMIC at 08:45

## 2025-05-05 RX ADMIN — Medication 5 ML: at 12:08

## 2025-05-05 RX ADMIN — AMIODARONE HYDROCHLORIDE 200 MG: 200 TABLET ORAL at 08:42

## 2025-05-05 RX ADMIN — HEPARIN SODIUM 1300 UNITS/HR: 10000 INJECTION, SOLUTION INTRAVENOUS at 02:41

## 2025-05-05 RX ADMIN — PANTOPRAZOLE SODIUM 40 MG: 40 TABLET, DELAYED RELEASE ORAL at 08:42

## 2025-05-05 RX ADMIN — SODIUM BICARBONATE 650 MG: 650 TABLET ORAL at 20:52

## 2025-05-05 RX ADMIN — SENNOSIDES AND DOCUSATE SODIUM 1 TABLET: 50; 8.6 TABLET ORAL at 20:52

## 2025-05-05 RX ADMIN — ACYCLOVIR 200 MG: 200 CAPSULE ORAL at 08:42

## 2025-05-05 RX ADMIN — ACYCLOVIR 200 MG: 200 CAPSULE ORAL at 20:52

## 2025-05-05 ASSESSMENT — ACTIVITIES OF DAILY LIVING (ADL)
ADLS_ACUITY_SCORE: 42
ADLS_ACUITY_SCORE: 39
ADLS_ACUITY_SCORE: 42
ADLS_ACUITY_SCORE: 39
ADLS_ACUITY_SCORE: 42

## 2025-05-05 NOTE — PLAN OF CARE
"Pt oriented x4.   VSS on RA, was on 2L NC when sleeping. Lung sounds clear, denies SOB.   Sinus rhythm on tele.   Bowels active, miralax given, pt had one BM this shift.   No urine output this shift.     Heparin gtt infusing, rates adjusted per protocol.     Plan: Continue current plan of care, antibiotics admin, blood cultures monitoring. TBD on Dialysis.       Plan of Care Reviewed With: patient    Overall Patient Progress: improvingOverall Patient Progress: improving    Outcome Evaluation: Pt slept better tonight. Pain managed w/ heat therapy. Pt had bowel movement, reports abdominal pain relief.      Problem: Adult Inpatient Plan of Care  Goal: Plan of Care Review  Description: The Plan of Care Review/Shift note should be completed every shift.  The Outcome Evaluation is a brief statement about your assessment that the patient is improving, declining, or no change.  This information will be displayed automatically on your shiftnote.  Outcome: Progressing  Flowsheets (Taken 5/5/2025 6470)  Outcome Evaluation: Pt slept better tonight. Pain managed w/ heat therapy. Pt had bowel movement, reports abdominal pain relief.  Plan of Care Reviewed With: patient  Overall Patient Progress: improving  Goal: Patient-Specific Goal (Individualized)  Description: You can add care plan individualizations to a care plan. Examples of Individualization might be:  \"Parent requests to be called daily at 9am for status\", \"I have a hard time hearing out of my right ear\", or \"Do not touch me to wake me up as it startlesme\".  Outcome: Progressing  Goal: Absence of Hospital-Acquired Illness or Injury  Outcome: Progressing  Intervention: Identify and Manage Fall Risk  Recent Flowsheet Documentation  Taken 5/5/2025 0400 by Magda Levin, RN  Safety Promotion/Fall Prevention:   activity supervised   assistive device/personal items within reach   clutter free environment maintained   nonskid shoes/slippers when out of bed   room door " open   supervised activity   safety round/check completed  Taken 5/4/2025 2000 by Magda Levin RN  Safety Promotion/Fall Prevention:   activity supervised   assistive device/personal items within reach   clutter free environment maintained   nonskid shoes/slippers when out of bed   room door open   supervised activity   safety round/check completed  Intervention: Prevent Skin Injury  Recent Flowsheet Documentation  Taken 5/5/2025 0400 by Magda Levin RN  Body Position: position maintained  Skin Protection:   adhesive use limited   transparent dressing maintained   tubing/devices free from skin contact  Taken 5/5/2025 0320 by Magda Levin RN  Body Position:   left   turned   position changed independently   side-lying  Taken 5/4/2025 2000 by Magda Levin RN  Body Position:   position changed independently   weight shifting   sitting up in bed  Skin Protection:   adhesive use limited   transparent dressing maintained   tubing/devices free from skin contact  Intervention: Prevent and Manage VTE (Venous Thromboembolism) Risk  Recent Flowsheet Documentation  Taken 5/5/2025 0400 by Magda Levin RN  VTE Prevention/Management: (heparin gtt) other (see comments)  Taken 5/4/2025 2000 by Magda Levin RN  VTE Prevention/Management: (heparin gtt) other (see comments)  Goal: Optimal Comfort and Wellbeing  Outcome: Progressing  Intervention: Monitor Pain and Promote Comfort  Recent Flowsheet Documentation  Taken 5/4/2025 2000 by Magda Levin RN  Pain Management Interventions:   care clustered   distraction   essential oils   pillow support provided   heat applied  Intervention: Provide Person-Centered Care  Recent Flowsheet Documentation  Taken 5/5/2025 0400 by Magda Levin RN  Trust Relationship/Rapport:   care explained   choices provided   emotional support provided   empathic listening provided   questions answered   questions encouraged   reassurance  provided   thoughts/feelings acknowledged  Taken 5/4/2025 2000 by Magda Levin, RN  Trust Relationship/Rapport:   care explained   choices provided   emotional support provided   empathic listening provided   questions answered   questions encouraged   reassurance provided   thoughts/feelings acknowledged  Goal: Readiness for Transition of Care  Outcome: Progressing

## 2025-05-05 NOTE — PROGRESS NOTES
"Oncology/Hematology Follow Up Note:    Assessment and Plan:  #1 Pyelonephritis with bacteremia  - Unrelated to amyloidosis.  Most likely also unrelated to amyloidosis treatment  - Improving clinically.    PLAN:  - Defer to ID for management    #2 AL amyloidosis  - With kidney involvement.  Possible heart involvement though ECHO and MRI results are reassuring.  - On Darzalex, Velcade, Cytoxan, and dex  - Has had a partial response to treatment so far    PLAN:  - OK to hold Velcade, Cytoxan, and dex that are due tomorrow.  Plan to restart treatment next week.    Malachi Brown M.D.  [facility]  [phone]    Subjective:    Patient is now afebrile with stable vital signs.  Flank pain has improved.      Labs:  All labs reviewed    CBC  Recent Labs   Lab 05/05/25  0523 05/04/25  1231 05/04/25  0533   WBC 8.6 11.7* 9.8   HGB 8.3* 9.6* 9.6*   MCV 99 100 100   PLT 95* 114* 120*       CMP  Recent Labs   Lab 05/05/25  0523 05/04/25  1819 05/04/25  0533 05/03/25  1937 05/03/25  0816 05/03/25  0606 05/03/25  0326 05/02/25  1210   * 134* 138  --   --  138  --  139   POTASSIUM 4.0 4.5 5.3  --   --  4.6  --  4.1   CHLORIDE 97* 98 103  --   --  104  --  100   CO2 22 21* 19*  --   --  19*  --  23   ANIONGAP 15 15 16*  --   --  15  --  16*   * 156* 85 98   < > 99   < > 121*   BUN 59.9* 53.4* 48.7*  --   --  35.2*  --  30.0*   CR 6.26* 6.32* 6.33*  --   --  4.97*  --  4.59*   GFRESTIMATED 7* 7* 7*  --   --  9*  --  10*   KELTON 7.6* 7.9* 8.0*  --   --  7.4*  --  9.3   PROTTOTAL  --   --   --   --   --  5.0*  --  6.9   ALBUMIN  --   --   --   --   --  2.9*  --  4.1   BILITOTAL  --   --   --   --   --  0.9  --  1.1   ALKPHOS  --   --   --   --   --  87  --  91   AST  --   --   --   --   --  158*  --  17   ALT  --   --   --   --   --  114*  --  14    < > = values in this interval not displayed.       INRNo lab results found in last 7 days.    Blood CultureNo results for input(s): \"CULT\" in the last 168 hours.      Malachi Y. " MD Kevin  [facility]  5/5/2025 4:50 PM

## 2025-05-05 NOTE — PROGRESS NOTES
[facility]  Hospitalist Progress Note  Saul Fuentes MD 05/05/2025    Reason for Stay (Diagnosis): ecoli sepsis, pyelonephritis, R atrial thrombus         Assessment and Plan:      Summary of Stay: Dulce Ma is a 64 year old female admitted on 5/2/2025. She has ESRD on HD and Amyloidosis. She is on teatment with Dr Ramsey ([facility]).  She presented to the emergency department complaining of new onset of back pain, predominantly right-sided, chills and emesis episode en route to the hospital.  She has abnormal UA, positive costovertebral angle tenderness on the right and CT of the abdomen that shows stranding of the fat around the right kidney      Patient's hospital course was complicated by hypotension and bradycardia the evening of admission.  She received a dose of atropine and a dose of IV epinephrine.  Bradycardia resolved.  She is currently on norepinephrine for blood pressure support which is being weaned.     Since admission blood and urine cultures returned positive for sensitive E. Coli.      Patient remains on IV Rocephin for treatment of sepsis due to E. coli bacteremia and pyelonephritis     Given her tunneled dialysis catheter, infection disease was consulted to see if her line needed to be removed.  At this point infection disease feels that if repeat blood cultures are negative, line may be able to remain in place    Further workup included echocardiogram on 5/4 in the setting of atrial fibrillation.  Echocardiogram notable for right atrial thrombus, possibly adhered to patient's dialysis catheter.  Given finding of right atrial thrombus, and possible need for dialysis catheter removal, a follow-up limited echo was done to rule out a PFO.  The patient did have mild bubbles appearing late which did not appear to come from the cardiac septum (i.e. not a PFO), and more likely from pulmonary vein suggesting pulmonary AVM or shunting     Plans today:  -Continue Rocephin  - Repeat  blood cultures show no growth  -Remains on IV heparin infusion given right atrial thrombus  -Medically stable to transfer out of ICU        Sepsis due to E. coli bacteremia and right-sided pyelonephritis  -CT abdomen on admission with findings consistent with right-sided pyelonephritis  -Blood and urine cultures positive for pansensitive E. coli  -Vasopressor weaned off 5/3  -Continue IV Rocephin  -Infection disease consulted to address the question of whether the patient's tunneled dialysis catheter needs to be removed in the setting of bacteremia.  Repeat blood cultures have been obtained and are no growth to date    Right atrial thrombus  -Incidentally found on echocardiogram, which was being done to evaluate atrial fibrillation  -Thrombus likely adhered to dialysis catheter  -Given possible need for dialysis catheter removal in the setting of bacteremia, follow-up limited echocardiogram was done to rule out PFO.  The patient did have mild bubbles appearing late which did not appear to come from the cardiac septum (i.e. not a PFO), and more likely from pulmonary vein suggesting pulmonary AVM or shunting  - Continue IV heparin infusion with anticipated transition to DOAC prior to discharge     End-stage renal disease on hemodialysis.  - Nephrology consult requested  -Normally dialyzes 3 times a week  -Her nephrologist is Dr. Kelly     AL Amyloidosis.    -Follows with Dr. Ramsey of oncology  -Treated with once a week doses of Cytoxan and dexamethasone     Leukocytopenia with normal low ANC and lymphocytopenia on admit labs.  -Likely related to Cytoxan  - resolved and now has leukocytosis related to sepsis     History of PAF.  -On amiodarone.  -Does not appear to be on an anticoagulant chronically  - Started on IV heparin infusion with anticipated transition to DOAC prior to discharge given right atrial thrombus found on TTE this admission (see above)    Lactic acidosis associated with sepsis     Thrombocytopenia   -  "platelet count near 100        DVT Prophylaxis: heparin infusion  GI ppx:  PPI  Butler Catheter: Not present  Lines: tunneled dialysis catheter     Cardiac Monitoring: None  Code Status: Full CodeFULL CODE   Discharge Dispo: Home anticipated  Medically Ready for Discharge: Anticipated in 2-4 Days after further treatment of sepsis and improvement in symptoms           Interval History (Subjective):      Continues to feel achy.  No other complaints today.  Discussed with patient results of echocardiogram showing right atrial thrombus.                  Physical Exam:      Last Vital Signs:  /60 (BP Location: Right arm)   Pulse 76   Temp 98.4  F (36.9  C) (Oral)   Resp 13   Ht 1.6 m (5' 3\")   Wt 52.8 kg (116 lb 4.8 oz)   LMP 10/31/2011   SpO2 100%   BMI 20.60 kg/m        Intake/Output Summary (Last 24 hours) at 5/5/2025 0859  Last data filed at 5/5/2025 0400  Gross per 24 hour   Intake 1682.12 ml   Output 20 ml   Net 1662.12 ml       Constitutional: Awake, alert, cooperative, no apparent distress     Respiratory: Clear to auscultation bilaterally, no crackles or wheezing   Cardiovascular: Regular rate and rhythm, normal S1 and S2, and no murmur noted   Abdomen: Normal bowel sounds, soft, non-distended, non-tender   Skin: No rashes, no cyanosis, dry to touch   Neuro: Alert and oriented x3, no weakness, numbness, memory loss   Extremities: No edema, normal range of motion   Other(s):        All other systems: Negative          Medications:      All current medications were reviewed with changes reflected in problem list.         Data:      All new lab and imaging data was reviewed.   Labs:       Lab Results   Component Value Date     05/05/2025     05/04/2025     05/04/2025     05/28/2020     01/11/2019     07/22/2016    Lab Results   Component Value Date    CHLORIDE 97 05/05/2025    CHLORIDE 98 05/04/2025    CHLORIDE 103 05/04/2025    CHLORIDE 106 11/22/2021    CHLORIDE " 109 2020    CHLORIDE 108 2019    CHLORIDE 105 2016    Lab Results   Component Value Date    BUN 59.9 2025    BUN 53.4 2025    BUN 48.7 2025    BUN 19 2021    BUN 23 2020    BUN 16 2019    BUN 17 2016      Lab Results   Component Value Date    POTASSIUM 4.0 2025    POTASSIUM 4.5 2025    POTASSIUM 5.3 2025    POTASSIUM 4.0 2021    POTASSIUM 4.8 2020    POTASSIUM 4.2 2019    POTASSIUM 4.6 2016    Lab Results   Component Value Date    CO2 22 2025    CO2 21 2025    CO2 19 2025    CO2 27 2021    CO2 24 2020    CO2 24 2019    CO2 27 2016    Lab Results   Component Value Date    CR 6.26 2025    CR 6.32 2025    CR 6.33 2025    CR 0.78 2020    CR 0.81 2019    CR 0.73 2016        Recent Labs   Lab 25  05   WBC 8.6   HGB 8.3*   HCT 25.2*   MCV 99   PLT 95*      Imaging:   Recent Results (from the past 24 hours)   Echocardiogram Limited    Narrative    [id number]  [id number]  [id number]  [id number]^ZOEY^ROBBIN^AUGUSTINA     [facility]  Echocardiography Laboratory  [address] [place] [address]     Name: MARV SEGOVIA  MRN: [id number]  : 1960  Study Date: 2025 01:32 PM  Age: 65 yrs  Gender: Female  Patient [facility]  Reason For Study: Cardiac Thrombus  Ordering Physician: ROBBIN GREER  Performed By: Mack Paul RDCS     ______________________________________________________________________________  Procedure  Bubble Limited Echocardiogram with two-dimensional imaging.  ______________________________________________________________________________  Interpretation Summary     Right atrial mass noted as on previous study.  There are mild bubbles appearing late and do NOT appear to come from septum,  more likely from pulmonary vein suggesting pulmonary AVM or  shunting.  ______________________________________________________________________________  ______________________________________________________________________________  Report approved by: Jayna Dankle, MD on 05/04/2025 03:14 PM     ______________________________________________________________________________

## 2025-05-05 NOTE — PROGRESS NOTES
[facility]    Infectious Disease Progress Note    Date of Service : 05/05/2025       Assessment:  65 yo woman   ESRD on HD. Now admitted with E coli bacteremia and R sided pyelonephritis     E coli bacteremia\sepsis  R sided pyelonephritis  ESRD on HD with chest permacath     She has both clinical and radiographic evidence of pyelonephritis involving the R kidney and as a good source for  her E coli bacteremia  The concern is seeding the permcath but this is not a given and I think it is reasonable to treat the infection with the hope that the catheter is not infected    Recommendations:  Continue ceftriaxone . Susceptibility data noted. Transition to oral Ciprofloxacin once clinically improved  Follow up blood cx. Blood cxs from 5/3 remain negative  If ongoing + BC , will need to remove the catheter    Aimee Bajwa MD    Interval History   Improving clinically, has remained afebrile with resolved leukocytosis. Tolerating antibiotics without side effects, some ongoing flank pain      Physical Exam   Temp: 98.4  F (36.9  C) Temp src: Oral BP: 124/67 Pulse: 71   Resp: 14 SpO2: 99 % O2 Device: None (Room air) Oxygen Delivery: 2 LPM  Vitals:    05/02/25 1858 05/04/25 1226   Weight: 48.5 kg (106 lb 14.8 oz) 52.8 kg (116 lb 4.8 oz)     Vital Signs with Ranges  Temp:  [98  F (36.7  C)-98.8  F (37.1  C)] 98.4  F (36.9  C)  Pulse:  [70-81] 71  Resp:  [9-22] 14  BP: (100-141)/(57-76) 124/67  SpO2:  [91 %-100 %] 99 %    Constitutional: Awake, alert, cooperative, no apparent distress  Lungs: non labored breathing  Chest: Right sided tunnel catheter site intact  Skin: No rash    Other:    Medications   Current Facility-Administered Medications   Medication Dose Route Frequency Provider Last Rate Last Admin    heparin 25,000 units in 0.45% NaCl 250 mL ANTICOAGULANT infusion  0-5,000 Units/hr Intravenous Continuous Saul Fuentes MD 13 mL/hr at 05/05/25 1100 1,300 Units/hr at 05/05/25 1100     Current  Facility-Administered Medications   Medication Dose Route Frequency Provider Last Rate Last Admin    acyclovir (ZOVIRAX) capsule 200 mg  200 mg Oral BID Alexis Thomas MD   200 mg at 05/05/25 0842    amiodarone (PACERONE) tablet 200 mg  200 mg Oral Daily Alexis Thomas MD   200 mg at 05/05/25 0842    B and C vitamin Complex with folic acid (NEPHRONEX) liquid 5 mL  5 mL Oral Daily Saul Fuentes MD   5 mL at 05/05/25 1208    cefTRIAXone (ROCEPHIN) 2 g vial to attach to  ml bag for ADULTS or NS 50 ml bag for PEDS  2 g Intravenous Q24H Alexis Thomas MD   2 g at 05/05/25 0423    dorzolamide-timolol (COSOPT) ophthalmic solution 1 drop  1 drop Both Eyes BID Alexis Thomas MD   1 drop at 05/05/25 0845    latanoprost (XALATAN) 0.005 % ophthalmic solution 1 drop  1 drop Both Eyes At Bedtime Saul Fuentse MD        pantoprazole (PROTONIX) EC tablet 40 mg  40 mg Oral BID AC Saul Fuentes MD   40 mg at 05/05/25 0842    sodium bicarbonate tablet 650 mg  650 mg Oral BID Julio Cesar Hurtado MD   650 mg at 05/05/25 0842    sodium chloride (PF) 0.9% PF flush 3 mL  3 mL Intracatheter Q8H [facility] Alexis Thomas MD   3 mL at 05/05/25 0844       Data   All microbiology laboratory data reviewed.  Recent Labs   Lab Test 05/05/25 0523 05/04/25  1231 05/04/25  0533   WBC 8.6 11.7* 9.8   HGB 8.3* 9.6* 9.6*   HCT 25.2* 29.1* 29.4*   MCV 99 100 100   PLT 95* 114* 120*     Recent Labs   Lab Test 05/05/25  0523 05/04/25  1819 05/04/25  0533   CR 6.26* 6.32* 6.33*     05/03/2025 1303 05/04/2025 1605 Blood Culture Hand, Right [[id number]]   Blood from Hand, Right    Preliminary result Component Value   Culture No growth after 1 day P             05/03/2025 1302 05/04/2025 1605 Blood Culture Arm, Left [[id number]]   Blood from Arm, Left    Preliminary result Component Value   Culture No growth after 1 day P             05/02/2025 1538 05/04/2025 0010 Urine Culture [[id number]]    (Abnormal)   Urine,  Clean Catch    Final result Component Value   Culture >100,000 CFU/mL Escherichia coli Abnormal        Susceptibility     Escherichia coli     DINESH     Ampicillin <=2 ug/mL Susceptible     Ampicillin/ Sulbactam <=2 ug/mL Susceptible     Cefazolin 2 ug/mL Susceptible     Cefepime <=0.12 ug/mL Susceptible     Ceftazidime <=0.5 ug/mL Susceptible     Ceftriaxone <=0.25 ug/mL Susceptible     Ciprofloxacin <=0.06 ug/mL Susceptible     Ertapenem <=0.12 ug/mL Susceptible *     Extended Spectrum Beta-Lactamase Negative ug/mL ESBL Negative *     Gentamicin <=1 ug/mL Susceptible     Levofloxacin <=0.12 ug/mL Susceptible     Meropenem <=0.25 ug/mL Susceptible *     Nitrofurantoin <=16 ug/mL Susceptible     Piperacillin/Tazobactam <=4 ug/mL Susceptible     Trimethoprim/Sulfamethoxazole <=1/19 ug/mL Susceptible

## 2025-05-05 NOTE — PROGRESS NOTES
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      # Dialysis dependent renal failure: ESRD?                -started HD in February               -renal biopsy in January: AL amyloidosis, some ATN and mild IFTA               -3 hrs               -EDW 49.5 kg (typically no UF)               -Dr. Kelly at [facility]     # Access: RIJ TDC. Some tenderness but no other evidence of exit or tunnel site infection.    Initial catheter placed in August 2024  That catheter was exchanged approximately 2 months ago secondary to poor function  Suspect that that may have been clotted as well     # CRB: Blood culture is growing GNB from dialysis vs ascending urosepsis.     Follow-up cultures remain negative     # Anemia: Mircera 120 mcg q2 weeks (due). Hgb is at target.      # MM: She is getting chemo therapy with her oncologist.      # FEN: No peripheral edema. Acidosis.     # pAfib: on amiodarone     # TTE with 1.7 mass in the right atrium, thrombus vs infected veg.                -started heparin   -no apparent PFO    No dialysis today  Continue heparin infusion  Will need transition to oral anticoagulation    Plan dialysis tomorrow  Dialysis orders entered      Interval History:     Up in bedside chair  Comfortable  Review dialysis history with the patient  Reviewed her access history.    Discussed her current laboratory studies  Reviewed plan for potential dialysis tomorrow    Above reviewed in detail with ICU staff    ROS:     GENERAL: NAD, No fever,chills  E/M: NEGATIVE for ear, mouth and throat problems  R: NEGATIVE for significant cough or SOB  CV: NEGATIVE for chest pain, palpitations  GI: NEGATIVE for abdominal pain, heartburn, nausea,  vomiting or change in bowel pattern  EXT: no change edema  ROS otherwise negative    Medications and Allergies:     Reviewed    Physical Exam:     Vitals were reviewed  Patient Vitals for the past 8 hrs:   BP Temp Temp src Pulse Resp SpO2   05/05/25 0900 -- -- -- 79 14 98 %   05/05/25 0800 120/75 98.4  F (36.9  C) Oral 75 13 96 %   05/05/25 0700 -- -- -- 73 13 (!) 91 %   05/05/25 0600 -- -- -- 81 13 98 %   05/05/25 0500 -- -- -- 76 13 100 %   05/05/25 0400 105/60 98.8  F (37.1  C) Axillary 71 12 100 %     Wt Readings from Last 4 Encounters:   05/04/25 52.8 kg (116 lb 4.8 oz)   03/27/25 47.6 kg (105 lb)   02/11/25 49.8 kg (109 lb 12.6 oz)   01/15/25 52.9 kg (116 lb 11.2 oz)     I/O last 3 completed shifts:  In: 1682.12 [P.O.:500; I.V.:1182.12]  Out: 20 [Urine:20]    Vitals:    05/02/25 1858 05/04/25 1226   Weight: 48.5 kg (106 lb 14.8 oz) 52.8 kg (116 lb 4.8 oz)         GENERAL: awake, alert, follows  HEENT: NC/AT, PERRLA, EOMI, non icteric, pharynx moist without lesion  RESP:  clear anteriorly  CV: RRR, normal S1 S2  MS: no clubbing, cyanosis   SKIN: clear without significant rashes or lesions  EXT: warm, no edema    Data:     Recent Labs   Lab 05/05/25  0523 05/04/25  1819 05/04/25  0533 05/03/25  1937 05/03/25  0816 05/03/25  0606   * 134* 138  --   --  138   POTASSIUM 4.0 4.5 5.3  --   --  4.6   CHLORIDE 97* 98 103  --   --  104   CO2 22 21* 19*  --   --  19*   ANIONGAP 15 15 16*  --   --  15   * 156* 85 98   < > 99   BUN 59.9* 53.4* 48.7*  --   --  35.2*   CR 6.26* 6.32* 6.33*  --   --  4.97*   GFRESTIMATED 7* 7* 7*  --   --  9*   KELTON 7.6* 7.9* 8.0*  --   --  7.4*    < > = values in this interval not displayed.         Recent Labs   Lab 05/05/25  0523 05/04/25  1819   POTASSIUM 4.0 4.5     Recent Labs   Lab 05/03/25  0606 05/02/25  1210   ALBUMIN 2.9* 4.1     Recent Labs   Lab 05/05/25  0523 05/04/25  1231 05/04/25  0533 05/03/25  0606   HGB 8.3* 9.6* 9.6* 9.8*         G Enrique Headley,  MD [facility] - Nephrology  [phone]

## 2025-05-05 NOTE — CONSULTS
Patient has [employer] ([employer]) through an employer.    Xarelto/Eliquis:  $0/mo.     Angeles Barcenas  Pharmacy Technician/Liaison, Discharge Pharmacy   [phone] (voice or text)  [email or web address]  Pharmacy test claims are estimates and may not reflect final costs.  Suggested alternatives aim to be cost-effective and may not be therapeutically equivalent as this consult is informational and does not constitute medical advice.  Clinical decisions should be made by qualified healthcare providers.

## 2025-05-05 NOTE — PLAN OF CARE
Patient afebrile, A & O x 4, pain 3-4/10. Using ice and heat. Cardiac tele SR. BP WDL. Respiratory: LS clear/diminished. RA 95-98%. No shortness of breath. : urinated twice today, about 150 total. Will be getting dialysis Tuesday 5/6/25. GI: Regular diet with little appetite. BS present. 1 small BM this shift. Lines PIV x 3. Drips: Heparin. Skin: see flowsheet.   Patient is a Medical Tele. over flow.  She is being followed by ID and nephrology. Last Heparin 10A was not in normal range. 1600 Gave 2100 unit bolus and increased to 1600 units per hour. Next Heparin 10 A at 2200.  Patient up to chair most of day. Worked with OT and walked around room with gait belt and walker.     Goal Outcome Evaluation:      Plan of Care Reviewed With: patient    Overall Patient Progress: improvingOverall Patient Progress: improving    Outcome Evaluation: Patient was given a bath and feels better today. Pain managed with cold therapy this morning. up in chair, suppliments have been ordered inbetween meals. low appetite.      Problem: Adult Inpatient Plan of Care  Goal: Plan of Care Review  Description: The Plan of Care Review/Shift note should be completed every shift.  The Outcome Evaluation is a brief statement about your assessment that the patient is improving, declining, or no change.  This information will be displayed automatically on your shiftnote.  Outcome: Progressing  Flowsheets (Taken 5/5/2025 1130)  Outcome Evaluation: Patient was given a bath and feels better today. Pain managed with cold therapy this morning. up in chair, suppliments have been ordered inbetween meals. low appetite.  Plan of Care Reviewed With: patient  Overall Patient Progress: improving     Problem: Skin Injury Risk Increased  Goal: Skin Health and Integrity  Outcome: Progressing  Intervention: Plan: Nurse Driven Intervention: Moisture Management  Recent Flowsheet Documentation  Taken 5/5/2025 1600 by Meme Hay RN  Moisture Interventions:    Encourage regular toileting   No brief in bed   Incontinence pad   Urinary collection device  Taken 5/5/2025 0800 by Meme Hay RN  Moisture Interventions:   Encourage regular toileting   No brief in bed   Incontinence pad   Urinary collection device  Intervention: Plan: Nurse Driven Intervention: Friction and Shear  Recent Flowsheet Documentation  Taken 5/5/2025 1600 by Meme Hay RN  Friction/Shear Interventions:   HOB 30 degrees or less   Repositioning device (TAP system, etc.)  Taken 5/5/2025 0800 by Meme Hay RN  Friction/Shear Interventions:   HOB 30 degrees or less   Repositioning device (TAP system, etc.)  Intervention: Optimize Skin Protection  Recent Flowsheet Documentation  Taken 5/5/2025 1600 by Meme Hay RN  Activity Management:   activity adjusted per tolerance   ambulated in room  Head of Bed (HOB) Positioning: HOB at 20-30 degrees  Taken 5/5/2025 0800 by Meme Hay RN  Activity Management: activity adjusted per tolerance  Head of Bed (HOB) Positioning: HOB at 20-30 degrees     Problem: Delirium  Goal: Optimal Coping  Outcome: Progressing  Intervention: Optimize Psychosocial Adjustment to Delirium  Recent Flowsheet Documentation  Taken 5/5/2025 1600 by Meme Hay RN  Supportive Measures:   active listening utilized   relaxation techniques promoted   verbalization of feelings encouraged  Family/Support System Care: self-care encouraged  Taken 5/5/2025 0800 by Meme Hay RN  Supportive Measures:   active listening utilized   relaxation techniques promoted   verbalization of feelings encouraged  Family/Support System Care: self-care encouraged  Goal: Improved Attention and Thought Clarity  Outcome: Progressing     Problem: Adult Inpatient Plan of Care  Goal: Absence of Hospital-Acquired Illness or Injury  Intervention: Identify and Manage Fall Risk  Recent Flowsheet Documentation  Taken 5/5/2025 1600 by Meme Hay RN  Safety Promotion/Fall Prevention:    activity supervised   clutter free environment maintained   increased rounding and observation   lighting adjusted   mobility aid in reach   nonskid shoes/slippers when out of bed   room door open   room near nurse's station   supervised activity   treat underlying cause  Taken 5/5/2025 0800 by Meme Hay RN  Safety Promotion/Fall Prevention:   activity supervised   clutter free environment maintained   increased rounding and observation   lighting adjusted   mobility aid in reach   nonskid shoes/slippers when out of bed   room door open   room near nurse's station   supervised activity   treat underlying cause  Intervention: Prevent Skin Injury  Recent Flowsheet Documentation  Taken 5/5/2025 1600 by Meme Hay RN  Body Position: position changed independently  Taken 5/5/2025 1400 by Meme Hay RN  Body Position: position changed independently  Taken 5/5/2025 1200 by Meme Hay RN  Body Position: position changed independently  Taken 5/5/2025 1000 by Meme Hay RN  Body Position: position changed independently  Taken 5/5/2025 0800 by Meme Hay RN  Body Position: position changed independently  Intervention: Prevent and Manage VTE (Venous Thromboembolism) Risk  Recent Flowsheet Documentation  Taken 5/5/2025 1600 by Meme Hay RN  VTE Prevention/Management: (Heparin drip) other (see comments)  Taken 5/5/2025 0800 by Meme Hay RN  VTE Prevention/Management: (Heparin drip) other (see comments)  Goal: Optimal Comfort and Wellbeing  Intervention: Monitor Pain and Promote Comfort  Recent Flowsheet Documentation  Taken 5/5/2025 0800 by Meme Hay RN  Pain Management Interventions: cold applied  Intervention: Provide Person-Centered Care  Recent Flowsheet Documentation  Taken 5/5/2025 1600 by Meme Hay RN  Trust Relationship/Rapport:   care explained   choices provided   emotional support provided   empathic listening provided   questions answered   questions  encouraged   reassurance provided   thoughts/feelings acknowledged  Taken 5/5/2025 0800 by Meme Hay RN  Trust Relationship/Rapport:   care explained   choices provided   emotional support provided   empathic listening provided   questions answered   questions encouraged   reassurance provided   thoughts/feelings acknowledged     Problem: Sepsis/Septic Shock  Goal: Optimal Coping  Intervention: Optimize Psychosocial Adjustment to Illness  Recent Flowsheet Documentation  Taken 5/5/2025 1600 by Meme Hay RN  Supportive Measures:   active listening utilized   relaxation techniques promoted   verbalization of feelings encouraged  Family/Support System Care: self-care encouraged  Taken 5/5/2025 0800 by Meme Hay RN  Supportive Measures:   active listening utilized   relaxation techniques promoted   verbalization of feelings encouraged  Family/Support System Care: self-care encouraged  Goal: Absence of Infection Signs and Symptoms  Intervention: Promote Recovery  Recent Flowsheet Documentation  Taken 5/5/2025 1600 by Meme Hay RN  Activity Management:   activity adjusted per tolerance   ambulated in room  Taken 5/5/2025 0800 by Meme Hay RN  Activity Management: activity adjusted per tolerance     Problem: Delirium  Goal: Improved Behavioral Control  Intervention: Minimize Safety Risk  Recent Flowsheet Documentation  Taken 5/5/2025 1600 by Meme Hay RN  Enhanced Safety Measures:   assistive devices when indicated   pain management   review medications for side effects with activity  Trust Relationship/Rapport:   care explained   choices provided   emotional support provided   empathic listening provided   questions answered   questions encouraged   reassurance provided   thoughts/feelings acknowledged  Taken 5/5/2025 0800 by Meme Hay RN  Enhanced Safety Measures:   assistive devices when indicated   pain management   review medications for side effects with activity  Trust  Relationship/Rapport:   care explained   choices provided   emotional support provided   empathic listening provided   questions answered   questions encouraged   reassurance provided   thoughts/feelings acknowledged

## 2025-05-05 NOTE — CONSULTS
CLINICAL NUTRITION SERVICES - ASSESSMENT NOTE    Registered Dietitian Interventions:  Changing diet to regular in the setting of poor overall intake. Once she starts to increase PO/get closer to meeting nutritional needs through PO intake, okay to transition to renal diet. Discussed with MD during IDT rounds.    Adding Nephronex d/t suboptimal intakes    Nutrition Supplements: Nepro once per day - would recommend 2-3 per day, but pt not agreeable to that at this time. Verbalized that she will need multiple cups of ice in order to drink it because she does not love the taste, but finds it more tolerable when very cold. Please allow pt to order additional supplements PRN.    Encouraged smaller, more frequent intakes and ordering food with meals that she is able to keep in room as a snack if she doesn't eat it with meal.    Discussed the importance of adequate intakes in order to support needs while undergoing HD. Pt states that she does speak with the dietitian at her dialysis center and is comfortable asking questions regarding nutritional needs based on labs.        REASON FOR ASSESSMENT  Positive admission nutrition risk screen    PMH: AL amyloidosis, end-stage renal disease on dialysis, sees [place] [facility] and currently receiving chemotherapy for AL amyloidosis      Per EMR, pt presented to ED on 5/2 with new onset back pain, predominantly right-sided, chills and emesis episode en route to the hospital.  Pt developed hypotension and bradycardia the evening of admission and she was subsequently transferred to ICU d/t need for norepinephrine.      INFORMATION OBTAINED  Assessed patient in room.    NUTRITION HISTORY  - Information obtained from chart review and pt at bedside. Patient known to this writer and other [facility] dietitians from previous admissions.   - Diet at home: Pt eats a somewhat regular diet at home. She states that she does try to eat foods that are lower in potassium and phosphorus based on her  "labs.  - Usual intakes: Reports intakes of 2-3 smaller meals per day. She reported working very hard to increase her intakes and that her appetite is better than it has been at previous admissions. Was unable to give specific examples of meals, but did mention cereal, eggs, and pork steak as things she frequently eats.  - Barriers to PO intakes: general malaise, fluctuating appetite, dislikes hospital food  - Use of oral supplements: None at baseline  - Chewing/swallowing issues: None noted  - Allergies: NKFA    Pt last assessed by RD on 2/11/25. She met severe malnutrition criteria based on poor intake, weight loss, and fat/muscle losses. Per RD notes from this admission, pt reported poor intakes since January, stating that eating had not been going well. She reported symptoms of indigestion which led to her choosing blander foods in general, with a typical intake of 2-3 small meals per day. During this admission, pt was drinking Novasource renal ONS.    CURRENT NUTRITION ORDERS  Diet: 2 Gram K diet      CURRENT INTAKE/TOLERANCE  Per nursing flowsheet, 25-50% intakes and poor appetite.     Per [facility], pt getting meals consistently, though they are fairly small and lacking in protein.     Per RN notes, pt with very poor appetite and eating very little since admission - some sips of chicken broth.     LABS  Nutrition-relevant labs: Reviewed  Noted: Na 134(L), BUN 59.9(H), Creat 6.26(H),     MEDICATIONS  Nutrition-relevant medications: Reviewed  Noted: heparin drip      ANTHROPOMETRICS  Height: 160 cm (5' 3\")  Most Recent Weight: 52.8 kg (116 lb 4.8 oz)  IBW: 52.4 kg  % IBW: 101%  BMI (kg/m ): Body mass index is 20.6 kg/m . (Normal BMI)  Weight History:  Wt Readings from Last 10 Encounters:   05/04/25 52.8 kg (116 lb 4.8 oz)   03/27/25 47.6 kg (105 lb)   02/11/25 49.8 kg (109 lb 12.6 oz)   01/15/25 52.9 kg (116 lb 11.2 oz)   01/08/25 54 kg (119 lb)   01/08/25 54.1 kg (119 lb 3.2 oz)   11/27/24 55.8 kg (123 " lb)   10/18/24 58 kg (127 lb 14.4 oz)   05/25/23 62.6 kg (138 lb)   11/22/21 61.2 kg (135 lb)     -8.97% BW x 6.5 months    Possible that some weight loss being masked by fluid retention  Pt did state that she is noticing quite a bit of fluid which she feels may be covering up some weight loss as well.    ASSESSED NUTRITION NEEDS  Dosing Weight: 52.8 kg, based on actual wt  Estimated Energy Needs: 2499-0111 kcals/day (25 - 30 kcals/kg)  Justification: Increased needs d/t Hemodialysis  Estimated Protein Needs: >/= 63.4 grams protein/day (>/=1.2 grams of pro/kg)  Justification: Hemodialysis  Estimated Fluid Needs: Defer to nephrology    SYSTEM AND PHYSICAL FINDINGS    Possible need for central line removal d/t E Coli infection    GI symptoms:   - Stools: LBM 5/4  - Emesis: No episodes of emesis documented so far this admission.    Skin/wounds:   - Edema: 1-2+  - Pressure Injuries/Nonhealing wounds: None currently documented    MALNUTRITION  % Intake: Decreased intake does not meet criteria  % Weight Loss: Weight loss does not meet criteria (Possible fluid masking?)  Subcutaneous Fat Loss: Fat overlying the ribs: Mild -> not using as criterion as only present in 1 region  Muscle Loss: Temples (temporalis muscle): Mild, Clavicles (pectoralis and deltoids): Mild, Shoulders (deltoids): Mild, Interosseous muscles: Mild, and Scapula (latissimus dorsi, trapezious, deltoids): Mild  Fluid Accumulation/Edema: Mild, 1+ -> not using as criterion as possible that this could be attributed to CKD and not be an indicator of nutritional status  Malnutrition Diagnosis: Patient does not meet two of the established criteria necessary for diagnosing malnutrition but is at risk for malnutrition  Malnutrition Present on Admission: No    NUTRITION DIAGNOSIS  Inadequate oral intake related to poor appetite, general malaise as evidenced by intakes so far this admission and mild muscle losses throughout body.     INTERVENTIONS  See nutrition  "interventions above    Goals  Patient to consume % of nutritionally adequate meal trays TID, or the equivalent with supplements/snacks.     Monitoring/Evaluation  Progress toward goals will be monitored and evaluated per policy.        Lelo Zavala RD, LD  Clinical Dietitian  [facility] Message Group: \"Dietitian [[place]athal[place]]\"  Office Phone: [phone]  Pagers: 3rd floor/ICU: [phone]  All other floors: [phone]  Weekend/holiday: [phone]    "

## 2025-05-06 ENCOUNTER — APPOINTMENT (OUTPATIENT)
Dept: OCCUPATIONAL THERAPY | Facility: CLINIC | Age: 65
End: 2025-05-06
Payer: COMMERCIAL

## 2025-05-06 LAB
ANION GAP SERPL CALCULATED.3IONS-SCNC: 13 MMOL/L (ref 7–15)
BUN SERPL-MCNC: 64.7 MG/DL (ref 8–23)
CALCIUM SERPL-MCNC: 7.8 MG/DL (ref 8.8–10.4)
CHLORIDE SERPL-SCNC: 98 MMOL/L (ref 98–107)
CREAT SERPL-MCNC: 6.75 MG/DL (ref 0.51–0.95)
DACRYOCYTES BLD QL SMEAR: SLIGHT
EGFRCR SERPLBLD CKD-EPI 2021: 6 ML/MIN/1.73M2
ERYTHROCYTE [DISTWIDTH] IN BLOOD BY AUTOMATED COUNT: 16.7 % (ref 10–15)
FRAGMENTS BLD QL SMEAR: SLIGHT
GLUCOSE SERPL-MCNC: 96 MG/DL (ref 70–99)
HCO3 SERPL-SCNC: 23 MMOL/L (ref 22–29)
HCT VFR BLD AUTO: 24.8 % (ref 35–47)
HGB BLD-MCNC: 8.2 G/DL (ref 11.7–15.7)
MCH RBC QN AUTO: 33.1 PG (ref 26.5–33)
MCHC RBC AUTO-ENTMCNC: 33.1 G/DL (ref 31.5–36.5)
MCV RBC AUTO: 100 FL (ref 78–100)
PLAT MORPH BLD: ABNORMAL
PLATELET # BLD AUTO: 86 10E3/UL (ref 150–450)
POTASSIUM SERPL-SCNC: 4.2 MMOL/L (ref 3.4–5.3)
RBC # BLD AUTO: 2.48 10E6/UL (ref 3.8–5.2)
RBC MORPH BLD: ABNORMAL
SODIUM SERPL-SCNC: 134 MMOL/L (ref 135–145)
UFH PPP CHRO-ACNC: 0.52 IU/ML
WBC # BLD AUTO: 7.9 10E3/UL (ref 4–11)

## 2025-05-06 PROCEDURE — 99232 SBSQ HOSP IP/OBS MODERATE 35: CPT | Performed by: SPECIALIST

## 2025-05-06 PROCEDURE — 250N000011 HC RX IP 250 OP 636: Performed by: INTERNAL MEDICINE

## 2025-05-06 PROCEDURE — 99232 SBSQ HOSP IP/OBS MODERATE 35: CPT | Performed by: INTERNAL MEDICINE

## 2025-05-06 PROCEDURE — 90935 HEMODIALYSIS ONE EVALUATION: CPT

## 2025-05-06 PROCEDURE — 250N000013 HC RX MED GY IP 250 OP 250 PS 637: Performed by: INTERNAL MEDICINE

## 2025-05-06 PROCEDURE — 5A1D70Z PERFORMANCE OF URINARY FILTRATION, INTERMITTENT, LESS THAN 6 HOURS PER DAY: ICD-10-PCS | Performed by: INTERNAL MEDICINE

## 2025-05-06 PROCEDURE — 36415 COLL VENOUS BLD VENIPUNCTURE: CPT | Performed by: HOSPITALIST

## 2025-05-06 PROCEDURE — 85027 COMPLETE CBC AUTOMATED: CPT | Performed by: INTERNAL MEDICINE

## 2025-05-06 PROCEDURE — 97535 SELF CARE MNGMENT TRAINING: CPT | Mod: GO

## 2025-05-06 PROCEDURE — 80048 BASIC METABOLIC PNL TOTAL CA: CPT | Performed by: INTERNAL MEDICINE

## 2025-05-06 PROCEDURE — 250N000011 HC RX IP 250 OP 636: Mod: JZ | Performed by: INTERNAL MEDICINE

## 2025-05-06 PROCEDURE — 250N000011 HC RX IP 250 OP 636: Performed by: HOSPITALIST

## 2025-05-06 PROCEDURE — 258N000003 HC RX IP 258 OP 636: Performed by: INTERNAL MEDICINE

## 2025-05-06 PROCEDURE — 90935 HEMODIALYSIS ONE EVALUATION: CPT | Performed by: INTERNAL MEDICINE

## 2025-05-06 PROCEDURE — 85520 HEPARIN ASSAY: CPT | Performed by: HOSPITALIST

## 2025-05-06 PROCEDURE — 250N000013 HC RX MED GY IP 250 OP 250 PS 637: Performed by: HOSPITALIST

## 2025-05-06 PROCEDURE — 120N000001 HC R&B MED SURG/OB

## 2025-05-06 RX ADMIN — PANTOPRAZOLE SODIUM 40 MG: 40 TABLET, DELAYED RELEASE ORAL at 17:43

## 2025-05-06 RX ADMIN — HEPARIN SODIUM 2000 UNITS: 1000 INJECTION, SOLUTION INTRAVENOUS; SUBCUTANEOUS at 13:59

## 2025-05-06 RX ADMIN — HYDROMORPHONE HYDROCHLORIDE 0.2 MG: 0.2 INJECTION, SOLUTION INTRAMUSCULAR; INTRAVENOUS; SUBCUTANEOUS at 10:08

## 2025-05-06 RX ADMIN — SODIUM CHLORIDE 200 ML: 9 INJECTION, SOLUTION INTRAVENOUS at 13:57

## 2025-05-06 RX ADMIN — SODIUM BICARBONATE 650 MG: 650 TABLET ORAL at 08:45

## 2025-05-06 RX ADMIN — PANTOPRAZOLE SODIUM 40 MG: 40 TABLET, DELAYED RELEASE ORAL at 08:44

## 2025-05-06 RX ADMIN — ACYCLOVIR 200 MG: 200 CAPSULE ORAL at 08:44

## 2025-05-06 RX ADMIN — HYDROMORPHONE HYDROCHLORIDE 0.2 MG: 0.2 INJECTION, SOLUTION INTRAMUSCULAR; INTRAVENOUS; SUBCUTANEOUS at 22:12

## 2025-05-06 RX ADMIN — SODIUM CHLORIDE 250 ML: 9 INJECTION, SOLUTION INTRAVENOUS at 13:58

## 2025-05-06 RX ADMIN — CEFTRIAXONE 2 G: 2 INJECTION, POWDER, FOR SOLUTION INTRAMUSCULAR; INTRAVENOUS at 03:45

## 2025-05-06 RX ADMIN — Medication 5 ML: at 08:46

## 2025-05-06 RX ADMIN — HEPARIN SODIUM 1600 UNITS/HR: 10000 INJECTION, SOLUTION INTRAVENOUS at 10:52

## 2025-05-06 RX ADMIN — AMIODARONE HYDROCHLORIDE 200 MG: 200 TABLET ORAL at 08:44

## 2025-05-06 RX ADMIN — Medication: at 13:55

## 2025-05-06 RX ADMIN — ACYCLOVIR 200 MG: 200 CAPSULE ORAL at 21:11

## 2025-05-06 RX ADMIN — DORZOLAMIDE HYDROCHLORIDE AND TIMOLOL MALEATE 1 DROP: 20; 5 SOLUTION OPHTHALMIC at 08:45

## 2025-05-06 RX ADMIN — DORZOLAMIDE HYDROCHLORIDE AND TIMOLOL MALEATE 1 DROP: 20; 5 SOLUTION OPHTHALMIC at 21:23

## 2025-05-06 ASSESSMENT — ACTIVITIES OF DAILY LIVING (ADL)
ADLS_ACUITY_SCORE: 39

## 2025-05-06 NOTE — PLAN OF CARE
"Goal Outcome Evaluation:      Plan of Care Reviewed With: patient    Overall Patient Progress: improvingOverall Patient Progress: improving    Outcome Evaluation: a&o. VSS on room air. up ax1. reg diet w/ poor rod. HD today nothing removed. hep@1600. report given to 3rd floor RN. will continue with plan of care.      Problem: Adult Inpatient Plan of Care  Goal: Plan of Care Review  Description: The Plan of Care Review/Shift note should be completed every shift.  The Outcome Evaluation is a brief statement about your assessment that the patient is improving, declining, or no change.  This information will be displayed automatically on your shiftnote.  Outcome: Progressing  Flowsheets (Taken 5/6/2025 1755)  Outcome Evaluation: a&o. VSS on room air. up ax1. reg diet w/ poor rod. HD today nothing removed. hep@1600. report given to 3rd floor RN. will continue with plan of care.  Plan of Care Reviewed With: patient  Overall Patient Progress: improving  Goal: Patient-Specific Goal (Individualized)  Description: You can add care plan individualizations to a care plan. Examples of Individualization might be:  \"Parent requests to be called daily at 9am for status\", \"I have a hard time hearing out of my right ear\", or \"Do not touch me to wake me up as it startlesme\".  Outcome: Progressing  Goal: Absence of Hospital-Acquired Illness or Injury  Outcome: Progressing  Intervention: Identify and Manage Fall Risk  Recent Flowsheet Documentation  Taken 5/6/2025 0843 by Shania Zhu RN  Safety Promotion/Fall Prevention:   activity supervised   assistive device/personal items within reach   clutter free environment maintained   room near nurse's station   safety round/check completed  Goal: Optimal Comfort and Wellbeing  Outcome: Progressing  Intervention: Provide Person-Centered Care  Recent Flowsheet Documentation  Taken 5/6/2025 0843 by Shania Zhu RN  Trust Relationship/Rapport:   care explained   choices provided   " questions answered   questions encouraged  Goal: Readiness for Transition of Care  Outcome: Progressing     Problem: Sepsis/Septic Shock  Goal: Optimal Coping  Outcome: Progressing  Goal: Absence of Bleeding  Outcome: Progressing  Goal: Blood Glucose Level Within Targeted Range  Outcome: Progressing  Goal: Absence of Infection Signs and Symptoms  Outcome: Progressing  Goal: Optimal Nutrition Intake  Outcome: Progressing     Problem: Infection  Goal: Absence of Infection Signs and Symptoms  Outcome: Progressing     Problem: Skin Injury Risk Increased  Goal: Skin Health and Integrity  Outcome: Progressing  Intervention: Plan: Nurse Driven Intervention: Moisture Management  Recent Flowsheet Documentation  Taken 5/6/2025 0843 by Shania Zhu RN  Moisture Interventions:   Encourage regular toileting   Incontinence pad  Intervention: Plan: Nurse Driven Intervention: Friction and Shear  Recent Flowsheet Documentation  Taken 5/6/2025 0843 by Shania Zhu RN  Friction/Shear Interventions:   HOB 30 degrees or less   Assistive lifting device (portable/ceiling lift, etc.)     Problem: Delirium  Goal: Optimal Coping  Outcome: Progressing  Goal: Improved Behavioral Control  Outcome: Progressing  Intervention: Minimize Safety Risk  Recent Flowsheet Documentation  Taken 5/6/2025 0843 by Shania Zhu RN  Enhanced Safety Measures: review medications for side effects with activity  Trust Relationship/Rapport:   care explained   choices provided   questions answered   questions encouraged  Goal: Improved Attention and Thought Clarity  Outcome: Progressing  Goal: Improved Sleep  Outcome: Progressing

## 2025-05-06 NOTE — PROGRESS NOTES
Potassium   Date Value Ref Range Status   05/06/2025 4.2 3.4 - 5.3 mmol/L Final   11/22/2021 4.0 3.4 - 5.3 mmol/L Final   05/28/2020 4.8 3.4 - 5.3 mmol/L Final     Hemoglobin   Date Value Ref Range Status   05/06/2025 8.2 (L) 11.7 - 15.7 g/dL Final   05/28/2020 12.8 11.7 - 15.7 g/dL Final       DIALYSIS PROCEDURE NOTE  Hepatitis status of previous patient on machine log was checked and verified ok to use with this patients hepatitis status.  Patient dialyzed for 2.5 hrs. on a K3 bath with a net fluid removal of  0 L.  A BFR of 350 ml/min was obtained via a right CVC with lines reversed secondary to sluggish arterial.     The treatment plan was discussed with Dr. Headley during the treatment.    Total heparin received during the treatment: 0 units.     Line flushed, clamped and capped with heparin 1:1000 2 mL (2000 units) per lumen    Meds  given: None   Complications: None      Person educated: pt. Knowledge base substantial. Barriers to learning: None. Educated on procedure/access care via verbal mode. Patient verbalized understanding.   ICEBOAT? Timeout performed pre-treatment  I: Patient was identified using 2 identifiers  C:  Consent Signed Yes  E: Equipment preventative maintenance is current and dialysis delivery system OK to use  B: Hepatitis B Surface Antigen: Negative; Draw Date: 5/5/2025      Hepatitis B Surface Antibody: Susceptible; Draw Date: 5/5/2025  O: Dialysis orders present and complete prior to treatment  A: Vascular access verified and assessed prior to treatment  T: Treatment was performed at a clinically appropriate time  ?: Patient was allowed to ask questions and address concerns prior to treatment  See Adult Hemodialysis flowsheet in [facility] for further details and post assessment.  Machine water alarm in place and functioning. Transducer pods intact and checked every 15min.   Pt assisted with repositioning throughout dialysis treatment.  Pt returned via N/A bedside tx..  Chlorine/Chloramine  water system checked every 4 hours.  Outpatient Dialysis at [facility].      Post treatment report given to Josie BROOKE RN regarding 0 L of fluid removed, last BP      Zeenat ABDUL RN

## 2025-05-06 NOTE — PLAN OF CARE
"Pt alert & oriented. VSS. On 2L/NC when sleeping. NSR on tele. Bowels active, no BM tonight, low appetite and intake. Voiding. Up with assist x1 & FWW.     Plan: Heparin infusing, antiXa at goal, recheck tomorrow AM. Dialysis today.     Goal Outcome Evaluation:      Plan of Care Reviewed With: patient    Overall Patient Progress: improvingOverall Patient Progress: improving    Outcome Evaluation: Pt slept most of the night. VSS. Up with assist x1 & FWW.      Problem: Adult Inpatient Plan of Care  Goal: Plan of Care Review  Description: The Plan of Care Review/Shift note should be completed every shift.  The Outcome Evaluation is a brief statement about your assessment that the patient is improving, declining, or no change.  This information will be displayed automatically on your shiftnote.  Outcome: Progressing  Flowsheets (Taken 5/6/2025 0629)  Outcome Evaluation: Pt slept most of the night. VSS. Up with assist x1 & FWW.  Plan of Care Reviewed With: patient  Overall Patient Progress: improving  Goal: Patient-Specific Goal (Individualized)  Description: You can add care plan individualizations to a care plan. Examples of Individualization might be:  \"Parent requests to be called daily at 9am for status\", \"I have a hard time hearing out of my right ear\", or \"Do not touch me to wake me up as it startlesme\".  Outcome: Progressing  Goal: Absence of Hospital-Acquired Illness or Injury  Outcome: Progressing  Intervention: Identify and Manage Fall Risk  Recent Flowsheet Documentation  Taken 5/6/2025 0338 by Magda Levin, RN  Safety Promotion/Fall Prevention:   activity supervised   assistive device/personal items within reach   clutter free environment maintained   room near nurse's station   safety round/check completed  Taken 5/5/2025 1933 by Magda Levin, RN  Safety Promotion/Fall Prevention:   activity supervised   assistive device/personal items within reach   clutter free environment maintained   " room near nurse's station   safety round/check completed  Intervention: Prevent Skin Injury  Recent Flowsheet Documentation  Taken 5/6/2025 0600 by Magda Levin RN  Body Position:   position changed independently   supine   supine, head elevated  Taken 5/6/2025 0338 by Magda Levin RN  Body Position:   position changed independently   weight shifting   upper extremity elevated  Taken 5/5/2025 2100 by Magda Levin RN  Body Position:   sitting up in bed   position changed independently  Taken 5/5/2025 1933 by Magda Levin RN  Body Position:   position changed independently   weight shifting  Intervention: Prevent and Manage VTE (Venous Thromboembolism) Risk  Recent Flowsheet Documentation  Taken 5/6/2025 0338 by Magda Levin RN  VTE Prevention/Management: (Heparin gtt) other (see comments)  Taken 5/5/2025 1933 by Magda Levin RN  VTE Prevention/Management: (Heparin gtt) other (see comments)  Goal: Optimal Comfort and Wellbeing  Outcome: Progressing  Intervention: Monitor Pain and Promote Comfort  Recent Flowsheet Documentation  Taken 5/5/2025 1933 by Magda Levin RN  Pain Management Interventions:   cold applied   care clustered   pillow support provided   relaxation techniques promoted   repositioned  Intervention: Provide Person-Centered Care  Recent Flowsheet Documentation  Taken 5/6/2025 0338 by Magda Levin RN  Trust Relationship/Rapport:   care explained   choices provided   emotional support provided   empathic listening provided   questions answered   questions encouraged   reassurance provided   thoughts/feelings acknowledged  Taken 5/5/2025 1933 by Magda Levin RN  Trust Relationship/Rapport:   care explained   choices provided   emotional support provided   empathic listening provided   questions answered   questions encouraged   reassurance provided   thoughts/feelings acknowledged  Goal: Readiness for Transition of  Care  Outcome: Progressing

## 2025-05-06 NOTE — PROGRESS NOTES
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      # Dialysis dependent renal failure: ESRD?                -started HD in February               -renal biopsy in January: AL amyloidosis, some ATN and mild IFTA               -3 hrs               -EDW 49.5 kg (typically no UF)               -Dr. Kelly at [facility]     # Access: RIJ TDC. Some tenderness but no other evidence of exit or tunnel site infection.     Initial catheter placed in August 2024  That catheter was exchanged approximately 2 months ago secondary to poor function  Suspect that that may have been clotted as well     # CRB: Blood culture is growing GNB from dialysis vs ascending urosepsis.      Follow-up cultures remain negative     # Anemia: Mircera 120 mcg q2 weeks (due). Hgb is at target.      # MM: She is getting chemo therapy with her oncologist.      # FEN: No peripheral edema. Acidosis.     # pAfib: on amiodarone     # TTE with 1.7 mass in the right atrium, thrombus vs infected veg.                -started heparin              -no apparent PFO      Will need oral anticoagulation  Next 05.09.25     Interval History:     Dialysis run parameters reviewed with dialysis RN at patient bedside.  Seen on run    2.5 hours  No UF    Stable initial portion of run  BP in range     ROS     GENERAL: NAD, No fever,chills  R: NEGATIVE for significant cough or SOB  CV: NEGATIVE for chest pain, palpitations  EXT: no change edema  MUSCULOSKELETAL: back pain  ROS otherwise negative    Dialysis Parameters:     Vitals were reviewed  Patient Vitals for the past 8 hrs:   BP Temp Temp src Pulse Resp SpO2   05/06/25 1100 125/74 -- -- 62 12 93 %   05/06/25 1045 125/74 -- -- 64 13 93 %  "  05/06/25 1030 131/74 -- -- 64 11 95 %   05/06/25 1024 122/74 -- -- 67 12 94 %   05/06/25 1020 125/76 -- -- 66 (!) 9 95 %   05/06/25 1015 125/76 98.3  F (36.8  C) Oral 70 (!) 8 94 %   05/06/25 0843 (!) 150/83 97.4  F (36.3  C) Temporal -- -- --   05/06/25 0500 -- -- -- 63 11 98 %   05/06/25 0400 -- -- -- 60 12 100 %   05/06/25 0338 (!) 143/79 97  F (36.1  C) Axillary 64 14 100 %   05/06/25 0325 -- -- -- 71 11 100 %     Wt Readings from Last 4 Encounters:   05/04/25 52.8 kg (116 lb 4.8 oz)   03/27/25 47.6 kg (105 lb)   02/11/25 49.8 kg (109 lb 12.6 oz)   01/15/25 52.9 kg (116 lb 11.2 oz)     I/O last 3 completed shifts:  In: 750.4 [P.O.:386; I.V.:364.4]  Out: 200 [Urine:200]    Vitals:    05/02/25 1858 05/04/25 1226   Weight: 48.5 kg (106 lb 14.8 oz) 52.8 kg (116 lb 4.8 oz)       Current Weight: 52.8  Dry Weight: 49.5  Dialysis Temp: 36.5  C  Access Device: TDC  Access Site: right  Dialyzer: Revaclear  Dialysis Bath: 3  Sodium Profile: n  UF Goal: 0  Blood Flow Rate (mL/min): 350  Total Treatment Time (hrs): 2.5  Heparin: Low dose as required      EPO dose: n  Zemplar: n  IV Fe: n      Medications and Allergies:     Reviewed      Physical Exam:     Seen and examined during course of dialysis run    /74   Pulse 64   Temp 98.3  F (36.8  C) (Oral)   Resp 13   Ht 1.6 m (5' 3\")   Wt 52.8 kg (116 lb 4.8 oz)   LMP 10/31/2011   SpO2 93%   BMI 20.60 kg/m      GENERAL: awake, alert, follows  HEENT: NC/AT, PERRLA, EOMI, non icteric, pharynx moist without lesion  RESP: clear  CV: RRR, normal S1 S2  MS: no edema  SKIN: catheter site clean without drainage  NEURO: speech normal and cranial nerves 2-12 intact  PSYCH: affect normal/bright    Data:       Recent Labs   Lab 05/06/25  0523   *   POTASSIUM 4.2   CHLORIDE 98   CO2 23   ANIONGAP 13   GLC 96   BUN 64.7*   CR 6.75*   GFRESTIMATED 6*   KELTON 7.8*     Recent Labs   Lab 05/06/25  0523 05/05/25  0523 05/04/25  1819 05/04/25  0533 05/03/25  0606   POTASSIUM 4.2 " 4.0 4.5 5.3 4.6         G Enrique Headley MD    [facility] - Nephrology  [phone]

## 2025-05-06 NOTE — PROGRESS NOTES
Hospitalist Medicine Progress Note   [facility]       Dulce Ma is a 64 year old lady with AL amyloidosi under treatment of Dr. Ramsey ([facility]) , ESRD on maintenance hemodialysis, paroxysmal atrial fibrillation, GERD, anemia who was admitted to [facility] 5/2/2025 with new onset of right-sided back pain, chills with nausea and vomiting she had right costal renal angle tenderness, urine had moderate leukocyte esterase with 66 WBCs and grew pansensitive E. coli on culture of urine and blood.  Patient was started on ceftriaxone at admission       Date of Admission:  5/2/2025  Assessment & Plan     Right pyelonephritis due to E. coli acute UTI  E. coli bacteremia and sepsis  Patient came in with acute onset of right-sided back pain, right costophrenic angle tenderness, pyuria growing E. coli in the urine and blood cultures she was treated with ceftriaxone at admission.   Patient is hemodynamically stable now  Patient had evaluation by infectious diseases with concerns for seeding the PermCath at this time ID does not think that this catheter is infected with plans to convert ceftriaxone to oral ciprofloxacin at the time of discharge to treat for total of 14 days  There is no growth as yet from the blood cultures done on 5/3/2025 and should they be positive removal of the permacath is planned    Right atrial thrombus  -Incidentally found on echocardiogram, which was being done to evaluate atrial fibrillation  -Thrombus likely adhered to dialysis catheter  -Given possible need for dialysis catheter removal in the setting of bacteremia, follow-up limited echocardiogram was done to rule out PFO.  The patient did have mild bubbles appearing late which did not appear to come from the cardiac septum (i.e. not a PFO), and more likely from pulmonary vein suggesting pulmonary AVM or shunting  - Continue IV heparin infusion with anticipated transition to DOAC prior to discharge    End-stage  renal disease on hemodialysis.  - Nephrology consult requested  -Normally dialyzes 3 times a week  -Her nephrologist is Dr. Kelly     AL Amyloidosis.    -Follows with Dr. Ramsey of oncology  -Treated with once a week doses of Cytoxan and dexamethasone     Leukocytopenia with normal low ANC and lymphocytopenia on admit labs.  -Likely related to Cytoxan  - resolved and now has leukocytosis related to sepsis     History of PAF.  -On amiodarone.  -Does not appear to be on an anticoagulant chronically  - Started on IV heparin infusion with anticipated transition to DOAC prior to discharge given right atrial thrombus found on TTE this admission (see above)     Lactic acidosis associated with sepsis      Thrombocytopenia   - platelet count near 100            Plan:   Transfer to the Medical Floor   CBC and BMP in am   Monitor closely for blood cultures from the blood culture done on 5/3/2025 if positive would need permacath removal  We will discuss with oncology  Treat with ciprofloxacin until 5/16/2025    Diet: Regular Diet Adult  Snacks/Supplements Adult: Nepro Oral Supplement; Between Meals    DVT Prophylaxis: Heparin drip  Butler Catheter: Not present  Code Status: Full Code         Medically Ready for Discharge: Anticipated in 2-4 Days    Clinically Significant Risk Factors         # Hyponatremia: Lowest Na = 134 mmol/L in last 2 days, will monitor as appropriate  # Hypochloremia: Lowest Cl = 97 mmol/L in last 2 days, will monitor as appropriate      # Hypoalbuminemia: Lowest albumin = 2.9 g/dL at 5/3/2025  6:06 AM, will monitor as appropriate   # Thrombocytopenia: Lowest platelets = 86 in last 2 days, will monitor for bleeding   # Hypertension: Noted on problem list                # Financial/Environmental Concerns: none               The patient's care was discussed with the Patient and RN.    Francisco Correa MD  Hospitalist Service  [facility]  Hospital    ______________________________________________________________________    Interval History     Symptoms   Patient had hemodialysis today    Review of Systems:   Still has Shortness of breath      Data reviewed today: I reviewed all medications, new labs and imaging results over the last 24 hours.     Physical Exam   Vital Signs: Temp: 97  F (36.1  C) Temp src: Axillary BP: (!) 143/79 Pulse: 63   Resp: 11 SpO2: 98 % O2 Device: Nasal cannula Oxygen Delivery: 2 LPM  Weight: 116 lbs 4.8 oz      GENERAL: Patient is not in acute distress  HEENT: EOM+,Conjunctiva is clear   NECK:  no Jugular Venous distention  HEART: S1 S2 regular Rate and Rhythm, there is  no murmur,   LUNGS: Respirations are not laboured, Lungs are  clear to auscultation without Crepitations or Wheezing   ABDOMEN: Soft, there is no tenderness, Bowel Sounds are Positive   LOWER LIMBS: no Pedal Edema  Bilaterally   CNS:  Alert,  Oriented x 3, Moving all the Four Limbs     Data   Recent Labs   Lab 05/06/25  0523 05/05/25  0523 05/04/25  1819 05/04/25  1231 05/03/25  0816 05/03/25  0606 05/03/25  0326 05/02/25  1210   WBC 7.9 8.6  --  11.7*   < > 19.4*  --  1.8*   HGB 8.2* 8.3*  --  9.6*   < > 9.8*  --  11.5*    99  --  100   < > 102*  --  103*   PLT 86* 95*  --  114*   < > 110*  --  150   * 134* 134*  --    < > 138  --  139   POTASSIUM 4.2 4.0 4.5  --    < > 4.6  --  4.1   CHLORIDE 98 97* 98  --    < > 104  --  100   CO2 23 22 21*  --    < > 19*  --  23   BUN 64.7* 59.9* 53.4*  --    < > 35.2*  --  30.0*   CR 6.75* 6.26* 6.32*  --    < > 4.97*  --  4.59*   ANIONGAP 13 15 15  --    < > 15  --  16*   KELTON 7.8* 7.6* 7.9*  --    < > 7.4*  --  9.3   GLC 96 102* 156*  --    < > 99   < > 121*   ALBUMIN  --   --   --   --   --  2.9*  --  4.1   PROTTOTAL  --   --   --   --   --  5.0*  --  6.9   BILITOTAL  --   --   --   --   --  0.9  --  1.1   ALKPHOS  --   --   --   --   --  87  --  91   ALT  --   --   --   --   --  114*  --  14   AST  --    --   --   --   --  158*  --  17   LIPASE  --   --   --   --   --   --   --  23    < > = values in this interval not displayed.         No results found for this or any previous visit (from the past 24 hours).

## 2025-05-06 NOTE — PROGRESS NOTES
SPIRITUAL HEALTH SERVICES - Progress Note  [facility] ICU  Referral Source/Reason for Visit: Assess pt's emotional/spiritual resources and needs per her length of stay in the ICU.     Summary and Recommendations -  Pt Dulce expressed concern for how her mother is coping while she is in the hospital.  Dulce and her mother have support from a [place] of friends.  Dulce is Quaker, but her spirituality is more home based than Caodaism based.    Plan: Informed pt how she can request further  support.  This author and other chaplains remain available per pt/family request.     Mathew Mae M.Div., [facility]  Staff     SHS available 24/7 for emergent requests/referrals, either by paging the on-call  or by entering an ASAP/STAT consult in [employer], which will also page the on-call .    Assessment    Saw pt Dulce Ma to assess pt's emotional/spiritual resources and needs per her length of stay in the ICU.    Patient/Family Understanding of Illness and Goals of Care - not discussed.    Distress and Loss - Dulce reported that she and her mother live together and expressed concern about how her mother is coping while she is in the hospital.    Strengths, Coping, and Resources - Dulce and her mother have support from a [place] of friends.    Meaning, Beliefs, and Spirituality -   Dulce identifies as Quaker; her spirituality is more home based than Caodaism based.  She prays often throughout the day.  Dulce welcomed my offer to keep her in prayer but declined prayer in the moment.  She reflected that she trust that God is present and listening to her.

## 2025-05-07 VITALS
HEIGHT: 63 IN | WEIGHT: 121.7 LBS | RESPIRATION RATE: 16 BRPM | HEART RATE: 75 BPM | DIASTOLIC BLOOD PRESSURE: 76 MMHG | TEMPERATURE: 97.9 F | OXYGEN SATURATION: 99 % | BODY MASS INDEX: 21.56 KG/M2 | SYSTOLIC BLOOD PRESSURE: 155 MMHG

## 2025-05-07 LAB
ANION GAP SERPL CALCULATED.3IONS-SCNC: 10 MMOL/L (ref 7–15)
BUN SERPL-MCNC: 39 MG/DL (ref 8–23)
CALCIUM SERPL-MCNC: 8 MG/DL (ref 8.8–10.4)
CHLORIDE SERPL-SCNC: 100 MMOL/L (ref 98–107)
CREAT SERPL-MCNC: 4.77 MG/DL (ref 0.51–0.95)
EGFRCR SERPLBLD CKD-EPI 2021: 10 ML/MIN/1.73M2
ERYTHROCYTE [DISTWIDTH] IN BLOOD BY AUTOMATED COUNT: 16.8 % (ref 10–15)
GLUCOSE SERPL-MCNC: 100 MG/DL (ref 70–99)
HCO3 SERPL-SCNC: 27 MMOL/L (ref 22–29)
HCT VFR BLD AUTO: 28.3 % (ref 35–47)
HGB BLD-MCNC: 9.2 G/DL (ref 11.7–15.7)
MCH RBC QN AUTO: 32.7 PG (ref 26.5–33)
MCHC RBC AUTO-ENTMCNC: 32.5 G/DL (ref 31.5–36.5)
MCV RBC AUTO: 101 FL (ref 78–100)
PLATELET # BLD AUTO: 112 10E3/UL (ref 150–450)
POTASSIUM SERPL-SCNC: 4.2 MMOL/L (ref 3.4–5.3)
RBC # BLD AUTO: 2.81 10E6/UL (ref 3.8–5.2)
SODIUM SERPL-SCNC: 137 MMOL/L (ref 135–145)
UFH PPP CHRO-ACNC: 0.49 IU/ML (ref ?–1.1)
WBC # BLD AUTO: 5.9 10E3/UL (ref 4–11)

## 2025-05-07 PROCEDURE — 250N000013 HC RX MED GY IP 250 OP 250 PS 637: Performed by: INTERNAL MEDICINE

## 2025-05-07 PROCEDURE — 99239 HOSP IP/OBS DSCHRG MGMT >30: CPT | Performed by: INTERNAL MEDICINE

## 2025-05-07 PROCEDURE — 250N000013 HC RX MED GY IP 250 OP 250 PS 637: Performed by: HOSPITALIST

## 2025-05-07 PROCEDURE — 85520 HEPARIN ASSAY: CPT | Performed by: HOSPITALIST

## 2025-05-07 PROCEDURE — 99207 PR NO BILLABLE SERVICE THIS VISIT: CPT | Performed by: INTERNAL MEDICINE

## 2025-05-07 PROCEDURE — 85027 COMPLETE CBC AUTOMATED: CPT | Performed by: INTERNAL MEDICINE

## 2025-05-07 PROCEDURE — 80048 BASIC METABOLIC PNL TOTAL CA: CPT | Performed by: INTERNAL MEDICINE

## 2025-05-07 PROCEDURE — 250N000011 HC RX IP 250 OP 636: Performed by: INTERNAL MEDICINE

## 2025-05-07 PROCEDURE — 250N000011 HC RX IP 250 OP 636: Performed by: HOSPITALIST

## 2025-05-07 PROCEDURE — 36415 COLL VENOUS BLD VENIPUNCTURE: CPT | Performed by: INTERNAL MEDICINE

## 2025-05-07 RX ORDER — CIPROFLOXACIN 500 MG/1
500 TABLET, FILM COATED ORAL DAILY
Qty: 9 TABLET | Refills: 0 | Status: SHIPPED | OUTPATIENT
Start: 2025-05-07 | End: 2025-05-16

## 2025-05-07 RX ADMIN — Medication 5 ML: at 08:30

## 2025-05-07 RX ADMIN — DORZOLAMIDE HYDROCHLORIDE AND TIMOLOL MALEATE 1 DROP: 20; 5 SOLUTION OPHTHALMIC at 08:33

## 2025-05-07 RX ADMIN — CEFTRIAXONE 2 G: 2 INJECTION, POWDER, FOR SOLUTION INTRAMUSCULAR; INTRAVENOUS at 04:51

## 2025-05-07 RX ADMIN — PANTOPRAZOLE SODIUM 40 MG: 40 TABLET, DELAYED RELEASE ORAL at 06:51

## 2025-05-07 RX ADMIN — HEPARIN SODIUM 1600 UNITS/HR: 10000 INJECTION, SOLUTION INTRAVENOUS at 02:50

## 2025-05-07 RX ADMIN — APIXABAN 10 MG: 5 TABLET, FILM COATED ORAL at 12:00

## 2025-05-07 RX ADMIN — AMIODARONE HYDROCHLORIDE 200 MG: 200 TABLET ORAL at 08:29

## 2025-05-07 RX ADMIN — ACYCLOVIR 200 MG: 200 CAPSULE ORAL at 08:29

## 2025-05-07 ASSESSMENT — ACTIVITIES OF DAILY LIVING (ADL)
ADLS_ACUITY_SCORE: 39

## 2025-05-07 NOTE — PLAN OF CARE
Discharge instruction given, iv's removed. Patient wheeled down to vehicle by staff. Filled meds given for home use.

## 2025-05-07 NOTE — PLAN OF CARE
"Occupational Therapy Discharge Summary    Reason for therapy discharge:    Discharged to home with home therapy.    Progress towards therapy goal(s). See goals on Care Plan in [facility] electronic health record for goal details.  Patient was making progress towards goals at time of discharge.    Therapy recommendation(s):    Continued therapy is recommended.  Rationale/Recommendations:  per treating OT \"The patient presents below baseline with generalized weakness and decreased activity tolerance. The patient improving and now demonstrating functional mobility and ADLs in room with supervision to minimal assist. Anticipate that the patient will be appropriate for return home with assist as needed for heavier tasks\".    **This patient was not seen by writing OT, information for discharge summary taken from treating OT's notes.**     "

## 2025-05-07 NOTE — PLAN OF CARE
"Assumed care from [id number]      A & O x 4, continues with heparin gtt @ 1600 units/hr. Usual dialysis schedule MWF, did receive dialysis on 5/6. Continues with IV abx. Denies pain. Next Hep Xa draw this AM. Up stand assist with activity        Goal Outcome Evaluation:      Plan of Care Reviewed With: patient    Overall Patient Progress: improvingOverall Patient Progress: improving      Problem: Adult Inpatient Plan of Care  Goal: Plan of Care Review  Description: The Plan of Care Review/Shift note should be completed every shift.  The Outcome Evaluation is a brief statement about your assessment that the patient is improving, declining, or no change.  This information will be displayed automatically on your shiftnote.  The Plan of Care Review/Shift note should be completed every shift.  The Outcome Evaluation is a brief statement about your assessment that the patient is improving, declining, or no change.  This information will be displayed automatically on your shiftnote.  Outcome: Progressing  Flowsheets (Taken 5/7/2025 0516)  Plan of Care Reviewed With: patient  Overall Patient Progress: improving  Goal: Patient-Specific Goal (Individualized)  Description: You can add care plan individualizations to a care plan. Examples of Individualization might be:  \"Parent requests to be called daily at 9am for status\", \"I have a hard time hearing out of my right ear\", or \"Do not touch me to wake me up as it startlesme\".  You can add care plan individualizations to a care plan. Examples of Individualization might be:  \"Parent requests to be called daily at 9am for status\", \"I have a hard time hearing out of my right ear\", or \"Do not touch me to wake me up as it startlesme\".  Outcome: Progressing  Goal: Absence of Hospital-Acquired Illness or Injury  Outcome: Progressing  Intervention: Identify and Manage Fall Risk  Recent Flowsheet Documentation  Taken 5/7/2025 0220 by Jaci Reyes RN  Safety Promotion/Fall Prevention: " safety round/check completed  Intervention: Prevent Skin Injury  Recent Flowsheet Documentation  Taken 5/7/2025 0220 by Jaci Reyes RN  Body Position: position changed independently  Intervention: Prevent Infection  Recent Flowsheet Documentation  Taken 5/7/2025 0220 by Jaci Reyes RN  Infection Prevention: hand hygiene promoted  Goal: Optimal Comfort and Wellbeing  Outcome: Progressing  Intervention: Provide Person-Centered Care  Recent Flowsheet Documentation  Taken 5/7/2025 0220 by Jaci Reyes RN  Trust Relationship/Rapport:   care explained   choices provided  Goal: Readiness for Transition of Care  Outcome: Progressing

## 2025-05-07 NOTE — DISCHARGE INSTRUCTIONS
HOMECARE NOTE:   Your doctor has ordered home care to help you after your hospital stay.  This service will be provided by [facility]. The staff will contact you to schedule your first visit. If you have any question, or have not received a call within 48 hours of discharge, please call them at [phone]. *please see homecare quality ratings for all homecares in your area at www.medicare.gov/care-compare

## 2025-05-07 NOTE — PLAN OF CARE
"Goal Outcome Evaluation:      Plan of Care Reviewed With: patient    Overall Patient Progress: improvingOverall Patient Progress: improving    Outcome Evaluation: hep gtt infusing. HD today nothng removed, c/o back pain, prn IV meds given.    Pt transferred from ICU this shift, A & O x 4, VSS, Tele SR, LS clear, o2 sat 96 97% RA. Regular diet, three PIV. HD CVC access/ right side, dressing CDI.  Up SBA/gb.  PT/OT following.  Plan: TBD.  Will continue POC and monitoring.   Problem: Adult Inpatient Plan of Care  Goal: Plan of Care Review  Description: The Plan of Care Review/Shift note should be completed every shift.  The Outcome Evaluation is a brief statement about your assessment that the patient is improving, declining, or no change.  This information will be displayed automatically on your shiftnote.  The Plan of Care Review/Shift note should be completed every shift.  The Outcome Evaluation is a brief statement about your assessment that the patient is improving, declining, or no change.  This information will be displayed automatically on your shiftnote.  Outcome: Progressing  Flowsheets (Taken 5/6/2025 0747)  Outcome Evaluation: hep gtt infusing. HD today nothng removed, c/o back pain, prn IV meds given.  Plan of Care Reviewed With: patient  Overall Patient Progress: improving  Goal: Patient-Specific Goal (Individualized)  Description: You can add care plan individualizations to a care plan. Examples of Individualization might be:  \"Parent requests to be called daily at 9am for status\", \"I have a hard time hearing out of my right ear\", or \"Do not touch me to wake me up as it startlesme\".  You can add care plan individualizations to a care plan. Examples of Individualization might be:  \"Parent requests to be called daily at 9am for status\", \"I have a hard time hearing out of my right ear\", or \"Do not touch me to wake me up as it startlesme\".  Outcome: Progressing  Goal: Absence of Hospital-Acquired Illness or " Injury  Outcome: Progressing  Goal: Optimal Comfort and Wellbeing  Outcome: Progressing  Intervention: Monitor Pain and Promote Comfort  Recent Flowsheet Documentation  Taken 5/6/2025 2212 by Mehul Marsh RN  Pain Management Interventions: medication (see MAR)  Goal: Readiness for Transition of Care  Outcome: Progressing     Problem: Sepsis/Septic Shock  Goal: Optimal Coping  Outcome: Progressing  Goal: Absence of Bleeding  Outcome: Progressing  Goal: Blood Glucose Level Within Targeted Range  Outcome: Progressing  Goal: Absence of Infection Signs and Symptoms  Outcome: Progressing  Intervention: Promote Recovery  Recent Flowsheet Documentation  Taken 5/6/2025 2032 by Mehul Marsh RN  Activity Management: ambulated to bathroom  Goal: Optimal Nutrition Intake  Outcome: Progressing     Problem: Infection  Goal: Absence of Infection Signs and Symptoms  Outcome: Progressing     Problem: Skin Injury Risk Increased  Goal: Skin Health and Integrity  Outcome: Progressing  Intervention: Plan: Nurse Driven Intervention: Moisture Management  Recent Flowsheet Documentation  Taken 5/6/2025 2000 by Mehul Marsh RN  Moisture Interventions:   No brief in bed   Incontinence pad   Perineal cleanser  Bathing/Skin Care:   electrode patches/site rotation   moisturizer applied  Intervention: Optimize Skin Protection  Recent Flowsheet Documentation  Taken 5/6/2025 2032 by Mehul Masrh RN  Activity Management: ambulated to bathroom     Problem: Delirium  Goal: Optimal Coping  Outcome: Progressing  Goal: Improved Behavioral Control  Outcome: Progressing  Goal: Improved Attention and Thought Clarity  Outcome: Progressing  Goal: Improved Sleep  Outcome: Progressing

## 2025-05-07 NOTE — PROGRESS NOTES
Care Management Discharge Note    Discharge Date: 05/07/2025       Discharge Disposition:  Home  Discharge Services:  Home Care RN/PT/OT w [facility], continued OP dialysis [facility]  Discharge Transportation: family or friend will provide    Patient/family educated on Medicare website which has current facility and service quality ratings:  yes    Education Provided on the Discharge Plan:  yes  Persons Notified of Discharge Plans: patient  Patient/Family in Agreement with the Plan:  yes    Handoff Referral Completed: No, handoff not indicated or clinically appropriate    Additional Information:  Discharge order in place and signed today. HH PT/OT orders on discharge order.     Pt in agreement w HH PT/OT, has no agency preference. CM sent referral. [facility] contact info added to AVS.     Per chart review pt dialyzes at [facility].     Pt denied additional needs for discharge at this time.    Addendum 1335: [facility] accepted for HH PT/OT, pt and hospitalist charissa bryan RN. Discharge orders sent to agency.     Savanah Lema RN, BSN  Inpatient Care Coordination  [facility]  [phone]

## 2025-05-07 NOTE — PROGRESS NOTES
Renal Medicine       Anticipated discharge per patient  Next dialysis 05.09.25   [facility] notified  Discussed with outpatient Nephrologist Dr. Kelly        Recent Labs   Lab 05/07/25  0745 05/06/25  0523   POTASSIUM 4.2 4.2           CECILIA Headley    [facility]  [phone]

## 2025-05-07 NOTE — DISCHARGE SUMMARY
[facility]  Hospitalist Discharge Summary      Date of Admission:  5/2/2025  Date of Discharge:  5/7/2025  2:27 PM  Discharging Provider: Marine Heck DO  Discharge Service: Hospitalist Service    Discharge Diagnoses   Pyelonephritis  E. coli UTI  E. coli bacteremia  Sepsis  Right atrial thrombus  AL amyloidosis  Lactic acidosis     Clinically Significant Risk Factors          Follow-ups Needed After Discharge   Follow-up Appointments       Follow Up      Follow up with nephrology and dialysis appointments        Hospital Follow-up with Existing Primary Care Provider (PCP)          Schedule Primary Care visit within: 7 Days               Unresulted Labs Ordered in the Past 30 Days of this Admission       Date and Time Order Name Status Description    5/3/2025 11:47 AM Blood Culture Hand, Right Preliminary     5/3/2025 11:47 AM Blood Culture Arm, Left Preliminary         These results will be followed up by hospital medicine service    Discharge Disposition   Discharged to home  Condition at discharge: Stable    Hospital Course   Patient is a 65-year-old female who presented with sepsis and found to have E. coli bacteremia and pyelonephritis.  Was treated with appropriate antibiotics recommended by infectious disease.  Discharged on ciprofloxacin through 5/16.  Incidentally found to have right atrial thrombus as well treated with IV heparin inpatient and transition to [facility] as an outpatient.  Patient showed clinical improvement after treatment and was discharged home.  Stable    Consultations This Hospital Stay   NEPHROLOGY IP CONSULT  PHYSICAL THERAPY ADULT IP CONSULT  OCCUPATIONAL THERAPY ADULT IP CONSULT  CARE MANAGEMENT / SOCIAL WORK IP CONSULT  HEMATOLOGY & ONCOLOGY IP CONSULT  CARE MANAGEMENT / SOCIAL WORK IP CONSULT  INTERVENTIONAL RADIOLOGY ADULT/PEDS IP CONSULT  INFECTIOUS DISEASES IP CONSULT  NURSING TO CONSULT FOR VASCULAR ACCESS CARE IP CONSULT  PHARMACY IP  CONSULT  PHARMACY LIAISON FOR MEDICATION COVERAGE CONSULT  PHARMACY LIAISON FOR MEDICATION COVERAGE CONSULT  PHARMACY DISCHARGE EDUCATION BY PHARMACIST    Code Status   Full Code    Time Spent on this Encounter   I, Marine Heck DO, personally saw the patient today and spent greater than 30 minutes discharging this patient.       Marine Heck DO  [place] [address] MED[facility]  [address] [facility] [address]  Phone: [phone]  Fax: [phone]  ______________________________________________________________________    Physical Exam   Vital Signs: Temp: 97.9  F (36.6  C) Temp src: Oral BP: (!) 155/76 Pulse: 75   Resp: 16 SpO2: 99 % O2 Device: None (Room air)    Weight: 121 lbs 11.2 oz         Primary Care Physician   Alida Lamb    Discharge Orders      Home Care Referral      Reason for your hospital stay    Pyelonephritis, UTI, Right atrial thrombus, E. Coli bacteremia     Activity    Your activity upon discharge: activity as tolerated     Tubes and Drains    Current Tubes and Drains:     CVC Line  Duration           CVC Double Lumen Right Internal jugular Tunneled 55 days              Follow Up    Follow up with nephrology and dialysis appointments     Diet    Follow this diet upon discharge: Current Diet:Orders Placed This Encounter      Snacks/Supplements Adult: Nepro Oral Supplement; Between Meals      Regular Diet Adult     Hospital Follow-up with Existing Primary Care Provider (PCP)            Significant Results and Procedures   Most Recent 3 CBC's:  Recent Labs   Lab Test 05/07/25  0745 05/06/25  0523 05/05/25  0523   WBC 5.9 7.9 8.6   HGB 9.2* 8.2* 8.3*   * 100 99   * 86* 95*     Most Recent 3 BMP's:  Recent Labs   Lab Test 05/07/25  0745 05/06/25  0523 05/05/25  0523    134* 134*   POTASSIUM 4.2 4.2 4.0   CHLORIDE 100 98 97*   CO2 27 23 22   BUN 39.0* 64.7* 59.9*   CR 4.77* 6.75* 6.26*   ANIONGAP 10 13 15   KELTON 8.0* 7.8*  7.6*   * 96 102*     Most Recent 2 LFT's:  Recent Labs   Lab Test 05/03/25  0606 05/02/25  1210   * 17   * 14   ALKPHOS 87 91   BILITOTAL 0.9 1.1   ,   Results for orders placed or performed during the hospital encounter of 05/02/25   CT Abdomen Pelvis w/o Contrast    Narrative    EXAM: CT ABDOMEN PELVIS W/O CONTRAST  LOCATION: [facility]  DATE: 5/2/2025    INDICATION: Back pain chills, nausea and vomiting. History of multiple myeloma, amyloidosis and end-stage renal disease, on hemodialysis.  COMPARISON: PET CT 01/30/2025  TECHNIQUE: CT scan of the abdomen and pelvis was performed without IV contrast. Multiplanar reformats were obtained. Dose reduction techniques were used.  CONTRAST: None.    FINDINGS:   LOWER CHEST: Right IJ hemodialysis catheter tip in high right and. This small bilateral pleural effusions with bibasilar compressive atelectasis is new.    HEPATOBILIARY: Stable hepatic cysts. Layering density within the gallbladder lumen could represent sludge +/- tiny gallstones. No wall thickening nor bile duct dilatation.    PANCREAS: Normal.    SPLEEN: Normal.    ADRENAL GLANDS: Normal.    KIDNEYS/BLADDER: Bilateral perinephric fat stranding/edema, right greater than left. No ureteral calculus nor hydronephrosis. The bladder is nondistended.    BOWEL: Diverticulosis of the colon. No acute inflammatory change. No obstruction. Trace amount of peritoneal fluid in the right abdomen.    LYMPH NODES: No lymphadenopathy.    VASCULATURE: No aortoiliac aneurysm    PELVIC ORGANS: Enlarged myomatous uterus. No focal fluid collection.    MUSCULOSKELETAL: Generalized subcutaneous edema suggesting anasarca.      Impression    IMPRESSION:   1.  Bilateral perinephric fat stranding/edema, greater on the right. No ureteral calculus or hydronephrosis. Findings could may be related to anasarca versus underlying pyelonephritis. Correlation with urinalysis suggested.  2.  Small bilateral  pleural effusions, generalized subcutaneous edema and trace peritoneal fluid may be related to anasarca/fluid overload.  3.  Layering density within the gallbladder lumen suggests layering sludge +/- stones no cholelithiasis nor bile duct dilatation.     Echocardiogram Complete     Value    LVEF  60-65%    [facility]    [id number]  [id number]  [id number]  [id number]^BETTY^SHANE^T     [facility]  Echocardiography Laboratory  [address] [place] [address]     Name: MARV SEGOVIA  MRN: [id number]  : 1960  Study Date: 2025 08:01 AM  Age: 65 yrs  Gender: Female  Patient Location: [facility]  Reason For Study: Atrial Fibrillation  Ordering Physician: SHANE HERNÁNDEZ  Performed By: Mack Paul RDCS     BSA: 1.5 m2  Height: 63 in  Weight: 106 lb  HR: 79  BP: 172/79 mmHg  ______________________________________________________________________________  Procedure  Echocardiogram with two-dimensional, color and spectral Doppler.  ______________________________________________________________________________  Interpretation Summary     Reviewed chart. Patient with Catheter in RA. [facility] Cardiac MRI in 3/25/25  was Negative for Cardiac Amylodosis     The visual ejection fraction is 60-65%.  There is moderate concentric left ventricular hypertrophy.  The left ventricle is normal in structure, function and size.  No regional wall motion abnormalities noted.  The right ventricle is normal in structure, function and size.  Large left pleural effusion  There is no pericardial effusion.     There is an irregular mass in Right atrium measuring 1.7 X 1.2 cm in size. Has  independant motion representing vegetation or thrombus. Possible attachment to  catheter.  ______________________________________________________________________________  Left Ventricle  The left ventricle is normal in structure, function and size. There is  moderate concentric left ventricular hypertrophy. The visual ejection fraction  is  60-65%. No regional wall motion abnormalities noted.     Right Ventricle  The right ventricle is normal in structure, function and size.     Atria  The left atrium is mildly dilated. Right atrial size is normal.     Mitral Valve  There is trace mitral regurgitation.     Tricuspid Valve  No tricuspid regurgitation.     Aortic Valve  No aortic regurgitation is present.     Pulmonic Valve  The pulmonic valve is not well visualized.     Vessels  The aortic root is normal size. Normal size ascending aorta. The inferior vena  cava is normal.     Pericardium  There is no pericardial effusion. Large left pleural effusion.     ______________________________________________________________________________  MMode/2D Measurements & Calculations  IVSd: 1.6 cm  LVIDd: 3.1 cm  LVIDs: 2.0 cm  LVPWd: 1.4 cm  IVC diam: 1.8 cm  FS: 34.8 %     LV mass(C)d: 165.1 grams  LV mass(C)dI: 111.8 grams/m2  Ao root diam: 2.9 cm  LA dimension: 3.7 cm  asc Aorta Diam: 2.9 cm  LA/Ao: 1.3  Ao root diam index Ht(cm/m): 1.8  Ao root diam index BSA (cm/m2): 2.0  Asc Ao diam index BSA (cm/m2): 2.0  Asc Ao diam index Ht(cm/m): 1.8  LA Volume (BP): 57.3 ml  LA Volume Index (BP): 38.7 ml/m2     RV Base: 3.4 cm  RWT: 0.94  TAPSE: 1.3 cm     Doppler Measurements & Calculations  MV E max julio c: 81.3 cm/sec  MV A max julio c: 57.6 cm/sec  MV E/A: 1.4  MV dec slope: 513.3 cm/sec2  MV dec time: 0.16 sec  TR max julio c: 266.6 cm/sec  TR max P.4 mmHg  E/E' av.3  Lateral E/e': 12.7  Medial E/e': 15.9  RV S Julio C: 10.4 cm/sec     ______________________________________________________________________________  Report approved by: CAPO Vergara on 2025 09:43 AM         Echocardiogram Limited    Narrative    [id number]  [id number]  [id number]^ZOEY^ROBBIN^[facility]  Echocardiography Laboratory  [address] [place] [address]     Name: MARV SEGOVIA  MRN: [id number]  : 1960  Study Date: 2025 01:32  PM  Age: 65 yrs  Gender: Female  Patient Location: [facility]  Reason For Study: Cardiac Thrombus  Ordering Physician: ROBBIN GREER  Performed By: Mack Paul, TUNG     ______________________________________________________________________________  Procedure  Bubble Limited Echocardiogram with two-dimensional imaging.  ______________________________________________________________________________  Interpretation Summary     Right atrial mass noted as on previous study.  There are mild bubbles appearing late and do NOT appear to come from septum,  more likely from pulmonary vein suggesting pulmonary AVM or shunting.  ______________________________________________________________________________  ______________________________________________________________________________  Report approved by: Jayna Salas MD on 05/04/2025 03:14 PM     ______________________________________________________________________________          Discharge Medications   Discharge Medication List as of 5/7/2025 12:49 PM        START taking these medications    Details   apixaban ANTICOAGULANT (ELIQUIS) 5 MG tablet Take 2 tablets (10 mg) by mouth 2 times daily for 7 days, THEN 1 tablet (5 mg) 2 times daily., Disp-88 tablet, R-0, E-Prescribe      ciprofloxacin (CIPRO) 500 MG tablet Take 1 tablet (500 mg) by mouth daily for 9 days., Disp-9 tablet, R-0, E-Prescribe           CONTINUE these medications which have NOT CHANGED    Details   acyclovir (ZOVIRAX) 200 MG capsule Take 200 mg by mouth 2 times daily., Historical      amiodarone (PACERONE) 200 MG tablet TAKE ONE TABLET BY MOUTH OR BY FEEDING TUBE OR BY NASOGASTRIC TUBE DAILY, Disp-30 tablet, R-0, E-Prescribe      dexAMETHasone (DECADRON) 4 MG tablet Take 10 tablets (40 mg) by mouth once a week. Take 10 tablets (40 mg) by mouth on Days 1, 8, 15, and 22. Take with food., Disp-40 tablet, R-0, E-Prescribe      dorzolamide-timolol (COSOPT) 2-0.5 % ophthalmic solution Place 1 drop into  both eyes 2 times daily., Historical      Netarsudil-Latanoprost (ROCKLATAN) 0.02-0.005 % SOLN Place 1 drop into both eyes at bedtime., Historical      ondansetron (ZOFRAN ODT) 4 MG ODT tab Take 1 tablet (4 mg) by mouth every 6 hours as needed., Disp-20 tablet, R-0, E-Prescribe      pantoprazole (PROTONIX) 40 MG EC tablet Take 1 tablet (40 mg) by mouth 2 times daily., Disp-60 tablet, R-5, E-PrescribeWhile on dialysis and using heparin for that; ### Profile Rx: patient will contact pharmacy when needed ###      prochlorperazine (COMPAZINE) 10 MG tablet Take 10 mg by mouth every 6 hours as needed for nausea or vomiting., Historical      senna-docusate (SENOKOT-S/PERICOLACE) 8.6-50 MG tablet Take 1 tablet by mouth 2 times daily as needed for constipation., Disp-30 tablet, R-0, E-Prescribe      cycloPHOSphamide (CYTOXAN) 50 MG capsule Take 6 capsules (300 mg) by mouth every 7 days for 4 doses Take on days 1, 8, 15, and 22., Disp-24 capsule, R-0, E-Prescribe           Allergies   Allergies   Allergen Reactions    Amoxicillin Rash     Face; in early adulthood    Tolerated cephalexin 01/2025    Benzoyl Peroxide Swelling     swelling    Ibuprofen Hives     over long duration dev. hives    Penicillins Rash     Face; in early adulthood.    Tolerated cephalexin 01/2025    Face; in early adulthood      Face; in early adulthood  Tolerated cephalexin 01/2025      Face; in early adulthood.  Tolerated cephalexin 01/2025

## 2025-05-07 NOTE — CONSULTS
Patient has [employer] ([employer]) through an employer.    Xarelto/Eliquis:  $0/mo.     [place] Barcenas  Pharmacy Technician/Liaison, Discharge Pharmacy   [phone] (voice or text)  [email or web address]  Pharmacy test claims are estimates and may not reflect final costs.  Suggested alternatives aim to be cost-effective and may not be therapeutically equivalent as this consult is informational and does not constitute medical advice.  Clinical decisions should be made by qualified healthcare providers.

## 2025-05-07 NOTE — PROGRESS NOTES
Oncology Chart Check:    No new recommendations from an oncology standpoint.  Holding treatment for AL amyloidosis this week.  Scheduled for labs and treatment on Tuesday 5/13.  Scheduled to see me on Monday 5/23.  OK with her starting Eliquis for possible right atrial thrombus    Malachi Brown M.D.  [facility]  [phone]

## 2025-05-08 LAB
BACTERIA BLD CULT: NO GROWTH
BACTERIA BLD CULT: NO GROWTH

## 2025-05-08 NOTE — PLAN OF CARE
Physical Therapy Discharge Summary    Reason for therapy discharge:    Discharged to home with home therapy.    Progress towards therapy goal(s). See goals on Care Plan in [facility] electronic health record for goal details.  Goals not met.  Barriers to achieving goals:   discharge from facility.    Therapy recommendation(s):    Continued therapy is recommended.  Rationale/Recommendations:  Home with Home PT recommended by evaluating therapist.

## 2025-05-23 ENCOUNTER — TRANSFERRED RECORDS (OUTPATIENT)
Dept: HEALTH INFORMATION MANAGEMENT | Facility: CLINIC | Age: 65
End: 2025-05-23
Payer: COMMERCIAL

## 2025-05-26 ENCOUNTER — MYC MEDICAL ADVICE (OUTPATIENT)
Dept: FAMILY MEDICINE | Facility: CLINIC | Age: 65
End: 2025-05-26
Payer: COMMERCIAL

## 2025-05-26 DIAGNOSIS — N39.0 RECURRENT UTI: Primary | ICD-10-CM

## 2025-05-26 DIAGNOSIS — Z86.19 HISTORY OF SEPSIS: ICD-10-CM

## 2025-05-27 PROBLEM — N39.0 RECURRENT UTI: Status: ACTIVE | Noted: 2025-05-27

## 2025-05-27 PROBLEM — Z86.19 HISTORY OF SEPSIS: Status: ACTIVE | Noted: 2025-05-27

## 2025-05-27 NOTE — TELEPHONE ENCOUNTER
Please review patients message    Future Appointments 5/27/2025 - 11/23/2025        Date Visit Type Length Department Provider     6/5/2025 11:20 AM ED/HOSP FOLLOW UP 40 min [facility] Alida Lamb MD    Location Instructions:     [facility] is located at [address] [place] [address], along [address] Free parking is available; access the lot by turning north from [address] onto [address], then west onto [address].                   Sooner appointment?     Currently on 5 mg BID eliquis    Routing to provider to review and advise.     Eleni Balderrama RN   [place] [facility]

## 2025-06-05 ENCOUNTER — VIRTUAL VISIT (OUTPATIENT)
Dept: FAMILY MEDICINE | Facility: CLINIC | Age: 65
End: 2025-06-05
Attending: INTERNAL MEDICINE
Payer: COMMERCIAL

## 2025-06-05 DIAGNOSIS — D69.6 THROMBOCYTOPENIA: ICD-10-CM

## 2025-06-05 DIAGNOSIS — N17.9 ACUTE RENAL FAILURE SUPERIMPOSED ON CHRONIC KIDNEY DISEASE, UNSPECIFIED ACUTE RENAL FAILURE TYPE, UNSPECIFIED CKD STAGE: ICD-10-CM

## 2025-06-05 DIAGNOSIS — Z86.19 HISTORY OF SEPSIS: ICD-10-CM

## 2025-06-05 DIAGNOSIS — C90.00 MULTIPLE MYELOMA NOT HAVING ACHIEVED REMISSION (H): ICD-10-CM

## 2025-06-05 DIAGNOSIS — N08: ICD-10-CM

## 2025-06-05 DIAGNOSIS — I10 ESSENTIAL HYPERTENSION WITH GOAL BLOOD PRESSURE LESS THAN 140/90: ICD-10-CM

## 2025-06-05 DIAGNOSIS — N18.6 END-STAGE RENAL DISEASE ON HEMODIALYSIS (H): ICD-10-CM

## 2025-06-05 DIAGNOSIS — N12 PYELONEPHRITIS: ICD-10-CM

## 2025-06-05 DIAGNOSIS — K21.00 GASTROESOPHAGEAL REFLUX DISEASE WITH ESOPHAGITIS WITHOUT HEMORRHAGE: ICD-10-CM

## 2025-06-05 DIAGNOSIS — Z99.2 END-STAGE RENAL DISEASE ON HEMODIALYSIS (H): ICD-10-CM

## 2025-06-05 DIAGNOSIS — N28.9 ACUTE ON CHRONIC RENAL INSUFFICIENCY: ICD-10-CM

## 2025-06-05 DIAGNOSIS — R04.0 SEVERE EPISTAXIS: ICD-10-CM

## 2025-06-05 DIAGNOSIS — E85.4: ICD-10-CM

## 2025-06-05 DIAGNOSIS — N18.9 ACUTE ON CHRONIC RENAL INSUFFICIENCY: ICD-10-CM

## 2025-06-05 DIAGNOSIS — E85.81 AL AMYLOIDOSIS (H): ICD-10-CM

## 2025-06-05 DIAGNOSIS — Z09 HOSPITAL DISCHARGE FOLLOW-UP: Primary | ICD-10-CM

## 2025-06-05 DIAGNOSIS — I51.3 RIGHT ATRIAL THROMBUS: ICD-10-CM

## 2025-06-05 DIAGNOSIS — N18.9 ACUTE RENAL FAILURE SUPERIMPOSED ON CHRONIC KIDNEY DISEASE, UNSPECIFIED ACUTE RENAL FAILURE TYPE, UNSPECIFIED CKD STAGE: ICD-10-CM

## 2025-06-05 RX ORDER — PANTOPRAZOLE SODIUM 40 MG/1
40 TABLET, DELAYED RELEASE ORAL 2 TIMES DAILY
Qty: 180 TABLET | Refills: 4 | Status: SHIPPED | OUTPATIENT
Start: 2025-06-05

## 2025-06-05 NOTE — PATIENT INSTRUCTIONS
[facility]  [address] [place] [address]  Office: [phone]   Fax:    [phone]     Based on our discussion, I have outlined the following instructions for you:      - Keep going to your dialysis sessions on Monday, Wednesday, and Friday. Your health team will check your lab results during these sessions.  - per your oncologist's noted:  Take your Cytoxan pill once a week, 400 mg. Also, take dexamethasone 40 mg once a week. You will receive Darzalex Fast Pro every other week, Velcade once a week, and stomach infusions as part of your treatment.  - Take Eliquis 2.5 mg once a day to help with your blood condition and hopefully help dissolve the blood clot (thrombus) in your left atrium of your heart.   - Take pantoprazole 40 mg twice a day for your stomach. If you start feeling better, you can reduce it to once a day.    Thank you again for your visit, and we look forward to supporting you in your journey to better health.     Return in about 20 weeks (around 10/23/2025) for routine physical and check on amyloidosis , BP, Chronic renal failure, etc. per [facility].     Follow up with your specialists as directed.   If you need anything, please let us know.   If you'd like to have your urine checked for infection, submit an e-visit through your [employer]hart - reason: UTI symptoms.  Then schedule a lab only appt that day for a Urine with micro and Urine culture - you have standing orders for these.               Thank you so much for doing a video visit with us today. And, again, thank you  for choosing our [facility].  Please contact us with any questions that you may have.   We appreciate the opportunity to serve you now and look forward to supporting your healthcare needs for a long time to come!    Our clinic for the foreseeable future and until further notice , will continue to offer virtual visits, including telephone visits, video visits,  and e-visits,  "in addition to in person visits.  Please call to schedule another one if you need it!        Most Sincerely,     Alida Lamb MD                                              To reach your [facility] care team after hours call:   [phone] press #[phone] \"to speak with your care team\".  This will get you to our clinic instead of routing to central [facility]  scheduling.         Our current clinic hours are:         Monday- Thursday   7:00am - 6:00pm  in person.      Friday  7:00am- 5:00pm in person                       Saturday and Sunday : Closed to in person and virtual visits            We have telephone and virtual visit times available in the above time frames Monday through Friday.                                                Phone:  [phone] ( press 2 to speak to your care team).     Our pharmacy hours: Monday  through Friday 8:00am to 5:00pm                        Saturday - 9:00 am to 12 noon         Sunday : Closed.     ###  Please note: at this time we are not accepting any walk-in visits. ###      There is also information available at our web site:  [email or web address]    If your provider ordered any lab tests and you do not receive the results within 10 business days, please call the clinic.    If you need a medication refill please contact your pharmacy.  Please allow 3 business days for your refill to be completed.    Our clinic offers telephone visits and e visits.  Please ask one of your team members to explain more.      Use [employer] (secure email communication and access to your chart) to send your primary care provider a message or make an appointment. Ask someone on your Team how to sign up for [employer].                           "
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
## 2025-06-06 ENCOUNTER — TRANSFERRED RECORDS (OUTPATIENT)
Dept: HEALTH INFORMATION MANAGEMENT | Facility: CLINIC | Age: 65
End: 2025-06-06
Payer: COMMERCIAL

## 2025-07-03 ENCOUNTER — PATIENT OUTREACH (OUTPATIENT)
Dept: CARE COORDINATION | Facility: CLINIC | Age: 65
End: 2025-07-03
Payer: COMMERCIAL

## 2025-07-07 ENCOUNTER — TRANSFERRED RECORDS (OUTPATIENT)
Dept: HEALTH INFORMATION MANAGEMENT | Facility: CLINIC | Age: 65
End: 2025-07-07
Payer: COMMERCIAL

## 2025-08-05 ENCOUNTER — PATIENT OUTREACH (OUTPATIENT)
Dept: CARE COORDINATION | Facility: CLINIC | Age: 65
End: 2025-08-05
Payer: COMMERCIAL

## 2025-08-11 ENCOUNTER — TRANSFERRED RECORDS (OUTPATIENT)
Dept: HEALTH INFORMATION MANAGEMENT | Facility: CLINIC | Age: 65
End: 2025-08-11
Payer: COMMERCIAL

## 2025-08-12 ENCOUNTER — TELEPHONE (OUTPATIENT)
Dept: FAMILY MEDICINE | Facility: CLINIC | Age: 65
End: 2025-08-12
Payer: COMMERCIAL

## 2025-08-20 ENCOUNTER — MYC MEDICAL ADVICE (OUTPATIENT)
Dept: FAMILY MEDICINE | Facility: CLINIC | Age: 65
End: 2025-08-20
Payer: COMMERCIAL

## 2025-08-29 ENCOUNTER — TRANSFERRED RECORDS (OUTPATIENT)
Dept: HEALTH INFORMATION MANAGEMENT | Facility: CLINIC | Age: 65
End: 2025-08-29
Payer: COMMERCIAL

## (undated) DEVICE — CLIP HEMOSTASIS ASSURANCE W16 MM BX00711884

## (undated) DEVICE — KIT ENDO TURNOVER/PROCEDURE W/CLEAN A SCOPE LINERS 103888

## (undated) RX ORDER — FENTANYL CITRATE 50 UG/ML
INJECTION, SOLUTION INTRAMUSCULAR; INTRAVENOUS
Status: DISPENSED
Start: 2025-03-13

## (undated) RX ORDER — CLINDAMYCIN PHOSPHATE 900 MG/50ML
INJECTION, SOLUTION INTRAVENOUS
Status: DISPENSED
Start: 2025-03-13

## (undated) RX ORDER — CEFAZOLIN SODIUM 2 G/50ML
SOLUTION INTRAVENOUS
Status: DISPENSED
Start: 2025-03-13

## (undated) RX ORDER — FENTANYL CITRATE 50 UG/ML
INJECTION, SOLUTION INTRAMUSCULAR; INTRAVENOUS
Status: DISPENSED
Start: 2018-07-27

## (undated) RX ORDER — FENTANYL CITRATE 50 UG/ML
INJECTION, SOLUTION INTRAMUSCULAR; INTRAVENOUS
Status: DISPENSED
Start: 2025-01-14

## (undated) RX ORDER — CLINDAMYCIN PHOSPHATE 900 MG/50ML
INJECTION, SOLUTION INTRAVENOUS
Status: DISPENSED
Start: 2025-02-06

## (undated) RX ORDER — HEPARIN SODIUM 1000 [USP'U]/ML
INJECTION, SOLUTION INTRAVENOUS; SUBCUTANEOUS
Status: DISPENSED
Start: 2025-02-06

## (undated) RX ORDER — FENTANYL CITRATE 50 UG/ML
INJECTION, SOLUTION INTRAMUSCULAR; INTRAVENOUS
Status: DISPENSED
Start: 2025-02-06

## (undated) RX ORDER — LIDOCAINE HYDROCHLORIDE 10 MG/ML
INJECTION, SOLUTION EPIDURAL; INFILTRATION; INTRACAUDAL; PERINEURAL
Status: DISPENSED
Start: 2025-03-13

## (undated) RX ORDER — HEPARIN SODIUM 1000 [USP'U]/ML
INJECTION, SOLUTION INTRAVENOUS; SUBCUTANEOUS
Status: DISPENSED
Start: 2025-03-13

## (undated) RX ORDER — LIDOCAINE HYDROCHLORIDE 10 MG/ML
INJECTION, SOLUTION EPIDURAL; INFILTRATION; INTRACAUDAL; PERINEURAL
Status: DISPENSED
Start: 2025-02-06